# Patient Record
Sex: MALE | Race: WHITE | NOT HISPANIC OR LATINO | Employment: OTHER | URBAN - METROPOLITAN AREA
[De-identification: names, ages, dates, MRNs, and addresses within clinical notes are randomized per-mention and may not be internally consistent; named-entity substitution may affect disease eponyms.]

---

## 2017-03-23 ENCOUNTER — PATIENT OUTREACH (OUTPATIENT)
Dept: HEALTH INFORMATION MANAGEMENT | Facility: OTHER | Age: 77
End: 2017-03-23

## 2017-04-03 NOTE — PROGRESS NOTES
Attempt #:2    Verify PCP: yes    Communication Preference Obtained: yes     Review Care Team: yes    Annual Wellness Visit Scheduling  1. Scheduling Status:Scheduled       Care Gap Scheduling (Attempt to Schedule EACH Overdue Care Gap!)     Health Maintenance Due   Topic Date Due   • Annual Wellness Visit  1940   • COLONOSCOPY  04/17/1990   • IMM ZOSTER VACCINE  04/17/2000   • IMM PNEUMOCOCCAL 65+ (ADULT) LOW/MEDIUM RISK SERIES (2 of 2 - PCV13) 06/29/2016         [employer] Activation: sent activation code  [employer] Thomas: no  Virtual Visits: no  Opt In to Text Messages: no

## 2017-04-19 ENCOUNTER — TELEPHONE (OUTPATIENT)
Dept: MEDICAL GROUP | Facility: MEDICAL CENTER | Age: 77
End: 2017-04-19

## 2017-04-19 NOTE — TELEPHONE ENCOUNTER
ANNUAL WELLNESS VISIT PRE-VISIT PLANNING     1.  Reviewed last PCP office visit assessment and plan notes: No 12/2015    2.  If any orders were placed last visit do we have Results/Consult Notes?        •  Labs? Yes none completed       •  Imaging? No        •  Referrals? No    3.  Patient Care Coordination Note was updated with diagnosis information:  Yes    4.  Patient is due for these Health Maintenance Topics:   Health Maintenance Due   Topic Date Due   • Annual Wellness Visit  1940   • COLONOSCOPY  04/17/1990 will discuss with provider    • IMM ZOSTER VACCINE  04/17/2000   • IMM PNEUMOCOCCAL 65+ (ADULT) LOW/MEDIUM RISK SERIES (2 of 2 - PCV13) 06/29/2016 pt agrees           •  TATIANA letter was faxed to:   for  records    5.  Immunizations were updated in [place] using WebIZ?: Yes       •  Web Iz Recommendations:         •  Is patient due for Tdap/Shingles? No.  If yes, was patient alerted of copay? No    6.  Patient has:       •   Diabetes: TYPE 2    7.  Updated Care Team with [employer] and all specialists?        •   Gait devices, O2, CPAP, etc: no        •   Eye professional: no       •   Other specialists (GYN, cardiology, endo, etc): no    8.  Is patient in need of any refills prior to office visit? Yes       •    Separate refill encounter created?: no    9.  Patient was informed to arrive 15 min prior to their scheduled appointment and bring in their medication bottles? yes    10.  Patient was advised: “This is a free wellness visit. The provider will screen for medical conditions to help you stay healthy. If you have other concerns to address you may be asked to discuss these at a separate visit or there may be an additional fee.”  Yes

## 2017-04-21 ENCOUNTER — HOSPITAL ENCOUNTER (OUTPATIENT)
Facility: MEDICAL CENTER | Age: 77
End: 2017-04-21
Attending: INTERNAL MEDICINE
Payer: MEDICARE

## 2017-04-21 PROCEDURE — 82274 ASSAY TEST FOR BLOOD FECAL: CPT

## 2017-04-21 NOTE — TELEPHONE ENCOUNTER
PVP COMPLETE-    LAST EYE EXAM    ADVANCE DIRECTIVE-set     SPECIALTY COMMENT-updated      VACCINES-pcv-13    DM RN PROBLEM WITH DEVICE   COLON Q'S

## 2017-04-25 ENCOUNTER — OFFICE VISIT (OUTPATIENT)
Dept: MEDICAL GROUP | Facility: MEDICAL CENTER | Age: 77
End: 2017-04-25
Payer: MEDICARE

## 2017-04-25 VITALS
HEIGHT: 71 IN | DIASTOLIC BLOOD PRESSURE: 60 MMHG | OXYGEN SATURATION: 96 % | SYSTOLIC BLOOD PRESSURE: 122 MMHG | HEART RATE: 78 BPM | BODY MASS INDEX: 25.76 KG/M2 | TEMPERATURE: 97.5 F | WEIGHT: 184 LBS

## 2017-04-25 DIAGNOSIS — Z12.11 COLON CANCER SCREENING: ICD-10-CM

## 2017-04-25 DIAGNOSIS — I10 ESSENTIAL HYPERTENSION: ICD-10-CM

## 2017-04-25 DIAGNOSIS — H91.93 BILATERAL HEARING LOSS: ICD-10-CM

## 2017-04-25 DIAGNOSIS — Z23 NEED FOR PNEUMOCOCCAL VACCINATION: ICD-10-CM

## 2017-04-25 DIAGNOSIS — E55.9 VITAMIN D INSUFFICIENCY: ICD-10-CM

## 2017-04-25 DIAGNOSIS — Z85.46 HISTORY OF PROSTATE CANCER: ICD-10-CM

## 2017-04-25 DIAGNOSIS — Z00.00 MEDICARE ANNUAL WELLNESS VISIT, INITIAL: ICD-10-CM

## 2017-04-25 DIAGNOSIS — D64.9 ANEMIA, UNSPECIFIED TYPE: ICD-10-CM

## 2017-04-25 PROCEDURE — G0438 PPPS, INITIAL VISIT: HCPCS | Mod: 25 | Performed by: FAMILY MEDICINE

## 2017-04-25 PROCEDURE — G0009 ADMIN PNEUMOCOCCAL VACCINE: HCPCS | Performed by: FAMILY MEDICINE

## 2017-04-25 PROCEDURE — 90670 PCV13 VACCINE IM: CPT | Performed by: FAMILY MEDICINE

## 2017-04-25 ASSESSMENT — PATIENT HEALTH QUESTIONNAIRE - PHQ9: CLINICAL INTERPRETATION OF PHQ2 SCORE: 0

## 2017-04-25 NOTE — PROGRESS NOTES
Chief Complaint   Patient presents with   • Annual Wellness Visit         HPI:  Sammy Oseguera is a 77 y.o. male here for Medicare Annual Wellness Visit        Patient Active Problem List    Diagnosis Date Noted   • Anemia 12/15/2016   • Bilateral hearing loss 06/29/2015   • History of prostate cancer 06/29/2015   • Type 2 diabetes mellitus, uncontrolled (CMS-[facility]) 01/25/2015   • Hypertension 01/25/2015   • Vitamin D insufficiency 01/25/2015       Current Outpatient Prescriptions   Medication Sig Dispense Refill   • sitagliptan-metformin (JANUMET)  MG per tablet Take 1 Tab by mouth 2 times a day, with meals. 180 Tab 4   • cyanocobalamin (VITAMIN B12) 1000 MCG Tab Take 3 Tabs by mouth every day.     • vitamin D (CHOLECALCIFEROL) 1000 UNIT TABS Take 1,000 Units by mouth every day.       No current facility-administered medications for this visit.        Patient is taking medications as noted in medication list.  Current supplements as per medication list.   Chronic narcotic pain medicines: no    Allergies: Review of patient's allergies indicates no known allergies.    Current social contact/activities: Pt keeps himself busy with daily activities.      Is patient current with immunizations?  No, due for PREVNAR (PCV13) . Patient is interested in receiving PREVNAR (PCV13)  today.     He  reports that he has never smoked. He has never used smokeless tobacco. He reports that he drinks alcohol.  Counseling given: Not Answered        DPA/Advanced Directive:  Patient has Advanced Directive on file.     ROS:    Gait: Uses no assistive device   Ostomy: no   Other tubes: no   Amputations: no   Chronic oxygen use: no   Last eye exam: 4/24/17   Wears hearing aids: yes   : small amount of stress incontinence.       Screening:    DIABETES  1. Records requested for overdue HM topics specifically for diabetes? yes      a. If yes, specifically requested: Retinal eye exam     2. Has patient ever had diabetes education?  Yes      a. If so, when? Over the years       b. If not, is patient interested? no          Depression Screening    Little interest or pleasure in doing things?  0 - not at all  Feeling down, depressed, or hopeless?  0 - not at all  Patient Health Questionnaire Score: 0  If depressive symptoms identified deferred to follow up visit unless specifically addressed in assessment and plan.    Screening for Cognitive Impairment    Three Minute Recall (banana, sunrise, fence)  3/3    Draw clock face with all 12 numbers set to the hand to show 10 minutes past 11 o'clock  1 5/5  If cognitive concerns identified deferred to follow up visit unless specifically addressed in assessment and plan.    Fall Risk Assessment    Has the patient had two or more falls in the last year or any fall with injury in the last year?  Yes  If Fall Risk identified deferred to follow up visit unless specifically addressed in assessment and plan.    Safety Assessment    Throw rugs on floor.  Yes  Handrails on all stairs.  Yes  Good lighting in all hallways.  Yes  Difficulty hearing.  No  Patient counseled about all safety risks that were identified.    Functional Assessment ADLs    Are there any barriers preventing you from cooking for yourself or meeting nutritional needs?  No.    Are there any barriers preventing you from driving safely or obtaining transportation?  No.    Are there any barriers preventing you from using a telephone or calling for help?  No.    Are there any barriers preventing you from shopping?  No.    Are there any barriers preventing you from taking care of your own finances?  No.    Are there any barriers preventing you from managing your medications?  No.    Are currently engaging any exercise or physical activity?  Yes.  Pt walks, lifts weights and yoga.     Health Maintenance Summary                Annual Wellness Visit Overdue 1940     COLONOSCOPY Overdue 4/17/1990     IMM ZOSTER VACCINE Overdue 4/17/2000     IMM  "PNEUMOCOCCAL 65+ (ADULT) LOW/MEDIUM RISK SERIES Overdue 6/29/2016      Done 6/29/2015 Imm Admin: Pneumococcal polysaccharide vaccine (PPSV-23)    RETINAL SCREENING Overdue 4/22/2017      Done 4/22/2016 AMB REFERRAL FOR RETINAL SCREENING EXAM    A1C SCREENING Next Due 5/28/2017      Done 11/28/2016 HEMOGLOBIN A1C (A)     Patient has more history with this topic...    DIABETES MONOFILAMENT / LE EXAM Next Due 7/20/2017      Done 7/20/2016 AMB DIABETIC MONOFILAMENT LOWER EXTREMITY EXAM     Patient has more history with this topic...    FASTING LIPID PROFILE Next Due 11/28/2017      Done 11/28/2016 LIPID PROFILE     Patient has more history with this topic...    URINE ACR / MICROALBUMIN Next Due 11/28/2017      Done 11/28/2016 MICROALBUMIN CREAT RATIO URINE     Patient has more history with this topic...    SERUM CREATININE Next Due 11/28/2017      Done 11/28/2016 COMP METABOLIC PANEL (A)     Patient has more history with this topic...    IMM DTaP/Tdap/Td Vaccine Next Due 2/28/2026      Done 2/28/2016 Imm Admin: Tdap Vaccine          Patient Care Team:  Crow Olivas M.D. as PCP - General (Family Medicine)  Nitesh Young M.D. as Consulting Physician (Ophthalmology)    Social History   Substance Use Topics   • Smoking status: Never Smoker    • Smokeless tobacco: Never Used   • Alcohol Use: 0.0 oz/week     0 Standard drinks or equivalent per week      Comment: 4-5 weekly      Family History   Problem Relation Age of Onset   • Stroke Mother    • Stroke Father      He  has a past medical history of HTN and Type II or unspecified type diabetes mellitus without mention of complication, not stated as uncontrolled.   Past Surgical History   Procedure Laterality Date   • Laparoscopic gastric ulcer over sew     • Prostatectomy, radical retro             Exam:     Blood pressure 122/60, pulse 78, temperature 36.4 °C (97.5 °F), height 1.803 m (5' 11\"), weight 83.462 kg (184 lb), SpO2 96 %. Body mass index is 25.67 " kg/(m^2).    Hearing poor.    Dentition fair  Alert, oriented in no acute distress.  Eye contact is good, speech goal directed, affect calm      Assessment and Plan. The following treatment and monitoring plan is recommended:    1. Medicare annual wellness visit, initial      2. Need for pneumococcal vaccination  - PNEUMOCOCCAL CONJUGATE VACCINE 13-VALENT    3. Uncontrolled type 2 diabetes mellitus without complication, without long-term current use of insulin (CMS-[facility])  Stable. Continue current medication. Follow up in 2 months with labs.    4. Essential hypertension  Controlled off medication. Continue to monitor.    5. Vitamin D insufficiency  Continue supplementation.    6. Bilateral hearing loss  Referral to audiologist for reevaluation.    7. Anemia, unspecified type  Follow up with labs in 2 months.    8. History of prostate cancer  Stable. Continue to monitor.    9. Colon cancer screening  - REFERRAL TO GASTROENTEROLOGY        Services suggested: No services needed at this time  Health Care Screening recommendations as per orders if indicated.  Referrals offered: PT/OT/Nutrition counseling/Behavioral Health/Smoking cessation as per orders if indicated.    Discussion today about general wellness and lifestyle habits:    · Prevent falls and reduce trip hazards; Cautioned about securing or removing rugs.  · Have a working fire alarm and carbon monoxide detector;   · Engage in regular physical activity and social activities       Follow-up: Return in about 2 months (around 6/25/2017) for Diabetes, Short.

## 2017-04-25 NOTE — MR AVS SNAPSHOT
"        Sammy Oseguera   2017 10:00 AM   Office Visit   MRN: [id number]    Department:  [facility]   Dept Phone:  [phone]    Description:  Male : 1940   Provider:  Crow Olivas M.D.; [facility]            Reason for Visit     Annual Wellness Visit           Allergies as of 2017     No Known Allergies      You were diagnosed with     Medicare annual wellness visit, initial   [674534]       Need for pneumococcal vaccination   [333961]       Uncontrolled type 2 diabetes mellitus without complication, without long-term current use of insulin (CMS-[facility])   [1818351]       Essential hypertension   [2540928]       Vitamin D insufficiency   [932786]       Bilateral hearing loss   [930967]       Anemia, unspecified type   [1490281]       History of prostate cancer   [846988]       Colon cancer screening   [655281]         Vital Signs     Blood Pressure Pulse Temperature Height Weight Body Mass Index    122/60 mmHg 78 36.4 °C (97.5 °F) 1.803 m (5' 11\") 83.462 kg (184 lb) 25.67 kg/m2    Oxygen Saturation Smoking Status                96% Never Smoker           Basic Information     Date Of Birth Sex Race Ethnicity Preferred Language    1940 Male White Non- English      Your appointments     Marco 15, 2017 10:00 AM   Established Patient with Crow Olivas M.D.   [facility] ([facility])    [address]  [facility] [address] [phone]           You will be receiving a confirmation call a few days before your appointment from our automated call confirmation system.              Problem List              ICD-10-CM Priority Class Noted - Resolved    Type 2 diabetes mellitus, uncontrolled (CMS-[facility]) E11.65   2015 - Present    Hypertension I10   2015 - Present    Vitamin D insufficiency E55.9   2015 - Present    Bilateral hearing loss H91.93   2015 - Present    History of prostate cancer Z85.46   2015 - " Present    Anemia D64.9   12/15/2016 - Present      Health Maintenance        Date Due Completion Dates    COLONOSCOPY 4/17/1990 ---    IMM ZOSTER VACCINE 4/17/2000 ---    IMM PNEUMOCOCCAL 65+ (ADULT) LOW/MEDIUM RISK SERIES (2 of 2 - PCV13) 6/29/2016 6/29/2015    RETINAL SCREENING 4/22/2017 4/22/2016    A1C SCREENING 5/28/2017 11/28/2016, 7/14/2016, 1/20/2016, 7/16/2015, 4/13/2015, 11/21/2014, 7/29/2014, 4/21/2014, 11/26/2013, 8/29/2013, 4/19/2013, 12/6/2012, 8/30/2012, 5/8/2012    DIABETES MONOFILAMENT / LE EXAM 7/20/2017 7/20/2016, 4/22/2015 (Done)    Override on 4/22/2015: Done    FASTING LIPID PROFILE 11/28/2017 11/28/2016, 2/24/2016, 4/13/2015, 8/30/2012    URINE ACR / MICROALBUMIN 11/28/2017 11/28/2016, 2/24/2016, 4/21/2014, 8/29/2013, 12/6/2012, 8/30/2012    SERUM CREATININE 11/28/2017 11/28/2016, 7/14/2016, 2/24/2016, 10/23/2015, 7/16/2015, 4/13/2015, 11/21/2014, 7/29/2014, 4/21/2014, 11/26/2013, 8/29/2013, 4/19/2013, 12/6/2012, 8/30/2012, 5/8/2012, 11/22/2010    IMM DTaP/Tdap/Td Vaccine (2 - Td) 2/28/2026 2/28/2016            Current Immunizations     13-VALENT PCV PREVNAR 4/25/2017    Influenza Vaccine Adult HD 12/15/2016    Pneumococcal polysaccharide vaccine (PPSV-23) 6/29/2015    Tdap Vaccine 2/28/2016 11:29 AM    Typhoid Vaccine 9/2/2009      Below and/or attached are the medications your provider expects you to take. Review all of your home medications and newly ordered medications with your provider and/or pharmacist. Follow medication instructions as directed by your provider and/or pharmacist. Please keep your medication list with you and share with your provider. Update the information when medications are discontinued, doses are changed, or new medications (including over-the-counter products) are added; and carry medication information at all times in the event of emergency situations     Allergies:  No Known Allergies          Medications  Valid as of: April 25, 2017 - 11:06 AM    Generic Name  Brand Name Tablet Size Instructions for use    Cholecalciferol (Tab) cholecalciferol 1000 UNIT Take 1,000 Units by mouth every day.        Cyanocobalamin (Tab) VITAMIN B12 1000 MCG Take 3 Tabs by mouth every day.        SITagliptin-MetFORMIN HCl (Tab) JANUMET  MG Take 1 Tab by mouth 2 times a day, with meals.        .                 Medicines prescribed today were sent to:     [id number] - [place], [place] - [address]    [address] [place] [address]    Phone: [phone] Fax: [phone]    Open 24 Hours?: No      Medication refill instructions:       If your prescription bottle indicates you have medication refills left, it is not necessary to call your provider’s office. Please contact your pharmacy and they will refill your medication.    If your prescription bottle indicates you do not have any refills left, you may request refills at any time through one of the following ways: The online [email or web address] system (except Urgent Care), by calling your provider’s office, or by asking your pharmacy to contact your provider’s office with a refill request. Medication refills are processed only during regular business hours and may not be available until the next business day. Your provider may request additional information or to have a follow-up visit with you prior to refilling your medication.   *Please Note: Medication refills are assigned a new Rx number when refilled electronically. Your pharmacy may indicate that no refills were authorized even though a new prescription for the same medication is available at the pharmacy. Please request the medicine by name with the pharmacy before contacting your provider for a refill.        Referral     A referral request has been sent to our patient care coordination department. Please allow 3-5 business days for us to process this request and contact you either by phone or mail. If you do not hear from us by the 5th business day, please call us at [phone].           OggiFinogi Access Code: [email or web address]  Expires: 5/25/2017 11:06 AM    OggiFinogi  A secure, online tool to manage your health information     [employer]’s OggiFinogi® is a secure, online tool that connects you to your personalized health information from the privacy of your home -- day or night - making it very easy for you to manage your healthcare. Once the activation process is completed, you can even access your medical information using the OggiFinogi thomas, which is available for free in the Apple Thomas store or Google Play store.     OggiFinogi provides the following levels of access (as shown below):   My Chart Features   [facility] Primary Care Doctor [facility]  Specialists [facility]  Urgent  Care Non-[facility]  Primary Care  Doctor   Email your healthcare team securely and privately 24/7 X X X    Manage appointments: schedule your next appointment; view details of past/upcoming appointments X      Request prescription refills. X      View recent personal medical records, including lab and immunizations X X X X   View health record, including health history, allergies, medications X X X X   Read reports about your outpatient visits, procedures, consult and ER notes X X X X   See your discharge summary, which is a recap of your hospital and/or ER visit that includes your diagnosis, lab results, and care plan. X X       How to register for OggiFinogi:  1. Go to  [email or web address].  2. Click on the Sign Up Now box, which takes you to the New Member Sign Up page. You will need to provide the following information:  a. Enter your OggiFinogi Access Code exactly as it appears at the top of this page. (You will not need to use this code after you’ve completed the sign-up process. If you do not sign up before the expiration date, you must request a new code.)   b. Enter your date of birth.   c. Enter your home email address.   d. Click Submit, and follow the next screen’s instructions.  3. Create a OggiFinogi ID. This  will be your compropago login ID and cannot be changed, so think of one that is secure and easy to remember.  4. Create a compropago password. You can change your password at any time.  5. Enter your Password Reset Question and Answer. This can be used at a later time if you forget your password.   6. Enter your e-mail address. This allows you to receive e-mail notifications when new information is available in compropago.  7. Click Sign Up. You can now view your health information.    For assistance activating your compropago account, call [phone]

## 2017-04-26 DIAGNOSIS — Z12.11 COLON CANCER SCREENING: ICD-10-CM

## 2017-04-27 ENCOUNTER — TELEPHONE (OUTPATIENT)
Dept: MEDICAL GROUP | Facility: MEDICAL CENTER | Age: 77
End: 2017-04-27

## 2017-04-27 LAB — HEMOCCULT STL QL IA: NEGATIVE

## 2017-04-27 NOTE — TELEPHONE ENCOUNTER
----- Message from Crow Olivas M.D. sent at 4/27/2017  9:43 AM PDT -----  Please notify patient that stool test is negative for blood. This test should be repeated annually for colon cancer screening.  Crow Olivas MD

## 2017-05-09 ENCOUNTER — HOSPITAL ENCOUNTER (OUTPATIENT)
Dept: LAB | Facility: MEDICAL CENTER | Age: 77
End: 2017-05-09
Attending: FAMILY MEDICINE
Payer: MEDICARE

## 2017-05-09 DIAGNOSIS — D64.9 ANEMIA, UNSPECIFIED TYPE: ICD-10-CM

## 2017-05-09 DIAGNOSIS — Z85.46 HISTORY OF PROSTATE CANCER: ICD-10-CM

## 2017-05-09 DIAGNOSIS — I10 ESSENTIAL HYPERTENSION: ICD-10-CM

## 2017-05-09 LAB
ALBUMIN SERPL BCP-MCNC: 4.2 G/DL (ref 3.2–4.9)
ALBUMIN/GLOB SERPL: 1.6 G/DL
ALP SERPL-CCNC: 55 U/L (ref 30–99)
ALT SERPL-CCNC: 12 U/L (ref 2–50)
ANION GAP SERPL CALC-SCNC: 8 MMOL/L (ref 0–11.9)
AST SERPL-CCNC: 13 U/L (ref 12–45)
BASOPHILS # BLD AUTO: 0.9 % (ref 0–1.8)
BASOPHILS # BLD: 0.07 K/UL (ref 0–0.12)
BILIRUB SERPL-MCNC: 1 MG/DL (ref 0.1–1.5)
BUN SERPL-MCNC: 19 MG/DL (ref 8–22)
CALCIUM SERPL-MCNC: 9 MG/DL (ref 8.5–10.5)
CHLORIDE SERPL-SCNC: 107 MMOL/L (ref 96–112)
CHOLEST SERPL-MCNC: 114 MG/DL (ref 100–199)
CO2 SERPL-SCNC: 25 MMOL/L (ref 20–33)
CREAT SERPL-MCNC: 1.17 MG/DL (ref 0.5–1.4)
CREAT UR-MCNC: 107.3 MG/DL
EOSINOPHIL # BLD AUTO: 0.2 K/UL (ref 0–0.51)
EOSINOPHIL NFR BLD: 2.5 % (ref 0–6.9)
ERYTHROCYTE [DISTWIDTH] IN BLOOD BY AUTOMATED COUNT: 47.3 FL (ref 35.9–50)
EST. AVERAGE GLUCOSE BLD GHB EST-MCNC: 171 MG/DL
FERRITIN SERPL-MCNC: 27.1 NG/ML (ref 22–322)
GFR SERPL CREATININE-BSD FRML MDRD: >60 ML/MIN/1.73 M 2
GLOBULIN SER CALC-MCNC: 2.6 G/DL (ref 1.9–3.5)
GLUCOSE SERPL-MCNC: 163 MG/DL (ref 65–99)
HBA1C MFR BLD: 7.6 % (ref 0–5.6)
HCT VFR BLD AUTO: 40.2 % (ref 42–52)
HDLC SERPL-MCNC: 41 MG/DL
HGB BLD-MCNC: 13.3 G/DL (ref 14–18)
IMM GRANULOCYTES # BLD AUTO: 0.02 K/UL (ref 0–0.11)
IMM GRANULOCYTES NFR BLD AUTO: 0.2 % (ref 0–0.9)
IRON SATN MFR SERPL: 41 % (ref 15–55)
IRON SERPL-MCNC: 126 UG/DL (ref 50–180)
LDLC SERPL CALC-MCNC: 54 MG/DL
LYMPHOCYTES # BLD AUTO: 1.81 K/UL (ref 1–4.8)
LYMPHOCYTES NFR BLD: 22.4 % (ref 22–41)
MCH RBC QN AUTO: 33.1 PG (ref 27–33)
MCHC RBC AUTO-ENTMCNC: 33.1 G/DL (ref 33.7–35.3)
MCV RBC AUTO: 100 FL (ref 81.4–97.8)
MICROALBUMIN UR-MCNC: 6.1 MG/DL
MICROALBUMIN/CREAT UR: 57 MG/G (ref 0–30)
MONOCYTES # BLD AUTO: 0.61 K/UL (ref 0–0.85)
MONOCYTES NFR BLD AUTO: 7.6 % (ref 0–13.4)
NEUTROPHILS # BLD AUTO: 5.36 K/UL (ref 1.82–7.42)
NEUTROPHILS NFR BLD: 66.4 % (ref 44–72)
NRBC # BLD AUTO: 0 K/UL
NRBC BLD AUTO-RTO: 0 /100 WBC
PLATELET # BLD AUTO: 181 K/UL (ref 164–446)
PMV BLD AUTO: 10.3 FL (ref 9–12.9)
POTASSIUM SERPL-SCNC: 3.8 MMOL/L (ref 3.6–5.5)
PROT SERPL-MCNC: 6.8 G/DL (ref 6–8.2)
PSA SERPL-MCNC: 0.03 NG/ML (ref 0–4)
RBC # BLD AUTO: 4.02 M/UL (ref 4.7–6.1)
SODIUM SERPL-SCNC: 140 MMOL/L (ref 135–145)
TIBC SERPL-MCNC: 311 UG/DL (ref 250–450)
TRIGL SERPL-MCNC: 97 MG/DL (ref 0–149)
WBC # BLD AUTO: 8.1 K/UL (ref 4.8–10.8)

## 2017-05-09 PROCEDURE — 82043 UR ALBUMIN QUANTITATIVE: CPT

## 2017-05-09 PROCEDURE — 84153 ASSAY OF PSA TOTAL: CPT

## 2017-05-09 PROCEDURE — 82728 ASSAY OF FERRITIN: CPT

## 2017-05-09 PROCEDURE — 82570 ASSAY OF URINE CREATININE: CPT

## 2017-05-09 PROCEDURE — 85025 COMPLETE CBC W/AUTO DIFF WBC: CPT

## 2017-05-09 PROCEDURE — 83540 ASSAY OF IRON: CPT

## 2017-05-09 PROCEDURE — 80053 COMPREHEN METABOLIC PANEL: CPT

## 2017-05-09 PROCEDURE — 80061 LIPID PANEL: CPT

## 2017-05-09 PROCEDURE — 36415 COLL VENOUS BLD VENIPUNCTURE: CPT

## 2017-05-09 PROCEDURE — 83550 IRON BINDING TEST: CPT

## 2017-05-09 PROCEDURE — 83036 HEMOGLOBIN GLYCOSYLATED A1C: CPT

## 2017-05-30 ENCOUNTER — OFFICE VISIT (OUTPATIENT)
Dept: MEDICAL GROUP | Facility: MEDICAL CENTER | Age: 77
End: 2017-05-30
Payer: MEDICARE

## 2017-05-30 VITALS
HEART RATE: 77 BPM | SYSTOLIC BLOOD PRESSURE: 154 MMHG | WEIGHT: 185 LBS | TEMPERATURE: 97.8 F | DIASTOLIC BLOOD PRESSURE: 82 MMHG | OXYGEN SATURATION: 100 % | BODY MASS INDEX: 25.9 KG/M2 | HEIGHT: 71 IN

## 2017-05-30 DIAGNOSIS — R60.0 LEG EDEMA, LEFT: ICD-10-CM

## 2017-05-30 DIAGNOSIS — I10 ESSENTIAL HYPERTENSION: ICD-10-CM

## 2017-05-30 DIAGNOSIS — D64.9 ANEMIA, UNSPECIFIED TYPE: ICD-10-CM

## 2017-05-30 DIAGNOSIS — E55.9 VITAMIN D INSUFFICIENCY: ICD-10-CM

## 2017-05-30 PROCEDURE — 1100F PTFALLS ASSESS-DOCD GE2>/YR: CPT | Performed by: FAMILY MEDICINE

## 2017-05-30 PROCEDURE — 4040F PNEUMOC VAC/ADMIN/RCVD: CPT | Performed by: FAMILY MEDICINE

## 2017-05-30 PROCEDURE — G8432 DEP SCR NOT DOC, RNG: HCPCS | Performed by: FAMILY MEDICINE

## 2017-05-30 PROCEDURE — 99214 OFFICE O/P EST MOD 30 MIN: CPT | Performed by: FAMILY MEDICINE

## 2017-05-30 PROCEDURE — 0518F FALL PLAN OF CARE DOCD: CPT | Mod: 8P | Performed by: FAMILY MEDICINE

## 2017-05-30 PROCEDURE — 3288F FALL RISK ASSESSMENT DOCD: CPT | Performed by: FAMILY MEDICINE

## 2017-05-30 PROCEDURE — 1036F TOBACCO NON-USER: CPT | Performed by: FAMILY MEDICINE

## 2017-05-30 PROCEDURE — G8419 CALC BMI OUT NRM PARAM NOF/U: HCPCS | Performed by: FAMILY MEDICINE

## 2017-05-30 NOTE — PROGRESS NOTES
"Subjective:   Sammy Oseguera is a 77 y.o. male here today for diabetes    Anemia  Patient has a macrocytic anemia. He is taking vitamin B-12 supplementation. Anemia has gotten slightly worse over the last 6 months.    Hypertension  Patient is not currently on blood pressure medication.    Leg edema, left  For last 1.5 years patient has noticed leg swelling in the left leg more than the right leg. Right leg is very minimally swollen at times. Left leg is more significantly swollen at the end of the day. When he wakes up in the morning leg swelling is much improved.  He states he has had trauma to his left leg in the past, but denies any surgery of the left lower extremity.    Type 2 diabetes mellitus, uncontrolled  Patient has not been exercising and watching his diet closely. He is on Janumet  grams twice daily, which he is tolerating well. His A1c did increase from 7.1 up to now 7.6.    Vitamin D insufficiency  Patient is taking vitamin D supplementation daily.         Current medicines (including changes today)  Current Outpatient Prescriptions   Medication Sig Dispense Refill   • sitagliptan-metformin (JANUMET)  MG per tablet Take 1 Tab by mouth 2 times a day, with meals. 180 Tab 4   • cyanocobalamin (VITAMIN B12) 1000 MCG Tab Take 3 Tabs by mouth every day.     • vitamin D (CHOLECALCIFEROL) 1000 UNIT TABS Take 1,000 Units by mouth every day.       No current facility-administered medications for this visit.     He  has a past medical history of HTN and Type II or unspecified type diabetes mellitus without mention of complication, not stated as uncontrolled.    ROS   No chest pain, no shortness of breath       Objective:     Blood pressure 154/82, pulse 77, temperature 36.6 °C (97.8 °F), height 1.803 m (5' 11\"), weight 83.915 kg (185 lb), SpO2 100 %. Body mass index is 25.81 kg/(m^2).   Physical Exam:  Constitutional: Alert, no distress.  Skin: Warm, dry, good turgor, no rashes in visible " areas.  Eye: Equal, round and reactive, conjunctiva clear, lids normal.  Psych: Alert and oriented x3, normal affect and mood.  Left leg with +2 pitting pedal edema  Right leg with +1 pitting pedal edema      Assessment and Plan:   The following treatment plan was discussed    1. Uncontrolled type 2 diabetes mellitus without complication, without long-term current use of insulin (CMS-[facility])  Advised diet and exercise. Continue current medication. Follow up with labs in 6 months.  - COMP METABOLIC PANEL; Future  - HEMOGLOBIN A1C; Future  - MICROALBUMIN CREAT RATIO URINE; Future  - LIPID PROFILE; Future    2. Essential hypertension  Slightly elevated today. Follow-up in 6 months to review again, consider ACE inhibitor if still elevated.  - CBC WITH DIFFERENTIAL; Future    3. Vitamin D insufficiency  Controlled. Continue current supplementation.    4. Anemia, unspecified type  Check labs and call with results. If still not clear etiology, consider hematology referral.  - VITAMIN B12; Future  - FOLATE; Future  - IRON/TOTAL IRON BIND; Future  - RETICULOCYTES COUNT; Future    5. Leg edema, left  Most likely venous insufficiency. Ultrasound ordered to rule out DVT, call with results.  - US-EXTREMITY VENOUS UNILATERAL-LOWER LEFT; Future      Followup: Return in about 6 months (around 11/30/2017) for Diabetes, Short.

## 2017-05-30 NOTE — ASSESSMENT & PLAN NOTE
Patient has not been exercising and watching his diet closely. He is on Janumet  grams twice daily, which he is tolerating well. His A1c did increase from 7.1 up to now 7.6.

## 2017-05-30 NOTE — MR AVS SNAPSHOT
"        Sammy Oesguera   2017 9:40 AM   Office Visit   MRN: [id number]    Department:  [facility]   Dept Phone:  [phone]    Description:  Male : 1940   Provider:  Crow Olivas M.D.           Reason for Visit     Knee Injury fell x3days. swollen legs      Allergies as of 2017     No Known Allergies      You were diagnosed with     Uncontrolled type 2 diabetes mellitus without complication, without long-term current use of insulin (CMS-HCC)   [6386093]       Essential hypertension   [8769426]       Vitamin D insufficiency   [906181]       Anemia, unspecified type   [3527363]       Leg edema, left   [397864]         Vital Signs     Blood Pressure Pulse Temperature Height Weight Body Mass Index    154/82 mmHg 77 36.6 °C (97.8 °F) 1.803 m (5' 11\") 83.915 kg (185 lb) 25.81 kg/m2    Oxygen Saturation Smoking Status                100% Never Smoker           Basic Information     Date Of Birth Sex Race Ethnicity Preferred Language    1940 Male White Non- English      Your appointments     Nov 15, 2017 10:00 AM   Established Patient with Crow Olivas M.D.   [facility] ([facility])    [address]  [facility] [address] [phone]           You will be receiving a confirmation call a few days before your appointment from our automated call confirmation system.              Problem List              ICD-10-CM Priority Class Noted - Resolved    Type 2 diabetes mellitus, uncontrolled (CMS-HCC) E11.65   2015 - Present    Hypertension I10   2015 - Present    Vitamin D insufficiency E55.9   2015 - Present    Bilateral hearing loss H91.93   2015 - Present    History of prostate cancer Z85.46   2015 - Present    Anemia D64.9   12/15/2016 - Present    Leg edema, left R60.0   2017 - Present      Health Maintenance        Date Due Completion Dates    IMM ZOSTER VACCINE 2000 ---    RETINAL SCREENING 2017 " 4/22/2016    DIABETES MONOFILAMENT / LE EXAM 7/20/2017 7/20/2016, 4/22/2015 (Done)    Override on 4/22/2015: Done    A1C SCREENING 11/9/2017 5/9/2017, 11/28/2016, 7/14/2016, 1/20/2016, 7/16/2015, 4/13/2015, 11/21/2014, 7/29/2014, 4/21/2014, 11/26/2013, 8/29/2013, 4/19/2013, 12/6/2012, 8/30/2012, 5/8/2012    COLON CANCER SCREENING ANNUAL FIT 4/21/2018 4/21/2017    FASTING LIPID PROFILE 5/9/2018 5/9/2017, 11/28/2016, 2/24/2016, 4/13/2015, 8/30/2012    URINE ACR / MICROALBUMIN 5/9/2018 5/9/2017, 11/28/2016, 2/24/2016, 4/21/2014, 8/29/2013, 12/6/2012, 8/30/2012    SERUM CREATININE 5/9/2018 5/9/2017, 11/28/2016, 7/14/2016, 2/24/2016, 10/23/2015, 7/16/2015, 4/13/2015, 11/21/2014, 7/29/2014, 4/21/2014, 11/26/2013, 8/29/2013, 4/19/2013, 12/6/2012, 8/30/2012, 5/8/2012, 11/22/2010    IMM DTaP/Tdap/Td Vaccine (2 - Td) 2/28/2026 2/28/2016            Current Immunizations     13-VALENT PCV PREVNAR 4/25/2017    Influenza Vaccine Adult HD 12/15/2016    Pneumococcal polysaccharide vaccine (PPSV-23) 6/29/2015    Tdap Vaccine 2/28/2016 11:29 AM    Typhoid Vaccine 9/2/2009      Below and/or attached are the medications your provider expects you to take. Review all of your home medications and newly ordered medications with your provider and/or pharmacist. Follow medication instructions as directed by your provider and/or pharmacist. Please keep your medication list with you and share with your provider. Update the information when medications are discontinued, doses are changed, or new medications (including over-the-counter products) are added; and carry medication information at all times in the event of emergency situations     Allergies:  No Known Allergies          Medications  Valid as of: May 30, 2017 - 10:14 AM    Generic Name Brand Name Tablet Size Instructions for use    Cholecalciferol (Tab) cholecalciferol 1000 UNIT Take 1,000 Units by mouth every day.        Cyanocobalamin (Tab) VITAMIN B12 1000 MCG Take 3 Tabs by mouth  every day.        SITagliptin-MetFORMIN HCl (Tab) JANUMET  MG Take 1 Tab by mouth 2 times a day, with meals.        .                 Medicines prescribed today were sent to:     [id number] - [place], [place] - [address]    [address] [place] [address]    Phone: [phone] Fax: [phone]    Open 24 Hours?: No      Medication refill instructions:       If your prescription bottle indicates you have medication refills left, it is not necessary to call your provider’s office. Please contact your pharmacy and they will refill your medication.    If your prescription bottle indicates you do not have any refills left, you may request refills at any time through one of the following ways: The online [employer] system (except Urgent Care), by calling your provider’s office, or by asking your pharmacy to contact your provider’s office with a refill request. Medication refills are processed only during regular business hours and may not be available until the next business day. Your provider may request additional information or to have a follow-up visit with you prior to refilling your medication.   *Please Note: Medication refills are assigned a new Rx number when refilled electronically. Your pharmacy may indicate that no refills were authorized even though a new prescription for the same medication is available at the pharmacy. Please request the medicine by name with the pharmacy before contacting your provider for a refill.        Your To Do List     Future Labs/Procedures Complete By Expires    CBC WITH DIFFERENTIAL  As directed 5/31/2018    COMP METABOLIC PANEL  As directed 5/31/2018    FOLATE  As directed 5/31/2018    HEMOGLOBIN A1C  As directed 5/31/2018    IRON/TOTAL IRON BIND  As directed 5/31/2018    LIPID PROFILE  As directed 5/31/2018    MICROALBUMIN CREAT RATIO URINE  As directed 5/31/2018    RETICULOCYTES COUNT  As directed 5/30/2018    US-EXTREMITY VENOUS UNILATERAL-LOWER LEFT  As  directed 11/30/2017    VITAMIN B12  As directed 5/31/2018         Medical [employer] Access Code: [email or web address]  Expires: 6/29/2017  9:32 AM    Medical Breakthroughs Fund  A secure, online tool to manage your health information     [employer]’s Medical Breakthrough[employer] Fund® is a secure, online tool that connects you to your personalized health information from the privacy of your home -- day or night - making it very easy for you to manage your healthcare. Once the activation process is completed, you can even access your medical information using the Medical [employer] Fund thomas, which is available for free in the Apple Thomas store or Google Play store.     Medical [employer] Fund provides the following levels of access (as shown below):   My Chart Features   [facility] Doctor [facility]  Specialists [facility]  Urgent  Care Non-[facility]  Primary Care  Doctor   Email your healthcare team securely and privately 24/7 X X X    Manage appointments: schedule your next appointment; view details of past/upcoming appointments X      Request prescription refills. X      View recent personal medical records, including lab and immunizations X X X X   View health record, including health history, allergies, medications X X X X   Read reports about your outpatient visits, procedures, consult and ER notes X X X X   See your discharge summary, which is a recap of your hospital and/or ER visit that includes your diagnosis, lab results, and care plan. X X       How to register for Medical Breakthroughs Fund:  1. Go to  [email or web address].  2. Click on the Sign Up Now box, which takes you to the New Member Sign Up page. You will need to provide the following information:  a. Enter your Medical Breakthroughs Fund Access Code exactly as it appears at the top of this page. (You will not need to use this code after you’ve completed the sign-up process. If you do not sign up before the expiration date, you must request a new code.)   b. Enter your date of birth.   c. Enter your home email address.   d. Click Submit, and follow the next screen’s  instructions.  3. Create a [employer]ist ID. This will be your [employer]ist login ID and cannot be changed, so think of one that is secure and easy to remember.  4. Create a [employer]ist password. You can change your password at any time.  5. Enter your Password Reset Question and Answer. This can be used at a later time if you forget your password.   6. Enter your e-mail address. This allows you to receive e-mail notifications when new information is available in [employer].  7. Click Sign Up. You can now view your health information.    For assistance activating your [employer] account, call [phone]

## 2017-05-30 NOTE — ASSESSMENT & PLAN NOTE
Patient has a macrocytic anemia. He is taking vitamin B-12 supplementation. Anemia has gotten slightly worse over the last 6 months.

## 2017-05-30 NOTE — ASSESSMENT & PLAN NOTE
For last 1.5 years patient has noticed leg swelling in the left leg more than the right leg. Right leg is very minimally swollen at times. Left leg is more significantly swollen at the end of the day. When he wakes up in the morning leg swelling is much improved.  He states he has had trauma to his left leg in the past, but denies any surgery of the left lower extremity.

## 2017-05-31 ENCOUNTER — HOSPITAL ENCOUNTER (OUTPATIENT)
Dept: LAB | Facility: MEDICAL CENTER | Age: 77
End: 2017-05-31
Attending: FAMILY MEDICINE
Payer: MEDICARE

## 2017-05-31 DIAGNOSIS — D64.9 ANEMIA, UNSPECIFIED TYPE: ICD-10-CM

## 2017-05-31 LAB
FOLATE SERPL-MCNC: >23.7 NG/ML
HGB RETIC QN AUTO: 34.5 PG/CELL (ref 29–35)
IMM RETICS NFR: 11.4 % (ref 9.3–17.4)
IRON SATN MFR SERPL: 18 % (ref 15–55)
IRON SERPL-MCNC: 59 UG/DL (ref 50–180)
RETICS # AUTO: 0.05 M/UL (ref 0.04–0.06)
RETICS/RBC NFR: 1.3 % (ref 0.8–2.1)
TIBC SERPL-MCNC: 323 UG/DL (ref 250–450)
VIT B12 SERPL-MCNC: >1500 PG/ML (ref 211–911)

## 2017-05-31 PROCEDURE — 82607 VITAMIN B-12: CPT

## 2017-05-31 PROCEDURE — 83540 ASSAY OF IRON: CPT

## 2017-05-31 PROCEDURE — 82746 ASSAY OF FOLIC ACID SERUM: CPT

## 2017-05-31 PROCEDURE — 83550 IRON BINDING TEST: CPT

## 2017-05-31 PROCEDURE — 85046 RETICYTE/HGB CONCENTRATE: CPT

## 2017-05-31 PROCEDURE — 36415 COLL VENOUS BLD VENIPUNCTURE: CPT

## 2017-06-01 ENCOUNTER — TELEPHONE (OUTPATIENT)
Dept: MEDICAL GROUP | Facility: MEDICAL CENTER | Age: 77
End: 2017-06-01

## 2017-06-01 NOTE — TELEPHONE ENCOUNTER
----- Message from Crow Olivas M.D. sent at 6/1/2017  7:56 AM PDT -----  Please notify the patient that vitamin B-12, folate, iron counts are normal. He should continue with vitamin B-12 supplementation. At this point we will do a recheck on his blood counts in 6 months.  Repeat blood count tests were given recent appointment to be done in 6 months.  Crow Olivas M.D.

## 2017-06-02 ENCOUNTER — TELEPHONE (OUTPATIENT)
Dept: MEDICAL GROUP | Facility: MEDICAL CENTER | Age: 77
End: 2017-06-02

## 2017-06-02 ENCOUNTER — HOSPITAL ENCOUNTER (OUTPATIENT)
Dept: RADIOLOGY | Facility: MEDICAL CENTER | Age: 77
End: 2017-06-02
Attending: FAMILY MEDICINE
Payer: MEDICARE

## 2017-06-02 DIAGNOSIS — M25.562 LEFT KNEE PAIN, UNSPECIFIED CHRONICITY: ICD-10-CM

## 2017-06-02 DIAGNOSIS — H91.93 BILATERAL HEARING LOSS, UNSPECIFIED HEARING LOSS TYPE: ICD-10-CM

## 2017-06-02 DIAGNOSIS — R60.0 LEG EDEMA, LEFT: ICD-10-CM

## 2017-06-02 PROCEDURE — 93971 EXTREMITY STUDY: CPT

## 2017-06-02 NOTE — TELEPHONE ENCOUNTER
----- Message from Rachel Mccormick sent at 6/2/2017  8:21 AM PDT -----  Regarding: REFERRAL TO ORTHOPEDIST  Contact: [phone]  Patient came in hard of hearing says wants dr eng to refer to orthopedic surgeon. Did not say for what. Can you please ask Dr Eng and call the patient?    Thanks,  Rachel

## 2017-06-05 NOTE — TELEPHONE ENCOUNTER
I'm not sure why he wanted a referral to the orthopedic surgeon, we will need to ask him again.  I did place a referral to the audiologist for hearing aids because he was complaining of difficulty hearing.  Crow Olivas M.D.

## 2017-06-06 ENCOUNTER — TELEPHONE (OUTPATIENT)
Dept: MEDICAL GROUP | Facility: MEDICAL CENTER | Age: 77
End: 2017-06-06

## 2017-06-06 NOTE — TELEPHONE ENCOUNTER
----- Message from Crow Olivas M.D. sent at 6/6/2017  7:38 AM PDT -----  Please notify patient that there is no blood clot in legs.  Crow Olivas M.D.

## 2017-09-14 ENCOUNTER — HOSPITAL ENCOUNTER (OUTPATIENT)
Dept: RADIOLOGY | Facility: MEDICAL CENTER | Age: 77
End: 2017-09-14
Attending: FAMILY MEDICINE
Payer: MEDICARE

## 2017-09-14 ENCOUNTER — TELEPHONE (OUTPATIENT)
Dept: MEDICAL GROUP | Facility: MEDICAL CENTER | Age: 77
End: 2017-09-14

## 2017-09-14 ENCOUNTER — OFFICE VISIT (OUTPATIENT)
Dept: MEDICAL GROUP | Facility: MEDICAL CENTER | Age: 77
End: 2017-09-14
Payer: MEDICARE

## 2017-09-14 VITALS
WEIGHT: 176 LBS | OXYGEN SATURATION: 99 % | RESPIRATION RATE: 16 BRPM | HEART RATE: 60 BPM | DIASTOLIC BLOOD PRESSURE: 82 MMHG | BODY MASS INDEX: 24.64 KG/M2 | HEIGHT: 71 IN | TEMPERATURE: 97.6 F | SYSTOLIC BLOOD PRESSURE: 122 MMHG

## 2017-09-14 DIAGNOSIS — R07.81 RIB PAIN: ICD-10-CM

## 2017-09-14 DIAGNOSIS — R09.82 POST-NASAL DRAINAGE: ICD-10-CM

## 2017-09-14 PROCEDURE — 71100 X-RAY EXAM RIBS UNI 2 VIEWS: CPT

## 2017-09-14 PROCEDURE — 99214 OFFICE O/P EST MOD 30 MIN: CPT | Performed by: FAMILY MEDICINE

## 2017-09-14 RX ORDER — FLUTICASONE PROPIONATE 50 MCG
2 SPRAY, SUSPENSION (ML) NASAL DAILY
Qty: 16 G | Refills: 3 | Status: SHIPPED | OUTPATIENT
Start: 2017-09-14 | End: 2018-07-19

## 2017-09-14 NOTE — TELEPHONE ENCOUNTER
----- Message from Crow Olivas M.D. sent at 9/14/2017  4:49 PM PDT -----  Please notify patient and he does have a rib fracture on the right anterior seventh rib. It is nondisplaced and there is no involvement with the lung.  This will take an additional 5-6 weeks to heal.  Crow Olivas M.D.

## 2017-09-14 NOTE — ASSESSMENT & PLAN NOTE
Patient's last A1c was 7.6. He is on Janumet. He denies any neuropathy. He has an appointment with follow-up labs in 2 months.

## 2017-09-14 NOTE — PROGRESS NOTES
"Subjective:   Sammy Oseguera is a 77 y.o. male here today for Rib pain and postnasal drainage    Rib pain  Fell at home about 10 days ago and hit his anterior left side and posterior right side.  Worse with sleeping on his side and going over bumps in car.    Post-nasal drainage  Clearing throat frequently, worse with laying flat, coughing through the night. He has not tried any medication.    Type 2 diabetes mellitus, uncontrolled  Patient's last A1c was 7.6. He is on Janumet. He denies any neuropathy. He has an appointment with follow-up labs in 2 months.         Current medicines (including changes today)  Current Outpatient Prescriptions   Medication Sig Dispense Refill   • fluticasone (FLONASE) 50 MCG/ACT nasal spray Spray 2 Sprays in nose every day. 16 g 3   • JANUMET  MG per tablet TAKE 1 TAB BY MOUTH 2 TIMES A DAY, WITH MEALS. 180 Tab 3   • cyanocobalamin (VITAMIN B12) 1000 MCG Tab Take 3 Tabs by mouth every day.     • vitamin D (CHOLECALCIFEROL) 1000 UNIT TABS Take 1,000 Units by mouth every day.       No current facility-administered medications for this visit.      He  has a past medical history of HTN and Type II or unspecified type diabetes mellitus without mention of complication, not stated as uncontrolled.    ROS   No chest pain, no shortness of breath     Objective:     Blood pressure 122/82, pulse 60, temperature 36.4 °C (97.6 °F), resp. rate 16, height 1.803 m (5' 11\"), weight 79.8 kg (176 lb), SpO2 99 %. Body mass index is 24.55 kg/m².   Physical Exam:  Constitutional: Alert, no distress.  Skin: Warm, dry, good turgor, no rashes in visible areas.  Eye: Equal, round and reactive, conjunctiva clear, lids normal.  ENMT: Lips without lesions, good dentition, oropharynx clear.Thin mucus bilateral naris  Respiratory: Unlabored respiratory effort, lungs clear to auscultation, no wheezes, no ronchi.  Cardiovascular: Normal S1, S2, no murmur, no edema.  Psych: Alert and oriented x3, normal " affect and mood.    Monofilament testing with a 10 gram force: sensation intact: intact bilaterally  Visual Inspection: Feet without maceration, ulcers, fissures.  Pedal pulses: intact bilaterally      Assessment and Plan:   The following treatment plan was discussed    1. Rib pain  X-ray of ribs ordered to evaluate for possible contusion versus fracture.  - [id number] (W/O CXR); Future    2. Post-nasal drainage  New problem. Trial of Flonase. If no improvement consider adding Astelin nasal spray.  - fluticasone (FLONASE) 50 MCG/ACT nasal spray; Spray 2 Sprays in nose every day.  Dispense: 16 g; Refill: 3    3. Uncontrolled type 2 diabetes mellitus without complication, without long-term current use of insulin (CMS-[facility])  Follow-up in 2 months with labs.  - Diabetic Monofilament Lower Extremity Exam      Followup: Return if symptoms worsen or fail to improve.

## 2017-09-14 NOTE — ASSESSMENT & PLAN NOTE
Clearing throat frequently, worse with laying flat, coughing through the night. He has not tried any medication.

## 2017-09-14 NOTE — ASSESSMENT & PLAN NOTE
Fell at home about 10 days ago and hit his anterior left side and posterior right side.  Worse with sleeping on his side and going over bumps in car.

## 2017-11-13 ENCOUNTER — HOSPITAL ENCOUNTER (EMERGENCY)
Facility: MEDICAL CENTER | Age: 77
End: 2017-11-13
Attending: EMERGENCY MEDICINE
Payer: MEDICARE

## 2017-11-13 ENCOUNTER — APPOINTMENT (OUTPATIENT)
Dept: RADIOLOGY | Facility: MEDICAL CENTER | Age: 77
End: 2017-11-13
Attending: EMERGENCY MEDICINE
Payer: MEDICARE

## 2017-11-13 VITALS
HEIGHT: 71 IN | TEMPERATURE: 97.7 F | RESPIRATION RATE: 18 BRPM | BODY MASS INDEX: 30.15 KG/M2 | OXYGEN SATURATION: 94 % | WEIGHT: 215.39 LBS | SYSTOLIC BLOOD PRESSURE: 199 MMHG | HEART RATE: 73 BPM | DIASTOLIC BLOOD PRESSURE: 87 MMHG

## 2017-11-13 DIAGNOSIS — G44.209 TENSION HEADACHE: ICD-10-CM

## 2017-11-13 LAB
ANION GAP SERPL CALC-SCNC: 11 MMOL/L (ref 0–11.9)
BASOPHILS # BLD AUTO: 0.9 % (ref 0–1.8)
BASOPHILS # BLD: 0.08 K/UL (ref 0–0.12)
BUN SERPL-MCNC: 19 MG/DL (ref 8–22)
CALCIUM SERPL-MCNC: 9.4 MG/DL (ref 8.4–10.2)
CHLORIDE SERPL-SCNC: 100 MMOL/L (ref 96–112)
CO2 SERPL-SCNC: 25 MMOL/L (ref 20–33)
CREAT SERPL-MCNC: 1.21 MG/DL (ref 0.5–1.4)
EKG IMPRESSION: NORMAL
EOSINOPHIL # BLD AUTO: 0.13 K/UL (ref 0–0.51)
EOSINOPHIL NFR BLD: 1.5 % (ref 0–6.9)
ERYTHROCYTE [DISTWIDTH] IN BLOOD BY AUTOMATED COUNT: 51.6 FL (ref 35.9–50)
GFR SERPL CREATININE-BSD FRML MDRD: 58 ML/MIN/1.73 M 2
GLUCOSE SERPL-MCNC: 169 MG/DL (ref 65–99)
HCT VFR BLD AUTO: 38.7 % (ref 42–52)
HGB BLD-MCNC: 12.9 G/DL (ref 14–18)
IMM GRANULOCYTES # BLD AUTO: 0.02 K/UL (ref 0–0.11)
IMM GRANULOCYTES NFR BLD AUTO: 0.2 % (ref 0–0.9)
LYMPHOCYTES # BLD AUTO: 1.22 K/UL (ref 1–4.8)
LYMPHOCYTES NFR BLD: 13.7 % (ref 22–41)
MCH RBC QN AUTO: 33.5 PG (ref 27–33)
MCHC RBC AUTO-ENTMCNC: 33.3 G/DL (ref 33.7–35.3)
MCV RBC AUTO: 100.5 FL (ref 81.4–97.8)
MONOCYTES # BLD AUTO: 0.65 K/UL (ref 0–0.85)
MONOCYTES NFR BLD AUTO: 7.3 % (ref 0–13.4)
NEUTROPHILS # BLD AUTO: 6.81 K/UL (ref 1.82–7.42)
NEUTROPHILS NFR BLD: 76.4 % (ref 44–72)
NRBC # BLD AUTO: 0 K/UL
NRBC BLD AUTO-RTO: 0 /100 WBC
PLATELET # BLD AUTO: 178 K/UL (ref 164–446)
PMV BLD AUTO: 10.4 FL (ref 9–12.9)
POTASSIUM SERPL-SCNC: 3.7 MMOL/L (ref 3.6–5.5)
RBC # BLD AUTO: 3.85 M/UL (ref 4.7–6.1)
SODIUM SERPL-SCNC: 136 MMOL/L (ref 135–145)
WBC # BLD AUTO: 8.9 K/UL (ref 4.8–10.8)

## 2017-11-13 PROCEDURE — 96375 TX/PRO/DX INJ NEW DRUG ADDON: CPT | Mod: XU

## 2017-11-13 PROCEDURE — 36415 COLL VENOUS BLD VENIPUNCTURE: CPT

## 2017-11-13 PROCEDURE — 70496 CT ANGIOGRAPHY HEAD: CPT

## 2017-11-13 PROCEDURE — 700102 HCHG RX REV CODE 250 W/ 637 OVERRIDE(OP): Performed by: EMERGENCY MEDICINE

## 2017-11-13 PROCEDURE — 70450 CT HEAD/BRAIN W/O DYE: CPT

## 2017-11-13 PROCEDURE — 99284 EMERGENCY DEPT VISIT MOD MDM: CPT

## 2017-11-13 PROCEDURE — 96374 THER/PROPH/DIAG INJ IV PUSH: CPT | Mod: XU

## 2017-11-13 PROCEDURE — 93005 ELECTROCARDIOGRAM TRACING: CPT | Performed by: EMERGENCY MEDICINE

## 2017-11-13 PROCEDURE — 85025 COMPLETE CBC W/AUTO DIFF WBC: CPT

## 2017-11-13 PROCEDURE — 700111 HCHG RX REV CODE 636 W/ 250 OVERRIDE (IP): Performed by: EMERGENCY MEDICINE

## 2017-11-13 PROCEDURE — A9270 NON-COVERED ITEM OR SERVICE: HCPCS | Performed by: EMERGENCY MEDICINE

## 2017-11-13 PROCEDURE — 80048 BASIC METABOLIC PNL TOTAL CA: CPT

## 2017-11-13 PROCEDURE — 93005 ELECTROCARDIOGRAM TRACING: CPT

## 2017-11-13 RX ORDER — KETOROLAC TROMETHAMINE 30 MG/ML
15 INJECTION, SOLUTION INTRAMUSCULAR; INTRAVENOUS ONCE
Status: COMPLETED | OUTPATIENT
Start: 2017-11-13 | End: 2017-11-13

## 2017-11-13 RX ORDER — BUTALBITAL, ACETAMINOPHEN AND CAFFEINE 50; 325; 40 MG/1; MG/1; MG/1
1 TABLET ORAL ONCE
Status: COMPLETED | OUTPATIENT
Start: 2017-11-13 | End: 2017-11-13

## 2017-11-13 RX ORDER — HYDROCODONE BITARTRATE AND ACETAMINOPHEN 5; 325 MG/1; MG/1
1 TABLET ORAL ONCE
Status: COMPLETED | OUTPATIENT
Start: 2017-11-13 | End: 2017-11-13

## 2017-11-13 RX ORDER — METOCLOPRAMIDE HYDROCHLORIDE 5 MG/ML
5 INJECTION INTRAMUSCULAR; INTRAVENOUS ONCE
Status: COMPLETED | OUTPATIENT
Start: 2017-11-13 | End: 2017-11-13

## 2017-11-13 RX ORDER — DIPHENHYDRAMINE HYDROCHLORIDE 50 MG/ML
25 INJECTION INTRAMUSCULAR; INTRAVENOUS ONCE
Status: COMPLETED | OUTPATIENT
Start: 2017-11-13 | End: 2017-11-13

## 2017-11-13 RX ADMIN — HYDROCODONE BITARTRATE AND ACETAMINOPHEN 1 TABLET: 5; 325 TABLET ORAL at 19:31

## 2017-11-13 RX ADMIN — KETOROLAC TROMETHAMINE 15 MG: 30 INJECTION, SOLUTION INTRAMUSCULAR at 20:28

## 2017-11-13 RX ADMIN — BUTALBITAL, ACETAMINOPHEN, AND CAFFEINE 1 TABLET: 50; 325; 40 TABLET ORAL at 21:23

## 2017-11-13 RX ADMIN — METOCLOPRAMIDE 5 MG: 5 INJECTION, SOLUTION INTRAMUSCULAR; INTRAVENOUS at 20:30

## 2017-11-13 RX ADMIN — DIPHENHYDRAMINE HYDROCHLORIDE 25 MG: 50 INJECTION, SOLUTION INTRAMUSCULAR; INTRAVENOUS at 20:25

## 2017-11-13 ASSESSMENT — PAIN SCALES - GENERAL: PAINLEVEL_OUTOF10: 4

## 2017-11-14 NOTE — ED PROVIDER NOTES
ED Provider Note    CHIEF COMPLAINT  Chief Complaint   Patient presents with   • Headache       HPI  Sammy Oseguera is a 77 y.o. male With history of first-degree heart block and diabetes here with headache for 1 day. Patient reports headache began last night. Patient reports that headache was gradual in onset and reports headache has paroxysms of worsening pain in the last for around a few seconds and then finn without any intervention. Headache is frontal with radiations to the occiput, bilateral. Patient denies any associated lacrimation or changes in vision. Patient denies any associated weakness or numbness or difficulty inability. Patient denies any recent head trauma. Patient denies any associated neck pain or neck stiffness. Patient denies any history of polycystic kidney disease. Patient reports mild nausea without any vomiting. Patient presents to the emergency department as he was unable to resolve headache with aspirin. Patient reports a long-standing history of headaches similar to this however this headache is not resolved as soon as others    REVIEW OF SYSTEMS  Review of Systems   All other systems reviewed and are negative.    See HPI for further details. All other systems are negative.     PAST MEDICAL HISTORY   has a past medical history of HTN and Type II or unspecified type diabetes mellitus without mention of complication, not stated as uncontrolled.    SOCIAL HISTORY  Social History     Social History Main Topics   • Smoking status: Never Smoker   • Smokeless tobacco: Never Used   • Alcohol use 0.0 oz/week      Comment: 4-5 weekly    • Drug use: Unknown   • Sexual activity: Not on file       SURGICAL HISTORY   has a past surgical history that includes laparoscopic gastric ulcer over sew and prostatectomy, radical retro.    CURRENT MEDICATIONS  Home Medications     Reviewed by Henry Terrell R.N. (Registered Nurse) on 11/13/17 at 1823  Med List Status: Complete   Medication Last Dose Status    cyanocobalamin (VITAMIN B12) 1000 MCG Tab  Active   fluticasone (FLONASE) 50 MCG/ACT nasal spray  Active   JANUMET  MG per tablet  Active   vitamin D (CHOLECALCIFEROL) 1000 UNIT TABS  Active                ALLERGIES  No Known Allergies    PHYSICAL EXAM  Physical Exam   Constitutional: He is oriented to person, place, and time. He appears well-developed and well-nourished.   HENT:   Head: Normocephalic and atraumatic.   Eyes: Conjunctivae are normal. Pupils are equal, round, and reactive to light.   Neck: Normal range of motion. Neck supple.   Cardiovascular: Normal rate and regular rhythm.    Pulmonary/Chest: Effort normal and breath sounds normal. No respiratory distress. He has no wheezes. He has no rales. He exhibits no tenderness.   Abdominal: Soft. Bowel sounds are normal. He exhibits no distension and no mass. There is no tenderness. There is no rebound and no guarding.   Neurological: He is alert and oriented to person, place, and time. Coordination normal.   Proximal distal strength is 5 out of 5 bilaterally in upper and lower extremities. No dysmetria, no dysdiadochokinesia, Romberg normal. Normal gait. Equal reflexes. Cranial nerves are intact   Skin: Skin is warm and dry. No rash noted.   Psychiatric: He has a normal mood and affect. His behavior is normal.     EKG reveals first-degree heart block with normal axis normal intervals otherwise and no ST changes consistent with acute regional ischemia    COURSE & MEDICAL DECISION MAKING  Pertinent Labs & Imaging studies reviewed. (See chart for details)  Patient here with gradual onset similar headache to prior with no neck pain or neurologic deficit. I believe subarachnoid hemorrhage is highly unlikely in this very well-appearing patient. Will CT for possible intracranial bleed over also believe this is very unlikely. I believe further CTA and LP is of little utility in this well-appearing patient. Will treat symptomatically and continue to  reassess.  CT head is unremarkable.  Patient reports mild improvement with [place] reports headache persists. We'll give by her cocktail. Adding Toradol that CT results have confirmed no ICH  As patient with ongoing pain will give Fioricet with caffeine for further management. As patient's headache has been difficult to control will check CTA for possible alternative pathology. CT is returned with out any aneurysms, patient with atypical anatomy but no indication for ICH, SAH or acute pathology otherwise  The patient will not drink alcohol nor drive with prescribed medications. The patient will return for worsening symptoms and is stable at the time of discharge. The patient verbalizes understanding and will comply.    FINAL IMPRESSION  1. Tension headache         Electronically signed by: Santana Newton, 11/13/2017 7:25 PM

## 2017-11-14 NOTE — DISCHARGE INSTRUCTIONS
Tension Headache  A tension headache is pain, pressure, or aching felt over the front and sides of the head. Tension headaches often come after stress, feeling worried (anxiety), or feeling sad or down for a while (depressed).  HOME CARE  · Only take medicine as told by your doctor.  · Lie down in a dark, quiet room when you have a headache.  · Keep a journal to find out if certain things bring on headaches. For example, write down:  ¨ What you eat and drink.  ¨ How much sleep you get.  ¨ Any change to your diet or medicines.  · Relax by getting a massage or doing other relaxing activities.  · Put ice or heat packs on the head and neck area as told by your doctor.  · Lessen stress.  · Sit up straight. Do not tighten (tense) your muscles.  · Quit smoking if you smoke.  · Lessen how much alcohol you drink.  · Lessen how much caffeine you drink, or stop drinking caffeine.  · Eat and exercise regularly.  · Get enough sleep.  · Avoid using too much pain medicine.  GET HELP RIGHT AWAY IF:   · Your headache becomes really bad.  · You have a fever.  · You have a stiff neck.  · You have trouble seeing.  · Your muscles are weak, or you lose muscle control.  · You lose your balance or have trouble walking.  · You feel like you will pass out (faint), or you pass out.  · You have really bad symptoms that are different than your first symptoms.  · You have problems with the medicines given to you by your doctor.  · Your medicines do not work.  · Your headache feels different than the other headaches.  · You feel sick to your stomach (nauseous) or throw up (vomit).     This information is not intended to replace advice given to you by your health care provider. Make sure you discuss any questions you have with your health care provider.     Document Released: 03/14/2011 Document Revised: 05/03/2016 Document Reviewed: 04/11/2016  Elsevier Interactive Patient Education ©2016 [employer]

## 2017-11-14 NOTE — ED NOTES
Discharge information provided. Pt verbalized understanding of discharge instructions to follow up with PCP and to return to ER if condition worsens. Pt expressed the awareness of not driving or operating heavy machinery, has ride home with Wife. Pt ambulated out of ER in a steady gait, no additional questions or concerns.

## 2017-11-14 NOTE — ED NOTES
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

## 2017-11-16 ENCOUNTER — HOSPITAL ENCOUNTER (OUTPATIENT)
Dept: LAB | Facility: MEDICAL CENTER | Age: 77
End: 2017-11-16
Attending: FAMILY MEDICINE
Payer: MEDICARE

## 2017-11-16 DIAGNOSIS — I10 ESSENTIAL HYPERTENSION: ICD-10-CM

## 2017-11-16 LAB
ALBUMIN SERPL BCP-MCNC: 4.2 G/DL (ref 3.2–4.9)
ALBUMIN/GLOB SERPL: 1.4 G/DL
ALP SERPL-CCNC: 71 U/L (ref 30–99)
ALT SERPL-CCNC: 16 U/L (ref 2–50)
ANION GAP SERPL CALC-SCNC: 10 MMOL/L (ref 0–11.9)
AST SERPL-CCNC: 15 U/L (ref 12–45)
BASOPHILS # BLD AUTO: 1.1 % (ref 0–1.8)
BASOPHILS # BLD: 0.09 K/UL (ref 0–0.12)
BILIRUB SERPL-MCNC: 1.3 MG/DL (ref 0.1–1.5)
BUN SERPL-MCNC: 15 MG/DL (ref 8–22)
CALCIUM SERPL-MCNC: 9.2 MG/DL (ref 8.5–10.5)
CHLORIDE SERPL-SCNC: 105 MMOL/L (ref 96–112)
CHOLEST SERPL-MCNC: 112 MG/DL (ref 100–199)
CO2 SERPL-SCNC: 25 MMOL/L (ref 20–33)
CREAT SERPL-MCNC: 1.26 MG/DL (ref 0.5–1.4)
CREAT UR-MCNC: 151.4 MG/DL
EOSINOPHIL # BLD AUTO: 0.17 K/UL (ref 0–0.51)
EOSINOPHIL NFR BLD: 2.2 % (ref 0–6.9)
ERYTHROCYTE [DISTWIDTH] IN BLOOD BY AUTOMATED COUNT: 52.3 FL (ref 35.9–50)
EST. AVERAGE GLUCOSE BLD GHB EST-MCNC: 169 MG/DL
GFR SERPL CREATININE-BSD FRML MDRD: 55 ML/MIN/1.73 M 2
GLOBULIN SER CALC-MCNC: 3 G/DL (ref 1.9–3.5)
GLUCOSE SERPL-MCNC: 159 MG/DL (ref 65–99)
HBA1C MFR BLD: 7.5 % (ref 0–5.6)
HCT VFR BLD AUTO: 38.8 % (ref 42–52)
HDLC SERPL-MCNC: 45 MG/DL
HGB BLD-MCNC: 12.4 G/DL (ref 14–18)
IMM GRANULOCYTES # BLD AUTO: 0.03 K/UL (ref 0–0.11)
IMM GRANULOCYTES NFR BLD AUTO: 0.4 % (ref 0–0.9)
LDLC SERPL CALC-MCNC: 53 MG/DL
LYMPHOCYTES # BLD AUTO: 1.22 K/UL (ref 1–4.8)
LYMPHOCYTES NFR BLD: 15.5 % (ref 22–41)
MCH RBC QN AUTO: 32.5 PG (ref 27–33)
MCHC RBC AUTO-ENTMCNC: 32 G/DL (ref 33.7–35.3)
MCV RBC AUTO: 101.6 FL (ref 81.4–97.8)
MICROALBUMIN UR-MCNC: 33.1 MG/DL
MICROALBUMIN/CREAT UR: 219 MG/G (ref 0–30)
MONOCYTES # BLD AUTO: 0.56 K/UL (ref 0–0.85)
MONOCYTES NFR BLD AUTO: 7.1 % (ref 0–13.4)
NEUTROPHILS # BLD AUTO: 5.8 K/UL (ref 1.82–7.42)
NEUTROPHILS NFR BLD: 73.7 % (ref 44–72)
NRBC # BLD AUTO: 0 K/UL
NRBC BLD AUTO-RTO: 0 /100 WBC
PLATELET # BLD AUTO: 169 K/UL (ref 164–446)
PMV BLD AUTO: 10.8 FL (ref 9–12.9)
POTASSIUM SERPL-SCNC: 3.8 MMOL/L (ref 3.6–5.5)
PROT SERPL-MCNC: 7.2 G/DL (ref 6–8.2)
RBC # BLD AUTO: 3.82 M/UL (ref 4.7–6.1)
SODIUM SERPL-SCNC: 140 MMOL/L (ref 135–145)
TRIGL SERPL-MCNC: 71 MG/DL (ref 0–149)
WBC # BLD AUTO: 7.9 K/UL (ref 4.8–10.8)

## 2017-11-16 PROCEDURE — 85025 COMPLETE CBC W/AUTO DIFF WBC: CPT

## 2017-11-16 PROCEDURE — 83036 HEMOGLOBIN GLYCOSYLATED A1C: CPT

## 2017-11-16 PROCEDURE — 80053 COMPREHEN METABOLIC PANEL: CPT

## 2017-11-16 PROCEDURE — 82043 UR ALBUMIN QUANTITATIVE: CPT

## 2017-11-16 PROCEDURE — 36415 COLL VENOUS BLD VENIPUNCTURE: CPT

## 2017-11-16 PROCEDURE — 82570 ASSAY OF URINE CREATININE: CPT

## 2017-11-16 PROCEDURE — 80061 LIPID PANEL: CPT

## 2017-11-20 ENCOUNTER — OFFICE VISIT (OUTPATIENT)
Dept: MEDICAL GROUP | Facility: MEDICAL CENTER | Age: 77
End: 2017-11-20
Payer: MEDICARE

## 2017-11-20 VITALS
HEART RATE: 82 BPM | TEMPERATURE: 97 F | WEIGHT: 180 LBS | DIASTOLIC BLOOD PRESSURE: 86 MMHG | OXYGEN SATURATION: 99 % | HEIGHT: 71 IN | BODY MASS INDEX: 25.2 KG/M2 | RESPIRATION RATE: 16 BRPM | SYSTOLIC BLOOD PRESSURE: 140 MMHG

## 2017-11-20 DIAGNOSIS — N18.30 CKD STAGE 3 SECONDARY TO DIABETES (HCC): ICD-10-CM

## 2017-11-20 DIAGNOSIS — I10 ESSENTIAL HYPERTENSION: ICD-10-CM

## 2017-11-20 DIAGNOSIS — E11.22 CKD STAGE 3 SECONDARY TO DIABETES (HCC): ICD-10-CM

## 2017-11-20 PROCEDURE — 99214 OFFICE O/P EST MOD 30 MIN: CPT | Performed by: FAMILY MEDICINE

## 2017-11-20 RX ORDER — LANCETS 30 GAUGE
EACH MISCELLANEOUS
Qty: 100 EACH | Refills: 3 | Status: SHIPPED | OUTPATIENT
Start: 2017-11-20 | End: 2018-05-09 | Stop reason: SDUPTHER

## 2017-11-20 NOTE — ASSESSMENT & PLAN NOTE
Patient is tolerating Janumet  milligrams twice a day. His A1c has improved slightly from 7.6 down to 7.5. He does have increasing microalbuminuria. Patient is not on an ACE inhibitor or ARB, he would like to try medication to control his blood pressure readings.

## 2017-11-20 NOTE — PROGRESS NOTES
Subjective:   Samym Oseguera is a 77 y.o. male here today for diabetes    Type 2 diabetes mellitus, uncontrolled  Patient is tolerating Janumet  milligrams twice a day. His A1c has improved slightly from 7.6 down to 7.5. He does have increasing microalbuminuria. Patient is not on an ACE inhibitor or ARB, he would like to try medication to control his blood pressure readings.    CKD stage 3 secondary to diabetes (CMS-[facility])  Patient has stage III chronic kidney disease which has been stable over the last couple years.    Hypertension  The patient has controlled his blood pressure through medication in the past. His wife has noticed that his stress and anxiety level have been increased recently.         Patient was seen at the emergency department for frontal headache that resolved with caffeine. He had a CTA that ruled out stroke. Patient has not had recurrence of headache. It was thought that his headache may been triggered from caffeine withdrawal, because the day prior to the headache he did not have any coffee.      Current medicines (including changes today)  Current Outpatient Prescriptions   Medication Sig Dispense Refill   • glucose monitoring kit (FREESTYLE) monitoring kit 1 Each by Does not apply route Once for 1 dose. 1 Kit 0   • Blood Glucose Monitoring Suppl Supplies Misc Test strips order: Test strips for Abbott Freestyle meter. Sig: use once daily. Dx: E11.65 100 Each 3   • Lancets Misc Lancets order: Lancets for Abbott Freestyle meter. Sig: use once daily. Dx: E11.65 100 Each 3   • Acetaminophen-Caffeine 500-65 MG Tab Take 1 Tab by mouth every four hours as needed (for headache). 60 Tab 0   • fluticasone (FLONASE) 50 MCG/ACT nasal spray Spray 2 Sprays in nose every day. 16 g 3   • JANUMET  MG per tablet TAKE 1 TAB BY MOUTH 2 TIMES A DAY, WITH MEALS. 180 Tab 3   • cyanocobalamin (VITAMIN B12) 1000 MCG Tab Take 3 Tabs by mouth every day.     • vitamin D (CHOLECALCIFEROL) 1000 UNIT TABS  "Take 1,000 Units by mouth every day.       No current facility-administered medications for this visit.      He  has a past medical history of HTN and Type II or unspecified type diabetes mellitus without mention of complication, not stated as uncontrolled.    ROS   No chest pain, no shortness of breath       Objective:     Blood pressure 140/86, pulse 82, temperature 36.1 °C (97 °F), resp. rate 16, height 1.803 m (5' 11\"), weight 81.6 kg (180 lb), SpO2 99 %. Body mass index is 25.1 kg/m².   Physical Exam:  Constitutional: Alert, no distress.  Skin: Warm, dry, good turgor, no rashes in visible areas.  Eye: Equal, round and reactive, conjunctiva clear, lids normal.  Psych: Alert and oriented x3, normal affect and mood.        Assessment and Plan:   The following treatment plan was discussed    1. Uncontrolled type 2 diabetes mellitus without complication, without long-term current use of insulin (CMS-HCC)  Controlled for age. Continue current medication. Follow up with labs in 6 months.  - glucose monitoring kit (FREESTYLE) monitoring kit; 1 Each by Does not apply route Once for 1 dose.  Dispense: 1 Kit; Refill: 0  - Blood Glucose Monitoring Suppl Supplies Misc; Test strips order: Test strips for Abbott Freestyle meter. Sig: use once daily. Dx: E11.65  Dispense: 100 Each; Refill: 3  - Lancets Misc; Lancets order: Lancets for Abbott Freestyle meter. Sig: use once daily. Dx: E11.65  Dispense: 100 Each; Refill: 3  - HEMOGLOBIN A1C; Future  - COMP METABOLIC PANEL; Future  - MICROALBUMIN CREAT RATIO URINE; Future  - LIPID PROFILE; Future  - UA/M W/RFLX CULTURE, ROUTINE    2. Essential hypertension  Patient will do a trial of medication, per his request. Follow-up in 6 months.    3. CKD stage 3 secondary to diabetes (CMS-[facility])  Stable. Suggested patient be on lisinopril, but he declines. Follow up in 6 months with labs.      Followup: Return in about 6 months (around 5/20/2018) for Annual, Diabetes.         "

## 2017-12-05 ENCOUNTER — APPOINTMENT (RX ONLY)
Dept: URBAN - METROPOLITAN AREA CLINIC 4 | Facility: CLINIC | Age: 77
Setting detail: DERMATOLOGY
End: 2017-12-05

## 2017-12-05 DIAGNOSIS — L81.4 OTHER MELANIN HYPERPIGMENTATION: ICD-10-CM

## 2017-12-05 DIAGNOSIS — L82.1 OTHER SEBORRHEIC KERATOSIS: ICD-10-CM

## 2017-12-05 PROBLEM — L57.0 ACTINIC KERATOSIS: Status: ACTIVE | Noted: 2017-12-05

## 2017-12-05 PROBLEM — D48.5 NEOPLASM OF UNCERTAIN BEHAVIOR OF SKIN: Status: ACTIVE | Noted: 2017-12-05

## 2017-12-05 PROCEDURE — 11100: CPT

## 2017-12-05 PROCEDURE — 11101: CPT

## 2017-12-05 PROCEDURE — ? BIOPSY BY SHAVE METHOD

## 2017-12-05 PROCEDURE — ? COUNSELING

## 2017-12-05 PROCEDURE — 69100 BIOPSY OF EXTERNAL EAR: CPT

## 2017-12-05 PROCEDURE — 99202 OFFICE O/P NEW SF 15 MIN: CPT | Mod: 25

## 2017-12-05 ASSESSMENT — LOCATION SIMPLE DESCRIPTION DERM
LOCATION SIMPLE: LEFT FOREHEAD
LOCATION SIMPLE: NECK
LOCATION SIMPLE: RIGHT FOREHEAD
LOCATION SIMPLE: FRONTAL SCALP

## 2017-12-05 ASSESSMENT — LOCATION DETAILED DESCRIPTION DERM
LOCATION DETAILED: LEFT SUPERIOR LATERAL NECK
LOCATION DETAILED: MEDIAL FRONTAL SCALP
LOCATION DETAILED: LEFT SUPERIOR FOREHEAD
LOCATION DETAILED: RIGHT SUPERIOR FOREHEAD

## 2017-12-05 ASSESSMENT — LOCATION ZONE DERM
LOCATION ZONE: NECK
LOCATION ZONE: SCALP
LOCATION ZONE: FACE

## 2017-12-05 NOTE — PROCEDURE: BIOPSY BY SHAVE METHOD
Bill [id number] For Specimen Handling/Conveyance To Laboratory?: no
Type Of Destruction Used: Curettage
Wound Care: Petrolatum
Post-Care Instructions: I reviewed with the patient in detail post-care instructions. Patient is to keep the biopsy site dry overnight, and then apply bacitracin twice daily until healed. Patient may apply hydrogen peroxide soaks to remove any crusting.
Consent: Written consent was obtained and risks were reviewed including but not limited to scarring, infection, bleeding, scabbing, incomplete removal, nerve damage and allergy to anesthesia.
Anesthesia Volume In Cc: 0.5
Dressing: bandage
Silver Nitrate Text: The wound bed was treated with silver nitrate after the biopsy was performed.
Detail Level: Detailed
Hemostasis: Drysol
Lab: [id number]
Biopsy Type: H and E
Notification Instructions: Patient will be notified of biopsy results. However, patient instructed to call the office if not contacted within 2 weeks.
Curettage Text: The wound bed was treated with curettage after the biopsy was performed.
Billing Type: Third-Party Bill
Cryotherapy Text: The wound bed was treated with cryotherapy after the biopsy was performed.
Anesthesia Type: 1% lidocaine with 1:100,000 epinephrine and 408mcg clindamycin/ml and a 1:10 solution of 8.4% sodium bicarbonate
X Size Of Lesion In Cm: 0
Electrodesiccation And Curettage Text: The wound bed was treated with electrodesiccation and curettage after the biopsy was performed.
Biopsy Method: Dermablade
Lab Facility: 
Electrodesiccation Text: The wound bed was treated with electrodesiccation after the biopsy was performed.
Size Of Lesion In Cm: 1.5
Size Of Lesion In Cm: 0.7
Size Of Lesion In Cm: 0.9

## 2017-12-13 ENCOUNTER — APPOINTMENT (RX ONLY)
Dept: URBAN - METROPOLITAN AREA CLINIC 4 | Facility: CLINIC | Age: 77
Setting detail: DERMATOLOGY
End: 2017-12-13

## 2017-12-13 PROBLEM — C44.319 BASAL CELL CARCINOMA OF SKIN OF OTHER PARTS OF FACE: Status: ACTIVE | Noted: 2017-12-13

## 2017-12-13 PROCEDURE — ? MOHS SURGERY PHONE CONSULTATION

## 2017-12-13 NOTE — PROCEDURE: MOHS SURGERY PHONE CONSULTATION
Does The Patient Have A Living Will (Optional)?: No
Detail Level: Simple
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
Has The Pathology Report Been Received?: Yes
Date Of Mohs Surgery: 01/08/2018
Patient's Insurance: [employer]
Time Of Mohs Surgery: 11 am
If Yes- What Blood Thinners Do They Take?: Aspirin and aleve as needed
Date Of Surgical Consultation If Needed: 12/12/2017
Office Location Of Mohs Surgery: [facility] [address]
Patient Reported Location: L. central mandibular cheek
Referring Provider: SHIRLEY Ramsey

## 2017-12-14 ENCOUNTER — APPOINTMENT (RX ONLY)
Dept: URBAN - METROPOLITAN AREA CLINIC 4 | Facility: CLINIC | Age: 77
Setting detail: DERMATOLOGY
End: 2017-12-14

## 2017-12-14 DIAGNOSIS — Z48.817 ENCOUNTER FOR SURGICAL AFTERCARE FOLLOWING SURGERY ON THE SKIN AND SUBCUTANEOUS TISSUE: ICD-10-CM

## 2017-12-14 PROCEDURE — 99212 OFFICE O/P EST SF 10 MIN: CPT

## 2017-12-14 PROCEDURE — ? COUNSELING

## 2017-12-14 ASSESSMENT — LOCATION SIMPLE DESCRIPTION DERM: LOCATION SIMPLE: NECK

## 2017-12-14 ASSESSMENT — LOCATION DETAILED DESCRIPTION DERM: LOCATION DETAILED: LEFT SUPERIOR LATERAL NECK

## 2017-12-14 ASSESSMENT — LOCATION ZONE DERM: LOCATION ZONE: NECK

## 2017-12-14 NOTE — HPI: WOUND CHECK
How Is Your Wound Healing?: healing well
Additional History: Biopsy site slightly tender, patient would like site rechecked as he is leaving town.

## 2018-01-08 ENCOUNTER — APPOINTMENT (RX ONLY)
Dept: URBAN - METROPOLITAN AREA CLINIC 20 | Facility: CLINIC | Age: 78
Setting detail: DERMATOLOGY
End: 2018-01-08

## 2018-01-08 VITALS — SYSTOLIC BLOOD PRESSURE: 130 MMHG | DIASTOLIC BLOOD PRESSURE: 75 MMHG | HEART RATE: 73 BPM

## 2018-01-08 DIAGNOSIS — L57.8 OTHER SKIN CHANGES DUE TO CHRONIC EXPOSURE TO NONIONIZING RADIATION: ICD-10-CM

## 2018-01-08 PROBLEM — C44.319 BASAL CELL CARCINOMA OF SKIN OF OTHER PARTS OF FACE: Status: ACTIVE | Noted: 2018-01-08

## 2018-01-08 PROCEDURE — 99212 OFFICE O/P EST SF 10 MIN: CPT | Mod: 25

## 2018-01-08 PROCEDURE — ? COUNSELING

## 2018-01-08 PROCEDURE — 17312 MOHS ADDL STAGE: CPT

## 2018-01-08 PROCEDURE — 13132 CMPLX RPR F/C/C/M/N/AX/G/H/F: CPT

## 2018-01-08 PROCEDURE — 17311 MOHS 1 STAGE H/N/HF/G: CPT

## 2018-01-08 PROCEDURE — ? MOHS SURGERY

## 2018-01-08 NOTE — PROCEDURE: MOHS SURGERY
Stage 3: Number Of Blocks?: 0
Consent Type: Consent 1 (Standard)
O-L Flap Text: The defect edges were debeveled with a #15 scalpel blade.  Given the location of the defect, shape of the defect and the proximity to free margins an O-L flap was deemed most appropriate.  Using a sterile surgical marker, an appropriate advancement flap was drawn incorporating the defect and placing the expected incisions within the relaxed skin tension lines where possible.    The area thus outlined was incised deep to adipose tissue with a #15 scalpel blade.  The skin margins were undermined to an appropriate distance in all directions utilizing iris scissors.
Include Size Of Lesion In Location Indication Statement: Yes
Dermal Autograft Text: The defect edges were debeveled with a #15 scalpel blade.  Given the location of the defect, shape of the defect and the proximity to free margins a dermal autograft was deemed most appropriate.  Using a sterile surgical marker, the primary defect shape was transferred to the donor site. The area thus outlined was incised deep to adipose tissue with a #15 scalpel blade.  The harvested graft was then trimmed of adipose and epidermal tissue until only dermis was left.  The skin graft was then placed in the primary defect and oriented appropriately.
Double Island Pedicle Flap Text: The defect edges were debeveled with a #15 scalpel blade.  Given the location of the defect, shape of the defect and the proximity to free margins a double island pedicle advancement flap was deemed most appropriate.  Using a sterile surgical marker, an appropriate advancement flap was drawn incorporating the defect, outlining the appropriate donor tissue and placing the expected incisions within the relaxed skin tension lines where possible.    The area thus outlined was incised deep to adipose tissue with a #15 scalpel blade.  The skin margins were undermined to an appropriate distance in all directions around the primary defect and laterally outward around the island pedicle utilizing iris scissors.  There was minimal undermining beneath the pedicle flap.
W Plasty Text: The lesion was extirpated to the level of the fat with a #15 scalpel blade.  Given the location of the defect, shape of the defect and the proximity to free margins a W-plasty was deemed most appropriate for repair.  Using a sterile surgical marker, the appropriate transposition arms of the W-plasty were drawn incorporating the defect and placing the expected incisions within the relaxed skin tension lines where possible.    The area thus outlined was incised deep to adipose tissue with a #15 scalpel blade.  The skin margins were undermined to an appropriate distance in all directions utilizing iris scissors.  The opposing transposition arms were then transposed into place in opposite direction and anchored with interrupted buried subcutaneous sutures.
Stage 4: Additional Anesthesia Type: 1% lidocaine with epinephrine
Bcc Histology Text: There were numerous aggregates of basaloid cells.
Mohs Method Verbiage: An incision at a 45 degree angle following the standard Mohs approach was done and the specimen was harvested as a microscopic controlled layer.
Display The Frozen Section And Histology As A Separate Paragraph: No
Referred To Asc For Closure Text (Leave Blank If You Do Not Want): After obtaining clear surgical margins the patient was sent to an ASC for surgical repair.  The patient understands they will receive post-surgical care and follow-up from the ASC physician.
Paramedian Forehead Flap Text: A decision was made to reconstruct the defect utilizing an interpolation axial flap and a staged reconstruction.  A telfa template was made of the defect.  This telfa template was then used to outline the paramedian forehead pedicle flap.  The donor area for the pedicle flap was then injected with anesthesia.  The flap was excised through the skin and subcutaneous tissue down to the layer of the underlying musculature.  The pedicle flap was carefully excised within this deep plane to maintain its blood supply.  The edges of the donor site were undermined.   The donor site was closed in a primary fashion.  The pedicle was then rotated into position and sutured.  Once the tube was sutured into place, adequate blood supply was confirmed with blanching and refill.  The pedicle was then wrapped with xeroform gauze and dressed appropriately with a telfa and gauze bandage to ensure continued blood supply and protect the attached pedicle.
Area M Indication Text: Tumors in this location are included in Area M (cheek, forehead, scalp, neck, jawline and pretibial skin).  Mohs surgery is indicated for tumors in these anatomic locations.
Consent (Nose)/Introductory Paragraph: The rationale for Mohs was explained to the patient and consent was obtained. The risks, benefits and alternatives to therapy were discussed in detail. Specifically, the risks of nasal deformity, changes in the flow of air through the nose, infection, scarring, bleeding, prolonged wound healing, incomplete removal, allergy to anesthesia, nerve injury and recurrence were addressed. Prior to the procedure, the treatment site was clearly identified and confirmed by the patient. All components of Universal Protocol/PAUSE Rule completed.
Consent 3/Introductory Paragraph: I gave the patient a chance to ask questions they had about the procedure.  Following this I explained the Mohs procedure and consent was obtained. The risks, benefits and alternatives to therapy were discussed in detail. Specifically, the risks of infection, scarring, bleeding, prolonged wound healing, incomplete removal, allergy to anesthesia, nerve injury and recurrence were addressed. Prior to the procedure, the treatment site was clearly identified and confirmed by the patient. All components of Universal Protocol/PAUSE Rule completed.
Post-Care Instructions: I reviewed with the patient in detail post-care instructions. Patient is not to engage in any heavy lifting, exercise, or swimming for the next 14 days. Should the patient develop any fevers, chills, bleeding, severe pain patient will contact the office immediately.
Tarsorrhaphy Text: A tarsorrhaphy was performed using Frost sutures.
Secondary Intention Text (Leave Blank If You Do Not Want): The defect will heal with secondary intention.
Estimated Blood Loss (Cc): minimal
Partial Purse String (Intermediate) Text: Given the location of the defect and the characteristics of the surrounding skin an intermediate purse string closure was deemed most appropriate.  Undermining was performed circumfirentially around the surgical defect.  A purse string suture was then placed and tightened. Wound tension only allowed a partial closure of the circular defect.
Rhombic Flap Text: The defect edges were debeveled with a #15 scalpel blade.  Given the location of the defect and the proximity to free margins a rhombic flap was deemed most appropriate.  Using a sterile surgical marker, an appropriate rhombic flap was drawn incorporating the defect.    The area thus outlined was incised deep to adipose tissue with a #15 scalpel blade.  The skin margins were undermined to an appropriate distance in all directions utilizing iris scissors.
Advancement Flap (Single) Text: The defect edges were debeveled with a #15 scalpel blade.  Given the location of the defect and the proximity to free margins a single advancement flap was deemed most appropriate.  Using a sterile surgical marker, an appropriate advancement flap was drawn incorporating the defect and placing the expected incisions within the relaxed skin tension lines where possible.    The area thus outlined was incised deep to adipose tissue with a #15 scalpel blade.  The skin margins were undermined to an appropriate distance in all directions utilizing iris scissors.
O-T Plasty Text: The defect edges were debeveled with a #15 scalpel blade.  Given the location of the defect, shape of the defect and the proximity to free margins an O-T plasty was deemed most appropriate.  Using a sterile surgical marker, an appropriate O-T plasty was drawn incorporating the defect and placing the expected incisions within the relaxed skin tension lines where possible.    The area thus outlined was incised deep to adipose tissue with a #15 scalpel blade.  The skin margins were undermined to an appropriate distance in all directions utilizing iris scissors.
Island Pedicle Flap Text: The defect edges were debeveled with a #15 scalpel blade.  Given the location of the defect, shape of the defect and the proximity to free margins an island pedicle advancement flap was deemed most appropriate.  Using a sterile surgical marker, an appropriate advancement flap was drawn incorporating the defect, outlining the appropriate donor tissue and placing the expected incisions within the relaxed skin tension lines where possible.    The area thus outlined was incised deep to adipose tissue with a #15 scalpel blade.  The skin margins were undermined to an appropriate distance in all directions around the primary defect and laterally outward around the island pedicle utilizing iris scissors.  There was minimal undermining beneath the pedicle flap.
Lazy S Complex Repair Preamble Text (Leave Blank If You Do Not Want): Extensive wide undermining was performed.
Surgeon Performing Repair (Optional): Romy Lombardi MD
Helical Rim Advancement Flap Text: The defect edges were debeveled with a #15 blade scalpel.  Given the location of the defect and the proximity to free margins (helical rim) a double helical rim advancement flap was deemed most appropriate.  Using a sterile surgical marker, the appropriate advancement flaps were drawn incorporating the defect and placing the expected incisions between the helical rim and antihelix where possible.  The area thus outlined was incised through and through with a #15 scalpel blade.  With a skin hook and iris scissors, the flaps were gently and sharply undermined and freed up.
Transposition Flap Text: The defect edges were debeveled with a #15 scalpel blade.  Given the location of the defect and the proximity to free margins a transposition flap was deemed most appropriate.  Using a sterile surgical marker, an appropriate transposition flap was drawn incorporating the defect.    The area thus outlined was incised deep to adipose tissue with a #15 scalpel blade.  The skin margins were undermined to an appropriate distance in all directions utilizing iris scissors.
Hemostasis: Electrocautery
Advancement-Rotation Flap Text: The defect edges were debeveled with a #15 scalpel blade.  Given the location of the defect, shape of the defect and the proximity to free margins an advancement-rotation flap was deemed most appropriate.  Using a sterile surgical marker, an appropriate flap was drawn incorporating the defect and placing the expected incisions within the relaxed skin tension lines where possible. The area thus outlined was incised deep to adipose tissue with a #15 scalpel blade.  The skin margins were undermined to an appropriate distance in all directions utilizing iris scissors.
Hatchet Flap Text: The defect edges were debeveled with a #15 scalpel blade.  Given the location of the defect, shape of the defect and the proximity to free margins a hatchet flap was deemed most appropriate.  Using a sterile surgical marker, an appropriate hatchet flap was drawn incorporating the defect and placing the expected incisions within the relaxed skin tension lines where possible.    The area thus outlined was incised deep to adipose tissue with a #15 scalpel blade.  The skin margins were undermined to an appropriate distance in all directions utilizing iris scissors.
Detail Level: Detailed
Crescentic Intermediate Repair Preamble Text (Leave Blank If You Do Not Want): Undermining was performed with blunt dissection.
Consent (Spinal Accessory)/Introductory Paragraph: The rationale for Mohs was explained to the patient and consent was obtained. The risks, benefits and alternatives to therapy were discussed in detail. Specifically, the risks of damage to the spinal accessory nerve, infection, scarring, bleeding, prolonged wound healing, incomplete removal, allergy to anesthesia, and recurrence were addressed. Prior to the procedure, the treatment site was clearly identified and confirmed by the patient. All components of Universal Protocol/PAUSE Rule completed.
Ftsg Text: The defect edges were debeveled with a #15 scalpel blade.  Given the location of the defect, shape of the defect and the proximity to free margins a full thickness skin graft was deemed most appropriate.  Using a sterile surgical marker, the primary defect shape was transferred to the donor site. The area thus outlined was incised deep to adipose tissue with a #15 scalpel blade.  The harvested graft was then trimmed of adipose tissue until only dermis and epidermis was left.  The skin margins of the secondary defect were undermined to an appropriate distance in all directions utilizing iris scissors.  The secondary defect was closed with interrupted buried subcutaneous sutures.  The skin edges were then re-apposed with running  sutures.  The skin graft was then placed in the primary defect and oriented appropriately.
Epidermal Closure Graft Donor Site (Optional): running
Inflammation Suggestive Of Cancer Camouflage Histology Text: There was a dense lymphocytic infiltrate which prevented adequate histologic evaluation of adjacent structures.
O-Z Plasty Text: The defect edges were debeveled with a #15 scalpel blade.  Given the location of the defect, shape of the defect and the proximity to free margins an O-Z plasty (double transposition flap) was deemed most appropriate.  Using a sterile surgical marker, the appropriate transposition flaps were drawn incorporating the defect and placing the expected incisions within the relaxed skin tension lines where possible.    The area thus outlined was incised deep to adipose tissue with a #15 scalpel blade.  The skin margins were undermined to an appropriate distance in all directions utilizing iris scissors.  Hemostasis was achieved with electrocautery.  The flaps were then transposed into place, one clockwise and the other counterclockwise, and anchored with interrupted buried subcutaneous sutures.
Number Of Stages: 2
A-T Advancement Flap Text: The defect edges were debeveled with a #15 scalpel blade.  Given the location of the defect, shape of the defect and the proximity to free margins an A-T advancement flap was deemed most appropriate.  Using a sterile surgical marker, an appropriate advancement flap was drawn incorporating the defect and placing the expected incisions within the relaxed skin tension lines where possible.    The area thus outlined was incised deep to adipose tissue with a #15 scalpel blade.  The skin margins were undermined to an appropriate distance in all directions utilizing iris scissors.
Closure 3 Information: This tab is for additional flaps and grafts above and beyond our usual structured repairs.  Please note if you enter information here it will not currently bill and you will need to add the billing information manually.
Cartilage Graft Text: The defect edges were debeveled with a #15 scalpel blade.  Given the location of the defect, shape of the defect, the fact the defect involved a full thickness cartilage defect a cartilage graft was deemed most appropriate.  An appropriate donor site was identified, cleansed, and anesthetized. The cartilage graft was then harvested and transferred to the recipient site, oriented appropriately and then sutured into place.  The secondary defect was then repaired using a primary closure.
Consent (Ear)/Introductory Paragraph: The rationale for Mohs was explained to the patient and consent was obtained. The risks, benefits and alternatives to therapy were discussed in detail. Specifically, the risks of ear deformity, infection, scarring, bleeding, prolonged wound healing, incomplete removal, allergy to anesthesia, nerve injury and recurrence were addressed. Prior to the procedure, the treatment site was clearly identified and confirmed by the patient. All components of Universal Protocol/PAUSE Rule completed.
Dressing (No Sutures): dry sterile dressing
Postop Diagnosis: same
No Repair - Repaired With Adjacent Surgical Defect Text (Leave Blank If You Do Not Want): After obtaining clear surgical margins the defect was repaired concurrently with another surgical defect which was in close approximation.
Complex Repair And Flap Additional Text (Will Appearing After The Standard Complex Repair Text): The complex repair was not sufficient to completely close the primary defect. The remaining additional defect was repaired with the flap mentioned below.
Stage 1: Number Of Blocks?: 1
Graft Donor Site Epidermal Sutures (Optional): 5-0 Surgipro
Melolabial Interpolation Flap Text: A decision was made to reconstruct the defect utilizing an interpolation axial flap and a staged reconstruction.  A telfa template was made of the defect.  This telfa template was then used to outline the melolabial interpolation flap.  The donor area for the pedicle flap was then injected with anesthesia.  The flap was excised through the skin and subcutaneous tissue down to the layer of the underlying musculature.  The pedicle flap was carefully excised within this deep plane to maintain its blood supply.  The edges of the donor site were undermined.   The donor site was closed in a primary fashion.  The pedicle was then rotated into position and sutured.  Once the tube was sutured into place, adequate blood supply was confirmed with blanching and refill.  The pedicle was then wrapped with xeroform gauze and dressed appropriately with a telfa and gauze bandage to ensure continued blood supply and protect the attached pedicle.
Z Plasty Text: The lesion was extirpated to the level of the fat with a #15 scalpel blade.  Given the location of the defect, shape of the defect and the proximity to free margins a Z-plasty was deemed most appropriate for repair.  Using a sterile surgical marker, the appropriate transposition arms of the Z-plasty were drawn incorporating the defect and placing the expected incisions within the relaxed skin tension lines where possible.    The area thus outlined was incised deep to adipose tissue with a #15 scalpel blade.  The skin margins were undermined to an appropriate distance in all directions utilizing iris scissors.  The opposing transposition arms were then transposed into place in opposite direction and anchored with interrupted buried subcutaneous sutures.
Referred To Otolaryngology For Closure Text (Leave Blank If You Do Not Want): After obtaining clear surgical margins the patient was sent to otolaryngology for surgical repair.  The patient understands they will receive post-surgical care and follow-up from the referring physician's office.
Closure 2 Information: This tab is for additional flaps and grafts, including complex repair and grafts and complex repair and flaps. You can also specify a different location for the additional defect, if the location is the same you do not need to select a new one. We will insert the automated text for the repair you select below just as we do for solitary flaps and grafts. Please note that at this time if you select a location with a different insurance zone you will need to override the ICD10 and CPT if appropriate.
Mohs Histo Method Verbiage: Each section was then chromacoded and processed in the Mohs lab using the Mohs protocol and submitted for frozen section.
Cheiloplasty (Complex) Text: A decision was made to reconstruct the defect with a  cheiloplasty.  The defect was undermined extensively.  Additional obicularis oris muscle was excised with a 15 blade scalpel.  The defect was converted into a full thickness wedge to facilite a better cosmetic result.  Small vessels were then tied off with 5-0 monocyrl. The obicularis oris, superficial fascia, adipose and dermis were then reapproximated.  After the deeper layers were approximated the epidermis was reapproximated with particular care given to realign the vermilion border.
Trilobed Flap Text: The defect edges were debeveled with a #15 scalpel blade.  Given the location of the defect and the proximity to free margins a trilobed flap was deemed most appropriate.  Using a sterile surgical marker, an appropriate trilobed flap drawn around the defect.    The area thus outlined was incised deep to adipose tissue with a #15 scalpel blade.  The skin margins were undermined to an appropriate distance in all directions utilizing iris scissors.
V-Y Plasty Text: The defect edges were debeveled with a #15 scalpel blade.  Given the location of the defect, shape of the defect and the proximity to free margins an V-Y advancement flap was deemed most appropriate.  Using a sterile surgical marker, an appropriate advancement flap was drawn incorporating the defect and placing the expected incisions within the relaxed skin tension lines where possible.    The area thus outlined was incised deep to adipose tissue with a #15 scalpel blade.  The skin margins were undermined to an appropriate distance in all directions utilizing iris scissors.
Mohs Rapid Report Verbiage: The area of clinically evident tumor was marked with skin marking ink and appropriately hatched.  The initial incision was made following the Mohs approach through the skin.  The specimen was taken to the lab, divided into the necessary number of pieces, chromacoded and processed according to the Mohs protocol.  This was repeated in successive stages until a tumor free defect was achieved.
Ear Star Wedge Flap Text: The defect edges were debeveled with a #15 blade scalpel.  Given the location of the defect and the proximity to free margins (helical rim) an ear star wedge flap was deemed most appropriate.  Using a sterile surgical marker, the appropriate flap was drawn incorporating the defect and placing the expected incisions between the helical rim and antihelix where possible.  The area thus outlined was incised through and through with a #15 scalpel blade.
Full Thickness Lip Wedge Repair (Flap) Text: Given the location of the defect and the proximity to free margins a full thickness wedge repair was deemed most appropriate.  Using a sterile surgical marker, the appropriate repair was drawn incorporating the defect and placing the expected incisions perpendicular to the vermilion border.  The vermilion border was also meticulously outlined to ensure appropriate reapproximation during the repair.  The area thus outlined was incised through and through with a #15 scalpel blade.  The muscularis and dermis were reaproximated with deep sutures following hemostasis. Care was taken to realign the vermilion border before proceeding with the superficial closure.  Once the vermilion was realigned the superfical and mucosal closure was finished.
Wound Care (No Sutures): Petrolatum
Referred To Mid-Level For Closure Text (Leave Blank If You Do Not Want): After obtaining clear surgical margins the patient was sent to a mid-level provider for surgical repair.  The patient understands they will receive post-surgical care and follow-up from the mid-level provider.
Location Indication Override (Is Already Calculated Based On Selected Body Location): Area M
Purse String (Intermediate) Text: Given the location of the defect and the characteristics of the surrounding skin a purse string intermediate closure was deemed most appropriate.  Undermining was performed circumfirentially around the surgical defect.  A purse string suture was then placed and tightened.
Burow's Advancement Flap Text: The defect edges were debeveled with a #15 scalpel blade.  Given the location of the defect and the proximity to free margins a Burow's advancement flap was deemed most appropriate.  Using a sterile surgical marker, the appropriate advancement flap was drawn incorporating the defect and placing the expected incisions within the relaxed skin tension lines where possible.    The area thus outlined was incised deep to adipose tissue with a #15 scalpel blade.  The skin margins were undermined to an appropriate distance in all directions utilizing iris scissors.
Unna Boot Text: An Unna boot was placed to help immobilize the limb and facilitate more rapid healing.
Anesthesia Volume In Cc: 6
Tissue Cultured Epidermal Autograft Text: The defect edges were debeveled with a #15 scalpel blade.  Given the location of the defect, shape of the defect and the proximity to free margins a tissue cultured epidermal autograft was deemed most appropriate.  The graft was then trimmed to fit the size of the defect.  The graft was then placed in the primary defect and oriented appropriately.
Keystone Flap Text: The defect edges were debeveled with a #15 scalpel blade.  Given the location of the defect, shape of the defect a keystone flap was deemed most appropriate.  Using a sterile surgical marker, an appropriate keystone flap was drawn incorporating the defect, outlining the appropriate donor tissue and placing the expected incisions within the relaxed skin tension lines where possible. The area thus outlined was incised deep to adipose tissue with a #15 scalpel blade.  The skin margins were undermined to an appropriate distance in all directions around the primary defect and laterally outward around the flap utilizing iris scissors.
Graft Donor Site Dermal Sutures (Optional): 5-0 Polysorb
Melolabial Transposition Flap Text: The defect edges were debeveled with a #15 scalpel blade.  Given the location of the defect and the proximity to free margins a melolabial flap was deemed most appropriate.  Using a sterile surgical marker, an appropriate melolabial transposition flap was drawn incorporating the defect.    The area thus outlined was incised deep to adipose tissue with a #15 scalpel blade.  The skin margins were undermined to an appropriate distance in all directions utilizing iris scissors.
Cheek-To-Nose Interpolation Flap Text: A decision was made to reconstruct the defect utilizing an interpolation axial flap and a staged reconstruction.  A telfa template was made of the defect.  This telfa template was then used to outline the Cheek-To-Nose Interpolation flap.  The donor area for the pedicle flap was then injected with anesthesia.  The flap was excised through the skin and subcutaneous tissue down to the layer of the underlying musculature.  The interpolation flap was carefully excised within this deep plane to maintain its blood supply.  The edges of the donor site were undermined.   The donor site was closed in a primary fashion.  The pedicle was then rotated into position and sutured.  Once the tube was sutured into place, adequate blood supply was confirmed with blanching and refill.  The pedicle was then wrapped with xeroform gauze and dressed appropriately with a telfa and gauze bandage to ensure continued blood supply and protect the attached pedicle.
Consent 1/Introductory Paragraph: The rationale for Mohs was explained to the patient and consent was obtained. The risks, benefits and alternatives to therapy were discussed in detail. Specifically, the risks of infection, scarring, bleeding, prolonged wound healing, incomplete removal, allergy to anesthesia, nerve injury and recurrence were addressed. Prior to the procedure, the treatment site was clearly identified and confirmed by the patient. All components of Universal Protocol/PAUSE Rule completed.
Consent (Lip)/Introductory Paragraph: The rationale for Mohs was explained to the patient and consent was obtained. The risks, benefits and alternatives to therapy were discussed in detail. Specifically, the risks of lip deformity, changes in the oral aperture, infection, scarring, bleeding, prolonged wound healing, incomplete removal, allergy to anesthesia, nerve injury and recurrence were addressed. Prior to the procedure, the treatment site was clearly identified and confirmed by the patient. All components of Universal Protocol/PAUSE Rule completed.
Referring Physician (Optional): Destiny Ramsey
Bilobed Flap Text: The defect edges were debeveled with a #15 scalpel blade.  Given the location of the defect and the proximity to free margins a bilobe flap was deemed most appropriate.  Using a sterile surgical marker, an appropriate bilobe flap drawn around the defect.    The area thus outlined was incised deep to adipose tissue with a #15 scalpel blade.  The skin margins were undermined to an appropriate distance in all directions utilizing iris scissors.
Consent 2/Introductory Paragraph: Mohs surgery was explained to the patient and consent was obtained. The risks, benefits and alternatives to therapy were discussed in detail. Specifically, the risks of infection, scarring, bleeding, prolonged wound healing, incomplete removal, allergy to anesthesia, nerve injury and recurrence were addressed. Prior to the procedure, the treatment site was clearly identified and confirmed by the patient. All components of Universal Protocol/PAUSE Rule completed.
Bi-Rhombic Flap Text: The defect edges were debeveled with a #15 scalpel blade.  Given the location of the defect and the proximity to free margins a bi-rhombic flap was deemed most appropriate.  Using a sterile surgical marker, an appropriate rhombic flap was drawn incorporating the defect. The area thus outlined was incised deep to adipose tissue with a #15 scalpel blade.  The skin margins were undermined to an appropriate distance in all directions utilizing iris scissors.
Consent (Temporal Branch)/Introductory Paragraph: The rationale for Mohs was explained to the patient and consent was obtained. The risks, benefits and alternatives to therapy were discussed in detail. Specifically, the risks of damage to the temporal branch of the facial nerve, infection, scarring, bleeding, prolonged wound healing, incomplete removal, allergy to anesthesia, and recurrence were addressed. Prior to the procedure, the treatment site was clearly identified and confirmed by the patient. All components of Universal Protocol/PAUSE Rule completed.
Consent (Marginal Mandibular)/Introductory Paragraph: The rationale for Mohs was explained to the patient and consent was obtained. The risks, benefits and alternatives to therapy were discussed in detail. Specifically, the risks of damage to the marginal mandibular branch of the facial nerve, infection, scarring, bleeding, prolonged wound healing, incomplete removal, allergy to anesthesia, and recurrence were addressed. Prior to the procedure, the treatment site was clearly identified and confirmed by the patient. All components of Universal Protocol/PAUSE Rule completed.
Home Suture Removal Text: Patient was provided instructions on removing sutures and will remove their sutures at home.  If they have any questions or difficulties they will call the office.
Suture Removal: 7 days
Localized Dermabrasion With Wire Brush Text: The patient was draped in routine manner.  Localized dermabrasion using 3 x 17 mm wire brush was performed in routine manner to papillary dermis. This spot dermabrasion is being performed to complete skin cancer reconstruction. It also will eliminate the other sun damaged precancerous cells that are known to be part of the regional effect of a lifetime's worth of sun exposure. This localized dermabrasion is therapeutic and should not be considered cosmetic in any regard.
Rotation Flap Text: The defect edges were debeveled with a #15 scalpel blade.  Given the location of the defect, shape of the defect and the proximity to free margins a rotation flap was deemed most appropriate.  Using a sterile surgical marker, an appropriate rotation flap was drawn incorporating the defect and placing the expected incisions within the relaxed skin tension lines where possible.    The area thus outlined was incised deep to adipose tissue with a #15 scalpel blade.  The skin margins were undermined to an appropriate distance in all directions utilizing iris scissors.
Purse String (Simple) Text: Given the location of the defect and the characteristics of the surrounding skin a purse string closure was deemed most appropriate.  Undermining was performed circumfirentially around the surgical defect.  A purse string suture was then placed and tightened.
Repair Performed By Another Provider Text (Leave Blank If You Do Not Want): After obtaining clear surgical margins the defect was repaired by another provider.
Epidermal Autograft Text: The defect edges were debeveled with a #15 scalpel blade.  Given the location of the defect, shape of the defect and the proximity to free margins an epidermal autograft was deemed most appropriate.  Using a sterile surgical marker, the primary defect shape was transferred to the donor site. The epidermal graft was then harvested.  The skin graft was then placed in the primary defect and oriented appropriately.
Mauc Instructions: By selecting yes to the question below the MAUC number will be added into the note.  This will be calculated automatically based on the diagnosis chosen, the size entered, the body zone selected (H,M,L) and the specific indications you chose. You will also have the option to override the Mohs AUC if you disagree with the automatically calculated number and this option is found in the Case Summary tab.
Simple / Intermediate / Complex Repair - Final Wound Length In Cm: 4.1
Island Pedicle Flap-Requiring Vessel Identification Text: The defect edges were debeveled with a #15 scalpel blade.  Given the location of the defect, shape of the defect and the proximity to free margins an island pedicle advancement flap was deemed most appropriate.  Using a sterile surgical marker, an appropriate advancement flap was drawn, based on the axial vessel mentioned above, incorporating the defect, outlining the appropriate donor tissue and placing the expected incisions within the relaxed skin tension lines where possible.    The area thus outlined was incised deep to adipose tissue with a #15 scalpel blade.  The skin margins were undermined to an appropriate distance in all directions around the primary defect and laterally outward around the island pedicle utilizing iris scissors.  There was minimal undermining beneath the pedicle flap.
Skin Substitute Text: The defect edges were debeveled with a #15 scalpel blade.  Given the location of the defect, shape of the defect and the proximity to free margins a skin substitute graft was deemed most appropriate.  The graft material was trimmed to fit the size of the defect. The graft was then placed in the primary defect and oriented appropriately.
Previous Accession (Optional): [id number].
V-Y Flap Text: The defect edges were debeveled with a #15 scalpel blade.  Given the location of the defect, shape of the defect and the proximity to free margins a V-Y flap was deemed most appropriate.  Using a sterile surgical marker, an appropriate advancement flap was drawn incorporating the defect and placing the expected incisions within the relaxed skin tension lines where possible.    The area thus outlined was incised deep to adipose tissue with a #15 scalpel blade.  The skin margins were undermined to an appropriate distance in all directions utilizing iris scissors.
Wound Care: Aquaphor
Consent (Near Eyelid Margin)/Introductory Paragraph: The rationale for Mohs was explained to the patient and consent was obtained. The risks, benefits and alternatives to therapy were discussed in detail. Specifically, the risks of ectropion or eyelid deformity, infection, scarring, bleeding, prolonged wound healing, incomplete removal, allergy to anesthesia, nerve injury and recurrence were addressed. Prior to the procedure, the treatment site was clearly identified and confirmed by the patient. All components of Universal Protocol/PAUSE Rule completed.
Dressing: pressure dressing with telfa
Star Wedge Flap Text: The defect edges were debeveled with a #15 scalpel blade.  Given the location of the defect, shape of the defect and the proximity to free margins a star wedge flap was deemed most appropriate.  Using a sterile surgical marker, an appropriate rotation flap was drawn incorporating the defect and placing the expected incisions within the relaxed skin tension lines where possible. The area thus outlined was incised deep to adipose tissue with a #15 scalpel blade.  The skin margins were undermined to an appropriate distance in all directions utilizing iris scissors.
Posterior Auricular Interpolation Flap Text: A decision was made to reconstruct the defect utilizing an interpolation axial flap and a staged reconstruction.  A telfa template was made of the defect.  This telfa template was then used to outline the posterior auricular interpolation flap.  The donor area for the pedicle flap was then injected with anesthesia.  The flap was excised through the skin and subcutaneous tissue down to the layer of the underlying musculature.  The pedicle flap was carefully excised within this deep plane to maintain its blood supply.  The edges of the donor site were undermined.   The donor site was closed in a primary fashion.  The pedicle was then rotated into position and sutured.  Once the tube was sutured into place, adequate blood supply was confirmed with blanching and refill.  The pedicle was then wrapped with xeroform gauze and dressed appropriately with a telfa and gauze bandage to ensure continued blood supply and protect the attached pedicle.
Deep Sutures: 4-0 Polysorb
Surgeon/Pathologist Verbiage (Will Incorporate Name Of Surgeon From Intro If Not Blank): operated in two distinct and integrated capacities as the surgeon and pathologist.
Alar Island Pedicle Flap Text: The defect edges were debeveled with a #15 scalpel blade.  Given the location of the defect, shape of the defect and the proximity to the alar rim an island pedicle advancement flap was deemed most appropriate.  Using a sterile surgical marker, an appropriate advancement flap was drawn incorporating the defect, outlining the appropriate donor tissue and placing the expected incisions within the nasal ala running parallel to the alar rim. The area thus outlined was incised with a #15 scalpel blade.  The skin margins were undermined minimally to an appropriate distance in all directions around the primary defect and laterally outward around the island pedicle utilizing iris scissors.  There was minimal undermining beneath the pedicle flap.
Bcc Infiltrative Histology Text: There were numerous aggregates of basaloid cells demonstrating an infiltrative pattern.
Area H Indication Text: Tumors in this location are included in Area H (eyelids, eyebrows, nose, lips, chin, ear, pre-auricular, post-auricular, temple, genitalia, hands, feet, ankles and areola).  Tissue conservation is critical in these anatomic locations.
Dorsal Nasal Flap Text: The defect edges were debeveled with a #15 scalpel blade.  Given the location of the defect and the proximity to free margins a dorsal nasal flap was deemed most appropriate.  Using a sterile surgical marker, an appropriate dorsal nasal flap was drawn around the defect.    The area thus outlined was incised deep to adipose tissue with a #15 scalpel blade.  The skin margins were undermined to an appropriate distance in all directions utilizing iris scissors.
Referred To Plastics For Closure Text (Leave Blank If You Do Not Want): After obtaining clear surgical margins the patient was sent to plastics for surgical repair.  The patient understands they will receive post-surgical care and follow-up from the referring physician's office.
Repair Type: Complex Repair
Island Pedicle Flap With Canthal Suspension Text: The defect edges were debeveled with a #15 scalpel blade.  Given the location of the defect, shape of the defect and the proximity to free margins an island pedicle advancement flap was deemed most appropriate.  Using a sterile surgical marker, an appropriate advancement flap was drawn incorporating the defect, outlining the appropriate donor tissue and placing the expected incisions within the relaxed skin tension lines where possible. The area thus outlined was incised deep to adipose tissue with a #15 scalpel blade.  The skin margins were undermined to an appropriate distance in all directions around the primary defect and laterally outward around the island pedicle utilizing iris scissors.  There was minimal undermining beneath the pedicle flap. A suspension suture was placed in the canthal tendon to prevent tension and prevent ectropion.
Modified Advancement Flap Text: The defect edges were debeveled with a #15 scalpel blade.  Given the location of the defect, shape of the defect and the proximity to free margins a modified advancement flap was deemed most appropriate.  Using a sterile surgical marker, an appropriate advancement flap was drawn incorporating the defect and placing the expected incisions within the relaxed skin tension lines where possible.    The area thus outlined was incised deep to adipose tissue with a #15 scalpel blade.  The skin margins were undermined to an appropriate distance in all directions utilizing iris scissors.
Mohs Case Number: [id number]
H Plasty Text: Given the location of the defect, shape of the defect and the proximity to free margins a H-plasty was deemed most appropriate for repair.  Using a sterile surgical marker, the appropriate advancement arms of the H-plasty were drawn incorporating the defect and placing the expected incisions within the relaxed skin tension lines where possible. The area thus outlined was incised deep to adipose tissue with a #15 scalpel blade. The skin margins were undermined to an appropriate distance in all directions utilizing iris scissors.  The opposing advancement arms were then advanced into place in opposite direction and anchored with interrupted buried subcutaneous sutures.
No Residual Tumor Seen Histology Text: There were no malignant cells seen in the sections examined.
Medical Necessity Statement: Based on my medical judgement, Mohs surgery is the most appropriate treatment for this cancer compared to other treatments.
Bilateral Helical Rim Advancement Flap Text: The defect edges were debeveled with a #15 blade scalpel.  Given the location of the defect and the proximity to free margins (helical rim) a bilateral helical rim advancement flap was deemed most appropriate.  Using a sterile surgical marker, the appropriate advancement flaps were drawn incorporating the defect and placing the expected incisions between the helical rim and antihelix where possible.  The area thus outlined was incised through and through with a #15 scalpel blade.  With a skin hook and iris scissors, the flaps were gently and sharply undermined and freed up.
Alternatives Discussed Intro (Do Not Add Period): I discussed alternative treatments to Mohs surgery and specifically discussed the risks and benefits of
Mucosal Advancement Flap Text: Given the location of the defect, shape of the defect and the proximity to free margins a mucosal advancement flap was deemed most appropriate. Incisions were made with a 15 blade scalpel in the appropriate fashion along the cutaneous vermilion border and the mucosal lip. The remaining actinically damaged mucosal tissue was excised.  The mucosal advancement flap was then elevated to the gingival sulcus with care taken to preserve the neurovascular structures and advanced into the primary defect. Care was taken to ensure that precise realignment of the vermilion border was achieved.
Referred To Oculoplastics For Closure Text (Leave Blank If You Do Not Want): After obtaining clear surgical margins the patient was sent to oculoplastics for surgical repair.  The patient understands they will receive post-surgical care and follow-up from the referring physician's office.
Partial Purse String (Simple) Text: Given the location of the defect and the characteristics of the surrounding skin a simple purse string closure was deemed most appropriate.  Undermining was performed circumfirentially around the surgical defect.  A purse string suture was then placed and tightened. Wound tension only allowed a partial closure of the circular defect.
Consent (Scalp)/Introductory Paragraph: The rationale for Mohs was explained to the patient and consent was obtained. The risks, benefits and alternatives to therapy were discussed in detail. Specifically, the risks of changes in hair growth pattern secondary to repair, infection, scarring, bleeding, prolonged wound healing, incomplete removal, allergy to anesthesia, nerve injury and recurrence were addressed. Prior to the procedure, the treatment site was clearly identified and confirmed by the patient. All components of Universal Protocol/PAUSE Rule completed.
Cheiloplasty (Less Than 50%) Text: A decision was made to reconstruct the defect with a  cheiloplasty.  The defect was undermined extensively.  Additional obicularis oris muscle was excised with a 15 blade scalpel.  The defect was converted into a full thickness wedge, of less than 50% of the vertical height of the lip, to facilite a better cosmetic result.  Small vessels were then tied off with 5-0 monocyrl. The obicularis oris, superficial fascia, adipose and dermis were then reapproximated.  After the deeper layers were approximated the epidermis was reapproximated with particular care given to realign the vermilion border.
Epidermal Closure: running cuticular
Muscle Hinge Flap Text: The defect edges were debeveled with a #15 scalpel blade.  Given the size, depth and location of the defect and the proximity to free margins a muscle hinge flap was deemed most appropriate.  Using a sterile surgical marker, an appropriate hinge flap was drawn incorporating the defect. The area thus outlined was incised with a #15 scalpel blade.  The skin margins were undermined to an appropriate distance in all directions utilizing iris scissors.
Xenograft Text: The defect edges were debeveled with a #15 scalpel blade.  Given the location of the defect, shape of the defect and the proximity to free margins a xenograft was deemed most appropriate.  The graft was then trimmed to fit the size of the defect.  The graft was then placed in the primary defect and oriented appropriately.
Mastoid Interpolation Flap Text: A decision was made to reconstruct the defect utilizing an interpolation axial flap and a staged reconstruction.  A telfa template was made of the defect.  This telfa template was then used to outline the mastoid interpolation flap.  The donor area for the pedicle flap was then injected with anesthesia.  The flap was excised through the skin and subcutaneous tissue down to the layer of the underlying musculature.  The pedicle flap was carefully excised within this deep plane to maintain its blood supply.  The edges of the donor site were undermined.   The donor site was closed in a primary fashion.  The pedicle was then rotated into position and sutured.  Once the tube was sutured into place, adequate blood supply was confirmed with blanching and refill.  The pedicle was then wrapped with xeroform gauze and dressed appropriately with a telfa and gauze bandage to ensure continued blood supply and protect the attached pedicle.
Subsequent Stages Histo Method Verbiage: Using a similar technique to that described above, a thin layer of tissue was removed from all areas where tumor was visible on the previous stage.  The tissue was again oriented, mapped, dyed, and processed as above.
Cheek Interpolation Flap Text: A decision was made to reconstruct the defect utilizing an interpolation axial flap and a staged reconstruction.  A telfa template was made of the defect.  This telfa template was then used to outline the Cheek Interpolation flap.  The donor area for the pedicle flap was then injected with anesthesia.  The flap was excised through the skin and subcutaneous tissue down to the layer of the underlying musculature.  The interpolation flap was carefully excised within this deep plane to maintain its blood supply.  The edges of the donor site were undermined.   The donor site was closed in a primary fashion.  The pedicle was then rotated into position and sutured.  Once the tube was sutured into place, adequate blood supply was confirmed with blanching and refill.  The pedicle was then wrapped with xeroform gauze and dressed appropriately with a telfa and gauze bandage to ensure continued blood supply and protect the attached pedicle.
Advancement Flap (Double) Text: The defect edges were debeveled with a #15 scalpel blade.  Given the location of the defect and the proximity to free margins a double advancement flap was deemed most appropriate.  Using a sterile surgical marker, the appropriate advancement flaps were drawn incorporating the defect and placing the expected incisions within the relaxed skin tension lines where possible.    The area thus outlined was incised deep to adipose tissue with a #15 scalpel blade.  The skin margins were undermined to an appropriate distance in all directions utilizing iris scissors.
Split-Thickness Skin Graft Text: The defect edges were debeveled with a #15 scalpel blade.  Given the location of the defect, shape of the defect and the proximity to free margins a split thickness skin graft was deemed most appropriate.  Using a sterile surgical marker, the primary defect shape was transferred to the donor site. The split thickness graft was then harvested.  The skin graft was then placed in the primary defect and oriented appropriately.
Wound Check: 6 weeks
Graft Donor Site Bandage (Optional-Leave Blank If You Don't Want In Note): Aquaplast was fitted to the graft site and sewn into place. A pressure bandage were applied to the donor site and over the aquaplast bolster.
Complex Repair And Graft Additional Text (Will Appearing After The Standard Complex Repair Text): The complex repair was not sufficient to completely close the primary defect. The remaining additional defect was repaired with the graft mentioned below.
Manual Repair Warning Statement: We plan on removing the manually selected variable below in favor of our much easier automatic structured text blocks found in the previous tab. We decided to do this to help make the flow better and give you the full power of structured data. Manual selection is never going to be ideal in our platform and I would encourage you to avoid using manual selection from this point on, especially since I will be sunsetting this feature. It is important that you do one of two things with the customized text below. First, you can save all of the text in a word file so you can have it for future reference. Second, transfer the text to the appropriate area in the Library tab. Lastly, if there is a flap or graft type which we do not have you need to let us know right away so I can add it in before the variable is hidden. No need to panic, we plan to give you roughly 6 months to make the change.
Same Histology In Subsequent Stages Text: The pattern and morphology of the tumor is as described in the first stage.
Ear Wedge Repair Text: A wedge excision was completed by carrying down an excision through the full thickness of the ear and cartilage with an inward facing Burow's triangle. The wound was then closed in a layered fashion.
Eye Protection Verbiage: Before proceeding with the stage, a plastic scleral shield was inserted. The globe was anesthetized with a few drops of 1% lidocaine with 1:100,000 epinephrine. Then, an appropriate sized scleral shield was chosen and coated with lacrilube ointment. The shield was gently inserted and left in place for the duration of each stage. After the stage was completed, the shield was gently removed.
S Plasty Text: Given the location and shape of the defect, and the orientation of relaxed skin tension lines, an S-plasty was deemed most appropriate for repair.  Using a sterile surgical marker, the appropriate outline of the S-plasty was drawn, incorporating the defect and placing the expected incisions within the relaxed skin tension lines where possible.  The area thus outlined was incised deep to adipose tissue with a #15 scalpel blade.  The skin margins were undermined to an appropriate distance in all directions utilizing iris scissors. The skin flaps were advanced over the defect.  The opposing margins were then approximated with interrupted buried subcutaneous sutures.
Interpolation Flap Text: A decision was made to reconstruct the defect utilizing an interpolation axial flap and a staged reconstruction.  A telfa template was made of the defect.  This telfa template was then used to outline the interpolation flap.  The donor area for the pedicle flap was then injected with anesthesia.  The flap was excised through the skin and subcutaneous tissue down to the layer of the underlying musculature.  The interpolation flap was carefully excised within this deep plane to maintain its blood supply.  The edges of the donor site were undermined.   The donor site was closed in a primary fashion.  The pedicle was then rotated into position and sutured.  Once the tube was sutured into place, adequate blood supply was confirmed with blanching and refill.  The pedicle was then wrapped with xeroform gauze and dressed appropriately with a telfa and gauze bandage to ensure continued blood supply and protect the attached pedicle.
Composite Graft Text: The defect edges were debeveled with a #15 scalpel blade.  Given the location of the defect, shape of the defect, the proximity to free margins and the fact the defect was full thickness a composite graft was deemed most appropriate.  The defect was outline and then transferred to the donor site.  A full thickness graft was then excised from the donor site. The graft was then placed in the primary defect, oriented appropriately and then sutured into place.  The secondary defect was then repaired using a primary closure.
Bilobed Transposition Flap Text: The defect edges were debeveled with a #15 scalpel blade.  Given the location of the defect and the proximity to free margins a bilobed transposition flap was deemed most appropriate.  Using a sterile surgical marker, an appropriate bilobe flap drawn around the defect.    The area thus outlined was incised deep to adipose tissue with a #15 scalpel blade.  The skin margins were undermined to an appropriate distance in all directions utilizing iris scissors.
Area L Indication Text: Tumors in this location are included in Area L (trunk and extremities).  Mohs surgery is indicated for larger tumors, or tumors with aggressive histologic features, in these anatomic locations.
Crescentic Advancement Flap Text: The defect edges were debeveled with a #15 scalpel blade.  Given the location of the defect and the proximity to free margins a crescentic advancement flap was deemed most appropriate.  Using a sterile surgical marker, the appropriate advancement flap was drawn incorporating the defect and placing the expected incisions within the relaxed skin tension lines where possible.    The area thus outlined was incised deep to adipose tissue with a #15 scalpel blade.  The skin margins were undermined to an appropriate distance in all directions utilizing iris scissors.
Spiral Flap Text: The defect edges were debeveled with a #15 scalpel blade.  Given the location of the defect, shape of the defect and the proximity to free margins a spiral flap was deemed most appropriate.  Using a sterile surgical marker, an appropriate rotation flap was drawn incorporating the defect and placing the expected incisions within the relaxed skin tension lines where possible. The area thus outlined was incised deep to adipose tissue with a #15 scalpel blade.  The skin margins were undermined to an appropriate distance in all directions utilizing iris scissors.
O-T Advancement Flap Text: The defect edges were debeveled with a #15 scalpel blade.  Given the location of the defect, shape of the defect and the proximity to free margins an O-T advancement flap was deemed most appropriate.  Using a sterile surgical marker, an appropriate advancement flap was drawn incorporating the defect and placing the expected incisions within the relaxed skin tension lines where possible.    The area thus outlined was incised deep to adipose tissue with a #15 scalpel blade.  The skin margins were undermined to an appropriate distance in all directions utilizing iris scissors.

## 2018-01-16 ENCOUNTER — APPOINTMENT (RX ONLY)
Dept: URBAN - METROPOLITAN AREA CLINIC 20 | Facility: CLINIC | Age: 78
Setting detail: DERMATOLOGY
End: 2018-01-16

## 2018-01-16 DIAGNOSIS — Z48.02 ENCOUNTER FOR REMOVAL OF SUTURES: ICD-10-CM

## 2018-01-16 PROCEDURE — 99024 POSTOP FOLLOW-UP VISIT: CPT

## 2018-01-16 PROCEDURE — ? SUTURE REMOVAL (GLOBAL PERIOD)

## 2018-01-16 ASSESSMENT — LOCATION ZONE DERM: LOCATION ZONE: FACE

## 2018-01-16 ASSESSMENT — LOCATION SIMPLE DESCRIPTION DERM: LOCATION SIMPLE: LEFT CHEEK

## 2018-01-16 ASSESSMENT — LOCATION DETAILED DESCRIPTION DERM: LOCATION DETAILED: LEFT CENTRAL MANDIBULAR CHEEK

## 2018-01-16 NOTE — PROCEDURE: SUTURE REMOVAL (GLOBAL PERIOD)
Add 13204 Cpt? (Important Note: In 2017 The Use Of 71120 Is Being Tracked By Cms To Determine Future Global Period Reimbursement For Global Periods): yes
Detail Level: Detailed

## 2018-01-30 RX ORDER — SITAGLIPTIN AND METFORMIN HYDROCHLORIDE 1000; 50 MG/1; MG/1
1 TABLET, FILM COATED ORAL 2 TIMES DAILY WITH MEALS
Qty: 180 TAB | Refills: 3 | Status: SHIPPED | OUTPATIENT
Start: 2018-01-30 | End: 2018-12-06 | Stop reason: SDUPTHER

## 2018-02-09 ENCOUNTER — APPOINTMENT (RX ONLY)
Dept: URBAN - METROPOLITAN AREA CLINIC 36 | Facility: CLINIC | Age: 78
Setting detail: DERMATOLOGY
End: 2018-02-09

## 2018-02-09 DIAGNOSIS — Z48.817 ENCOUNTER FOR SURGICAL AFTERCARE FOLLOWING SURGERY ON THE SKIN AND SUBCUTANEOUS TISSUE: ICD-10-CM

## 2018-02-09 PROCEDURE — ? POST-OP WOUND CHECK

## 2018-02-09 PROCEDURE — 99024 POSTOP FOLLOW-UP VISIT: CPT

## 2018-02-09 ASSESSMENT — LOCATION ZONE DERM: LOCATION ZONE: NECK

## 2018-02-09 ASSESSMENT — LOCATION DETAILED DESCRIPTION DERM: LOCATION DETAILED: LEFT CENTRAL LATERAL NECK

## 2018-02-09 ASSESSMENT — LOCATION SIMPLE DESCRIPTION DERM: LOCATION SIMPLE: NECK

## 2018-02-09 NOTE — PROCEDURE: POST-OP WOUND CHECK
Detail Level: Detailed
Wound Evaluated By: Vannessa Andrew RN
Additional Comments: Pt. Worried about scab on incision site. Removed scab and reassured pt. He will call if he has any further q/c re: site.
Add 16707 Cpt? (Important Note: In 2017 The Use Of 36339 Is Being Tracked By Cms To Determine Future Global Period Reimbursement For Global Periods): yes

## 2018-03-05 ENCOUNTER — APPOINTMENT (RX ONLY)
Dept: URBAN - METROPOLITAN AREA CLINIC 4 | Facility: CLINIC | Age: 78
Setting detail: DERMATOLOGY
End: 2018-03-05

## 2018-03-05 ENCOUNTER — APPOINTMENT (RX ONLY)
Dept: URBAN - METROPOLITAN AREA CLINIC 36 | Facility: CLINIC | Age: 78
Setting detail: DERMATOLOGY
End: 2018-03-05

## 2018-03-05 DIAGNOSIS — L57.0 ACTINIC KERATOSIS: ICD-10-CM

## 2018-03-05 DIAGNOSIS — D22 MELANOCYTIC NEVI: ICD-10-CM

## 2018-03-05 DIAGNOSIS — D18.0 HEMANGIOMA: ICD-10-CM

## 2018-03-05 DIAGNOSIS — L82.1 OTHER SEBORRHEIC KERATOSIS: ICD-10-CM

## 2018-03-05 DIAGNOSIS — Z71.89 OTHER SPECIFIED COUNSELING: ICD-10-CM

## 2018-03-05 DIAGNOSIS — Z48.817 ENCOUNTER FOR SURGICAL AFTERCARE FOLLOWING SURGERY ON THE SKIN AND SUBCUTANEOUS TISSUE: ICD-10-CM

## 2018-03-05 DIAGNOSIS — Z85.828 PERSONAL HISTORY OF OTHER MALIGNANT NEOPLASM OF SKIN: ICD-10-CM

## 2018-03-05 DIAGNOSIS — L81.4 OTHER MELANIN HYPERPIGMENTATION: ICD-10-CM

## 2018-03-05 PROBLEM — D22.71 MELANOCYTIC NEVI OF RIGHT LOWER LIMB, INCLUDING HIP: Status: ACTIVE | Noted: 2018-03-05

## 2018-03-05 PROBLEM — D23.72 OTHER BENIGN NEOPLASM OF SKIN OF LEFT LOWER LIMB, INCLUDING HIP: Status: ACTIVE | Noted: 2018-03-05

## 2018-03-05 PROBLEM — D22.62 MELANOCYTIC NEVI OF LEFT UPPER LIMB, INCLUDING SHOULDER: Status: ACTIVE | Noted: 2018-03-05

## 2018-03-05 PROBLEM — D22.61 MELANOCYTIC NEVI OF RIGHT UPPER LIMB, INCLUDING SHOULDER: Status: ACTIVE | Noted: 2018-03-05

## 2018-03-05 PROBLEM — D23.71 OTHER BENIGN NEOPLASM OF SKIN OF RIGHT LOWER LIMB, INCLUDING HIP: Status: ACTIVE | Noted: 2018-03-05

## 2018-03-05 PROBLEM — D18.01 HEMANGIOMA OF SKIN AND SUBCUTANEOUS TISSUE: Status: ACTIVE | Noted: 2018-03-05

## 2018-03-05 PROBLEM — D22.72 MELANOCYTIC NEVI OF LEFT LOWER LIMB, INCLUDING HIP: Status: ACTIVE | Noted: 2018-03-05

## 2018-03-05 PROBLEM — D22.5 MELANOCYTIC NEVI OF TRUNK: Status: ACTIVE | Noted: 2018-03-05

## 2018-03-05 PROCEDURE — ? INTRALESIONAL KENALOG

## 2018-03-05 PROCEDURE — 99024 POSTOP FOLLOW-UP VISIT: CPT

## 2018-03-05 PROCEDURE — 17000 DESTRUCT PREMALG LESION: CPT

## 2018-03-05 PROCEDURE — ? LIQUID NITROGEN

## 2018-03-05 PROCEDURE — ? POST-OP WOUND CHECK

## 2018-03-05 PROCEDURE — ? COUNSELING

## 2018-03-05 PROCEDURE — 11900 INJECT SKIN LESIONS </W 7: CPT

## 2018-03-05 PROCEDURE — 99214 OFFICE O/P EST MOD 30 MIN: CPT | Mod: 25

## 2018-03-05 PROCEDURE — 17003 DESTRUCT PREMALG LES 2-14: CPT

## 2018-03-05 PROCEDURE — ? ADDITIONAL NOTES

## 2018-03-05 ASSESSMENT — LOCATION DETAILED DESCRIPTION DERM
LOCATION DETAILED: LEFT ANTERIOR DISTAL UPPER ARM
LOCATION DETAILED: LEFT SUPERIOR LATERAL NECK
LOCATION DETAILED: LEFT SUPERIOR FOREHEAD
LOCATION DETAILED: LEFT INFERIOR CENTRAL MALAR CHEEK
LOCATION DETAILED: RIGHT ANTERIOR DISTAL THIGH
LOCATION DETAILED: LEFT SUPERIOR PARIETAL SCALP
LOCATION DETAILED: LEFT CENTRAL ZYGOMA
LOCATION DETAILED: EPIGASTRIC SKIN
LOCATION DETAILED: RIGHT INFERIOR FOREHEAD
LOCATION DETAILED: LEFT MEDIAL SUPERIOR CHEST
LOCATION DETAILED: LEFT BUTTOCK
LOCATION DETAILED: LEFT PROXIMAL PRETIBIAL REGION
LOCATION DETAILED: RIGHT ANTERIOR DISTAL UPPER ARM
LOCATION DETAILED: LEFT ANTERIOR PROXIMAL THIGH
LOCATION DETAILED: LEFT INFERIOR ANTERIOR NECK
LOCATION DETAILED: LEFT SUPERIOR MEDIAL UPPER BACK
LOCATION DETAILED: LEFT SUPERIOR OCCIPITAL SCALP
LOCATION DETAILED: MIDDLE STERNUM
LOCATION DETAILED: LOWER STERNUM
LOCATION DETAILED: SUPERIOR THORACIC SPINE
LOCATION DETAILED: LEFT FOREHEAD
LOCATION DETAILED: LEFT ANTERIOR DISTAL THIGH
LOCATION DETAILED: LEFT CENTRAL MANDIBULAR CHEEK
LOCATION DETAILED: RIGHT ANTERIOR PROXIMAL THIGH
LOCATION DETAILED: LEFT INFERIOR MEDIAL FOREHEAD
LOCATION DETAILED: RIGHT SUPERIOR LATERAL FOREHEAD
LOCATION DETAILED: LEFT INFERIOR FOREHEAD
LOCATION DETAILED: LEFT MEDIAL UPPER BACK
LOCATION DETAILED: RIGHT INFERIOR HELIX
LOCATION DETAILED: INFERIOR THORACIC SPINE
LOCATION DETAILED: LEFT CENTRAL LATERAL NECK

## 2018-03-05 ASSESSMENT — LOCATION SIMPLE DESCRIPTION DERM
LOCATION SIMPLE: RIGHT FOREHEAD
LOCATION SIMPLE: RIGHT EAR
LOCATION SIMPLE: LEFT ZYGOMA
LOCATION SIMPLE: LEFT CHEEK
LOCATION SIMPLE: RIGHT THIGH
LOCATION SIMPLE: LEFT UPPER ARM
LOCATION SIMPLE: LEFT BUTTOCK
LOCATION SIMPLE: RIGHT UPPER ARM
LOCATION SIMPLE: LEFT FOREHEAD
LOCATION SIMPLE: UPPER BACK
LOCATION SIMPLE: LEFT OCCIPITAL SCALP
LOCATION SIMPLE: ABDOMEN
LOCATION SIMPLE: LEFT ANTERIOR NECK
LOCATION SIMPLE: CHEST
LOCATION SIMPLE: LEFT UPPER BACK
LOCATION SIMPLE: NECK
LOCATION SIMPLE: LEFT PRETIBIAL REGION
LOCATION SIMPLE: LEFT THIGH
LOCATION SIMPLE: SCALP

## 2018-03-05 ASSESSMENT — LOCATION ZONE DERM
LOCATION ZONE: EAR
LOCATION ZONE: NECK
LOCATION ZONE: ARM
LOCATION ZONE: SCALP
LOCATION ZONE: NECK
LOCATION ZONE: FACE
LOCATION ZONE: TRUNK
LOCATION ZONE: LEG

## 2018-03-05 NOTE — PROCEDURE: POST-OP WOUND CHECK
Detail Level: Detailed
Wound Evaluated By: Romy Lombardi MD
Wound Dressing Override (Optional): spot bandage applied
Add 05815 Cpt? (Important Note: In 2017 The Use Of 66228 Is Being Tracked By Cms To Determine Future Global Period Reimbursement For Global Periods): yes

## 2018-03-05 NOTE — PROCEDURE: INTRALESIONAL KENALOG
Concentration Of Kenalog Solution Injected (Mg/Ml): 10.0
Include Z78.9 (Other Specified Conditions Influencing Health Status) As An Associated Diagnosis?: No
Administered By (Optional): Romy Lombardi M.D.
Expiration Date For Kenalog (Optional): 03/2019
Total Volume (Ccs): 0.4
Treatment Number (Optional): 1
Consent: The risks of atrophy were reviewed with the patient.
Medical Necessity Clause: This procedure was medically necessary because the lesions that were treated were:
Kenalog Preparation: Kenalog
Lot # For Kenalog (Optional): [id number]
X Size Of Lesion In Cm (Optional): 0
Detail Level: Detailed

## 2018-03-14 ENCOUNTER — OFFICE VISIT (OUTPATIENT)
Dept: MEDICAL GROUP | Facility: MEDICAL CENTER | Age: 78
End: 2018-03-14
Payer: MEDICARE

## 2018-03-14 VITALS
HEIGHT: 71 IN | WEIGHT: 186.8 LBS | SYSTOLIC BLOOD PRESSURE: 142 MMHG | DIASTOLIC BLOOD PRESSURE: 90 MMHG | BODY MASS INDEX: 26.15 KG/M2 | TEMPERATURE: 97 F | OXYGEN SATURATION: 98 % | RESPIRATION RATE: 12 BRPM | HEART RATE: 82 BPM

## 2018-03-14 DIAGNOSIS — I87.2 VENOUS INSUFFICIENCY OF BOTH LOWER EXTREMITIES: ICD-10-CM

## 2018-03-14 PROBLEM — R60.0 LEG EDEMA, LEFT: Status: RESOLVED | Noted: 2017-05-30 | Resolved: 2018-03-14

## 2018-03-14 PROCEDURE — 99214 OFFICE O/P EST MOD 30 MIN: CPT | Performed by: FAMILY MEDICINE

## 2018-03-14 NOTE — ASSESSMENT & PLAN NOTE
Complaining of increased gas since colonoscopy, patient is also on metformin 1000 mg twice daily. He is wondering about a probiotic.

## 2018-03-14 NOTE — ASSESSMENT & PLAN NOTE
Has been present for the last 2 years.  Worse on right than left currently. Ultrasound of left lower extremity showed no DVT last year. He states that swelling gets worse throughout the day. Swelling improved first thing in the morning or with elevating his legs. He states it is associated with heaviness of his legs with the swelling.

## 2018-03-14 NOTE — PROGRESS NOTES
"Subjective:   Sammy Oseguera is a 77 y.o. male here today for venous insufficiency    Venous insufficiency of both lower extremities  Has been present for the last 2 years.  Worse on right than left currently. Ultrasound of left lower extremity showed no DVT last year. He states that swelling gets worse throughout the day. Swelling improved first thing in the morning or with elevating his legs. He states it is associated with heaviness of his legs with the swelling.    Type 2 diabetes mellitus, uncontrolled  Complaining of increased gas since colonoscopy, patient is also on metformin 1000 mg twice daily. He is wondering about a probiotic.         Current medicines (including changes today)  Current Outpatient Prescriptions   Medication Sig Dispense Refill   • JANUMET  MG per tablet TAKE 1 TAB BY MOUTH 2 TIMES A DAY, WITH MEALS. 180 Tab 3   • Blood Glucose Monitoring Suppl Supplies Misc Test strips order: Test strips for Abbott Freestyle meter. Sig: use once daily. Dx: E11.65 100 Each 3   • Lancets Misc Lancets order: Lancets for Abbott Freestyle meter. Sig: use once daily. Dx: E11.65 100 Each 3   • Acetaminophen-Caffeine 500-65 MG Tab Take 1 Tab by mouth every four hours as needed (for headache). 60 Tab 0   • fluticasone (FLONASE) 50 MCG/ACT nasal spray Spray 2 Sprays in nose every day. 16 g 3   • cyanocobalamin (VITAMIN B12) 1000 MCG Tab Take 3 Tabs by mouth every day.     • vitamin D (CHOLECALCIFEROL) 1000 UNIT TABS Take 1,000 Units by mouth every day.       No current facility-administered medications for this visit.      He  has a past medical history of HTN and Type II or unspecified type diabetes mellitus without mention of complication, not stated as uncontrolled.    ROS   No shortness of breath, no fever       Objective:     Blood pressure 142/90, pulse 82, temperature 36.1 °C (97 °F), resp. rate 12, height 1.803 m (5' 11\"), weight 84.7 kg (186 lb 12.8 oz), SpO2 98 %. Body mass index is 26.05 " kg/m².   Physical Exam:  Constitutional: Alert, no distress.  Skin: Warm, dry, good turgor, no rashes in visible areas.  Eye: Equal, round and reactive, conjunctiva clear, lids normal.  Psych: Alert and oriented x3, normal affect and mood.  Ext: 2+ pitting edema bilaterally from ankles to knees, no erythema, no drainage.      Assessment and Plan:   The following treatment plan was discussed    1. Uncontrolled type 2 diabetes mellitus with stage 3 chronic kidney disease, without long-term current use of insulin (CMS-[facility])  Doing well for age. Continue to monitor with twice annual blood work. Continue current medication and try to add a probiotic.    2. Venous insufficiency of both lower extremities  Advised compression stockings first thing in the morning.      Followup: Return in about 3 months (around 6/14/2018) for Diabetes.

## 2018-04-25 ENCOUNTER — HOSPITAL ENCOUNTER (OUTPATIENT)
Dept: LAB | Facility: MEDICAL CENTER | Age: 78
End: 2018-04-25
Attending: FAMILY MEDICINE
Payer: MEDICARE

## 2018-04-25 LAB
ALBUMIN SERPL BCP-MCNC: 4.1 G/DL (ref 3.2–4.9)
ALBUMIN/GLOB SERPL: 1.3 G/DL
ALP SERPL-CCNC: 108 U/L (ref 30–99)
ALT SERPL-CCNC: 15 U/L (ref 2–50)
ANION GAP SERPL CALC-SCNC: 9 MMOL/L (ref 0–11.9)
APPEARANCE UR: CLEAR
AST SERPL-CCNC: 18 U/L (ref 12–45)
BACTERIA #/AREA URNS HPF: NEGATIVE /HPF
BILIRUB SERPL-MCNC: 1.2 MG/DL (ref 0.1–1.5)
BILIRUB UR QL STRIP.AUTO: NEGATIVE
BUN SERPL-MCNC: 20 MG/DL (ref 8–22)
CALCIUM SERPL-MCNC: 9.3 MG/DL (ref 8.5–10.5)
CHLORIDE SERPL-SCNC: 108 MMOL/L (ref 96–112)
CHOLEST SERPL-MCNC: 95 MG/DL (ref 100–199)
CO2 SERPL-SCNC: 24 MMOL/L (ref 20–33)
COLOR UR: YELLOW
CREAT SERPL-MCNC: 1.16 MG/DL (ref 0.5–1.4)
CREAT UR-MCNC: 145.5 MG/DL
EPI CELLS #/AREA URNS HPF: NEGATIVE /HPF
EST. AVERAGE GLUCOSE BLD GHB EST-MCNC: 163 MG/DL
GLOBULIN SER CALC-MCNC: 3.1 G/DL (ref 1.9–3.5)
GLUCOSE SERPL-MCNC: 113 MG/DL (ref 65–99)
GLUCOSE UR STRIP.AUTO-MCNC: NEGATIVE MG/DL
HBA1C MFR BLD: 7.3 % (ref 0–5.6)
HDLC SERPL-MCNC: 52 MG/DL
HYALINE CASTS #/AREA URNS LPF: ABNORMAL /LPF
KETONES UR STRIP.AUTO-MCNC: ABNORMAL MG/DL
LDLC SERPL CALC-MCNC: 33 MG/DL
LEUKOCYTE ESTERASE UR QL STRIP.AUTO: NEGATIVE
MICRO URNS: ABNORMAL
MICROALBUMIN UR-MCNC: 41.7 MG/DL
MICROALBUMIN/CREAT UR: 287 MG/G (ref 0–30)
NITRITE UR QL STRIP.AUTO: NEGATIVE
PH UR STRIP.AUTO: 5.5 [PH]
POTASSIUM SERPL-SCNC: 4.3 MMOL/L (ref 3.6–5.5)
PROT SERPL-MCNC: 7.2 G/DL (ref 6–8.2)
PROT UR QL STRIP: 100 MG/DL
RBC # URNS HPF: ABNORMAL /HPF
RBC UR QL AUTO: ABNORMAL
SODIUM SERPL-SCNC: 141 MMOL/L (ref 135–145)
SP GR UR STRIP.AUTO: 1.02
TRIGL SERPL-MCNC: 52 MG/DL (ref 0–149)
UROBILINOGEN UR STRIP.AUTO-MCNC: 0.2 MG/DL
WBC #/AREA URNS HPF: ABNORMAL /HPF

## 2018-04-25 PROCEDURE — 83036 HEMOGLOBIN GLYCOSYLATED A1C: CPT

## 2018-04-25 PROCEDURE — 80053 COMPREHEN METABOLIC PANEL: CPT

## 2018-04-25 PROCEDURE — 80061 LIPID PANEL: CPT

## 2018-04-25 PROCEDURE — 82043 UR ALBUMIN QUANTITATIVE: CPT

## 2018-04-25 PROCEDURE — 81001 URINALYSIS AUTO W/SCOPE: CPT

## 2018-04-25 PROCEDURE — 36415 COLL VENOUS BLD VENIPUNCTURE: CPT

## 2018-04-25 PROCEDURE — 82570 ASSAY OF URINE CREATININE: CPT

## 2018-05-09 ENCOUNTER — OFFICE VISIT (OUTPATIENT)
Dept: MEDICAL GROUP | Facility: MEDICAL CENTER | Age: 78
End: 2018-05-09
Payer: MEDICARE

## 2018-05-09 ENCOUNTER — HOSPITAL ENCOUNTER (OUTPATIENT)
Dept: LAB | Facility: MEDICAL CENTER | Age: 78
End: 2018-05-09
Attending: FAMILY MEDICINE
Payer: MEDICARE

## 2018-05-09 VITALS
WEIGHT: 174 LBS | TEMPERATURE: 97.3 F | SYSTOLIC BLOOD PRESSURE: 130 MMHG | OXYGEN SATURATION: 98 % | BODY MASS INDEX: 24.36 KG/M2 | HEART RATE: 78 BPM | DIASTOLIC BLOOD PRESSURE: 80 MMHG | HEIGHT: 71 IN

## 2018-05-09 DIAGNOSIS — Z85.46 HISTORY OF PROSTATE CANCER: ICD-10-CM

## 2018-05-09 DIAGNOSIS — D64.9 ANEMIA, UNSPECIFIED TYPE: ICD-10-CM

## 2018-05-09 DIAGNOSIS — R63.4 WEIGHT LOSS: ICD-10-CM

## 2018-05-09 DIAGNOSIS — Z00.00 MEDICARE ANNUAL WELLNESS VISIT, SUBSEQUENT: ICD-10-CM

## 2018-05-09 DIAGNOSIS — R09.82 POST-NASAL DRAINAGE: ICD-10-CM

## 2018-05-09 DIAGNOSIS — I10 ESSENTIAL HYPERTENSION: ICD-10-CM

## 2018-05-09 DIAGNOSIS — I87.2 VENOUS INSUFFICIENCY OF BOTH LOWER EXTREMITIES: ICD-10-CM

## 2018-05-09 DIAGNOSIS — E55.9 VITAMIN D INSUFFICIENCY: ICD-10-CM

## 2018-05-09 DIAGNOSIS — H91.13 PRESBYCUSIS OF BOTH EARS: ICD-10-CM

## 2018-05-09 PROBLEM — R07.81 RIB PAIN: Status: RESOLVED | Noted: 2017-09-14 | Resolved: 2018-05-09

## 2018-05-09 LAB
BASOPHILS # BLD AUTO: 0.8 % (ref 0–1.8)
BASOPHILS # BLD: 0.06 K/UL (ref 0–0.12)
EOSINOPHIL # BLD AUTO: 0.16 K/UL (ref 0–0.51)
EOSINOPHIL NFR BLD: 2.2 % (ref 0–6.9)
ERYTHROCYTE [DISTWIDTH] IN BLOOD BY AUTOMATED COUNT: 54.1 FL (ref 35.9–50)
HCT VFR BLD AUTO: 36.3 % (ref 42–52)
HGB BLD-MCNC: 11.8 G/DL (ref 14–18)
IMM GRANULOCYTES # BLD AUTO: 0.04 K/UL (ref 0–0.11)
IMM GRANULOCYTES NFR BLD AUTO: 0.5 % (ref 0–0.9)
LYMPHOCYTES # BLD AUTO: 1.11 K/UL (ref 1–4.8)
LYMPHOCYTES NFR BLD: 15 % (ref 22–41)
MCH RBC QN AUTO: 33.5 PG (ref 27–33)
MCHC RBC AUTO-ENTMCNC: 32.5 G/DL (ref 33.7–35.3)
MCV RBC AUTO: 103.1 FL (ref 81.4–97.8)
MONOCYTES # BLD AUTO: 0.59 K/UL (ref 0–0.85)
MONOCYTES NFR BLD AUTO: 8 % (ref 0–13.4)
NEUTROPHILS # BLD AUTO: 5.45 K/UL (ref 1.82–7.42)
NEUTROPHILS NFR BLD: 73.5 % (ref 44–72)
NRBC # BLD AUTO: 0 K/UL
NRBC BLD-RTO: 0 /100 WBC
PLATELET # BLD AUTO: 146 K/UL (ref 164–446)
PMV BLD AUTO: 10.3 FL (ref 9–12.9)
PSA SERPL-MCNC: 0.03 NG/ML (ref 0–4)
RBC # BLD AUTO: 3.52 M/UL (ref 4.7–6.1)
TSH SERPL DL<=0.005 MIU/L-ACNC: 2.65 UIU/ML (ref 0.38–5.33)
WBC # BLD AUTO: 7.4 K/UL (ref 4.8–10.8)

## 2018-05-09 PROCEDURE — 36415 COLL VENOUS BLD VENIPUNCTURE: CPT

## 2018-05-09 PROCEDURE — G0439 PPPS, SUBSEQ VISIT: HCPCS | Performed by: FAMILY MEDICINE

## 2018-05-09 PROCEDURE — 85025 COMPLETE CBC W/AUTO DIFF WBC: CPT

## 2018-05-09 PROCEDURE — 84153 ASSAY OF PSA TOTAL: CPT

## 2018-05-09 PROCEDURE — 84443 ASSAY THYROID STIM HORMONE: CPT

## 2018-05-09 RX ORDER — LANCETS 30 GAUGE
EACH MISCELLANEOUS
Qty: 100 EACH | Refills: 3 | Status: SHIPPED | OUTPATIENT
Start: 2018-05-09 | End: 2021-07-14

## 2018-05-09 ASSESSMENT — PATIENT HEALTH QUESTIONNAIRE - PHQ9: CLINICAL INTERPRETATION OF PHQ2 SCORE: 0

## 2018-05-09 ASSESSMENT — ACTIVITIES OF DAILY LIVING (ADL): BATHING_REQUIRES_ASSISTANCE: 0

## 2018-05-09 NOTE — PROGRESS NOTES
Chief Complaint   Patient presents with   • Annual Wellness Visit         HPI:  Sammy is a 78 y.o. here for Medicare Annual Wellness Visit        Patient Active Problem List    Diagnosis Date Noted   • Venous insufficiency of both lower extremities 03/14/2018   • Post-nasal drainage 09/14/2017   • Anemia 12/15/2016   • Bilateral hearing loss 06/29/2015   • History of prostate cancer 06/29/2015   • Type 2 diabetes mellitus, uncontrolled (HCC) 01/25/2015   • Hypertension 01/25/2015   • Vitamin D insufficiency 01/25/2015       Current Outpatient Prescriptions   Medication Sig Dispense Refill   • Blood Glucose Monitoring Suppl Device Meter: Dispense Device of Insurance Preference. Sig. Use as directed for blood sugar monitoring. #1. NR. 1 Device 0   • Blood Glucose Monitoring Suppl Supplies Misc Test strips order: Test strips for Device of Insurance Preference. Sig: use once daily. 100 Each 3   • Lancets Misc Lancets order: Lancets for Device of Insurance Preference. Sig: use once daily. 100 Each 3   • JANUMET  MG per tablet TAKE 1 TAB BY MOUTH 2 TIMES A DAY, WITH MEALS. 180 Tab 3   • Acetaminophen-Caffeine 500-65 MG Tab Take 1 Tab by mouth every four hours as needed (for headache). 60 Tab 0   • fluticasone (FLONASE) 50 MCG/ACT nasal spray Spray 2 Sprays in nose every day. (Patient not taking: Reported on 5/9/2018) 16 g 3   • cyanocobalamin (VITAMIN B12) 1000 MCG Tab Take 3 Tabs by mouth every day.     • vitamin D (CHOLECALCIFEROL) 1000 UNIT TABS Take 1,000 Units by mouth every day.       No current facility-administered medications for this visit.         Patient is taking medications as noted in medication list.  Current supplements as per medication list.     Allergies: Patient has no known allergies.    Current social contact/activities: Pt keeps himself busy at home.    Patient's perception of their health: good    Is patient current with immunizations? No, due for ZOSTAVAX (Shingles). Patient is interested  in receiving NONE today.    He  reports that he has never smoked. He has never used smokeless tobacco. He reports that he drinks alcohol. He reports that he does not use drugs.  Counseling given: Not Answered        DPA/Advanced directive: Patient has Advanced Directive on file.     ROS:    Gait: Uses no assistive device   Ostomy: No   Other tubes: No   Amputations: No   Chronic oxygen use: No   Last eye exam: 5/2018   Wears hearing aids: Yes   : Reports urinary leakage during the last 6 months that has somewhat interfered with their daily activities or sleep.      Screening:  DIABETES    Has patient ever had diabetes education? No, patient is NOT interested.          Depression Screening  Little interest or pleasure in doing things?  0 - not at all  Feeling down, depressed, or hopeless? 0 - not at all  Patient Health Questionnaire Score: 0    If depressive symptoms identified deferred to follow up visit unless specifically addressed in assessment and plan.    Interpretation of PHQ-9 Total Score   Score Severity   1-4 No Depression   5-9 Mild Depression   10-14 Moderate Depression   15-19 Moderately Severe Depression   20-27 Severe Depression    Screening for Cognitive Impairment  Three Minute Recall (apple, watch, jany)  2/3 Jany, horse and apple   Draw clock face with all 12 numbers set to the hand to show 10 minutes past 11 o'clock  1 5/5  If cognitive concerns identified, deferred for follow up unless specifically addressed in assessment and plan.    Fall Risk Assessment  Has the patient had two or more falls in the last year or any fall with injury in the last year?  No  If fall risk identified, deferred for follow up unless specifically addressed in assessment and plan.    Safety Assessment  Throw rugs on floor.  Yes  Handrails on all stairs.  Yes  Good lighting in all hallways.  Yes  Difficulty hearing.  No  Patient counseled about all safety risks that were identified.    Functional Assessment ADLs  Are  there any barriers preventing you from cooking for yourself or meeting nutritional needs?  No.    Are there any barriers preventing you from driving safely or obtaining transportation?  No.    Are there any barriers preventing you from using a telephone or calling for help?  No.    Are there any barriers preventing you from shopping?  No.    Are there any barriers preventing you from taking care of your own finances?  No.    Are there any barriers preventing you from managing your medications?  No.    Are there any barriers preventing you from showering, bathing or dressing yourself?  No.    Are you currently engaging any exercise or physical activity?  Yes.  Pt walks on occ.     Health Maintenance Summary                Annual Wellness Visit Overdue 4/26/2018      Done 4/25/2017 Visit Dx: Medicare annual wellness visit, initial    IMM INFLUENZA Next Due 9/1/2018      Done 12/15/2016 Imm Admin: Influenza Vaccine Adult HD    DIABETES MONOFILAMENT / LE EXAM Next Due 9/14/2018      Done 9/14/2017 AMB DIABETIC MONOFILAMENT LOWER EXTREMITY EXAM     Patient has more history with this topic...    A1C SCREENING Next Due 10/25/2018      Done 4/25/2018 HEMOGLOBIN A1C      Patient has more history with this topic...    RETINAL SCREENING Next Due 4/24/2019      Done 4/24/2018 REFERRAL FOR RETINAL SCREENING EXAM     Patient has more history with this topic...    FASTING LIPID PROFILE Next Due 4/25/2019      Done 4/25/2018 LIPID PROFILE      Patient has more history with this topic...    URINE ACR / MICROALBUMIN Next Due 4/25/2019      Done 4/25/2018 MICROALBUMIN CREAT RATIO URINE      Patient has more history with this topic...    SERUM CREATININE Next Due 4/25/2019      Done 4/25/2018 COMP METABOLIC PANEL      Patient has more history with this topic...    COLONOSCOPY Next Due 7/6/2020      Done 7/6/2017 REFERRAL TO GI FOR COLONOSCOPY    IMM DTaP/Tdap/Td Vaccine Next Due 2/28/2026      Done 2/28/2016 Imm Admin: Tdap Vaccine     "      Patient Care Team:  Crow Olivas M.D. as PCP - General (Family Medicine)  Nitesh Young M.D. as Consulting Physician (Ophthalmology)    Social History   Substance Use Topics   • Smoking status: Never Smoker   • Smokeless tobacco: Never Used   • Alcohol use 0.0 oz/week      Comment: 4-5 weekly      Family History   Problem Relation Age of Onset   • Stroke Mother    • Stroke Father      He  has a past medical history of HTN and Type II or unspecified type diabetes mellitus without mention of complication, not stated as uncontrolled.   Past Surgical History:   Procedure Laterality Date   • LAPAROSCOPIC GASTRIC ULCER OVER SEW     • PROSTATECTOMY, RADICAL RETRO           Exam:   Blood pressure 130/80, pulse 78, temperature 36.3 °C (97.3 °F), height 1.803 m (5' 11\"), weight 78.9 kg (174 lb), SpO2 98 %. Body mass index is 24.27 kg/m².    Constitutional: Alert, no distress.  Skin: Warm, dry, good turgor, no rashes in visible areas.  Eye: Equal, round and reactive, conjunctiva clear, lids normal.  Respiratory: Unlabored respiratory effort, lungs clear to auscultation, no wheezes, no ronchi.  Cardiovascular: Normal S1, S2, no murmur, no edema.  Psych: Alert and oriented x3, normal affect and mood.      Assessment and Plan. The following treatment and monitoring plan is recommended:    1. Medicare annual wellness visit, subsequent  Advised healthy lifestyle.  - Annual Wellness Visit - Includes PPPS Subsequent ()    2. Uncontrolled type 2 diabetes mellitus with stage 3 chronic kidney disease, without long-term current use of insulin (HCC)  Controlled. Encouraged patient to continue current medication. Follow up with labs in 6 months.  - Annual Wellness Visit - Includes PPPS Subsequent ()  - Blood Glucose Monitoring Suppl Device; Meter: Dispense Device of Insurance Preference. Sig. Use as directed for blood sugar monitoring. #1. NR.  Dispense: 1 Device; Refill: 0  - Blood Glucose Monitoring Suppl Supplies " Misc; Test strips order: Test strips for Device of Insurance Preference. Sig: use once daily.  Dispense: 100 Each; Refill: 3  - Lancets Misc; Lancets order: Lancets for Device of Insurance Preference. Sig: use once daily.  Dispense: 100 Each; Refill: 3    3. Essential hypertension  Controlled. Continue to monitor off medication.  - Annual Wellness Visit - Includes PPPS Subsequent ()    4. Anemia, unspecified type  Check labs and call with results.  - CBC WITH DIFFERENTIAL; Future  - Annual Wellness Visit - Includes PPPS Subsequent ()    5. Vitamin D insufficiency  Continue vitamin D supplementation.  - Annual Wellness Visit - Includes PPPS Subsequent ()    6. History of prostate cancer  Check labs and call with results.  - PROSTATE SPECIFIC AG DIAGNOSTIC; Future  - Annual Wellness Visit - Includes PPPS Subsequent ()    7. Weight loss  Patient is having decreased appetite. Check labs and call with results.  - TSH WITH REFLEX TO FT4; Future  - Annual Wellness Visit - Includes PPPS Subsequent ()    8. Venous insufficiency of both lower extremities  Improved with compression stockings.  - Annual Wellness Visit - Includes PPPS Subsequent ()    9. Presbycusis of both ears  Stable. Continue with hearing aids.  - Annual Wellness Visit - Includes PPPS Subsequent ()    10. Post-nasal drainage  Mild at this time. Advised Flonase as needed.  - Annual Wellness Visit - Includes PPPS Subsequent ()      Services suggested: No services needed at this time  Health Care Screening: Age-appropriate preventive services recommended by USPTF and ACIP covered by Medicare were discussed today. Services ordered if indicated and agreed upon by the patient.  Referrals offered: PT/OT/Nutrition counseling/Behavioral Health/Smoking cessation as per orders if indicated.    Discussion today about general wellness and lifestyle habits:    · Prevent falls and reduce trip hazards; Cautioned about securing or  removing rugs.  · Have a working fire alarm and carbon monoxide detector;   · Engage in regular physical activity and social activities     Follow-up: Return in about 6 months (around 11/9/2018) for Diabetes.

## 2018-05-10 ENCOUNTER — TELEPHONE (OUTPATIENT)
Dept: MEDICAL GROUP | Facility: MEDICAL CENTER | Age: 78
End: 2018-05-10

## 2018-05-10 DIAGNOSIS — D64.9 ANEMIA, UNSPECIFIED TYPE: Primary | ICD-10-CM

## 2018-05-10 NOTE — TELEPHONE ENCOUNTER
----- Message from Crow Olivas M.D. sent at 5/10/2018  9:11 AM PDT -----  Please notify patient that thyroid is normal. PSA is undetectable.  His blood counts are slightly lower than last time, slightly more anemic. We should have him do a FIT (stool test to check for bleeding).  Crow Olivas M.D.

## 2018-05-22 ENCOUNTER — HOSPITAL ENCOUNTER (OUTPATIENT)
Facility: MEDICAL CENTER | Age: 78
End: 2018-05-22
Attending: FAMILY MEDICINE
Payer: MEDICARE

## 2018-05-22 PROCEDURE — 82274 ASSAY TEST FOR BLOOD FECAL: CPT

## 2018-05-26 DIAGNOSIS — D64.9 ANEMIA, UNSPECIFIED TYPE: ICD-10-CM

## 2018-05-27 LAB — HEMOCCULT STL QL IA: NEGATIVE

## 2018-05-29 ENCOUNTER — TELEPHONE (OUTPATIENT)
Dept: MEDICAL GROUP | Facility: MEDICAL CENTER | Age: 78
End: 2018-05-29

## 2018-05-29 NOTE — TELEPHONE ENCOUNTER
----- Message from Crow Olivas M.D. sent at 5/28/2018  2:39 PM PDT -----  Please notify patient that stool test is negative for blood. This test should be repeated annually for colon cancer screening.  Crow Olivas MD

## 2018-07-10 ENCOUNTER — HOSPITAL ENCOUNTER (OUTPATIENT)
Dept: LAB | Facility: MEDICAL CENTER | Age: 78
End: 2018-07-10
Attending: FAMILY MEDICINE
Payer: MEDICARE

## 2018-07-10 ENCOUNTER — TELEPHONE (OUTPATIENT)
Dept: MEDICAL GROUP | Facility: MEDICAL CENTER | Age: 78
End: 2018-07-10

## 2018-07-10 ENCOUNTER — OFFICE VISIT (OUTPATIENT)
Dept: MEDICAL GROUP | Facility: MEDICAL CENTER | Age: 78
End: 2018-07-10
Payer: MEDICARE

## 2018-07-10 ENCOUNTER — HOSPITAL ENCOUNTER (OUTPATIENT)
Dept: RADIOLOGY | Facility: MEDICAL CENTER | Age: 78
End: 2018-07-10
Attending: FAMILY MEDICINE
Payer: MEDICARE

## 2018-07-10 VITALS
DIASTOLIC BLOOD PRESSURE: 72 MMHG | WEIGHT: 172 LBS | SYSTOLIC BLOOD PRESSURE: 136 MMHG | HEIGHT: 71 IN | OXYGEN SATURATION: 98 % | BODY MASS INDEX: 24.08 KG/M2 | HEART RATE: 76 BPM | TEMPERATURE: 98 F

## 2018-07-10 DIAGNOSIS — R10.32 LEFT LOWER QUADRANT PAIN: ICD-10-CM

## 2018-07-10 DIAGNOSIS — K40.90 INGUINAL HERNIA OF LEFT SIDE WITHOUT OBSTRUCTION OR GANGRENE: ICD-10-CM

## 2018-07-10 LAB
ANION GAP SERPL CALC-SCNC: 5 MMOL/L (ref 0–11.9)
BUN SERPL-MCNC: 20 MG/DL (ref 8–22)
CALCIUM SERPL-MCNC: 9.2 MG/DL (ref 8.4–10.2)
CHLORIDE SERPL-SCNC: 108 MMOL/L (ref 96–112)
CO2 SERPL-SCNC: 26 MMOL/L (ref 20–33)
CREAT SERPL-MCNC: 1.32 MG/DL (ref 0.5–1.4)
GLUCOSE SERPL-MCNC: 220 MG/DL (ref 65–99)
POTASSIUM SERPL-SCNC: 4.5 MMOL/L (ref 3.6–5.5)
SODIUM SERPL-SCNC: 139 MMOL/L (ref 135–145)

## 2018-07-10 PROCEDURE — 74177 CT ABD & PELVIS W/CONTRAST: CPT

## 2018-07-10 PROCEDURE — 80048 BASIC METABOLIC PNL TOTAL CA: CPT

## 2018-07-10 PROCEDURE — 700117 HCHG RX CONTRAST REV CODE 255: Performed by: FAMILY MEDICINE

## 2018-07-10 PROCEDURE — 36415 COLL VENOUS BLD VENIPUNCTURE: CPT

## 2018-07-10 PROCEDURE — 99214 OFFICE O/P EST MOD 30 MIN: CPT | Performed by: FAMILY MEDICINE

## 2018-07-10 RX ADMIN — IOHEXOL 100 ML: 350 INJECTION, SOLUTION INTRAVENOUS at 13:20

## 2018-07-10 RX ADMIN — IOHEXOL 50 ML: 240 INJECTION, SOLUTION INTRATHECAL; INTRAVASCULAR; INTRAVENOUS; ORAL at 13:06

## 2018-07-10 NOTE — ASSESSMENT & PLAN NOTE
After colonoscopy 1 year he has been having LLQ abdominal pain.  Pain is worse with having a bowel movement. Pain waxes and wanes.  Probiotics have been slightly helpful.  There is pain on palpation in LLQ, 9/10 pain.  Pain radiates down to left side of scrotum.  No fever.

## 2018-07-10 NOTE — TELEPHONE ENCOUNTER
----- Message from Crow Olivas M.D. sent at 7/10/2018  2:11 PM PDT -----  Please notify patient that there is no sign of infection in the colon.  Please have patient follow-up to review CT scan results, there are several small things that need to be reviewed with patient.  Crow Olivas M.D.

## 2018-07-10 NOTE — PROGRESS NOTES
"Subjective:   Sammy Oseguera is a 78 y.o. male here today for left lower quadrant pain    Left lower quadrant pain  After colonoscopy 1 year he has been having LLQ abdominal pain.  Pain is worse with having a bowel movement. Pain waxes and wanes.  Probiotics have been slightly helpful.  There is pain on palpation in LLQ, 9/10 pain.  Pain radiates down to left side of scrotum.  No fever.         Current medicines (including changes today)  Current Outpatient Prescriptions   Medication Sig Dispense Refill   • Blood Glucose Monitoring Suppl Device Meter: Dispense Device of Insurance Preference. Sig. Use as directed for blood sugar monitoring. #1. NR. 1 Device 0   • Blood Glucose Monitoring Suppl Supplies Misc Test strips order: Test strips for Device of Insurance Preference. Sig: use once daily. 100 Each 3   • Lancets Misc Lancets order: Lancets for Device of Insurance Preference. Sig: use once daily. 100 Each 3   • JANUMET  MG per tablet TAKE 1 TAB BY MOUTH 2 TIMES A DAY, WITH MEALS. 180 Tab 3   • Acetaminophen-Caffeine 500-65 MG Tab Take 1 Tab by mouth every four hours as needed (for headache). 60 Tab 0   • fluticasone (FLONASE) 50 MCG/ACT nasal spray Spray 2 Sprays in nose every day. (Patient not taking: Reported on 5/9/2018) 16 g 3   • cyanocobalamin (VITAMIN B12) 1000 MCG Tab Take 3 Tabs by mouth every day.     • vitamin D (CHOLECALCIFEROL) 1000 UNIT TABS Take 1,000 Units by mouth every day.       No current facility-administered medications for this visit.      He  has a past medical history of HTN and Type II or unspecified type diabetes mellitus without mention of complication, not stated as uncontrolled.    ROS   No fever, no dysuria, no significant constipation, no diarrhea, no dark stools, no bloody stools       Objective:     Blood pressure 136/72, pulse 76, temperature 36.7 °C (98 °F), height 1.803 m (5' 11\"), weight 78 kg (172 lb), SpO2 98 %. Body mass index is 23.99 kg/m².   Physical " Exam:  Constitutional: Alert, no distress.  Skin: Warm, dry, good turgor, no rashes in visible areas.  Eye: Equal, round and reactive, conjunctiva clear, lids normal.  Abdomen: Soft, mild left lower quadrant tenderness today on exam, left inguinal hernia appreciated with mild tenderness.  Psych: Alert and oriented x3, normal affect and mood.        Assessment and Plan:   The following treatment plan was discussed    1. Left lower quadrant pain  CT scan of abdomen and pelvis order to rule out diverticulitis.  If negative for diverticulitis done this most likely is coming from inguinal hernia.  - CT-ABDOMEN-PELVIS WITH    2. Inguinal hernia of left side without obstruction or gangrene  Present on exam today.  If CT scan is negative for diverticulitis or underlying colon inflammation then we will refer patient to surgeon for inguinal hernia repair.      Followup: Return if symptoms worsen or fail to improve.

## 2018-07-19 ENCOUNTER — HOSPITAL ENCOUNTER (OUTPATIENT)
Dept: LAB | Facility: MEDICAL CENTER | Age: 78
End: 2018-07-19
Attending: FAMILY MEDICINE
Payer: MEDICARE

## 2018-07-19 ENCOUNTER — OFFICE VISIT (OUTPATIENT)
Dept: MEDICAL GROUP | Facility: MEDICAL CENTER | Age: 78
End: 2018-07-19
Payer: MEDICARE

## 2018-07-19 VITALS
TEMPERATURE: 98 F | OXYGEN SATURATION: 97 % | HEIGHT: 71 IN | HEART RATE: 73 BPM | DIASTOLIC BLOOD PRESSURE: 80 MMHG | BODY MASS INDEX: 23.6 KG/M2 | WEIGHT: 168.6 LBS | SYSTOLIC BLOOD PRESSURE: 142 MMHG

## 2018-07-19 DIAGNOSIS — R74.8 ELEVATED LIVER ENZYMES: ICD-10-CM

## 2018-07-19 DIAGNOSIS — D64.9 ANEMIA, UNSPECIFIED TYPE: ICD-10-CM

## 2018-07-19 LAB
ALBUMIN SERPL BCP-MCNC: 4.6 G/DL (ref 3.2–4.9)
ALBUMIN/GLOB SERPL: 1.4 G/DL
ALP SERPL-CCNC: 113 U/L (ref 30–99)
ALT SERPL-CCNC: 14 U/L (ref 2–50)
ANION GAP SERPL CALC-SCNC: 10 MMOL/L (ref 0–11.9)
AST SERPL-CCNC: 17 U/L (ref 12–45)
BILIRUB SERPL-MCNC: 1.3 MG/DL (ref 0.1–1.5)
BUN SERPL-MCNC: 27 MG/DL (ref 8–22)
CALCIUM SERPL-MCNC: 9.6 MG/DL (ref 8.5–10.5)
CHLORIDE SERPL-SCNC: 108 MMOL/L (ref 96–112)
CO2 SERPL-SCNC: 22 MMOL/L (ref 20–33)
CREAT SERPL-MCNC: 1.38 MG/DL (ref 0.5–1.4)
FERRITIN SERPL-MCNC: 30.6 NG/ML (ref 22–322)
GLOBULIN SER CALC-MCNC: 3.2 G/DL (ref 1.9–3.5)
GLUCOSE SERPL-MCNC: 181 MG/DL (ref 65–99)
HBV SURFACE AG SER QL: NEGATIVE
HCV AB SER QL: NEGATIVE
IRON SATN MFR SERPL: 26 % (ref 15–55)
IRON SERPL-MCNC: 91 UG/DL (ref 50–180)
POTASSIUM SERPL-SCNC: 4.2 MMOL/L (ref 3.6–5.5)
PROT SERPL-MCNC: 7.8 G/DL (ref 6–8.2)
SODIUM SERPL-SCNC: 140 MMOL/L (ref 135–145)
TIBC SERPL-MCNC: 354 UG/DL (ref 250–450)

## 2018-07-19 PROCEDURE — 86235 NUCLEAR ANTIGEN ANTIBODY: CPT

## 2018-07-19 PROCEDURE — 86225 DNA ANTIBODY NATIVE: CPT

## 2018-07-19 PROCEDURE — 86803 HEPATITIS C AB TEST: CPT

## 2018-07-19 PROCEDURE — 86038 ANTINUCLEAR ANTIBODIES: CPT

## 2018-07-19 PROCEDURE — 83540 ASSAY OF IRON: CPT

## 2018-07-19 PROCEDURE — 36415 COLL VENOUS BLD VENIPUNCTURE: CPT

## 2018-07-19 PROCEDURE — 83516 IMMUNOASSAY NONANTIBODY: CPT

## 2018-07-19 PROCEDURE — 99214 OFFICE O/P EST MOD 30 MIN: CPT | Performed by: FAMILY MEDICINE

## 2018-07-19 PROCEDURE — 87340 HEPATITIS B SURFACE AG IA: CPT

## 2018-07-19 PROCEDURE — 80053 COMPREHEN METABOLIC PANEL: CPT

## 2018-07-19 PROCEDURE — 82728 ASSAY OF FERRITIN: CPT

## 2018-07-19 PROCEDURE — 83550 IRON BINDING TEST: CPT

## 2018-07-19 NOTE — PROGRESS NOTES
"Subjective:   Sammy Oseguera is a 78 y.o. male here today for elevated liver enzymes    Patient had an elevated alkaline phosphate. Reviewed CT scan which showed hepatitis steatosis, dilated portal vein, splenomegaly and mild/moderate ascites. He does drink 2 shots of alcohol daily for years. Left lower quadrant pain is on and off.      Current medicines (including changes today)  Current Outpatient Prescriptions   Medication Sig Dispense Refill   • Blood Glucose Monitoring Suppl Device Meter: Dispense Device of Insurance Preference. Sig. Use as directed for blood sugar monitoring. #1. NR. 1 Device 0   • Blood Glucose Monitoring Suppl Supplies Misc Test strips order: Test strips for Device of Insurance Preference. Sig: use once daily. 100 Each 3   • Lancets Misc Lancets order: Lancets for Device of Insurance Preference. Sig: use once daily. 100 Each 3   • JANUMET  MG per tablet TAKE 1 TAB BY MOUTH 2 TIMES A DAY, WITH MEALS. 180 Tab 3   • Acetaminophen-Caffeine 500-65 MG Tab Take 1 Tab by mouth every four hours as needed (for headache). 60 Tab 0   • cyanocobalamin (VITAMIN B12) 1000 MCG Tab Take 3 Tabs by mouth every day.     • vitamin D (CHOLECALCIFEROL) 1000 UNIT TABS Take 1,000 Units by mouth every day.       No current facility-administered medications for this visit.      He  has a past medical history of HTN and Type II or unspecified type diabetes mellitus without mention of complication, not stated as uncontrolled.    ROS   No fever, no jaundice       Objective:     Blood pressure 142/80, pulse 73, temperature 36.7 °C (98 °F), height 1.803 m (5' 11\"), weight 76.5 kg (168 lb 9.6 oz), SpO2 97 %. Body mass index is 23.51 kg/m².   Physical Exam:  Constitutional: Alert, no distress.  Skin: Warm, dry, good turgor, no rashes in visible areas.  Eye: Equal, round and reactive, conjunctiva clear, lids normal.  Psych: Alert and oriented x3, normal affect and mood.        Assessment and Plan:   The following " treatment plan was discussed    1. Elevated liver enzymes  New problem. Advised patient to stop alcohol. Check labs and call with results.  - HEP C VIRUS ANTIBODY; Future  - HEPATITIS B SURFACE ANTIGEN; Future  - MARINA ANTIBODY WITH REFLEX; Future  - MITOCHONDRIAL (M2) AB; Future  - COMP METABOLIC PANEL; Future      Followup: Return in about 4 months (around 11/19/2018).

## 2018-07-21 LAB — MITOCHONDRIA M2 IGG SER-ACNC: 5.8 UNITS (ref 0–20)

## 2018-07-23 LAB
DSDNA AB TITR SER CLIF: ABNORMAL {TITER}
ENA SM IGG SER-ACNC: 0 AU/ML (ref 0–40)
ENA SS-B IGG SER IA-ACNC: 11 AU/ML (ref 0–40)
NUCLEAR IGG SER QL IA: DETECTED
NUCLEAR IGG TITR SER IF: ABNORMAL {TITER}
SSA52 R0ENA AB IGG Q0420: 8 AU/ML (ref 0–40)
SSA60 R0ENA AB IGG Q0419: 3 AU/ML (ref 0–40)
U1 SNRNP IGG SER QL: 0 AU/ML (ref 0–40)

## 2018-07-24 ENCOUNTER — TELEPHONE (OUTPATIENT)
Dept: MEDICAL GROUP | Facility: MEDICAL CENTER | Age: 78
End: 2018-07-24

## 2018-07-24 DIAGNOSIS — K76.0 HEPATIC STEATOSIS: ICD-10-CM

## 2018-07-24 DIAGNOSIS — R76.8 POSITIVE ANA (ANTINUCLEAR ANTIBODY): ICD-10-CM

## 2018-07-24 DIAGNOSIS — K70.11 ASCITES DUE TO ALCOHOLIC HEPATITIS: ICD-10-CM

## 2018-07-24 NOTE — TELEPHONE ENCOUNTER
----- Message from Crow Olivas M.D. sent at 7/24/2018  7:35 AM PDT -----  Please notify patient that kidney function is stable.  Iron counts are normal.  Autoimmune testing shows no significant liver problems.  Hepatitis tests are negative.  We should have patient evaluated by the liver specialist to figure out how much damage has occurred to the liver.  Please let me know if he has a preferred liver specialist.  Crow Olivas M.D.

## 2018-07-27 ENCOUNTER — TELEPHONE (OUTPATIENT)
Dept: MEDICAL GROUP | Facility: MEDICAL CENTER | Age: 78
End: 2018-07-27

## 2018-07-27 NOTE — TELEPHONE ENCOUNTER
ESTABLISHED PATIENT PRE-VISIT PLANNING     Note: Patient will not be contacted if there is no indication to call.     1.  Reviewed notes from the last few office visits within the medical group: Yes 7/19/2018, 7/10/2018    2.  If any orders were placed at last visit or intended to be done for this visit (i.e. 6 mos follow-up), do we have Results/Consult Notes?        •  Labs - Labs ordered, completed on 7/10/2018, 7/19/2018 and results are in chart.   Note: If patient appointment is for lab review and patient did not complete labs, check with provider if OK to reschedule patient until labs completed.       •  Imaging - Imaging ordered, completed and results are in chart.       •  Referrals - No referrals were ordered at last office visit.    3. Is this appointment scheduled as a Hospital Follow-Up? No    4.  Immunizations were updated in [facility] using WebIZ?: Yes       •  Web Iz Recommendations: FLU, TD and SHINGRIX (Shingles)    5.  Patient is due for the following Health Maintenance Topics:   Health Maintenance Due   Topic Date Due   • IMM ZOSTER VACCINES (1 of 2) 04/17/1990       6.  MDX printed for Provider? NO    7.  Patient was NOT informed to arrive 15 min prior to their scheduled appointment and bring in their medication bottles.

## 2018-07-30 ENCOUNTER — OFFICE VISIT (OUTPATIENT)
Dept: MEDICAL GROUP | Facility: MEDICAL CENTER | Age: 78
End: 2018-07-30
Payer: MEDICARE

## 2018-07-30 VITALS
HEIGHT: 71 IN | BODY MASS INDEX: 23.94 KG/M2 | DIASTOLIC BLOOD PRESSURE: 64 MMHG | SYSTOLIC BLOOD PRESSURE: 134 MMHG | TEMPERATURE: 98.6 F | HEART RATE: 72 BPM | WEIGHT: 171 LBS | OXYGEN SATURATION: 97 %

## 2018-07-30 DIAGNOSIS — I87.2 VENOUS INSUFFICIENCY OF BOTH LOWER EXTREMITIES: ICD-10-CM

## 2018-07-30 DIAGNOSIS — K40.90 LEFT INGUINAL HERNIA: ICD-10-CM

## 2018-07-30 PROCEDURE — 99214 OFFICE O/P EST MOD 30 MIN: CPT | Performed by: FAMILY MEDICINE

## 2018-07-31 NOTE — ASSESSMENT & PLAN NOTE
Patient has not tried compression stockings.  He does complain of leg heaviness towards the end of the day.

## 2018-07-31 NOTE — PROGRESS NOTES
"Subjective:   Sammy Oseguera is a 78 y.o. male here today for left inguinal hernia    Left inguinal hernia  Patient is having significant left inguinal hernia pain on occasion.  Pain is made worse with constipation and right before he has to have a bowel movement.  Pain is improved with a bowel movement.  He states that the pain is sharp and radiating to his left groin.    Venous insufficiency of both lower extremities  Patient has not tried compression stockings.  He does complain of leg heaviness towards the end of the day.         Current medicines (including changes today)  Current Outpatient Prescriptions   Medication Sig Dispense Refill   • Blood Glucose Monitoring Suppl Device Meter: Dispense Device of Insurance Preference. Sig. Use as directed for blood sugar monitoring. #1. NR. 1 Device 0   • Blood Glucose Monitoring Suppl Supplies Misc Test strips order: Test strips for Device of Insurance Preference. Sig: use once daily. 100 Each 3   • Lancets Misc Lancets order: Lancets for Device of Insurance Preference. Sig: use once daily. 100 Each 3   • JANUMET  MG per tablet TAKE 1 TAB BY MOUTH 2 TIMES A DAY, WITH MEALS. 180 Tab 3   • Acetaminophen-Caffeine 500-65 MG Tab Take 1 Tab by mouth every four hours as needed (for headache). 60 Tab 0   • cyanocobalamin (VITAMIN B12) 1000 MCG Tab Take 3 Tabs by mouth every day.     • vitamin D (CHOLECALCIFEROL) 1000 UNIT TABS Take 1,000 Units by mouth every day.       No current facility-administered medications for this visit.      He  has a past medical history of HTN and Type II or unspecified type diabetes mellitus without mention of complication, not stated as uncontrolled.    ROS   No fever, no severe abdominal pain       Objective:     Blood pressure 134/64, pulse 72, temperature 37 °C (98.6 °F), height 1.803 m (5' 11\"), weight 77.6 kg (171 lb), SpO2 97 %. Body mass index is 23.85 kg/m².   Physical Exam:  Constitutional: Alert, no distress.  Skin: Warm, dry, " good turgor, no rashes in visible areas.  Eye: Equal, round and reactive, conjunctiva clear, lids normal.  Psych: Alert and oriented x3, normal affect and mood.        Assessment and Plan:   The following treatment plan was discussed    1. Left inguinal hernia  Advised patient to use stool softeners to prevent constipation and worsening pain.  Referral to surgery for evaluation and treatment.  ER precautions given.  - REFERRAL TO GENERAL SURGERY    2. Venous insufficiency of both lower extremities  Advise compression stockings to see if this will help with leg heaviness symptoms.      Followup: Return if symptoms worsen or fail to improve.

## 2018-07-31 NOTE — ASSESSMENT & PLAN NOTE
Patient is having significant left inguinal hernia pain on occasion.  Pain is made worse with constipation and right before he has to have a bowel movement.  Pain is improved with a bowel movement.  He states that the pain is sharp and radiating to his left groin.

## 2018-08-30 ENCOUNTER — TELEPHONE (OUTPATIENT)
Dept: MEDICAL GROUP | Facility: MEDICAL CENTER | Age: 78
End: 2018-08-30

## 2018-08-30 NOTE — TELEPHONE ENCOUNTER
ESTABLISHED PATIENT PRE-VISIT PLANNING     Note: Patient will not be contacted if there is no indication to call.     1.  Reviewed notes from the last few office visits within the medical group: Yes 7/30/18, 7/19/18    2.  If any orders were placed at last visit or intended to be done for this visit (i.e. 6 mos follow-up), do we have Results/Consult Notes?        •  Labs - Labs ordered, completed on 7/19/18 and results are in chart.   Note: If patient appointment is for lab review and patient did not complete labs, check with provider if OK to reschedule patient until labs completed.       •  Imaging - Imaging was not ordered at last office visit.       •  Referrals - Referral ordered, patient was seen and consult notes are in chart. Care Teams updated  YES.    3. Is this appointment scheduled as a Hospital Follow-Up? No    4.  Immunizations were updated in Epic using WebIZ?: Epic matches WebIZ       •  Web Iz Recommendations: FLU, TD and ZOSTAVAX (Shingles)    5.  Patient is due for the following Health Maintenance Topics:   Health Maintenance Due   Topic Date Due   • IMM HEP B VACCINE (1 of 3 - Risk 3-dose series) 04/17/1959   • IMM ZOSTER VACCINES (1 of 2) 04/17/1990   • IMM INFLUENZA (1) 09/01/2018     6.  MDX printed for Provider? NO    7.  Patient was NOT informed to arrive 15 min prior to their scheduled appointment and bring in their medication bottles.

## 2018-08-31 ENCOUNTER — OFFICE VISIT (OUTPATIENT)
Dept: MEDICAL GROUP | Facility: MEDICAL CENTER | Age: 78
End: 2018-08-31
Payer: MEDICARE

## 2018-08-31 VITALS
HEART RATE: 67 BPM | BODY MASS INDEX: 23.57 KG/M2 | OXYGEN SATURATION: 99 % | WEIGHT: 168.4 LBS | DIASTOLIC BLOOD PRESSURE: 72 MMHG | HEIGHT: 71 IN | SYSTOLIC BLOOD PRESSURE: 134 MMHG | TEMPERATURE: 97.7 F

## 2018-08-31 DIAGNOSIS — K70.31 ALCOHOLIC CIRRHOSIS OF LIVER WITH ASCITES (HCC): ICD-10-CM

## 2018-08-31 PROCEDURE — 99214 OFFICE O/P EST MOD 30 MIN: CPT | Performed by: FAMILY MEDICINE

## 2018-08-31 RX ORDER — SPIRONOLACTONE 100 MG/1
100 TABLET, FILM COATED ORAL DAILY
Qty: 30 TAB | Refills: 3 | Status: SHIPPED | OUTPATIENT
Start: 2018-08-31 | End: 2018-12-06 | Stop reason: SDUPTHER

## 2018-08-31 NOTE — PROGRESS NOTES
Subjective:   Sammy Oseguera is a 78 y.o. 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  His symptoms were vague and it was unclear if this was related to left inguinal hernia or diverticulitis because he has history of diverticulosis.  A CT scan was done which showed mild to moderate ascites pelvic ascites and small left inguinal hernia.  He does admit that he drinks about 2 shots of hard alcohol per day.  Patient was referred to a surgeon for inguinal hernia repair, however because of the uncontrolled ascites, he was advised to follow-up with GI to have ascites treated, as it could affect the healing of the inguinal hernia repair.  Patient saw GI and has an EGD scheduled for next month.  He is unsure if he should go ahead with the EGD.         Current medicines (including changes today)  Current Outpatient Prescriptions   Medication Sig Dispense Refill   • spironolactone (ALDACTONE) 100 MG Tab Take 1 Tab by mouth every day. 30 Tab 3   • Blood Glucose Monitoring Suppl Device Meter: Dispense Device of Insurance Preference. Sig. Use as directed for blood sugar monitoring. #1. NR. 1 Device 0   • Blood Glucose Monitoring Suppl Supplies Misc Test strips order: Test strips for Device of Insurance Preference. Sig: use once daily. 100 Each 3   • Lancets Misc Lancets order: Lancets for Device of Insurance Preference. Sig: use once daily. 100 Each 3   • JANUMET  MG per tablet TAKE 1 TAB BY MOUTH 2 TIMES A DAY, WITH MEALS. 180 Tab 3   • Acetaminophen-Caffeine 500-65 MG Tab Take 1 Tab by mouth every four hours as needed (for headache). 60 Tab 0   • cyanocobalamin (VITAMIN B12) 1000 MCG Tab Take 3 Tabs by mouth every day.     • vitamin D (CHOLECALCIFEROL) 1000 UNIT TABS Take 1,000 Units by mouth every day.       No current facility-administered medications for this visit.      He  has a  "past medical history of HTN and Type II or unspecified type diabetes mellitus without mention of complication, not stated as uncontrolled.    ROS   Positive abdominal bloating, no fever       Objective:     Blood pressure 134/72, pulse 67, temperature 36.5 °C (97.7 °F), height 1.803 m (5' 11\"), weight 76.4 kg (168 lb 6.4 oz), SpO2 99 %. Body mass index is 23.49 kg/m².   Physical Exam:  Constitutional: Alert, no distress.  Skin: Warm, dry, good turgor, no rashes in visible areas.  Eye: Equal, round and reactive, conjunctiva clear, lids normal.  Psych: Alert and oriented x3, normal affect and mood.        Assessment and Plan:   The following treatment plan was discussed    1. Alcoholic cirrhosis of liver with ascites (HCC)  New problem.  Start patient on spironolactone 100 mg daily and check BMP.  Follow-up in 2 weeks.  Advised patient to monitor blood pressure closely at home and notify us if blood pressure drops below 100/60, or he gets lightheaded.  Strongly encourage patient to have EGD, explained the reasoning for EGD, which he understands and will proceed with.  Discussed possibility that patient may not be able to have left inguinal hernia repair if liver and ascites problem cannot be controlled well.  Discussed eating small meals to avoid bloating.  - spironolactone (ALDACTONE) 100 MG Tab; Take 1 Tab by mouth every day.  Dispense: 30 Tab; Refill: 3  - BASIC METABOLIC PANEL; Future      Followup: Return in about 2 weeks (around 9/14/2018) for HTN.         "

## 2018-09-10 ENCOUNTER — APPOINTMENT (RX ONLY)
Dept: URBAN - METROPOLITAN AREA CLINIC 4 | Facility: CLINIC | Age: 78
Setting detail: DERMATOLOGY
End: 2018-09-10

## 2018-09-10 DIAGNOSIS — D18.0 HEMANGIOMA: ICD-10-CM

## 2018-09-10 DIAGNOSIS — Z85.828 PERSONAL HISTORY OF OTHER MALIGNANT NEOPLASM OF SKIN: ICD-10-CM

## 2018-09-10 DIAGNOSIS — D22 MELANOCYTIC NEVI: ICD-10-CM

## 2018-09-10 DIAGNOSIS — Z71.89 OTHER SPECIFIED COUNSELING: ICD-10-CM

## 2018-09-10 DIAGNOSIS — L82.1 OTHER SEBORRHEIC KERATOSIS: ICD-10-CM

## 2018-09-10 DIAGNOSIS — L81.4 OTHER MELANIN HYPERPIGMENTATION: ICD-10-CM

## 2018-09-10 PROBLEM — D48.5 NEOPLASM OF UNCERTAIN BEHAVIOR OF SKIN: Status: ACTIVE | Noted: 2018-09-10

## 2018-09-10 PROBLEM — D18.01 HEMANGIOMA OF SKIN AND SUBCUTANEOUS TISSUE: Status: ACTIVE | Noted: 2018-09-10

## 2018-09-10 PROBLEM — D22.5 MELANOCYTIC NEVI OF TRUNK: Status: ACTIVE | Noted: 2018-09-10

## 2018-09-10 PROCEDURE — ? BIOPSY BY SHAVE METHOD

## 2018-09-10 PROCEDURE — 11100: CPT

## 2018-09-10 PROCEDURE — ? COUNSELING

## 2018-09-10 PROCEDURE — 99214 OFFICE O/P EST MOD 30 MIN: CPT | Mod: 25

## 2018-09-10 ASSESSMENT — LOCATION DETAILED DESCRIPTION DERM
LOCATION DETAILED: MIDDLE STERNUM
LOCATION DETAILED: LEFT ANTERIOR DISTAL UPPER ARM
LOCATION DETAILED: INFERIOR THORACIC SPINE
LOCATION DETAILED: SUPERIOR THORACIC SPINE
LOCATION DETAILED: LEFT CENTRAL MANDIBULAR CHEEK
LOCATION DETAILED: LEFT SUPERIOR MEDIAL UPPER BACK
LOCATION DETAILED: LEFT INFERIOR MEDIAL FOREHEAD
LOCATION DETAILED: LEFT MEDIAL UPPER BACK
LOCATION DETAILED: RIGHT INFERIOR ANTERIOR NECK
LOCATION DETAILED: LOWER STERNUM
LOCATION DETAILED: EPIGASTRIC SKIN
LOCATION DETAILED: RIGHT ANTERIOR DISTAL UPPER ARM

## 2018-09-10 ASSESSMENT — LOCATION SIMPLE DESCRIPTION DERM
LOCATION SIMPLE: CHEST
LOCATION SIMPLE: LEFT UPPER ARM
LOCATION SIMPLE: ABDOMEN
LOCATION SIMPLE: RIGHT ANTERIOR NECK
LOCATION SIMPLE: LEFT FOREHEAD
LOCATION SIMPLE: LEFT CHEEK
LOCATION SIMPLE: UPPER BACK
LOCATION SIMPLE: RIGHT UPPER ARM
LOCATION SIMPLE: LEFT UPPER BACK

## 2018-09-10 ASSESSMENT — LOCATION ZONE DERM
LOCATION ZONE: FACE
LOCATION ZONE: ARM
LOCATION ZONE: TRUNK
LOCATION ZONE: NECK

## 2018-09-10 NOTE — HPI: EVALUATION OF SKIN LESION(S)
How Severe Are Your Spot(S)?: mild
Have Your Spot(S) Been Treated In The Past?: has not been treated
Hpi Title: Evaluation of Skin Lesions
Additional History: Patient would like [facility].

## 2018-09-10 NOTE — PROCEDURE: BIOPSY BY SHAVE METHOD
Bill For Surgical Tray: no
Curettage Text: The wound bed was treated with curettage after the biopsy was performed.
Electrodesiccation Text: The wound bed was treated with electrodesiccation after the biopsy was performed.
Additional Anesthesia Volume In Cc (Will Not Render If 0): 0
Consent: Written consent was obtained and risks were reviewed including but not limited to scarring, infection, bleeding, scabbing, incomplete removal, nerve damage and allergy to anesthesia.
Type Of Destruction Used: Curettage
Post-Care Instructions: I reviewed with the patient in detail post-care instructions. Patient is to keep the biopsy site dry overnight, and then apply bacitracin twice daily until healed. Patient may apply hydrogen peroxide soaks to remove any crusting.
Billing Type: Third-Party Bill
Biopsy Method: Dermablade
Anesthesia Volume In Cc: 0.5
Cryotherapy Text: The wound bed was treated with cryotherapy after the biopsy was performed.
Lab Facility: [id number]
Anesthesia Type: 1% lidocaine with 1:100,000 epinephrine and 408mcg clindamycin/ml and a 1:10 solution of 8.4% sodium bicarbonate
Hemostasis: Drysol
Dressing: bandage
Wound Care: Petrolatum
Notification Instructions: Patient will be notified of biopsy results. However, patient instructed to call the office if not contacted within 2 weeks.
Depth Of Biopsy: dermis
Biopsy Type: H and E
Detail Level: Detailed
Silver Nitrate Text: The wound bed was treated with silver nitrate after the biopsy was performed.
Size Of Lesion In Cm: 0.6
Lab: [id number]
Electrodesiccation And Curettage Text: The wound bed was treated with electrodesiccation and curettage after the biopsy was performed.
Was A Bandage Applied: Yes

## 2018-09-18 ENCOUNTER — HOSPITAL ENCOUNTER (OUTPATIENT)
Dept: LAB | Facility: MEDICAL CENTER | Age: 78
End: 2018-09-18
Attending: FAMILY MEDICINE
Payer: MEDICARE

## 2018-09-18 DIAGNOSIS — K70.31 ALCOHOLIC CIRRHOSIS OF LIVER WITH ASCITES (HCC): ICD-10-CM

## 2018-09-18 PROCEDURE — 36415 COLL VENOUS BLD VENIPUNCTURE: CPT

## 2018-09-18 PROCEDURE — 80048 BASIC METABOLIC PNL TOTAL CA: CPT

## 2018-09-19 ENCOUNTER — OFFICE VISIT (OUTPATIENT)
Dept: MEDICAL GROUP | Facility: MEDICAL CENTER | Age: 78
End: 2018-09-19
Payer: MEDICARE

## 2018-09-19 VITALS
HEIGHT: 71 IN | SYSTOLIC BLOOD PRESSURE: 124 MMHG | BODY MASS INDEX: 21.42 KG/M2 | DIASTOLIC BLOOD PRESSURE: 68 MMHG | HEART RATE: 83 BPM | TEMPERATURE: 97.6 F | WEIGHT: 153 LBS | OXYGEN SATURATION: 99 %

## 2018-09-19 DIAGNOSIS — Z23 NEED FOR VACCINATION: ICD-10-CM

## 2018-09-19 DIAGNOSIS — N18.30 CKD (CHRONIC KIDNEY DISEASE) STAGE 3, GFR 30-59 ML/MIN (HCC): ICD-10-CM

## 2018-09-19 DIAGNOSIS — K70.31 ALCOHOLIC CIRRHOSIS OF LIVER WITH ASCITES (HCC): ICD-10-CM

## 2018-09-19 DIAGNOSIS — K40.90 LEFT INGUINAL HERNIA: ICD-10-CM

## 2018-09-19 LAB
ANION GAP SERPL CALC-SCNC: 9 MMOL/L (ref 0–11.9)
BUN SERPL-MCNC: 34 MG/DL (ref 8–22)
CALCIUM SERPL-MCNC: 9.5 MG/DL (ref 8.5–10.5)
CHLORIDE SERPL-SCNC: 105 MMOL/L (ref 96–112)
CO2 SERPL-SCNC: 22 MMOL/L (ref 20–33)
CREAT SERPL-MCNC: 1.48 MG/DL (ref 0.5–1.4)
GLUCOSE SERPL-MCNC: 161 MG/DL (ref 65–99)
POTASSIUM SERPL-SCNC: 5.2 MMOL/L (ref 3.6–5.5)
SODIUM SERPL-SCNC: 136 MMOL/L (ref 135–145)

## 2018-09-19 PROCEDURE — G0008 ADMIN INFLUENZA VIRUS VAC: HCPCS | Performed by: FAMILY MEDICINE

## 2018-09-19 PROCEDURE — 99214 OFFICE O/P EST MOD 30 MIN: CPT | Mod: 25 | Performed by: FAMILY MEDICINE

## 2018-09-19 PROCEDURE — 90662 IIV NO PRSV INCREASED AG IM: CPT | Performed by: FAMILY MEDICINE

## 2018-09-19 NOTE — ASSESSMENT & PLAN NOTE
Patient admits that he has not been hydrating very well.  His GFR dropped from 50 down to 46 after 1 month of spironolactone.

## 2018-09-19 NOTE — ASSESSMENT & PLAN NOTE
Patient has alcohol cirrhosis of liver with ascites.  I started him on spironolactone 1 month ago and his ascites has improved.  His appetite is improving, his left inguinal hernia pain is improving and he does not feel bloated as much.  Patient has endoscopy scheduled for early next month to have esophageal varices screening.

## 2018-09-19 NOTE — PROGRESS NOTES
Subjective:   Sammy Oseguera is a 78 y.o. male here today for alcohol cirrhosis, CKD, hernia    Patient has lost about 30 pounds since last year.  His appetite has been decreased since the ascites.    Alcoholic cirrhosis of liver with ascites (HCC)  Patient has alcohol cirrhosis of liver with ascites.  I started him on spironolactone 1 month ago and his ascites has improved.  His appetite is improving, his left inguinal hernia pain is improving and he does not feel bloated as much.  Patient has endoscopy scheduled for early next month to have esophageal varices screening.    CKD (chronic kidney disease) stage 3, GFR 30-59 ml/min  Patient admits that he has not been hydrating very well.  His GFR dropped from 50 down to 46 after 1 month of spironolactone.    Left inguinal hernia  Patient has an appointment with surgeon in November after EGD to review if patient can have left inguinal hernia repair.  His left inguinal hernia pain has improved since his ascites has improved, but it is still present on occasion and he is still interested in having it repaired.         Current medicines (including changes today)  Current Outpatient Prescriptions   Medication Sig Dispense Refill   • spironolactone (ALDACTONE) 100 MG Tab Take 1 Tab by mouth every day. 30 Tab 3   • Blood Glucose Monitoring Suppl Device Meter: Dispense Device of Insurance Preference. Sig. Use as directed for blood sugar monitoring. #1. NR. 1 Device 0   • Blood Glucose Monitoring Suppl Supplies Misc Test strips order: Test strips for Device of Insurance Preference. Sig: use once daily. 100 Each 3   • Lancets Misc Lancets order: Lancets for Device of Insurance Preference. Sig: use once daily. 100 Each 3   • JANUMET  MG per tablet TAKE 1 TAB BY MOUTH 2 TIMES A DAY, WITH MEALS. 180 Tab 3   • Acetaminophen-Caffeine 500-65 MG Tab Take 1 Tab by mouth every four hours as needed (for headache). 60 Tab 0   • cyanocobalamin (VITAMIN B12) 1000 MCG Tab Take 3  "Tabs by mouth every day.     • vitamin D (CHOLECALCIFEROL) 1000 UNIT TABS Take 1,000 Units by mouth every day.       No current facility-administered medications for this visit.      He  has a past medical history of HTN and Type II or unspecified type diabetes mellitus without mention of complication, not stated as uncontrolled.    ROS   No shortness of breath, no nausea       Objective:     Blood pressure 124/68, pulse 83, temperature 36.4 °C (97.6 °F), height 1.803 m (5' 11\"), weight 69.4 kg (153 lb), SpO2 99 %. Body mass index is 21.34 kg/m².   Physical Exam:  Constitutional: Alert, no distress.  Skin: Warm, dry, good turgor, no rashes in visible areas.  Eye: Equal, round and reactive, conjunctiva clear, lids normal.  Abdomen: Soft, non-tender, no masses, no ascites appreciated.  Psych: Alert and oriented x3, normal affect and mood.        Assessment and Plan:   The following treatment plan was discussed    1. 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  Continue spironolactone for now.  Advised patient to increase hydration and caloric intake.  - BASIC METABOLIC PANEL; Future    2. Left inguinal hernia  Continue following with surgeon after EGD.    3. CKD (chronic kidney disease) stage 3, GFR 30-59 ml/min  Follow-up in 1 month with BMP.  Advised patient increase hydration.  Continues spironolactone.    4. Need for vaccination  - INFLUENZA VACCINE, HIGH DOSE (65+ ONLY)      Followup: Return in about 4 weeks (around 10/17/2018) for Labs.         "

## 2018-10-02 ENCOUNTER — APPOINTMENT (RX ONLY)
Dept: URBAN - METROPOLITAN AREA CLINIC 4 | Facility: CLINIC | Age: 78
Setting detail: DERMATOLOGY
End: 2018-10-02

## 2018-10-02 PROBLEM — C44.712 BASAL CELL CARCINOMA OF SKIN OF RIGHT LOWER LIMB, INCLUDING HIP: Status: ACTIVE | Noted: 2018-10-02

## 2018-10-02 PROCEDURE — ? EXCISION

## 2018-10-02 PROCEDURE — 11602 EXC TR-EXT MAL+MARG 1.1-2 CM: CPT

## 2018-10-02 PROCEDURE — 12031 INTMD RPR S/A/T/EXT 2.5 CM/<: CPT

## 2018-10-08 ENCOUNTER — APPOINTMENT (RX ONLY)
Dept: URBAN - METROPOLITAN AREA CLINIC 4 | Facility: CLINIC | Age: 78
Setting detail: DERMATOLOGY
End: 2018-10-08

## 2018-10-08 DIAGNOSIS — Z48.817 ENCOUNTER FOR SURGICAL AFTERCARE FOLLOWING SURGERY ON THE SKIN AND SUBCUTANEOUS TISSUE: ICD-10-CM

## 2018-10-08 PROCEDURE — ? POST-OP WOUND CHECK

## 2018-10-08 PROCEDURE — 99024 POSTOP FOLLOW-UP VISIT: CPT

## 2018-10-08 ASSESSMENT — LOCATION DETAILED DESCRIPTION DERM: LOCATION DETAILED: RIGHT PROXIMAL PRETIBIAL REGION

## 2018-10-08 ASSESSMENT — LOCATION SIMPLE DESCRIPTION DERM: LOCATION SIMPLE: RIGHT PRETIBIAL REGION

## 2018-10-08 ASSESSMENT — LOCATION ZONE DERM: LOCATION ZONE: LEG

## 2018-10-08 NOTE — PROCEDURE: POST-OP WOUND CHECK
Add 33142 Cpt? (Important Note: In 2017 The Use Of 19610 Is Being Tracked By Cms To Determine Future Global Period Reimbursement For Global Periods): yes
Wound Evaluated By: Destiny Ramsey PA-C
Detail Level: Simple

## 2018-10-12 ENCOUNTER — HOSPITAL ENCOUNTER (OUTPATIENT)
Dept: LAB | Facility: MEDICAL CENTER | Age: 78
End: 2018-10-12
Attending: FAMILY MEDICINE
Payer: MEDICARE

## 2018-10-12 DIAGNOSIS — K70.31 ALCOHOLIC CIRRHOSIS OF LIVER WITH ASCITES (HCC): ICD-10-CM

## 2018-10-12 LAB
ANION GAP SERPL CALC-SCNC: 10 MMOL/L (ref 0–11.9)
BUN SERPL-MCNC: 40 MG/DL (ref 8–22)
CALCIUM SERPL-MCNC: 10.1 MG/DL (ref 8.5–10.5)
CHLORIDE SERPL-SCNC: 105 MMOL/L (ref 96–112)
CO2 SERPL-SCNC: 21 MMOL/L (ref 20–33)
CREAT SERPL-MCNC: 1.5 MG/DL (ref 0.5–1.4)
GLUCOSE SERPL-MCNC: 163 MG/DL (ref 65–99)
POTASSIUM SERPL-SCNC: 4.9 MMOL/L (ref 3.6–5.5)
SODIUM SERPL-SCNC: 136 MMOL/L (ref 135–145)

## 2018-10-12 PROCEDURE — 36415 COLL VENOUS BLD VENIPUNCTURE: CPT

## 2018-10-12 PROCEDURE — 80048 BASIC METABOLIC PNL TOTAL CA: CPT

## 2018-10-16 ENCOUNTER — APPOINTMENT (RX ONLY)
Dept: URBAN - METROPOLITAN AREA CLINIC 4 | Facility: CLINIC | Age: 78
Setting detail: DERMATOLOGY
End: 2018-10-16

## 2018-10-16 DIAGNOSIS — Z48.817 ENCOUNTER FOR SURGICAL AFTERCARE FOLLOWING SURGERY ON THE SKIN AND SUBCUTANEOUS TISSUE: ICD-10-CM

## 2018-10-16 PROCEDURE — ? POST-OP WOUND CHECK

## 2018-10-16 PROCEDURE — 99024 POSTOP FOLLOW-UP VISIT: CPT

## 2018-10-16 ASSESSMENT — LOCATION SIMPLE DESCRIPTION DERM: LOCATION SIMPLE: RIGHT PRETIBIAL REGION

## 2018-10-16 ASSESSMENT — LOCATION ZONE DERM: LOCATION ZONE: LEG

## 2018-10-16 ASSESSMENT — LOCATION DETAILED DESCRIPTION DERM: LOCATION DETAILED: RIGHT PROXIMAL PRETIBIAL REGION

## 2018-10-16 NOTE — PROCEDURE: POST-OP WOUND CHECK
Add 59950 Cpt? (Important Note: In 2017 The Use Of 30885 Is Being Tracked By Cms To Determine Future Global Period Reimbursement For Global Periods): yes
Wound Evaluated By: Destiny Ramsey PA-C
Detail Level: Simple
Wound Assessment Override (Optional): healing slowly
Wound Dressing Override (Optional): coban and adherent telefax pad with Vaseline

## 2018-10-17 ENCOUNTER — OFFICE VISIT (OUTPATIENT)
Dept: MEDICAL GROUP | Facility: MEDICAL CENTER | Age: 78
End: 2018-10-17
Payer: MEDICARE

## 2018-10-17 VITALS
TEMPERATURE: 97.2 F | BODY MASS INDEX: 21.14 KG/M2 | HEART RATE: 65 BPM | HEIGHT: 71 IN | SYSTOLIC BLOOD PRESSURE: 134 MMHG | WEIGHT: 151 LBS | DIASTOLIC BLOOD PRESSURE: 60 MMHG | OXYGEN SATURATION: 96 %

## 2018-10-17 DIAGNOSIS — N18.30 CKD (CHRONIC KIDNEY DISEASE) STAGE 3, GFR 30-59 ML/MIN (HCC): ICD-10-CM

## 2018-10-17 DIAGNOSIS — E11.65 UNCONTROLLED TYPE 2 DIABETES MELLITUS WITH HYPERGLYCEMIA (HCC): ICD-10-CM

## 2018-10-17 DIAGNOSIS — K70.31 ALCOHOLIC CIRRHOSIS OF LIVER WITH ASCITES (HCC): ICD-10-CM

## 2018-10-17 PROCEDURE — 99214 OFFICE O/P EST MOD 30 MIN: CPT | Performed by: FAMILY MEDICINE

## 2018-10-17 NOTE — ASSESSMENT & PLAN NOTE
Patient has cirrhosis of the liver with ascites that is being managed with spironolactone 100 mg daily.  He was seen by the GI specialist and had an EGD performed which showed no esophageal varices but mild gastric hypertension.  An MRI of the abdomen has been scheduled through GI.

## 2018-10-17 NOTE — PROGRESS NOTES
Subjective:   Sammy Oseguera is a 78 y.o. male here today for ascites, diabetes, chronic kidney disease    Alcoholic cirrhosis of liver with ascites (HCC)  Patient has cirrhosis of the liver with ascites that is being managed with spironolactone 100 mg daily.  He was seen by the GI specialist and had an EGD performed which showed no esophageal varices but mild gastric hypertension.  An MRI of the abdomen has been scheduled through GI.    CKD (chronic kidney disease) stage 3, GFR 30-59 ml/min  Reviewed GFR with patient which is stable at 45.  He is on spironolactone 100 mg daily because of ascites.    Type 2 diabetes mellitus, uncontrolled  Patient is tolerating Janumet  mg twice daily.  He states that his fasting blood sugars around 135.  He has not been able to be active because he just had right foot surgery and has a healing wound.         Current medicines (including changes today)  Current Outpatient Prescriptions   Medication Sig Dispense Refill   • Blood Glucose Monitoring Suppl Device Meter: Dispense Device of Insurance Preference. Sig. Use as directed for blood sugar monitoring. #1. NR. 1 Device 0   • Blood Glucose Monitoring Suppl Supplies Misc Test strips order: Test strips for Device of Insurance Preference. Sig: use once daily. 100 Each 3   • spironolactone (ALDACTONE) 100 MG Tab Take 1 Tab by mouth every day. 30 Tab 3   • Lancets Misc Lancets order: Lancets for Device of Insurance Preference. Sig: use once daily. 100 Each 3   • JANUMET  MG per tablet TAKE 1 TAB BY MOUTH 2 TIMES A DAY, WITH MEALS. 180 Tab 3   • Acetaminophen-Caffeine 500-65 MG Tab Take 1 Tab by mouth every four hours as needed (for headache). 60 Tab 0   • cyanocobalamin (VITAMIN B12) 1000 MCG Tab Take 3 Tabs by mouth every day.     • vitamin D (CHOLECALCIFEROL) 1000 UNIT TABS Take 1,000 Units by mouth every day.       No current facility-administered medications for this visit.      He  has a past medical history of  "HTN and Type II or unspecified type diabetes mellitus without mention of complication, not stated as uncontrolled.    ROS   No chest pain, no shortness of breath       Objective:     Blood pressure 134/60, pulse 65, temperature 36.2 °C (97.2 °F), height 1.803 m (5' 11\"), weight 68.5 kg (151 lb), SpO2 96 %. Body mass index is 21.06 kg/m².   Physical Exam:  Constitutional: Alert, no distress.  Skin: Warm, dry, good turgor, no rashes in visible areas.  Eye: Equal, round and reactive, conjunctiva clear, lids normal.  Psych: Alert and oriented x3, normal affect and mood.        Assessment and Plan:   The following treatment plan was discussed    1. Alcoholic cirrhosis of liver with ascites (HCC)  Controlled with spironolactone.  Advised patient to proceed with MRI and follow-up with GI.    2. Uncontrolled type 2 diabetes mellitus with stage 3 chronic kidney disease, without long-term current use of insulin (HCC)  New prescription for glucometer and test strips.  Labs ordered to follow-up in 2 months.  Continue current medication.  - Blood Glucose Monitoring Suppl Device; Meter: Dispense Device of Insurance Preference. Sig. Use as directed for blood sugar monitoring. #1. NR.  Dispense: 1 Device; Refill: 0  - Blood Glucose Monitoring Suppl Supplies Misc; Test strips order: Test strips for Device of Insurance Preference. Sig: use once daily.  Dispense: 100 Each; Refill: 3  - COMP METABOLIC PANEL; Future  - HEMOGLOBIN A1C; Future  - MICROALBUMIN CREAT RATIO URINE; Future  - LIPID PROFILE; Future    3. CKD (chronic kidney disease) stage 3, GFR 30-59 ml/min ([facility])  Stable.  Continue to monitor with labs.  Follow-up in 2 months.    4. Uncontrolled type 2 diabetes mellitus with hyperglycemia ([facility])  Follow-up with labs in 2 months.      Followup: Return in about 2 months (around 12/17/2018) for Diabetes.         "

## 2018-10-17 NOTE — ASSESSMENT & PLAN NOTE
Patient is tolerating Janumet  mg twice daily.  He states that his fasting blood sugars around 135.  He has not been able to be active because he just had right foot surgery and has a healing wound.

## 2018-10-17 NOTE — ASSESSMENT & PLAN NOTE
Reviewed GFR with patient which is stable at 45.  He is on spironolactone 100 mg daily because of ascites.

## 2018-10-23 ENCOUNTER — APPOINTMENT (RX ONLY)
Dept: URBAN - METROPOLITAN AREA CLINIC 4 | Facility: CLINIC | Age: 78
Setting detail: DERMATOLOGY
End: 2018-10-23

## 2018-10-23 DIAGNOSIS — Z48.817 ENCOUNTER FOR SURGICAL AFTERCARE FOLLOWING SURGERY ON THE SKIN AND SUBCUTANEOUS TISSUE: ICD-10-CM

## 2018-10-23 PROCEDURE — 99024 POSTOP FOLLOW-UP VISIT: CPT

## 2018-10-23 PROCEDURE — ? POST-OP WOUND CHECK

## 2018-10-23 PROCEDURE — ? PRESCRIPTION

## 2018-10-23 ASSESSMENT — LOCATION DETAILED DESCRIPTION DERM: LOCATION DETAILED: RIGHT PROXIMAL PRETIBIAL REGION

## 2018-10-23 ASSESSMENT — LOCATION SIMPLE DESCRIPTION DERM: LOCATION SIMPLE: RIGHT PRETIBIAL REGION

## 2018-10-23 ASSESSMENT — LOCATION ZONE DERM: LOCATION ZONE: LEG

## 2018-10-23 NOTE — HPI: WOUND CHECK (POST-OP)
Additional History: Patient states he had pain around the excision site several nights ago, he states it has since felt better although still uncomfortable at time. No increase in redness or swelling.

## 2018-10-23 NOTE — PROCEDURE: POST-OP WOUND CHECK
Add 74547 Cpt? (Important Note: In 2017 The Use Of 03735 Is Being Tracked By Cms To Determine Future Global Period Reimbursement For Global Periods): yes
Detail Level: Simple
Wound Evaluated By: Destiny Ramsey PA-C
Wound Assessment Override (Optional): healing slowly
Wound Dressing Override (Optional): vaseline and nonadherent dressing.
Additional Comments: Recommend patient keep extremity elevated. May change dressing daily. May take ibuprofen for discomfort. Rx for Doxy given as there is slightly more surrounding erythema. Take probiotic while on antibiotic. Patient to return to clinic next week as scheduled.

## 2018-10-24 RX ORDER — DOXYCYCLINE HYCLATE 100 MG/1
CAPSULE, GELATIN COATED ORAL TWICE A DAY
Qty: 28 | Refills: 0 | Status: ERX | COMMUNITY
Start: 2018-10-24

## 2018-10-24 RX ADMIN — DOXYCYCLINE HYCLATE: 100 CAPSULE, GELATIN COATED ORAL at 00:00

## 2018-10-29 ENCOUNTER — TELEPHONE (OUTPATIENT)
Dept: MEDICAL GROUP | Facility: MEDICAL CENTER | Age: 78
End: 2018-10-29

## 2018-10-29 DIAGNOSIS — K70.31 ALCOHOLIC CIRRHOSIS OF LIVER WITH ASCITES (HCC): ICD-10-CM

## 2018-10-29 NOTE — TELEPHONE ENCOUNTER
----- Message from Kim Villarreal sent at 10/29/2018  1:47 PM PDT -----  Contact: [phone]  Patient is having an MRI done on 11/07 and states he was told that he will need labs done prior to that appt.  Please call patient back.

## 2018-10-29 NOTE — TELEPHONE ENCOUNTER
Patient notified. Pt. stating he never requested labs and he does not need them prior to his MRI and he does not wish to have them done.

## 2018-10-30 ENCOUNTER — APPOINTMENT (RX ONLY)
Dept: URBAN - METROPOLITAN AREA CLINIC 4 | Facility: CLINIC | Age: 78
Setting detail: DERMATOLOGY
End: 2018-10-30

## 2018-10-30 DIAGNOSIS — Z48.817 ENCOUNTER FOR SURGICAL AFTERCARE FOLLOWING SURGERY ON THE SKIN AND SUBCUTANEOUS TISSUE: ICD-10-CM

## 2018-10-30 PROCEDURE — 99024 POSTOP FOLLOW-UP VISIT: CPT

## 2018-10-30 PROCEDURE — ? POST-OP WOUND CHECK

## 2018-10-30 PROCEDURE — ? COUNSELING

## 2018-10-30 PROCEDURE — ? ADDITIONAL NOTES

## 2018-10-30 ASSESSMENT — LOCATION DETAILED DESCRIPTION DERM: LOCATION DETAILED: RIGHT PROXIMAL PRETIBIAL REGION

## 2018-10-30 ASSESSMENT — LOCATION ZONE DERM: LOCATION ZONE: LEG

## 2018-10-30 ASSESSMENT — LOCATION SIMPLE DESCRIPTION DERM: LOCATION SIMPLE: RIGHT PRETIBIAL REGION

## 2018-10-30 NOTE — HPI: WOUND CHECK (FOLLOW-UP)
How Is Your Wound Healing?: healing slowly
Additional History: Patient states he changes the wound dressing daily. He states the adherent Telfa is a better bandage for him. He states that he lets the wound breathe once a day while at home with his leg elevated. Patient is in for a wound check.
Date Of Surgery: 10/2/18
Name Of Surgery: Excision

## 2018-10-30 NOTE — PROCEDURE: POST-OP WOUND CHECK
Add 14565 Cpt? (Important Note: In 2017 The Use Of 39902 Is Being Tracked By Cms To Determine Future Global Period Reimbursement For Global Periods): yes
Wound Evaluated By: Destiny Ramsey PA-C
Detail Level: Detailed

## 2018-11-06 ENCOUNTER — APPOINTMENT (RX ONLY)
Dept: URBAN - METROPOLITAN AREA CLINIC 4 | Facility: CLINIC | Age: 78
Setting detail: DERMATOLOGY
End: 2018-11-06

## 2018-11-06 DIAGNOSIS — Z48.817 ENCOUNTER FOR SURGICAL AFTERCARE FOLLOWING SURGERY ON THE SKIN AND SUBCUTANEOUS TISSUE: ICD-10-CM

## 2018-11-06 PROCEDURE — ? POST-OP WOUND CHECK

## 2018-11-06 PROCEDURE — 99024 POSTOP FOLLOW-UP VISIT: CPT

## 2018-11-06 ASSESSMENT — LOCATION DETAILED DESCRIPTION DERM: LOCATION DETAILED: RIGHT PROXIMAL PRETIBIAL REGION

## 2018-11-06 ASSESSMENT — LOCATION SIMPLE DESCRIPTION DERM: LOCATION SIMPLE: RIGHT PRETIBIAL REGION

## 2018-11-06 ASSESSMENT — LOCATION ZONE DERM: LOCATION ZONE: LEG

## 2018-11-06 NOTE — PROCEDURE: POST-OP WOUND CHECK
Add 11183 Cpt? (Important Note: In 2017 The Use Of 21817 Is Being Tracked By Cms To Determine Future Global Period Reimbursement For Global Periods): yes
Additional Comments: Duoderm applied, followed by bandaide and tegaderm\\nWound care instructions discussed at length. Patient to change duoderm as discussed every 2-3 days and call with any questions or concerns.
Detail Level: Detailed
Wound Evaluated By: Destiny Ramsey PA-C

## 2018-11-07 ENCOUNTER — APPOINTMENT (RX ONLY)
Dept: URBAN - METROPOLITAN AREA CLINIC 4 | Facility: CLINIC | Age: 78
Setting detail: DERMATOLOGY
End: 2018-11-07

## 2018-11-07 DIAGNOSIS — Z48.817 ENCOUNTER FOR SURGICAL AFTERCARE FOLLOWING SURGERY ON THE SKIN AND SUBCUTANEOUS TISSUE: ICD-10-CM

## 2018-11-07 PROCEDURE — ? COUNSELING

## 2018-11-07 PROCEDURE — ? ADDITIONAL NOTES

## 2018-11-07 ASSESSMENT — LOCATION ZONE DERM: LOCATION ZONE: LEG

## 2018-11-07 ASSESSMENT — LOCATION SIMPLE DESCRIPTION DERM: LOCATION SIMPLE: RIGHT PRETIBIAL REGION

## 2018-11-07 ASSESSMENT — LOCATION DETAILED DESCRIPTION DERM: LOCATION DETAILED: RIGHT DISTAL PRETIBIAL REGION

## 2018-11-07 NOTE — PROCEDURE: ADDITIONAL NOTES
Detail Level: Simple
Additional Notes: Reiterated wound care and duoderm application instructions with patient. All of his questions were answered to his satisfaction. He verbalizes understanding of wound care. Wife present.

## 2018-11-07 NOTE — HPI: WOUND CHECK (FOLLOW-UP)
How Is Your Wound Healing?: healing slowly
Additional History: Patient states he went to change the wound and the duoderm came off. He states he noticed an odor and this concerned him. Patient denies any increase in pain, redness and denies a fever.

## 2018-11-08 ENCOUNTER — HOSPITAL ENCOUNTER (OUTPATIENT)
Dept: RADIOLOGY | Facility: MEDICAL CENTER | Age: 78
End: 2018-11-08
Attending: INTERNAL MEDICINE
Payer: MEDICARE

## 2018-11-08 DIAGNOSIS — R74.8 ACID PHOSPHATASE ELEVATED: ICD-10-CM

## 2018-11-08 DIAGNOSIS — R63.4 LOSS OF WEIGHT: ICD-10-CM

## 2018-11-08 PROCEDURE — 700117 HCHG RX CONTRAST REV CODE 255: Performed by: INTERNAL MEDICINE

## 2018-11-08 PROCEDURE — A9585 GADOBUTROL INJECTION: HCPCS | Performed by: INTERNAL MEDICINE

## 2018-11-08 PROCEDURE — 74183 MRI ABD W/O CNTR FLWD CNTR: CPT

## 2018-11-08 RX ORDER — GADOBUTROL 604.72 MG/ML
7.5 INJECTION INTRAVENOUS ONCE
Status: COMPLETED | OUTPATIENT
Start: 2018-11-08 | End: 2018-11-08

## 2018-11-08 RX ADMIN — GADOBUTROL 7.5 ML: 604.72 INJECTION INTRAVENOUS at 14:27

## 2018-11-15 ENCOUNTER — APPOINTMENT (RX ONLY)
Dept: URBAN - METROPOLITAN AREA CLINIC 4 | Facility: CLINIC | Age: 78
Setting detail: DERMATOLOGY
End: 2018-11-15

## 2018-11-15 DIAGNOSIS — Z48.817 ENCOUNTER FOR SURGICAL AFTERCARE FOLLOWING SURGERY ON THE SKIN AND SUBCUTANEOUS TISSUE: ICD-10-CM

## 2018-11-15 PROCEDURE — ? ADDITIONAL NOTES

## 2018-11-15 PROCEDURE — ? DEBRIDEMENT OF OPEN WOUND

## 2018-11-15 PROCEDURE — 97597 DBRDMT OPN WND 1ST 20 CM/<: CPT

## 2018-11-15 PROCEDURE — ? COUNSELING

## 2018-11-15 ASSESSMENT — LOCATION DETAILED DESCRIPTION DERM: LOCATION DETAILED: RIGHT PROXIMAL PRETIBIAL REGION

## 2018-11-15 ASSESSMENT — LOCATION SIMPLE DESCRIPTION DERM: LOCATION SIMPLE: RIGHT PRETIBIAL REGION

## 2018-11-15 ASSESSMENT — LOCATION ZONE DERM: LOCATION ZONE: LEG

## 2018-11-15 NOTE — PROCEDURE: DEBRIDEMENT OF OPEN WOUND
Size Of Wound In Cm2 (Optional): 1.5
Post-Care Instructions: I reviewed with the patient in detail post-care instructions. Patient is to keep the area dry for 48 hours, and not to engage in any swimming until the area is healed. Should the patient develop any fevers, chills, bleeding, severe pain patient will contact the office immediately.
Anesthesia Volume In Cc: 0
Render Post-Care Instructions In Note?: no
Consent was obtained from the patient. The risks, benefits and alternatives to therapy were discussed in detail. Specifically, the risks of infection, scarring, bleeding, prolonged wound healing, nerve injury, and allergy to anesthesia were addressed. Alternatives to debridement, such as aggressive wound care, were also discussed.  Prior to the procedure, the treatment site was clearly identified and confirmed by the patient. All components of Universal Protocol/PAUSE Rule completed.
Detail Level: Detailed
Procedure: Debridement of wound tissue less than 20sq cm

## 2018-11-15 NOTE — PROCEDURE: ADDITIONAL NOTES
Detail Level: Detailed
Additional Notes: Patient advised to continue with duoderm. \\nWill recheck progress in 1 week.

## 2018-11-20 ENCOUNTER — APPOINTMENT (RX ONLY)
Dept: URBAN - METROPOLITAN AREA CLINIC 4 | Facility: CLINIC | Age: 78
Setting detail: DERMATOLOGY
End: 2018-11-20

## 2018-11-20 DIAGNOSIS — Z48.817 ENCOUNTER FOR SURGICAL AFTERCARE FOLLOWING SURGERY ON THE SKIN AND SUBCUTANEOUS TISSUE: ICD-10-CM

## 2018-11-20 PROCEDURE — ? PRESCRIPTION

## 2018-11-20 PROCEDURE — 97597 DBRDMT OPN WND 1ST 20 CM/<: CPT

## 2018-11-20 PROCEDURE — ? DEBRIDEMENT OF OPEN WOUND

## 2018-11-20 PROCEDURE — ? ADDITIONAL NOTES

## 2018-11-20 RX ORDER — MUPIROCIN 20 MG/G
OINTMENT TOPICAL
Qty: 1 | Refills: 3 | Status: ERX | COMMUNITY
Start: 2018-11-20

## 2018-11-20 RX ADMIN — MUPIROCIN: 20 OINTMENT TOPICAL at 00:00

## 2018-11-20 ASSESSMENT — LOCATION ZONE DERM: LOCATION ZONE: LEG

## 2018-11-20 ASSESSMENT — LOCATION DETAILED DESCRIPTION DERM: LOCATION DETAILED: RIGHT PROXIMAL PRETIBIAL REGION

## 2018-11-20 ASSESSMENT — LOCATION SIMPLE DESCRIPTION DERM: LOCATION SIMPLE: RIGHT PRETIBIAL REGION

## 2018-11-20 NOTE — PROCEDURE: DEBRIDEMENT OF OPEN WOUND
Anesthesia Type: 1% lidocaine with epinephrine
Detail Level: Detailed
Render Post-Care Instructions In Note?: no
Size Of Wound In Cm2 (Optional): 3
Consent was obtained from the patient. The risks, benefits and alternatives to therapy were discussed in detail. Specifically, the risks of infection, scarring, bleeding, prolonged wound healing, nerve injury, and allergy to anesthesia were addressed. Alternatives to debridement, such as aggressive wound care, were also discussed.  Prior to the procedure, the treatment site was clearly identified and confirmed by the patient. All components of Universal Protocol/PAUSE Rule completed.
Additional Anesthesia Volume In Cc (Will Not Render If 0): 0
Additional Anesthesia Type: 1% lidocaine with 1:200,000 epinephrine and a 1:10 solution of 8.4% sodium bicarbonate
Anesthesia Volume In Cc: 5
Procedure: Debridement of wound tissue less than 20sq cm
Post-Care Instructions: I reviewed with the patient in detail post-care instructions. Patient is to keep the area dry for 48 hours, and not to engage in any swimming until the area is healed. Should the patient develop any fevers, chills, bleeding, severe pain patient will contact the office immediately.

## 2018-11-20 NOTE — PROCEDURE: ADDITIONAL NOTES
Detail Level: Detailed
Additional Notes: Recommend applying bactroban BID and apply sterile nonadherent dressing as demonstrated and explained. Will hold duoderm at this point, will consider resuming next visit.

## 2018-11-27 ENCOUNTER — APPOINTMENT (RX ONLY)
Dept: URBAN - METROPOLITAN AREA CLINIC 4 | Facility: CLINIC | Age: 78
Setting detail: DERMATOLOGY
End: 2018-11-27

## 2018-11-27 DIAGNOSIS — Z48.817 ENCOUNTER FOR SURGICAL AFTERCARE FOLLOWING SURGERY ON THE SKIN AND SUBCUTANEOUS TISSUE: ICD-10-CM

## 2018-11-27 DIAGNOSIS — Z85.828 PERSONAL HISTORY OF OTHER MALIGNANT NEOPLASM OF SKIN: ICD-10-CM

## 2018-11-27 PROCEDURE — 99024 POSTOP FOLLOW-UP VISIT: CPT

## 2018-11-27 PROCEDURE — ? COUNSELING

## 2018-11-27 PROCEDURE — ? POST-OP WOUND CHECK

## 2018-11-27 ASSESSMENT — LOCATION ZONE DERM
LOCATION ZONE: FACE
LOCATION ZONE: LEG

## 2018-11-27 ASSESSMENT — LOCATION DETAILED DESCRIPTION DERM
LOCATION DETAILED: RIGHT DISTAL PRETIBIAL REGION
LOCATION DETAILED: LEFT CENTRAL MANDIBULAR CHEEK

## 2018-11-27 ASSESSMENT — LOCATION SIMPLE DESCRIPTION DERM
LOCATION SIMPLE: LEFT CHEEK
LOCATION SIMPLE: RIGHT PRETIBIAL REGION

## 2018-11-27 NOTE — PROCEDURE: POST-OP WOUND CHECK
Detail Level: Detailed
Add 54953 Cpt? (Important Note: In 2017 The Use Of 39329 Is Being Tracked By Cms To Determine Future Global Period Reimbursement For Global Periods): yes
Wound Evaluated By: Destiny Ramsey PA-C
Additional Comments: Wound healing well, slowly. No indication for debridement today. Recommend patient continue applying mupirocin BID and dressing as discussed. Will consider duoderm next visit.

## 2018-12-04 ENCOUNTER — APPOINTMENT (RX ONLY)
Dept: URBAN - METROPOLITAN AREA CLINIC 4 | Facility: CLINIC | Age: 78
Setting detail: DERMATOLOGY
End: 2018-12-04

## 2018-12-04 DIAGNOSIS — Z48.817 ENCOUNTER FOR SURGICAL AFTERCARE FOLLOWING SURGERY ON THE SKIN AND SUBCUTANEOUS TISSUE: ICD-10-CM

## 2018-12-04 PROCEDURE — ? POST-OP WOUND CHECK (NO GLOBAL PERIOD)

## 2018-12-04 PROCEDURE — 99212 OFFICE O/P EST SF 10 MIN: CPT

## 2018-12-04 PROCEDURE — ? DIAGNOSIS COMMENT

## 2018-12-04 ASSESSMENT — LOCATION SIMPLE DESCRIPTION DERM: LOCATION SIMPLE: RIGHT PRETIBIAL REGION

## 2018-12-04 ASSESSMENT — LOCATION ZONE DERM: LOCATION ZONE: LEG

## 2018-12-04 ASSESSMENT — LOCATION DETAILED DESCRIPTION DERM: LOCATION DETAILED: RIGHT PROXIMAL PRETIBIAL REGION

## 2018-12-04 NOTE — PROCEDURE: POST-OP WOUND CHECK (NO GLOBAL PERIOD)
Detail Level: Detailed
Additional Comments: Patient advised to leave duoderm on 3 days at a time while changing the bandage daily. \\nWill recheck progress in 1 week.

## 2018-12-04 NOTE — HPI: WOUND CHECK (POST-OP)
Additional History: Patient is currently applying Mupirocin on the wound with a bandage, he will need a refill if appropriate.
Date Of Procedure: 9/2018

## 2018-12-05 ENCOUNTER — TELEPHONE (OUTPATIENT)
Dept: MEDICAL GROUP | Facility: MEDICAL CENTER | Age: 78
End: 2018-12-05

## 2018-12-06 ENCOUNTER — OFFICE VISIT (OUTPATIENT)
Dept: MEDICAL GROUP | Facility: MEDICAL CENTER | Age: 78
End: 2018-12-06
Payer: MEDICARE

## 2018-12-06 VITALS
WEIGHT: 159 LBS | RESPIRATION RATE: 16 BRPM | OXYGEN SATURATION: 100 % | TEMPERATURE: 97.1 F | DIASTOLIC BLOOD PRESSURE: 76 MMHG | BODY MASS INDEX: 22.26 KG/M2 | HEIGHT: 71 IN | HEART RATE: 66 BPM | SYSTOLIC BLOOD PRESSURE: 118 MMHG

## 2018-12-06 DIAGNOSIS — K40.90 LEFT INGUINAL HERNIA: ICD-10-CM

## 2018-12-06 DIAGNOSIS — K70.31 ALCOHOLIC CIRRHOSIS OF LIVER WITH ASCITES (HCC): ICD-10-CM

## 2018-12-06 DIAGNOSIS — E11.65 UNCONTROLLED TYPE 2 DIABETES MELLITUS WITH HYPERGLYCEMIA (HCC): ICD-10-CM

## 2018-12-06 LAB
HBA1C MFR BLD: 7 % (ref ?–5.8)
INT CON NEG: NEGATIVE
INT CON POS: POSITIVE

## 2018-12-06 PROCEDURE — 99214 OFFICE O/P EST MOD 30 MIN: CPT | Performed by: FAMILY MEDICINE

## 2018-12-06 PROCEDURE — 83036 HEMOGLOBIN GLYCOSYLATED A1C: CPT | Performed by: FAMILY MEDICINE

## 2018-12-06 RX ORDER — SPIRONOLACTONE 100 MG/1
100 TABLET, FILM COATED ORAL DAILY
Qty: 90 TAB | Refills: 3 | Status: SHIPPED | OUTPATIENT
Start: 2018-12-06 | End: 2020-01-20

## 2018-12-06 NOTE — PROGRESS NOTES
Subjective:   Sammy Oseguera is a 78 y.o. male here today for diabetes    Type 2 diabetes mellitus, uncontrolled  Patient is tolerating Janumet  mg twice daily.    Alcoholic cirrhosis of liver with ascites (HCC)  Saw general surgery yesterday and was advised to avoid Tylenol, pain fumes, and alcohol. MRI of liver dilated portal vein, possibly representing portal hypertension in the appropriate clinical setting. Borderline splenic enlargement. Trace ascites.  On spironolactone 100 mg, which has helped ascites significantly.    Left inguinal hernia  Saw surgeon yesterday and was offered surgery and he will monitor.         Current medicines (including changes today)  Current Outpatient Prescriptions   Medication Sig Dispense Refill   • spironolactone (ALDACTONE) 100 MG Tab Take 1 Tab by mouth every day. 90 Tab 3   • sitagliptan-metformin (JANUMET)  MG per tablet Take 1 Tab by mouth 2 times a day, with meals. 180 Tab 3   • Blood Glucose Monitoring Suppl Device Meter: Dispense Device of Insurance Preference. Sig. Use as directed for blood sugar monitoring. #1. NR. 1 Device 0   • Blood Glucose Monitoring Suppl Supplies Misc Test strips order: Test strips for Device of Insurance Preference. Sig: use once daily. 100 Each 3   • Lancets Misc Lancets order: Lancets for Device of Insurance Preference. Sig: use once daily. 100 Each 3   • Acetaminophen-Caffeine 500-65 MG Tab Take 1 Tab by mouth every four hours as needed (for headache). 60 Tab 0   • cyanocobalamin (VITAMIN B12) 1000 MCG Tab Take 3 Tabs by mouth every day.     • vitamin D (CHOLECALCIFEROL) 1000 UNIT TABS Take 1,000 Units by mouth every day.       No current facility-administered medications for this visit.      He  has a past medical history of HTN and Type II or unspecified type diabetes mellitus without mention of complication, not stated as uncontrolled.    ROS   No ascites, no weight loss       Objective:     Blood pressure 118/76, pulse 66,  "temperature 36.2 °C (97.1 °F), temperature source Temporal, resp. rate 16, height 1.803 m (5' 10.98\"), weight 72.1 kg (159 lb), SpO2 100 %. Body mass index is 22.19 kg/m².   Physical Exam:  Constitutional: Alert, no distress.  Skin: Warm, dry, good turgor, no rashes in visible areas.  Eye: Equal, round and reactive, conjunctiva clear, lids normal.  Psych: Alert and oriented x3, normal affect and mood.        Assessment and Plan:   The following treatment plan was discussed    1. Uncontrolled type 2 diabetes mellitus with hyperglycemia (HCC)  A1c in clinic is 7.0 today.  Continue Janumet, follow up with labs in 6 months.  - sitagliptan-metformin (JANUMET)  MG per tablet; Take 1 Tab by mouth 2 times a day, with meals.  Dispense: 180 Tab; Refill: 3  - COMP METABOLIC PANEL; Future  - MICROALBUMIN CREAT RATIO URINE; Future  - HEMOGLOBIN A1C; Future  - Lipid Profile; Future  - TSH WITH REFLEX TO FT4; Future  - POCT Hemoglobin A1C    2. Alcoholic cirrhosis of liver with ascites (HCC)  Controlled with spironolactone.  - spironolactone (ALDACTONE) 100 MG Tab; Take 1 Tab by mouth every day.  Dispense: 90 Tab; Refill: 3    3. Left inguinal hernia  No symptoms. Continue to monitor per patients request.      Followup: Return in about 6 months (around 6/6/2019) for Diabetes.         "

## 2018-12-06 NOTE — ASSESSMENT & PLAN NOTE
Saw general surgery yesterday and was advised to avoid Tylenol, pain fumes, and alcohol. MRI of liver dilated portal vein, possibly representing portal hypertension in the appropriate clinical setting. Borderline splenic enlargement. Trace ascites.  On spironolactone 100 mg, which has helped ascites significantly.

## 2018-12-06 NOTE — TELEPHONE ENCOUNTER
ESTABLISHED PATIENT PRE-VISIT PLANNING     Patient was NOT contacted to complete PVP.     Note: Patient will not be contacted if there is no indication to call.     1.  Reviewed notes from the last few office visits within the medical group: Yes 9/19/18, 10/17/18    2.  If any orders were placed at last visit or intended to be done for this visit (i.e. 6 mos follow-up), do we have Results/Consult Notes?        •  Labs - Unable to contact pt to inquire about labs ordered on 10/17/18. Labs ordered on 10/12/18, completed on 10/12/18.   Note: If patient appointment is for lab review and patient did not complete labs, check with provider if OK to reschedule patient until labs completed.       •  Imaging - Imaging was not ordered at last office visit.       •  Referrals - No referrals were ordered at last office visit.    3. Is this appointment scheduled as a Hospital Follow-Up? No    4.  Immunizations were updated in Epic using WebIZ?: Epic matches WebIZ       •  Web Iz Recommendations: TD and ZOSTAVAX (Shingles)    5.  Patient is due for the following Health Maintenance Topics:   Health Maintenance Due   Topic Date Due   • IMM HEP B VACCINE (1 of 3 - Risk 3-dose series) [birth year]1959   • IMM ZOSTER VACCINES (1 of 2) 04/17/1990   • DIABETES MONOFILAMENT / LE EXAM  09/14/2018   • A1C SCREENING  10/25/2018     6.  MDX printed for Provider? NO    7.  Patient was NOT informed to arrive 15 min prior to their scheduled appointment and bring in their medication bottles.

## 2018-12-11 ENCOUNTER — APPOINTMENT (RX ONLY)
Dept: URBAN - METROPOLITAN AREA CLINIC 4 | Facility: CLINIC | Age: 78
Setting detail: DERMATOLOGY
End: 2018-12-11

## 2018-12-11 DIAGNOSIS — Z48.817 ENCOUNTER FOR SURGICAL AFTERCARE FOLLOWING SURGERY ON THE SKIN AND SUBCUTANEOUS TISSUE: ICD-10-CM

## 2018-12-11 DIAGNOSIS — Z71.89 OTHER SPECIFIED COUNSELING: ICD-10-CM

## 2018-12-11 PROCEDURE — ? ADDITIONAL NOTES

## 2018-12-11 PROCEDURE — ? COUNSELING

## 2018-12-11 PROCEDURE — 99024 POSTOP FOLLOW-UP VISIT: CPT

## 2018-12-11 PROCEDURE — ? POST-OP WOUND CHECK

## 2018-12-11 ASSESSMENT — LOCATION DETAILED DESCRIPTION DERM
LOCATION DETAILED: RIGHT PROXIMAL PRETIBIAL REGION
LOCATION DETAILED: STERNAL NOTCH

## 2018-12-11 ASSESSMENT — LOCATION ZONE DERM
LOCATION ZONE: LEG
LOCATION ZONE: TRUNK

## 2018-12-11 ASSESSMENT — LOCATION SIMPLE DESCRIPTION DERM
LOCATION SIMPLE: RIGHT PRETIBIAL REGION
LOCATION SIMPLE: CHEST

## 2018-12-11 NOTE — PROCEDURE: ADDITIONAL NOTES
Additional Notes: Patient advised to continue with Duoderm and adherent telfa pad. \\nPatient advised to change duoderm every 2-3 days. \\nPatient advised to change adherent telfa dressing daily. \\nPatient advised to contact our office if lesion worsens despite any tx.
Detail Level: Simple

## 2018-12-11 NOTE — PROCEDURE: POST-OP WOUND CHECK
Additional Comments: No indication for debridement. Duoderm and dressing applied
Add 76440 Cpt? (Important Note: In 2017 The Use Of 70815 Is Being Tracked By Cms To Determine Future Global Period Reimbursement For Global Periods): yes
Detail Level: Detailed
Wound Evaluated By: Destiny Ramsey PA-C

## 2018-12-11 NOTE — HPI: WOUND CHECK (FOLLOW-UP)
How Is Your Wound Healing?: healing slowly
Additional History: Patient states that he is still using the duoderm every 2-3 days. He feels this has improved versus last visit.
Date Of Surgery: 9/2018
Name Of Surgery: Excision

## 2018-12-26 ENCOUNTER — APPOINTMENT (RX ONLY)
Dept: URBAN - METROPOLITAN AREA CLINIC 4 | Facility: CLINIC | Age: 78
Setting detail: DERMATOLOGY
End: 2018-12-26

## 2018-12-26 DIAGNOSIS — Z48.817 ENCOUNTER FOR SURGICAL AFTERCARE FOLLOWING SURGERY ON THE SKIN AND SUBCUTANEOUS TISSUE: ICD-10-CM

## 2018-12-26 PROCEDURE — ? POST-OP WOUND CHECK

## 2018-12-26 PROCEDURE — 99024 POSTOP FOLLOW-UP VISIT: CPT

## 2018-12-26 ASSESSMENT — LOCATION ZONE DERM: LOCATION ZONE: LEG

## 2018-12-26 ASSESSMENT — LOCATION DETAILED DESCRIPTION DERM: LOCATION DETAILED: RIGHT PROXIMAL PRETIBIAL REGION

## 2018-12-26 ASSESSMENT — LOCATION SIMPLE DESCRIPTION DERM: LOCATION SIMPLE: RIGHT PRETIBIAL REGION

## 2018-12-26 NOTE — PROCEDURE: POST-OP WOUND CHECK
Add 88338 Cpt? (Important Note: In 2017 The Use Of 17021 Is Being Tracked By Cms To Determine Future Global Period Reimbursement For Global Periods): yes
Detail Level: Detailed
Wound Evaluated By: Sterling Can MD
Additional Comments: No indication for debridement. Duoderm and dressing applied

## 2019-03-04 ENCOUNTER — APPOINTMENT (RX ONLY)
Dept: URBAN - METROPOLITAN AREA CLINIC 4 | Facility: CLINIC | Age: 79
Setting detail: DERMATOLOGY
End: 2019-03-04

## 2019-03-04 DIAGNOSIS — Z48.817 ENCOUNTER FOR SURGICAL AFTERCARE FOLLOWING SURGERY ON THE SKIN AND SUBCUTANEOUS TISSUE: ICD-10-CM

## 2019-03-04 DIAGNOSIS — L81.4 OTHER MELANIN HYPERPIGMENTATION: ICD-10-CM

## 2019-03-04 DIAGNOSIS — L82.1 OTHER SEBORRHEIC KERATOSIS: ICD-10-CM

## 2019-03-04 PROCEDURE — 99212 OFFICE O/P EST SF 10 MIN: CPT

## 2019-03-04 PROCEDURE — ? POST-OP WOUND EVALUATION

## 2019-03-04 PROCEDURE — ? DIAGNOSIS COMMENT

## 2019-03-04 ASSESSMENT — LOCATION SIMPLE DESCRIPTION DERM
LOCATION SIMPLE: RIGHT PRETIBIAL REGION
LOCATION SIMPLE: LEFT CHEEK

## 2019-03-04 ASSESSMENT — LOCATION ZONE DERM
LOCATION ZONE: FACE
LOCATION ZONE: LEG

## 2019-03-04 ASSESSMENT — LOCATION DETAILED DESCRIPTION DERM
LOCATION DETAILED: LEFT INFERIOR CENTRAL MALAR CHEEK
LOCATION DETAILED: LEFT CENTRAL MALAR CHEEK
LOCATION DETAILED: RIGHT PROXIMAL PRETIBIAL REGION

## 2019-03-04 NOTE — PROCEDURE: POST-OP WOUND EVALUATION
Any New Or Residual Neoplasm?: No
Wound Evaluated By (Optional): physician
Wound Color?: pink
Detail Level: Detailed
Wound Diameter In Cm(Optional): 0
Wound Discharge?: minimal discharge
Follow Up Units (Optional): 6
Wound Crusting?: clean
Follow Up Time Frame (Optional): months

## 2019-03-05 ENCOUNTER — TELEPHONE (OUTPATIENT)
Dept: MEDICAL GROUP | Facility: MEDICAL CENTER | Age: 79
End: 2019-03-05

## 2019-03-05 NOTE — TELEPHONE ENCOUNTER
ESTABLISHED PATIENT PRE-VISIT PLANNING     Patient was NOT contacted to complete PVP.     Note: Patient will not be contacted if there is no indication to call.     1.  Reviewed notes from the last few office visits within the medical group: Yes    2.  If any orders were placed at last visit or intended to be done for this visit (i.e. 6 mos follow-up), do we have Results/Consult Notes?        •  Labs - Labs ordered, but not to be completed until June 2019.   Note: If patient appointment is for lab review and patient did not complete labs, check with provider if OK to reschedule patient until labs completed.       •  Imaging - Imaging was not ordered at last office visit.       •  Referrals - No referrals were ordered at last office visit.    3. Is this appointment scheduled as a Hospital Follow-Up? No    4.  Immunizations were updated in [employer] using WebIZ?: Yes       •  Web Iz Recommendations: HEPATITIS B and SHINGRIX (Shingles)    5.  Patient is due for the following Health Maintenance Topics:   Health Maintenance Due   Topic Date Due   • IMM HEP B VACCINE (1 of 3 - Risk 3-dose series) [birth year]17/1959   • IMM ZOSTER VACCINES (1 of 2) 04/17/1990   • DIABETES MONOFILAMENT / LE EXAM  09/14/2018       6. Orders for overdue Health Maintenance topics pended in Pre-Charting? NO    7.  AHA (MDX) form printed for Provider? YES    8.  Patient was NOT informed to arrive 15 min prior to their scheduled appointment and bring in their medication bottles.

## 2019-03-06 ENCOUNTER — OFFICE VISIT (OUTPATIENT)
Dept: MEDICAL GROUP | Facility: MEDICAL CENTER | Age: 79
End: 2019-03-06
Payer: MEDICARE

## 2019-03-06 VITALS
SYSTOLIC BLOOD PRESSURE: 132 MMHG | HEIGHT: 71 IN | BODY MASS INDEX: 23.66 KG/M2 | DIASTOLIC BLOOD PRESSURE: 72 MMHG | WEIGHT: 169 LBS | TEMPERATURE: 98.4 F | OXYGEN SATURATION: 100 % | HEART RATE: 62 BPM

## 2019-03-06 DIAGNOSIS — K70.31 ALCOHOLIC CIRRHOSIS OF LIVER WITH ASCITES (HCC): ICD-10-CM

## 2019-03-06 DIAGNOSIS — E11.65 UNCONTROLLED TYPE 2 DIABETES MELLITUS WITH HYPERGLYCEMIA (HCC): ICD-10-CM

## 2019-03-06 DIAGNOSIS — I10 ESSENTIAL HYPERTENSION: ICD-10-CM

## 2019-03-06 LAB
HBA1C MFR BLD: 6.9 % (ref ?–5.8)
INT CON NEG: NEGATIVE
INT CON POS: POSITIVE

## 2019-03-06 PROCEDURE — 83036 HEMOGLOBIN GLYCOSYLATED A1C: CPT | Performed by: FAMILY MEDICINE

## 2019-03-06 PROCEDURE — 99214 OFFICE O/P EST MOD 30 MIN: CPT | Performed by: FAMILY MEDICINE

## 2019-03-06 ASSESSMENT — PATIENT HEALTH QUESTIONNAIRE - PHQ9: CLINICAL INTERPRETATION OF PHQ2 SCORE: 0

## 2019-03-06 NOTE — ASSESSMENT & PLAN NOTE
Patient is currently being followed by GI and had a negative EGD in October.  He is currently on spironolactone 100 mg daily.  He is not drinking alcohol.

## 2019-04-08 ENCOUNTER — APPOINTMENT (RX ONLY)
Dept: URBAN - METROPOLITAN AREA CLINIC 4 | Facility: CLINIC | Age: 79
Setting detail: DERMATOLOGY
End: 2019-04-08

## 2019-04-08 DIAGNOSIS — Z71.89 OTHER SPECIFIED COUNSELING: ICD-10-CM

## 2019-04-08 DIAGNOSIS — Z85.828 PERSONAL HISTORY OF OTHER MALIGNANT NEOPLASM OF SKIN: ICD-10-CM

## 2019-04-08 DIAGNOSIS — L57.0 ACTINIC KERATOSIS: ICD-10-CM

## 2019-04-08 DIAGNOSIS — D18.0 HEMANGIOMA: ICD-10-CM

## 2019-04-08 DIAGNOSIS — D22 MELANOCYTIC NEVI: ICD-10-CM

## 2019-04-08 DIAGNOSIS — L81.4 OTHER MELANIN HYPERPIGMENTATION: ICD-10-CM

## 2019-04-08 DIAGNOSIS — L82.1 OTHER SEBORRHEIC KERATOSIS: ICD-10-CM

## 2019-04-08 PROBLEM — D18.01 HEMANGIOMA OF SKIN AND SUBCUTANEOUS TISSUE: Status: ACTIVE | Noted: 2019-04-08

## 2019-04-08 PROBLEM — D22.72 MELANOCYTIC NEVI OF LEFT LOWER LIMB, INCLUDING HIP: Status: ACTIVE | Noted: 2019-04-08

## 2019-04-08 PROBLEM — D22.62 MELANOCYTIC NEVI OF LEFT UPPER LIMB, INCLUDING SHOULDER: Status: ACTIVE | Noted: 2019-04-08

## 2019-04-08 PROBLEM — D22.61 MELANOCYTIC NEVI OF RIGHT UPPER LIMB, INCLUDING SHOULDER: Status: ACTIVE | Noted: 2019-04-08

## 2019-04-08 PROBLEM — D22.5 MELANOCYTIC NEVI OF TRUNK: Status: ACTIVE | Noted: 2019-04-08

## 2019-04-08 PROBLEM — D22.71 MELANOCYTIC NEVI OF RIGHT LOWER LIMB, INCLUDING HIP: Status: ACTIVE | Noted: 2019-04-08

## 2019-04-08 PROCEDURE — ? COUNSELING

## 2019-04-08 PROCEDURE — 17003 DESTRUCT PREMALG LES 2-14: CPT

## 2019-04-08 PROCEDURE — ? LIQUID NITROGEN

## 2019-04-08 PROCEDURE — 17000 DESTRUCT PREMALG LESION: CPT

## 2019-04-08 PROCEDURE — 99214 OFFICE O/P EST MOD 30 MIN: CPT | Mod: 25

## 2019-04-08 PROCEDURE — ? OBSERVATION

## 2019-04-08 ASSESSMENT — LOCATION SIMPLE DESCRIPTION DERM
LOCATION SIMPLE: LEFT THIGH
LOCATION SIMPLE: RIGHT THIGH
LOCATION SIMPLE: CHEST
LOCATION SIMPLE: RIGHT PRETIBIAL REGION
LOCATION SIMPLE: ABDOMEN
LOCATION SIMPLE: LEFT UPPER ARM
LOCATION SIMPLE: RIGHT FOREHEAD
LOCATION SIMPLE: UPPER BACK
LOCATION SIMPLE: RIGHT UPPER ARM
LOCATION SIMPLE: LEFT CHEEK
LOCATION SIMPLE: SCALP
LOCATION SIMPLE: LEFT PRETIBIAL REGION
LOCATION SIMPLE: LEFT FOREHEAD
LOCATION SIMPLE: LEFT SCALP
LOCATION SIMPLE: RIGHT UPPER BACK
LOCATION SIMPLE: LEFT UPPER BACK

## 2019-04-08 ASSESSMENT — LOCATION ZONE DERM
LOCATION ZONE: SCALP
LOCATION ZONE: TRUNK
LOCATION ZONE: LEG
LOCATION ZONE: ARM
LOCATION ZONE: FACE

## 2019-04-08 ASSESSMENT — LOCATION DETAILED DESCRIPTION DERM
LOCATION DETAILED: LEFT ANTERIOR DISTAL THIGH
LOCATION DETAILED: SUPERIOR THORACIC SPINE
LOCATION DETAILED: LEFT DISTAL PRETIBIAL REGION
LOCATION DETAILED: LEFT ANTERIOR PROXIMAL UPPER ARM
LOCATION DETAILED: LEFT INFERIOR MEDIAL FOREHEAD
LOCATION DETAILED: RIGHT ANTERIOR DISTAL UPPER ARM
LOCATION DETAILED: RIGHT MEDIAL FOREHEAD
LOCATION DETAILED: STERNAL NOTCH
LOCATION DETAILED: INFERIOR THORACIC SPINE
LOCATION DETAILED: RIGHT SUPERIOR FOREHEAD
LOCATION DETAILED: LEFT SUPERIOR FOREHEAD
LOCATION DETAILED: LEFT ANTERIOR PROXIMAL THIGH
LOCATION DETAILED: MIDDLE STERNUM
LOCATION DETAILED: RIGHT LATERAL FOREHEAD
LOCATION DETAILED: LEFT CENTRAL MANDIBULAR CHEEK
LOCATION DETAILED: RIGHT MEDIAL UPPER BACK
LOCATION DETAILED: LOWER STERNUM
LOCATION DETAILED: EPIGASTRIC SKIN
LOCATION DETAILED: LEFT MEDIAL FOREHEAD
LOCATION DETAILED: RIGHT ANTERIOR DISTAL THIGH
LOCATION DETAILED: LEFT ANTERIOR DISTAL UPPER ARM
LOCATION DETAILED: LEFT SUPERIOR PARIETAL SCALP
LOCATION DETAILED: LEFT SUPERIOR MEDIAL UPPER BACK
LOCATION DETAILED: LEFT CENTRAL MALAR CHEEK
LOCATION DETAILED: LEFT MEDIAL UPPER BACK
LOCATION DETAILED: LEFT MEDIAL FRONTAL SCALP
LOCATION DETAILED: RIGHT ANTERIOR PROXIMAL THIGH
LOCATION DETAILED: RIGHT PROXIMAL PRETIBIAL REGION
LOCATION DETAILED: RIGHT ANTERIOR PROXIMAL UPPER ARM

## 2019-04-08 NOTE — PROCEDURE: COUNSELING
Detail Level: Simple
Detail Level: Zone
Detail Level: Generalized
Skin normal color for race, warm, dry and intact.

## 2019-04-22 ENCOUNTER — APPOINTMENT (RX ONLY)
Dept: URBAN - METROPOLITAN AREA CLINIC 4 | Facility: CLINIC | Age: 79
Setting detail: DERMATOLOGY
End: 2019-04-22

## 2019-04-22 DIAGNOSIS — G90.09 OTHER IDIOPATHIC PERIPHERAL AUTONOMIC NEUROPATHY: ICD-10-CM

## 2019-04-22 DIAGNOSIS — Z85.828 PERSONAL HISTORY OF OTHER MALIGNANT NEOPLASM OF SKIN: ICD-10-CM

## 2019-04-22 DIAGNOSIS — L82.1 OTHER SEBORRHEIC KERATOSIS: ICD-10-CM

## 2019-04-22 PROCEDURE — ? COUNSELING

## 2019-04-22 PROCEDURE — 99213 OFFICE O/P EST LOW 20 MIN: CPT

## 2019-04-22 PROCEDURE — ? ADDITIONAL NOTES

## 2019-04-22 PROCEDURE — ? DIAGNOSIS COMMENT

## 2019-04-22 ASSESSMENT — LOCATION DETAILED DESCRIPTION DERM
LOCATION DETAILED: RIGHT DISTAL PRETIBIAL REGION
LOCATION DETAILED: RIGHT PROXIMAL PRETIBIAL REGION
LOCATION DETAILED: LEFT SUPERIOR PARIETAL SCALP

## 2019-04-22 ASSESSMENT — LOCATION ZONE DERM
LOCATION ZONE: LEG
LOCATION ZONE: SCALP

## 2019-04-22 ASSESSMENT — LOCATION SIMPLE DESCRIPTION DERM
LOCATION SIMPLE: RIGHT PRETIBIAL REGION
LOCATION SIMPLE: SCALP

## 2019-04-22 NOTE — PROCEDURE: COUNSELING
Detail Level: Simple
Detail Level: Zone
Patient Specific Counseling (Will Not Stick From Patient To Patient): Lesion of concern on the scalp, clinically consistent with a seborrheic keratosis.

## 2019-04-22 NOTE — PROCEDURE: DIAGNOSIS COMMENT
Comment: Neuropathy vs musculoskeletal etiology considered. Tender to palpation anterior tibialis. No evidence of DVT or superficial thrombophlebitis. Patient states pain is gradually improving. Will evaluate in 4 weeks. Patient is to do gentle stretches and short walks. If no improvement may refer to ortho or PT. Gabapentin may also be considered.
Detail Level: Zone

## 2019-04-22 NOTE — HPI: BODY LOCATION - LEG
How Severe Is Your Condition?: mild
Additional History: Patient states that he has had pain intermittently on his right lower extremity. He states that the pain tends to be worse with movement. His wife states patient is not as active during the day but does elevate his leg. Denies leg swelling or redness. No paresthesias.

## 2019-04-22 NOTE — HPI: EVALUATION OF SKIN LESION(S)
What Type Of Note Output Would You Prefer (Optional)?: Standard Output
How Severe Are Your Spot(S)?: mild
Have Your Spot(S) Been Treated In The Past?: has not been treated
Hpi Title: Evaluation of a Skin Lesion
Additional History: Patient is unsure of what this lesion is.

## 2019-05-20 ENCOUNTER — APPOINTMENT (RX ONLY)
Dept: URBAN - METROPOLITAN AREA CLINIC 4 | Facility: CLINIC | Age: 79
Setting detail: DERMATOLOGY
End: 2019-05-20

## 2019-06-12 ENCOUNTER — HOSPITAL ENCOUNTER (OUTPATIENT)
Dept: LAB | Facility: MEDICAL CENTER | Age: 79
End: 2019-06-12
Attending: FAMILY MEDICINE
Payer: MEDICARE

## 2019-06-12 DIAGNOSIS — E11.65 UNCONTROLLED TYPE 2 DIABETES MELLITUS WITH HYPERGLYCEMIA (HCC): ICD-10-CM

## 2019-06-12 DIAGNOSIS — I10 ESSENTIAL HYPERTENSION: ICD-10-CM

## 2019-06-12 LAB
ALBUMIN SERPL BCP-MCNC: 4.1 G/DL (ref 3.2–4.9)
ALBUMIN/GLOB SERPL: 1.5 G/DL
ALP SERPL-CCNC: 55 U/L (ref 30–99)
ALT SERPL-CCNC: 14 U/L (ref 2–50)
ANION GAP SERPL CALC-SCNC: 10 MMOL/L (ref 0–11.9)
AST SERPL-CCNC: 14 U/L (ref 12–45)
BASOPHILS # BLD AUTO: 1.1 % (ref 0–1.8)
BASOPHILS # BLD: 0.08 K/UL (ref 0–0.12)
BILIRUB SERPL-MCNC: 0.7 MG/DL (ref 0.1–1.5)
BUN SERPL-MCNC: 27 MG/DL (ref 8–22)
CALCIUM SERPL-MCNC: 9.1 MG/DL (ref 8.5–10.5)
CHLORIDE SERPL-SCNC: 110 MMOL/L (ref 96–112)
CHOLEST SERPL-MCNC: 116 MG/DL (ref 100–199)
CO2 SERPL-SCNC: 22 MMOL/L (ref 20–33)
CREAT SERPL-MCNC: 1.55 MG/DL (ref 0.5–1.4)
CREAT UR-MCNC: 81.3 MG/DL
EOSINOPHIL # BLD AUTO: 0.2 K/UL (ref 0–0.51)
EOSINOPHIL NFR BLD: 2.7 % (ref 0–6.9)
ERYTHROCYTE [DISTWIDTH] IN BLOOD BY AUTOMATED COUNT: 49.5 FL (ref 35.9–50)
EST. AVERAGE GLUCOSE BLD GHB EST-MCNC: 154 MG/DL
GLOBULIN SER CALC-MCNC: 2.8 G/DL (ref 1.9–3.5)
GLUCOSE SERPL-MCNC: 142 MG/DL (ref 65–99)
HBA1C MFR BLD: 7 % (ref 0–5.6)
HCT VFR BLD AUTO: 37.2 % (ref 42–52)
HDLC SERPL-MCNC: 43 MG/DL
HGB BLD-MCNC: 11.9 G/DL (ref 14–18)
IMM GRANULOCYTES # BLD AUTO: 0.01 K/UL (ref 0–0.11)
IMM GRANULOCYTES NFR BLD AUTO: 0.1 % (ref 0–0.9)
LDLC SERPL CALC-MCNC: 55 MG/DL
LYMPHOCYTES # BLD AUTO: 1.65 K/UL (ref 1–4.8)
LYMPHOCYTES NFR BLD: 22.2 % (ref 22–41)
MCH RBC QN AUTO: 33.2 PG (ref 27–33)
MCHC RBC AUTO-ENTMCNC: 32 G/DL (ref 33.7–35.3)
MCV RBC AUTO: 103.9 FL (ref 81.4–97.8)
MICROALBUMIN UR-MCNC: 2.5 MG/DL
MICROALBUMIN/CREAT UR: 31 MG/G (ref 0–30)
MONOCYTES # BLD AUTO: 0.58 K/UL (ref 0–0.85)
MONOCYTES NFR BLD AUTO: 7.8 % (ref 0–13.4)
NEUTROPHILS # BLD AUTO: 4.91 K/UL (ref 1.82–7.42)
NEUTROPHILS NFR BLD: 66.1 % (ref 44–72)
NRBC # BLD AUTO: 0 K/UL
NRBC BLD-RTO: 0 /100 WBC
PLATELET # BLD AUTO: 171 K/UL (ref 164–446)
PMV BLD AUTO: 9.8 FL (ref 9–12.9)
POTASSIUM SERPL-SCNC: 4.9 MMOL/L (ref 3.6–5.5)
PROT SERPL-MCNC: 6.9 G/DL (ref 6–8.2)
RBC # BLD AUTO: 3.58 M/UL (ref 4.7–6.1)
SODIUM SERPL-SCNC: 142 MMOL/L (ref 135–145)
TRIGL SERPL-MCNC: 88 MG/DL (ref 0–149)
WBC # BLD AUTO: 7.4 K/UL (ref 4.8–10.8)

## 2019-06-12 PROCEDURE — 82570 ASSAY OF URINE CREATININE: CPT

## 2019-06-12 PROCEDURE — 80053 COMPREHEN METABOLIC PANEL: CPT

## 2019-06-12 PROCEDURE — 85025 COMPLETE CBC W/AUTO DIFF WBC: CPT

## 2019-06-12 PROCEDURE — 36415 COLL VENOUS BLD VENIPUNCTURE: CPT

## 2019-06-12 PROCEDURE — 82043 UR ALBUMIN QUANTITATIVE: CPT

## 2019-06-12 PROCEDURE — 83036 HEMOGLOBIN GLYCOSYLATED A1C: CPT

## 2019-06-12 PROCEDURE — 80061 LIPID PANEL: CPT

## 2019-06-14 ENCOUNTER — TELEPHONE (OUTPATIENT)
Dept: MEDICAL GROUP | Facility: MEDICAL CENTER | Age: 79
End: 2019-06-14

## 2019-06-14 NOTE — TELEPHONE ENCOUNTER
ESTABLISHED PATIENT PRE-VISIT PLANNING     Patient was contacted to complete PVP.     Note: Patient will not be contacted if there is no indication to call.     1.  Reviewed notes from the last few office visits within the medical group: Yes    2.  If any orders were placed at last visit or intended to be done for this visit (i.e. 6 mos follow-up), do we have Results/Consult Notes?        •  Labs - Labs ordered, completed on 6/12/19 and results are in chart.   Note: If patient appointment is for lab review and patient did not complete labs, check with provider if OK to reschedule patient until labs completed.       •  Imaging - Imaging was not ordered at last office visit.       •  Referrals - No referrals were ordered at last office visit.    3. Is this appointment scheduled as a Hospital Follow-Up? No    4.  Immunizations were updated in [employer] using WebIZ?: Yes       •  Web Iz Recommendations: HEPATITIS B and SHINGRIX (Shingles)    5.  Patient is due for the following Health Maintenance Topics:   Health Maintenance Due   Topic Date Due   • IMM HEP B VACCINE (1 of 3 - Risk 3-dose series) 04[birth year]17/1959   • IMM ZOSTER VACCINES (1 of 2) 04/17/1990   • DIABETES MONOFILAMENT / LE EXAM  09/14/2018   • RETINAL SCREENING  04/24/2019   • Annual Wellness Visit  05/10/2019       6. Orders for overdue Health Maintenance topics pended in Pre-Charting? NO    7.  AHA (MDX) form printed for Provider? YES    8.  Patient was informed to arrive 15 min prior to their scheduled appointment and bring in their medication bottles.

## 2019-06-17 ENCOUNTER — OFFICE VISIT (OUTPATIENT)
Dept: MEDICAL GROUP | Facility: MEDICAL CENTER | Age: 79
End: 2019-06-17
Payer: MEDICARE

## 2019-06-17 VITALS
TEMPERATURE: 97.3 F | SYSTOLIC BLOOD PRESSURE: 130 MMHG | HEIGHT: 71 IN | OXYGEN SATURATION: 97 % | HEART RATE: 64 BPM | WEIGHT: 165 LBS | DIASTOLIC BLOOD PRESSURE: 74 MMHG | BODY MASS INDEX: 23.1 KG/M2

## 2019-06-17 DIAGNOSIS — E11.22 TYPE 2 DIABETES MELLITUS WITH STAGE 3 CHRONIC KIDNEY DISEASE, WITHOUT LONG-TERM CURRENT USE OF INSULIN (HCC): ICD-10-CM

## 2019-06-17 DIAGNOSIS — K70.31 ALCOHOLIC CIRRHOSIS OF LIVER WITH ASCITES (HCC): ICD-10-CM

## 2019-06-17 DIAGNOSIS — E11.65 UNCONTROLLED TYPE 2 DIABETES MELLITUS WITH HYPERGLYCEMIA (HCC): ICD-10-CM

## 2019-06-17 DIAGNOSIS — N18.30 TYPE 2 DIABETES MELLITUS WITH STAGE 3 CHRONIC KIDNEY DISEASE, WITHOUT LONG-TERM CURRENT USE OF INSULIN (HCC): ICD-10-CM

## 2019-06-17 DIAGNOSIS — K76.6 PORTAL HYPERTENSION (HCC): ICD-10-CM

## 2019-06-17 PROCEDURE — 8041 PR SCP AHA: Performed by: FAMILY MEDICINE

## 2019-06-17 PROCEDURE — 99214 OFFICE O/P EST MOD 30 MIN: CPT | Performed by: FAMILY MEDICINE

## 2019-06-17 NOTE — PROGRESS NOTES
"Subjective:   Sammy Oseguera is a 79 y.o. male here today for diabetes, chronic kidney disease, cirrhosis    Patient has been avoiding alcohol, Tylenol, Aleve and ibuprofen.  He is tolerating spironolactone 100 mg daily and Janumet  mg twice daily.  Labs reviewed with patient, A1c is stable at 7.0.  GFR is stable in the 40s.  Liver enzymes are normal.    Current medicines (including changes today)  Current Outpatient Prescriptions   Medication Sig Dispense Refill   • spironolactone (ALDACTONE) 100 MG Tab Take 1 Tab by mouth every day. 90 Tab 3   • sitagliptan-metformin (JANUMET)  MG per tablet Take 1 Tab by mouth 2 times a day, with meals. 180 Tab 3   • Blood Glucose Monitoring Suppl Device Meter: Dispense Device of Insurance Preference. Sig. Use as directed for blood sugar monitoring. #1. NR. 1 Device 0   • Blood Glucose Monitoring Suppl Supplies Misc Test strips order: Test strips for Device of Insurance Preference. Sig: use once daily. 100 Each 3   • Lancets Misc Lancets order: Lancets for Device of Insurance Preference. Sig: use once daily. 100 Each 3   • Acetaminophen-Caffeine 500-65 MG Tab Take 1 Tab by mouth every four hours as needed (for headache). 60 Tab 0   • cyanocobalamin (VITAMIN B12) 1000 MCG Tab Take 3 Tabs by mouth every day.     • vitamin D (CHOLECALCIFEROL) 1000 UNIT TABS Take 1,000 Units by mouth every day.       No current facility-administered medications for this visit.      He  has a past medical history of HTN and Type II or unspecified type diabetes mellitus without mention of complication, not stated as uncontrolled.    ROS   No chest pain, no shortness of breath       Objective:     /74 (BP Location: Right arm, Patient Position: Sitting)   Pulse 64   Temp 36.3 °C (97.3 °F)   Ht 1.803 m (5' 11\")   Wt 74.8 kg (165 lb)   SpO2 97%  Body mass index is 23.01 kg/m².   Physical Exam:  Constitutional: Alert, no distress.  Skin: Warm, dry, good turgor, no rashes in " visible areas.  Eye: Equal, round and reactive, conjunctiva clear, lids normal.  Extremities: No lower extremity edema.  Psych: Alert and oriented x3, normal affect and mood.        Assessment and Plan:   The following treatment plan was discussed    1. Uncontrolled type 2 diabetes mellitus with hyperglycemia (HCC)  Controlled.  Continue Janumet.  Follow-up with labs in 6 months.  - CBC WITH DIFFERENTIAL; Future  - Comp Metabolic Panel; Future  - Lipid Profile; Future  - HEMOGLOBIN A1C; Future  - MICROALBUMIN CREAT RATIO URINE; Future    2. Type 2 diabetes mellitus with stage 3 chronic kidney disease, without long-term current use of insulin (HCC)  Controlled and stable.  Continue Janumet.  Follow-up with labs in 6 months.  - CBC WITH DIFFERENTIAL; Future  - Comp Metabolic Panel; Future  - Lipid Profile; Future  - HEMOGLOBIN A1C; Future  - MICROALBUMIN CREAT RATIO URINE; Future    3. Portal hypertension (HCC)  Controlled.  Continue avoiding hepatotoxic substances.  Continue spironolactone.  Last EGD showed no esophageal varices in October 2018.  Follow-up with me in 6 months.  Continue following with GI.    4. Alcoholic cirrhosis of liver with ascites (HCC)  Controlled.  Continue avoiding hepatotoxic substances.  Continue spironolactone.  Follow-up in 6 months.       Followup: Return in about 6 months (around 12/17/2019) for Diabetes, Labs.

## 2019-10-07 ENCOUNTER — APPOINTMENT (RX ONLY)
Dept: URBAN - METROPOLITAN AREA CLINIC 4 | Facility: CLINIC | Age: 79
Setting detail: DERMATOLOGY
End: 2019-10-07

## 2019-10-07 DIAGNOSIS — L57.0 ACTINIC KERATOSIS: ICD-10-CM

## 2019-10-07 DIAGNOSIS — Z85.828 PERSONAL HISTORY OF OTHER MALIGNANT NEOPLASM OF SKIN: ICD-10-CM

## 2019-10-07 DIAGNOSIS — L81.4 OTHER MELANIN HYPERPIGMENTATION: ICD-10-CM

## 2019-10-07 PROBLEM — D48.5 NEOPLASM OF UNCERTAIN BEHAVIOR OF SKIN: Status: ACTIVE | Noted: 2019-10-07

## 2019-10-07 PROCEDURE — 69100 BIOPSY OF EXTERNAL EAR: CPT | Mod: 59

## 2019-10-07 PROCEDURE — ? COUNSELING

## 2019-10-07 PROCEDURE — 17003 DESTRUCT PREMALG LES 2-14: CPT

## 2019-10-07 PROCEDURE — 17000 DESTRUCT PREMALG LESION: CPT

## 2019-10-07 PROCEDURE — 99213 OFFICE O/P EST LOW 20 MIN: CPT | Mod: 25

## 2019-10-07 PROCEDURE — ? BIOPSY BY SHAVE METHOD

## 2019-10-07 PROCEDURE — ? LIQUID NITROGEN

## 2019-10-07 ASSESSMENT — LOCATION DETAILED DESCRIPTION DERM
LOCATION DETAILED: RIGHT PROXIMAL PRETIBIAL REGION
LOCATION DETAILED: RIGHT INFERIOR HELIX
LOCATION DETAILED: LEFT SUPERIOR PARIETAL SCALP
LOCATION DETAILED: LEFT CENTRAL ZYGOMA
LOCATION DETAILED: RIGHT SUPERIOR FRONTAL SCALP
LOCATION DETAILED: LEFT CENTRAL MANDIBULAR CHEEK

## 2019-10-07 ASSESSMENT — LOCATION SIMPLE DESCRIPTION DERM
LOCATION SIMPLE: SCALP
LOCATION SIMPLE: RIGHT EAR
LOCATION SIMPLE: LEFT CHEEK
LOCATION SIMPLE: RIGHT PRETIBIAL REGION
LOCATION SIMPLE: LEFT ZYGOMA

## 2019-10-07 ASSESSMENT — LOCATION ZONE DERM
LOCATION ZONE: EAR
LOCATION ZONE: LEG
LOCATION ZONE: SCALP
LOCATION ZONE: FACE

## 2019-10-07 NOTE — PROCEDURE: BIOPSY BY SHAVE METHOD
Hemostasis: Pressure
Render In Bullet Format When Appropriate: No
Curettage Text: The wound bed was treated with curettage after the biopsy was performed.
Detail Level: Detailed
Lab: [id number]
Billing Type: Third-Party Bill
Biopsy Method: Dermablade
Lab Facility: 
Cryotherapy Text: The wound bed was treated with cryotherapy after the biopsy was performed.
Post-Care Instructions: I reviewed with the patient in detail post-care instructions. Patient is to keep the biopsy site dry overnight, and then apply bacitracin twice daily until healed. Patient may apply hydrogen peroxide soaks to remove any crusting.
Additional Anesthesia Volume In Cc (Will Not Render If 0): 0
Anesthesia Type: 1% lidocaine with 1:100,000 epinephrine and 408mcg clindamycin/ml and a 1:10 solution of 8.4% sodium bicarbonate
Size Of Lesion In Cm: 1
Wound Care: Petrolatum
Electrodesiccation Text: The wound bed was treated with electrodesiccation after the biopsy was performed.
Notification Instructions: Patient will be notified of biopsy results. However, patient instructed to call the office if not contacted within 2 weeks.
Consent: Written consent was obtained and risks were reviewed including but not limited to scarring, infection, bleeding, scabbing, incomplete removal, nerve damage and allergy to anesthesia.
Depth Of Biopsy: dermis
Biopsy Type: H and E
Electrodesiccation And Curettage Text: The wound bed was treated with electrodesiccation and curettage after the biopsy was performed.
Type Of Destruction Used: Curettage
Dressing: bandage
Was A Bandage Applied: Yes
Silver Nitrate Text: The wound bed was treated with silver nitrate after the biopsy was performed.

## 2019-10-07 NOTE — HPI: SKIN LESION
Is This A New Presentation, Or A Follow-Up?: Follow Up Skin Lesion
What Type Of Note Output Would You Prefer (Optional)?: Standard Output
How Severe Is Your Skin Lesion?: mild
Has Your Skin Lesion Been Treated?: been treated
Additional History: Patient had biopsy done 9/10/2018 resulted in Nod. BCC. Tx with excision 10/2018. Neuropathy was discussed and diagnosed last visit. Patient is here for further evaluation. Patient states that it only feels tender sometimes but besides that no issues.
When Was It Treated?: 10/2/2018

## 2019-12-19 ENCOUNTER — APPOINTMENT (OUTPATIENT)
Dept: MEDICAL GROUP | Facility: MEDICAL CENTER | Age: 79
End: 2019-12-19
Payer: MEDICARE

## 2020-01-02 ENCOUNTER — HOSPITAL ENCOUNTER (OUTPATIENT)
Dept: LAB | Facility: MEDICAL CENTER | Age: 80
End: 2020-01-02
Attending: FAMILY MEDICINE
Payer: MEDICARE

## 2020-01-02 DIAGNOSIS — E11.22 TYPE 2 DIABETES MELLITUS WITH STAGE 3 CHRONIC KIDNEY DISEASE, WITHOUT LONG-TERM CURRENT USE OF INSULIN (HCC): ICD-10-CM

## 2020-01-02 DIAGNOSIS — E11.65 UNCONTROLLED TYPE 2 DIABETES MELLITUS WITH HYPERGLYCEMIA (HCC): ICD-10-CM

## 2020-01-02 DIAGNOSIS — N18.30 TYPE 2 DIABETES MELLITUS WITH STAGE 3 CHRONIC KIDNEY DISEASE, WITHOUT LONG-TERM CURRENT USE OF INSULIN (HCC): ICD-10-CM

## 2020-01-02 LAB
ALBUMIN SERPL BCP-MCNC: 4.3 G/DL (ref 3.2–4.9)
ALBUMIN/GLOB SERPL: 1.3 G/DL
ALP SERPL-CCNC: 64 U/L (ref 30–99)
ALT SERPL-CCNC: 11 U/L (ref 2–50)
ANION GAP SERPL CALC-SCNC: 8 MMOL/L (ref 0–11.9)
AST SERPL-CCNC: 11 U/L (ref 12–45)
BASOPHILS # BLD AUTO: 1.2 % (ref 0–1.8)
BASOPHILS # BLD: 0.1 K/UL (ref 0–0.12)
BILIRUB SERPL-MCNC: 0.7 MG/DL (ref 0.1–1.5)
BUN SERPL-MCNC: 31 MG/DL (ref 8–22)
CALCIUM SERPL-MCNC: 9 MG/DL (ref 8.5–10.5)
CHLORIDE SERPL-SCNC: 108 MMOL/L (ref 96–112)
CHOLEST SERPL-MCNC: 120 MG/DL (ref 100–199)
CO2 SERPL-SCNC: 23 MMOL/L (ref 20–33)
CREAT SERPL-MCNC: 1.66 MG/DL (ref 0.5–1.4)
CREAT UR-MCNC: 116.4 MG/DL
EOSINOPHIL # BLD AUTO: 0.18 K/UL (ref 0–0.51)
EOSINOPHIL NFR BLD: 2.2 % (ref 0–6.9)
ERYTHROCYTE [DISTWIDTH] IN BLOOD BY AUTOMATED COUNT: 47.8 FL (ref 35.9–50)
EST. AVERAGE GLUCOSE BLD GHB EST-MCNC: 163 MG/DL
FASTING STATUS PATIENT QL REPORTED: NORMAL
GLOBULIN SER CALC-MCNC: 3.2 G/DL (ref 1.9–3.5)
GLUCOSE SERPL-MCNC: 126 MG/DL (ref 65–99)
HBA1C MFR BLD: 7.3 % (ref 0–5.6)
HCT VFR BLD AUTO: 40.5 % (ref 42–52)
HDLC SERPL-MCNC: 44 MG/DL
HGB BLD-MCNC: 13.2 G/DL (ref 14–18)
IMM GRANULOCYTES # BLD AUTO: 0.02 K/UL (ref 0–0.11)
IMM GRANULOCYTES NFR BLD AUTO: 0.2 % (ref 0–0.9)
LDLC SERPL CALC-MCNC: 59 MG/DL
LYMPHOCYTES # BLD AUTO: 1.85 K/UL (ref 1–4.8)
LYMPHOCYTES NFR BLD: 22.8 % (ref 22–41)
MCH RBC QN AUTO: 33.6 PG (ref 27–33)
MCHC RBC AUTO-ENTMCNC: 32.6 G/DL (ref 33.7–35.3)
MCV RBC AUTO: 103.1 FL (ref 81.4–97.8)
MICROALBUMIN UR-MCNC: 6.5 MG/DL
MICROALBUMIN/CREAT UR: 56 MG/G (ref 0–30)
MONOCYTES # BLD AUTO: 0.56 K/UL (ref 0–0.85)
MONOCYTES NFR BLD AUTO: 6.9 % (ref 0–13.4)
NEUTROPHILS # BLD AUTO: 5.42 K/UL (ref 1.82–7.42)
NEUTROPHILS NFR BLD: 66.7 % (ref 44–72)
NRBC # BLD AUTO: 0 K/UL
NRBC BLD-RTO: 0 /100 WBC
PLATELET # BLD AUTO: 179 K/UL (ref 164–446)
PMV BLD AUTO: 9.9 FL (ref 9–12.9)
POTASSIUM SERPL-SCNC: 4.1 MMOL/L (ref 3.6–5.5)
PROT SERPL-MCNC: 7.5 G/DL (ref 6–8.2)
RBC # BLD AUTO: 3.93 M/UL (ref 4.7–6.1)
SODIUM SERPL-SCNC: 139 MMOL/L (ref 135–145)
TRIGL SERPL-MCNC: 84 MG/DL (ref 0–149)
WBC # BLD AUTO: 8.1 K/UL (ref 4.8–10.8)

## 2020-01-02 PROCEDURE — 82043 UR ALBUMIN QUANTITATIVE: CPT

## 2020-01-02 PROCEDURE — 80053 COMPREHEN METABOLIC PANEL: CPT

## 2020-01-02 PROCEDURE — 36415 COLL VENOUS BLD VENIPUNCTURE: CPT

## 2020-01-02 PROCEDURE — 80061 LIPID PANEL: CPT

## 2020-01-02 PROCEDURE — 85025 COMPLETE CBC W/AUTO DIFF WBC: CPT

## 2020-01-02 PROCEDURE — 83036 HEMOGLOBIN GLYCOSYLATED A1C: CPT

## 2020-01-02 PROCEDURE — 82570 ASSAY OF URINE CREATININE: CPT

## 2020-01-03 ENCOUNTER — OFFICE VISIT (OUTPATIENT)
Dept: MEDICAL GROUP | Facility: MEDICAL CENTER | Age: 80
End: 2020-01-03
Payer: MEDICARE

## 2020-01-03 VITALS
SYSTOLIC BLOOD PRESSURE: 118 MMHG | DIASTOLIC BLOOD PRESSURE: 68 MMHG | BODY MASS INDEX: 23.24 KG/M2 | HEART RATE: 64 BPM | HEIGHT: 71 IN | WEIGHT: 166 LBS | OXYGEN SATURATION: 96 % | TEMPERATURE: 98.2 F

## 2020-01-03 DIAGNOSIS — I10 ESSENTIAL HYPERTENSION: ICD-10-CM

## 2020-01-03 DIAGNOSIS — N18.30 TYPE 2 DIABETES MELLITUS WITH STAGE 3 CHRONIC KIDNEY DISEASE, WITHOUT LONG-TERM CURRENT USE OF INSULIN (HCC): ICD-10-CM

## 2020-01-03 DIAGNOSIS — K70.31 ALCOHOLIC CIRRHOSIS OF LIVER WITH ASCITES (HCC): ICD-10-CM

## 2020-01-03 DIAGNOSIS — K76.6 PORTAL HYPERTENSION (HCC): ICD-10-CM

## 2020-01-03 DIAGNOSIS — E11.22 TYPE 2 DIABETES MELLITUS WITH STAGE 3 CHRONIC KIDNEY DISEASE, WITHOUT LONG-TERM CURRENT USE OF INSULIN (HCC): ICD-10-CM

## 2020-01-03 PROCEDURE — 8041 PR SCP AHA: Performed by: FAMILY MEDICINE

## 2020-01-03 PROCEDURE — 99214 OFFICE O/P EST MOD 30 MIN: CPT | Performed by: FAMILY MEDICINE

## 2020-01-03 ASSESSMENT — PATIENT HEALTH QUESTIONNAIRE - PHQ9: CLINICAL INTERPRETATION OF PHQ2 SCORE: 0

## 2020-01-03 NOTE — PROGRESS NOTES
Subjective:   Sammy Oseguera is a 79 y.o. male here today for diabetes    Patient is Janumet  mg twice daily, but causing diarrhea. His A1c is 7.3, up from 7.0.  Kidney disease continues to be progressive.  He admits to not hydrating well.  He is on spironolactone 100 mg daily.    He had a normal EGD in October 2018, which showed no significant concerns, however he has not had follow-up with GI since.  He is tolerating spironolactone 100 mg daily.  He has known alcoholic cirrhosis of the liver and portal hypertension.      Current medicines (including changes today)  Current Outpatient Medications   Medication Sig Dispense Refill   • sitagliptan-metformin (JANUMET)  MG per tablet Take 0.5 Tabs by mouth 2 times a day, with meals. 90 Tab 3   • spironolactone (ALDACTONE) 100 MG Tab Take 1 Tab by mouth every day. 90 Tab 3   • Blood Glucose Monitoring Suppl Device Meter: Dispense Device of Insurance Preference. Sig. Use as directed for blood sugar monitoring. #1. NR. 1 Device 0   • Blood Glucose Monitoring Suppl Supplies Misc Test strips order: Test strips for Device of Insurance Preference. Sig: use once daily. 100 Each 3   • Lancets Misc Lancets order: Lancets for Device of Insurance Preference. Sig: use once daily. 100 Each 3   • Acetaminophen-Caffeine 500-65 MG Tab Take 1 Tab by mouth every four hours as needed (for headache). 60 Tab 0   • cyanocobalamin (VITAMIN B12) 1000 MCG Tab Take 3 Tabs by mouth every day.     • vitamin D (CHOLECALCIFEROL) 1000 UNIT TABS Take 1,000 Units by mouth every day.       No current facility-administered medications for this visit.      He  has a past medical history of HTN and Type II or unspecified type diabetes mellitus without mention of complication, not stated as uncontrolled.    ROS   No chest pain, no shortness of breath       Objective:     /68 (BP Location: Right arm, Patient Position: Sitting)   Pulse 64   Temp 36.8 °C (98.2 °F)   Ht 1.803 m (5'  "11\")   Wt 75.3 kg (166 lb)   SpO2 96%  Body mass index is 23.15 kg/m².   Physical Exam:  Constitutional: Alert, no distress.  Skin: Warm, dry, good turgor, no rashes in visible areas.  Eye: Equal, round and reactive, conjunctiva clear, lids normal.  Psych: Alert and oriented x3, normal affect and mood.        Assessment and Plan:   The following treatment plan was discussed    1. Type 2 diabetes mellitus with stage 3 chronic kidney disease, without long-term current use of insulin (HCC)  Controlled but having significant side effects with Janumet.  We will reduce Janumet from  mg 1 tablet twice daily to  mg half a tablet twice a day.    Encouraged patient to increase hydration.  Follow-up in 3 months with labs.  - Comp Metabolic Panel; Future  - CBC WITH DIFFERENTIAL; Future  - Lipid Profile; Future  - HEMOGLOBIN A1C; Future  - MICROALBUMIN CREAT RATIO URINE; Future  - sitagliptan-metformin (JANUMET)  MG per tablet; Take 0.5 Tabs by mouth 2 times a day, with meals.  Dispense: 90 Tab; Refill: 3    2. Alcoholic cirrhosis of liver with ascites (HCC)  Patient is tolerating spironolactone.  Referral back to GI for evaluation.  - REFERRAL TO GASTROENTEROLOGY    3. Essential hypertension  Controlled.  Continue spironolactone.    4. Portal hypertension (HCC)  Patient is tolerating spironolactone.  Referral back to GI for evaluation.      Followup: Return in about 3 months (around 4/3/2020) for Diabetes.         "

## 2020-01-09 ENCOUNTER — PATIENT OUTREACH (OUTPATIENT)
Dept: HEALTH INFORMATION MANAGEMENT | Facility: OTHER | Age: 80
End: 2020-01-09

## 2020-01-09 SDOH — ECONOMIC STABILITY: TRANSPORTATION INSECURITY
IN THE PAST 12 MONTHS, HAS THE LACK OF TRANSPORTATION KEPT YOU FROM MEDICAL APPOINTMENTS OR FROM GETTING MEDICATIONS?: NO

## 2020-01-09 SDOH — ECONOMIC STABILITY: FOOD INSECURITY: WITHIN THE PAST 12 MONTHS, YOU WORRIED THAT YOUR FOOD WOULD RUN OUT BEFORE YOU GOT MONEY TO BUY MORE.: NEVER TRUE

## 2020-01-09 SDOH — ECONOMIC STABILITY: TRANSPORTATION INSECURITY
IN THE PAST 12 MONTHS, HAS LACK OF TRANSPORTATION KEPT YOU FROM MEETINGS, WORK, OR FROM GETTING THINGS NEEDED FOR DAILY LIVING?: NO

## 2020-01-09 SDOH — ECONOMIC STABILITY: FOOD INSECURITY: WITHIN THE PAST 12 MONTHS, THE FOOD YOU BOUGHT JUST DIDN'T LAST AND YOU DIDN'T HAVE MONEY TO GET MORE.: NEVER TRUE

## 2020-01-09 NOTE — PROGRESS NOTES
1. [facility] Verified: yes    2. Verify PCP: yes    3. Review and add  to Care Team: yes    5. Reviewed/Updated the following with patient:       •   Communication Preference Obtained? YES  • MyChart Activation: declined       •   E-Mail Address Obtained? YES       •   Appointment Day and Time Preferences? YES       •   Preferred Pharmacy? YES       •   Preferred Lab? YES    6. Care Gap Scheduling (Attempt to Schedule EACH Overdue Care Gap!)    Scheduled patient for Annual Wellness Visit

## 2020-01-18 DIAGNOSIS — K70.31 ALCOHOLIC CIRRHOSIS OF LIVER WITH ASCITES (HCC): ICD-10-CM

## 2020-01-20 RX ORDER — SPIRONOLACTONE 100 MG/1
100 TABLET, FILM COATED ORAL DAILY
Qty: 90 TAB | Refills: 3 | Status: SHIPPED | OUTPATIENT
Start: 2020-01-20 | End: 2021-03-01 | Stop reason: SDUPTHER

## 2020-01-28 ENCOUNTER — OFFICE VISIT (OUTPATIENT)
Dept: MEDICAL GROUP | Facility: MEDICAL CENTER | Age: 80
End: 2020-01-28
Payer: MEDICARE

## 2020-01-28 VITALS
BODY MASS INDEX: 23.1 KG/M2 | DIASTOLIC BLOOD PRESSURE: 70 MMHG | WEIGHT: 165 LBS | TEMPERATURE: 97 F | OXYGEN SATURATION: 90 % | SYSTOLIC BLOOD PRESSURE: 124 MMHG | HEART RATE: 60 BPM | HEIGHT: 71 IN

## 2020-01-28 DIAGNOSIS — Z00.00 MEDICARE ANNUAL WELLNESS VISIT, SUBSEQUENT: ICD-10-CM

## 2020-01-28 DIAGNOSIS — K76.6 PORTAL HYPERTENSION (HCC): ICD-10-CM

## 2020-01-28 DIAGNOSIS — N18.30 TYPE 2 DIABETES MELLITUS WITH STAGE 3 CHRONIC KIDNEY DISEASE, WITHOUT LONG-TERM CURRENT USE OF INSULIN (HCC): ICD-10-CM

## 2020-01-28 DIAGNOSIS — E11.65 UNCONTROLLED TYPE 2 DIABETES MELLITUS WITH HYPERGLYCEMIA (HCC): ICD-10-CM

## 2020-01-28 DIAGNOSIS — Z23 NEED FOR VACCINATION: ICD-10-CM

## 2020-01-28 DIAGNOSIS — E11.22 TYPE 2 DIABETES MELLITUS WITH STAGE 3 CHRONIC KIDNEY DISEASE, WITHOUT LONG-TERM CURRENT USE OF INSULIN (HCC): ICD-10-CM

## 2020-01-28 DIAGNOSIS — K70.31 ALCOHOLIC CIRRHOSIS OF LIVER WITH ASCITES (HCC): ICD-10-CM

## 2020-01-28 PROCEDURE — 90472 IMMUNIZATION ADMIN EACH ADD: CPT | Performed by: FAMILY MEDICINE

## 2020-01-28 PROCEDURE — 90662 IIV NO PRSV INCREASED AG IM: CPT | Performed by: FAMILY MEDICINE

## 2020-01-28 PROCEDURE — G0010 ADMIN HEPATITIS B VACCINE: HCPCS | Performed by: FAMILY MEDICINE

## 2020-01-28 PROCEDURE — G0008 ADMIN INFLUENZA VIRUS VAC: HCPCS | Performed by: FAMILY MEDICINE

## 2020-01-28 PROCEDURE — G0439 PPPS, SUBSEQ VISIT: HCPCS | Performed by: FAMILY MEDICINE

## 2020-01-28 PROCEDURE — 90750 HZV VACC RECOMBINANT IM: CPT | Performed by: FAMILY MEDICINE

## 2020-01-28 PROCEDURE — 90746 HEPB VACCINE 3 DOSE ADULT IM: CPT | Performed by: FAMILY MEDICINE

## 2020-01-28 ASSESSMENT — ENCOUNTER SYMPTOMS: GENERAL WELL-BEING: FAIR

## 2020-01-28 ASSESSMENT — PATIENT HEALTH QUESTIONNAIRE - PHQ9: CLINICAL INTERPRETATION OF PHQ2 SCORE: 0

## 2020-01-28 ASSESSMENT — ACTIVITIES OF DAILY LIVING (ADL): BATHING_REQUIRES_ASSISTANCE: 0

## 2020-02-27 ENCOUNTER — HOSPITAL ENCOUNTER (OUTPATIENT)
Dept: LAB | Facility: MEDICAL CENTER | Age: 80
End: 2020-02-27
Attending: INTERNAL MEDICINE
Payer: MEDICARE

## 2020-02-27 LAB
IRON SATN MFR SERPL: 28 % (ref 15–55)
IRON SERPL-MCNC: 84 UG/DL (ref 50–180)
TIBC SERPL-MCNC: 297 UG/DL (ref 250–450)

## 2020-02-27 PROCEDURE — 82105 ALPHA-FETOPROTEIN SERUM: CPT

## 2020-02-27 PROCEDURE — 83540 ASSAY OF IRON: CPT

## 2020-02-27 PROCEDURE — 36415 COLL VENOUS BLD VENIPUNCTURE: CPT

## 2020-02-27 PROCEDURE — 83550 IRON BINDING TEST: CPT

## 2020-02-28 DIAGNOSIS — E11.22 TYPE 2 DIABETES MELLITUS WITH STAGE 3 CHRONIC KIDNEY DISEASE, WITHOUT LONG-TERM CURRENT USE OF INSULIN (HCC): ICD-10-CM

## 2020-02-28 DIAGNOSIS — N18.30 TYPE 2 DIABETES MELLITUS WITH STAGE 3 CHRONIC KIDNEY DISEASE, WITHOUT LONG-TERM CURRENT USE OF INSULIN (HCC): ICD-10-CM

## 2020-02-28 RX ORDER — SITAGLIPTIN AND METFORMIN HYDROCHLORIDE 1000; 50 MG/1; MG/1
TABLET, FILM COATED ORAL
Qty: 200 TAB | Refills: 3 | Status: SHIPPED | OUTPATIENT
Start: 2020-02-28 | End: 2020-07-31 | Stop reason: SDUPTHER

## 2020-02-29 LAB — AFP-TM SERPL-MCNC: 2 NG/ML (ref 0–9)

## 2020-03-17 ENCOUNTER — PATIENT OUTREACH (OUTPATIENT)
Dept: HEALTH INFORMATION MANAGEMENT | Facility: OTHER | Age: 80
End: 2020-03-17

## 2020-03-17 ENCOUNTER — OFFICE VISIT (OUTPATIENT)
Dept: MEDICAL GROUP | Facility: MEDICAL CENTER | Age: 80
End: 2020-03-17
Payer: MEDICARE

## 2020-03-17 VITALS
DIASTOLIC BLOOD PRESSURE: 62 MMHG | WEIGHT: 165 LBS | TEMPERATURE: 97.7 F | SYSTOLIC BLOOD PRESSURE: 114 MMHG | HEART RATE: 68 BPM | HEIGHT: 71 IN | BODY MASS INDEX: 23.1 KG/M2 | OXYGEN SATURATION: 95 %

## 2020-03-17 DIAGNOSIS — H92.02 LEFT EAR PAIN: ICD-10-CM

## 2020-03-17 PROCEDURE — 99214 OFFICE O/P EST MOD 30 MIN: CPT | Performed by: FAMILY MEDICINE

## 2020-03-17 RX ORDER — AMOXICILLIN 875 MG/1
875 TABLET, COATED ORAL 2 TIMES DAILY
Qty: 20 TAB | Refills: 0 | Status: SHIPPED | OUTPATIENT
Start: 2020-03-17 | End: 2020-03-27

## 2020-03-17 ASSESSMENT — FIBROSIS 4 INDEX: FIB4 SCORE: 1.46

## 2020-03-17 NOTE — PROGRESS NOTES
"Subjective:   Sammy Oseguera is a 79 y.o. male here today for left ear pain    Has been having left ear pain and has had his hearing aid out for the last few days, symptoms have been going on for 2 weeks. Pain started on the outside of his ear, comes and goes, sometimes a stabbing for no reason. Now pain is more consistently deep.    Current medicines (including changes today)  Current Outpatient Medications   Medication Sig Dispense Refill   • JANUMET  MG per tablet TAKE 1 TABLET BY MOUTH TWICE A DAY WITH MEALS 200 Tab 3   • spironolactone (ALDACTONE) 100 MG Tab Take 1 Tab by mouth every day. 90 Tab 3   • Blood Glucose Monitoring Suppl Device Meter: Dispense Device of Insurance Preference. Sig. Use as directed for blood sugar monitoring. #1. NR. 1 Device 0   • Blood Glucose Monitoring Suppl Supplies Misc Test strips order: Test strips for Device of Insurance Preference. Sig: use once daily. 100 Each 3   • Lancets Misc Lancets order: Lancets for Device of Insurance Preference. Sig: use once daily. 100 Each 3   • Acetaminophen-Caffeine 500-65 MG Tab Take 1 Tab by mouth every four hours as needed (for headache). 60 Tab 0   • cyanocobalamin (VITAMIN B12) 1000 MCG Tab Take 3 Tabs by mouth every day.     • vitamin D (CHOLECALCIFEROL) 1000 UNIT TABS Take 1,000 Units by mouth every day.       No current facility-administered medications for this visit.      He  has a past medical history of HTN and Type II or unspecified type diabetes mellitus without mention of complication, not stated as uncontrolled.    ROS   No fever, + chewing causes left sided pain       Objective:     /62 (BP Location: Right arm, Patient Position: Sitting)   Pulse 68   Temp 36.5 °C (97.7 °F)   Ht 1.803 m (5' 11\")   Wt 74.8 kg (165 lb)   SpO2 95%  Body mass index is 23.01 kg/m².   Physical Exam:  Constitutional: Alert, no distress.  Skin: Warm, dry, good turgor, no rashes in visible areas.  Eye: Equal, round and reactive, " conjunctiva clear, lids normal.  ENMT: Lips without lesions, good dentition, oropharynx clear. TMs pearly gray right. Mild serous bulging on left TM.  Psych: Alert and oriented x3, normal affect and mood.        Assessment and Plan:   The following treatment plan was discussed    1. Left ear pain  Possible otitis media. Treat with amoxicillin. If no improvement consider dentist appointment if no improvement for TMJ vs dental issues.      Followup: Return if symptoms worsen or fail to improve.

## 2020-03-17 NOTE — PROGRESS NOTES
Member called in to schedule an appointment with his PCP for a possible ear infection. He had no symptoms of respiratory illness. I scheduled the appointment and he had no other questions at this time. If the member calls back, please transfer to [phone]

## 2020-03-30 ENCOUNTER — HOSPITAL ENCOUNTER (OUTPATIENT)
Dept: LAB | Facility: MEDICAL CENTER | Age: 80
End: 2020-03-30
Attending: FAMILY MEDICINE
Payer: MEDICARE

## 2020-03-30 DIAGNOSIS — N18.30 TYPE 2 DIABETES MELLITUS WITH STAGE 3 CHRONIC KIDNEY DISEASE, WITHOUT LONG-TERM CURRENT USE OF INSULIN (HCC): ICD-10-CM

## 2020-03-30 DIAGNOSIS — E11.22 TYPE 2 DIABETES MELLITUS WITH STAGE 3 CHRONIC KIDNEY DISEASE, WITHOUT LONG-TERM CURRENT USE OF INSULIN (HCC): ICD-10-CM

## 2020-03-30 LAB
ALBUMIN SERPL BCP-MCNC: 4.1 G/DL (ref 3.2–4.9)
ALBUMIN/GLOB SERPL: 1.4 G/DL
ALP SERPL-CCNC: 76 U/L (ref 30–99)
ALT SERPL-CCNC: 20 U/L (ref 2–50)
ANION GAP SERPL CALC-SCNC: 10 MMOL/L (ref 7–16)
AST SERPL-CCNC: 18 U/L (ref 12–45)
BASOPHILS # BLD AUTO: 1 % (ref 0–1.8)
BASOPHILS # BLD: 0.08 K/UL (ref 0–0.12)
BILIRUB SERPL-MCNC: 0.7 MG/DL (ref 0.1–1.5)
BUN SERPL-MCNC: 24 MG/DL (ref 8–22)
CALCIUM SERPL-MCNC: 8.9 MG/DL (ref 8.5–10.5)
CHLORIDE SERPL-SCNC: 106 MMOL/L (ref 96–112)
CHOLEST SERPL-MCNC: 122 MG/DL (ref 100–199)
CO2 SERPL-SCNC: 22 MMOL/L (ref 20–33)
CREAT SERPL-MCNC: 1.74 MG/DL (ref 0.5–1.4)
CREAT UR-MCNC: 110.2 MG/DL
EOSINOPHIL # BLD AUTO: 0.15 K/UL (ref 0–0.51)
EOSINOPHIL NFR BLD: 1.8 % (ref 0–6.9)
ERYTHROCYTE [DISTWIDTH] IN BLOOD BY AUTOMATED COUNT: 47.8 FL (ref 35.9–50)
EST. AVERAGE GLUCOSE BLD GHB EST-MCNC: 171 MG/DL
FASTING STATUS PATIENT QL REPORTED: NORMAL
GLOBULIN SER CALC-MCNC: 3 G/DL (ref 1.9–3.5)
GLUCOSE SERPL-MCNC: 137 MG/DL (ref 65–99)
HBA1C MFR BLD: 7.6 % (ref 0–5.6)
HCT VFR BLD AUTO: 39.9 % (ref 42–52)
HDLC SERPL-MCNC: 51 MG/DL
HGB BLD-MCNC: 13.1 G/DL (ref 14–18)
IMM GRANULOCYTES # BLD AUTO: 0.03 K/UL (ref 0–0.11)
IMM GRANULOCYTES NFR BLD AUTO: 0.4 % (ref 0–0.9)
LDLC SERPL CALC-MCNC: 58 MG/DL
LYMPHOCYTES # BLD AUTO: 1.49 K/UL (ref 1–4.8)
LYMPHOCYTES NFR BLD: 18.2 % (ref 22–41)
MCH RBC QN AUTO: 33.5 PG (ref 27–33)
MCHC RBC AUTO-ENTMCNC: 32.8 G/DL (ref 33.7–35.3)
MCV RBC AUTO: 102 FL (ref 81.4–97.8)
MICROALBUMIN UR-MCNC: 4.3 MG/DL
MICROALBUMIN/CREAT UR: 39 MG/G (ref 0–30)
MONOCYTES # BLD AUTO: 0.59 K/UL (ref 0–0.85)
MONOCYTES NFR BLD AUTO: 7.2 % (ref 0–13.4)
NEUTROPHILS # BLD AUTO: 5.85 K/UL (ref 1.82–7.42)
NEUTROPHILS NFR BLD: 71.4 % (ref 44–72)
NRBC # BLD AUTO: 0 K/UL
NRBC BLD-RTO: 0 /100 WBC
PLATELET # BLD AUTO: 171 K/UL (ref 164–446)
PMV BLD AUTO: 9.7 FL (ref 9–12.9)
POTASSIUM SERPL-SCNC: 4.1 MMOL/L (ref 3.6–5.5)
PROT SERPL-MCNC: 7.1 G/DL (ref 6–8.2)
RBC # BLD AUTO: 3.91 M/UL (ref 4.7–6.1)
SODIUM SERPL-SCNC: 138 MMOL/L (ref 135–145)
TRIGL SERPL-MCNC: 67 MG/DL (ref 0–149)
WBC # BLD AUTO: 8.2 K/UL (ref 4.8–10.8)

## 2020-03-30 PROCEDURE — 82570 ASSAY OF URINE CREATININE: CPT

## 2020-03-30 PROCEDURE — 80061 LIPID PANEL: CPT

## 2020-03-30 PROCEDURE — 85025 COMPLETE CBC W/AUTO DIFF WBC: CPT

## 2020-03-30 PROCEDURE — 80053 COMPREHEN METABOLIC PANEL: CPT

## 2020-03-30 PROCEDURE — 83036 HEMOGLOBIN GLYCOSYLATED A1C: CPT

## 2020-03-30 PROCEDURE — 82043 UR ALBUMIN QUANTITATIVE: CPT

## 2020-03-30 PROCEDURE — 36415 COLL VENOUS BLD VENIPUNCTURE: CPT

## 2020-04-03 ENCOUNTER — OFFICE VISIT (OUTPATIENT)
Dept: MEDICAL GROUP | Facility: MEDICAL CENTER | Age: 80
End: 2020-04-03
Payer: MEDICARE

## 2020-04-03 VITALS
SYSTOLIC BLOOD PRESSURE: 120 MMHG | DIASTOLIC BLOOD PRESSURE: 66 MMHG | WEIGHT: 165 LBS | TEMPERATURE: 97.7 F | BODY MASS INDEX: 23.1 KG/M2 | HEART RATE: 47 BPM | OXYGEN SATURATION: 100 % | HEIGHT: 71 IN

## 2020-04-03 DIAGNOSIS — E11.22 TYPE 2 DIABETES MELLITUS WITH STAGE 3 CHRONIC KIDNEY DISEASE, WITHOUT LONG-TERM CURRENT USE OF INSULIN (HCC): ICD-10-CM

## 2020-04-03 DIAGNOSIS — Z23 NEED FOR VACCINATION: ICD-10-CM

## 2020-04-03 DIAGNOSIS — I10 ESSENTIAL HYPERTENSION: ICD-10-CM

## 2020-04-03 DIAGNOSIS — N18.30 TYPE 2 DIABETES MELLITUS WITH STAGE 3 CHRONIC KIDNEY DISEASE, WITHOUT LONG-TERM CURRENT USE OF INSULIN (HCC): ICD-10-CM

## 2020-04-03 PROCEDURE — G0010 ADMIN HEPATITIS B VACCINE: HCPCS | Performed by: FAMILY MEDICINE

## 2020-04-03 PROCEDURE — 99214 OFFICE O/P EST MOD 30 MIN: CPT | Mod: 25 | Performed by: FAMILY MEDICINE

## 2020-04-03 PROCEDURE — 90746 HEPB VACCINE 3 DOSE ADULT IM: CPT | Performed by: FAMILY MEDICINE

## 2020-04-03 ASSESSMENT — FIBROSIS 4 INDEX: FIB4 SCORE: 1.86

## 2020-04-03 NOTE — ASSESSMENT & PLAN NOTE
Janumet  mg 1/2 tablet instead of full tablet, which has improved diarrhea. A1c has gone up from 7.0 to 7.3 to now 7.6. he admits to eating more sugar.

## 2020-04-03 NOTE — PROGRESS NOTES
"Subjective:   Sammy Oseguera is a 79 y.o. male here today for diabetes    Hypertension  Tolerating spironolactone 100 mg daily.    Type 2 diabetes mellitus with stage 3 chronic kidney disease, without long-term current use of insulin (HCC)  Janumet  mg 1/2 tablet instead of full tablet, which has improved diarrhea. A1c has gone up from 7.0 to 7.3 to now 7.6. he admits to eating more sugar.    Portal hypertension (HCC)  Has been seeing GI, ultrasound and alpha-fetoprotein were negative. His EGD was negative.         Current medicines (including changes today)  Current Outpatient Medications   Medication Sig Dispense Refill   • JANUMET  MG per tablet TAKE 1 TABLET BY MOUTH TWICE A DAY WITH MEALS 200 Tab 3   • spironolactone (ALDACTONE) 100 MG Tab Take 1 Tab by mouth every day. 90 Tab 3   • Blood Glucose Monitoring Suppl Device Meter: Dispense Device of Insurance Preference. Sig. Use as directed for blood sugar monitoring. #1. NR. 1 Device 0   • Blood Glucose Monitoring Suppl Supplies Misc Test strips order: Test strips for Device of Insurance Preference. Sig: use once daily. 100 Each 3   • Lancets Misc Lancets order: Lancets for Device of Insurance Preference. Sig: use once daily. 100 Each 3   • Acetaminophen-Caffeine 500-65 MG Tab Take 1 Tab by mouth every four hours as needed (for headache). 60 Tab 0   • cyanocobalamin (VITAMIN B12) 1000 MCG Tab Take 3 Tabs by mouth every day.     • vitamin D (CHOLECALCIFEROL) 1000 UNIT TABS Take 1,000 Units by mouth every day.       No current facility-administered medications for this visit.      He  has a past medical history of HTN and Type II or unspecified type diabetes mellitus without mention of complication, not stated as uncontrolled.    ROS   No chest pain, no shortness of breath       Objective:     /66 (BP Location: Right arm, Patient Position: Sitting)   Pulse (!) 47   Temp 36.5 °C (97.7 °F)   Ht 1.803 m (5' 11\")   Wt 74.8 kg (165 lb)   " SpO2 100%  Body mass index is 23.01 kg/m².   Physical Exam:  Constitutional: Alert, no distress.  Skin: Warm, dry, good turgor, no rashes in visible areas.  Eye: Equal, round and reactive, conjunctiva clear, lids normal.  ENMT: Lips without lesions, good dentition, oropharynx clear. TM pearly gray in left ear.  Psych: Alert and oriented x3, normal affect and mood.        Assessment and Plan:   The following treatment plan was discussed    1. Type 2 diabetes mellitus with stage 3 chronic kidney disease, without long-term current use of insulin (HCC)  Stable. Continue Janumet 1/2 tablet daily. Follow up with labs in 3 months.  - CBC WITH DIFFERENTIAL; Future  - Comp Metabolic Panel; Future  - Lipid Profile; Future  - HEMOGLOBIN A1C; Future  - MICROALBUMIN CREAT RATIO URINE; Future    2. Essential hypertension  Controlled. Continue spironolactone.    3. Need for vaccination  - Hepatitis B Vaccine Adult IM #2 out of 3 given today.      Followup: Return in about 3 months (around 7/3/2020) for Labs.

## 2020-04-17 ENCOUNTER — PATIENT OUTREACH (OUTPATIENT)
Dept: HEALTH INFORMATION MANAGEMENT | Facility: OTHER | Age: 80
End: 2020-04-17

## 2020-04-17 NOTE — PROGRESS NOTES
Called and wish the member a happy birthday. There was nothing the member needed at this time. If the member calls back, please transfer to [phone].

## 2020-07-01 NOTE — PROGRESS NOTES
Chief Complaint   Patient presents with   • Annual Wellness Visit         HPI:  Sammy Oseguera is a 79 y.o. here for Medicare Annual Wellness Visit     Patient has experienced occasional right hip and right ankle pain that is not severe.  He would like to monitor pains for now.      Patient Active Problem List    Diagnosis Date Noted   • Type 2 diabetes mellitus with stage 3 chronic kidney disease, without long-term current use of insulin (HCC) 06/17/2019   • Portal hypertension (HCC) 06/17/2019   • CKD (chronic kidney disease) stage 3, GFR 30-59 ml/min (HCC) 09/19/2018   • Alcoholic cirrhosis of liver with ascites (HCC) 08/31/2018   • Left inguinal hernia 07/10/2018   • Venous insufficiency of both lower extremities 03/14/2018   • Post-nasal drainage 09/14/2017   • Anemia 12/15/2016   • Bilateral hearing loss 06/29/2015   • History of prostate cancer 06/29/2015   • Type 2 diabetes mellitus, uncontrolled ([facility]) 01/25/2015   • Hypertension 01/25/2015   • Vitamin D insufficiency 01/25/2015       Current Outpatient Medications   Medication Sig Dispense Refill   • spironolactone (ALDACTONE) 100 MG Tab Take 1 Tab by mouth every day. 90 Tab 3   • sitagliptan-metformin (JANUMET)  MG per tablet Take 0.5 Tabs by mouth 2 times a day, with meals. 90 Tab 3   • Blood Glucose Monitoring Suppl Device Meter: Dispense Device of Insurance Preference. Sig. Use as directed for blood sugar monitoring. #1. NR. 1 Device 0   • Blood Glucose Monitoring Suppl Supplies Misc Test strips order: Test strips for Device of Insurance Preference. Sig: use once daily. 100 Each 3   • Lancets Misc Lancets order: Lancets for Device of Insurance Preference. Sig: use once daily. 100 Each 3   • Acetaminophen-Caffeine 500-65 MG Tab Take 1 Tab by mouth every four hours as needed (for headache). 60 Tab 0   • cyanocobalamin (VITAMIN B12) 1000 MCG Tab Take 3 Tabs by mouth every day.     • vitamin D (CHOLECALCIFEROL) 1000 UNIT TABS Take 1,000 Units by  SUBJECTIVE:    Edd Cohen is 61 y.o.male who comes in complaining of Medication Refill   . HPI: He is still using Ativan for anxiety. He is working at [employer]. He is also on sertraline. He really only takes the Ativan about once a day. He knows it is addicting. He is not increasing the dose. It does not make him feel hung over. He says he is not drinking too much. He does not smoke. No Known Allergies    Social History     Socioeconomic History    Marital status:      Spouse name: Keshav Hines Number of children: 2    Years of education: 15    Highest education level: Not on file   Occupational History    Occupation: utility supervisor     Employer: [employer]: works swing shift   Social Needs    Financial resource strain: Not on file    Food insecurity     Worry: Not on file     Inability: Not on file   [employer] needs     Medical: Not on file     Non-medical: Not on file   Tobacco Use    Smoking status: Never Smoker    Smokeless tobacco: Never Used   Substance and Sexual Activity    Alcohol use:  Yes     Alcohol/week: 4.2 standard drinks     Types: 5 Standard drinks or equivalent per week     Comment: occ    Drug use: No    Sexual activity: Yes     Partners: Female     Comment:    Lifestyle    Physical activity     Days per week: Not on file     Minutes per session: Not on file    Stress: Not on file   Relationships    Social connections     Talks on phone: Not on file     Gets together: Not on file     Attends Yazdanism service: Not on file     Active member of club or organization: Not on file     Attends meetings of clubs or organizations: Not on file     Relationship status: Not on file    Intimate partner violence     Fear of current or ex partner: Not on file     Emotionally abused: Not on file     Physically abused: Not on file     Forced sexual activity: Not on file   Other Topics Concern    Not on file   Social History Narrative mouth every day.       No current facility-administered medications for this visit.             Current supplements as per medication list.       Allergies: Patient has no known allergies.    Current social contact/activities: patient reports he enjoys playing poker and visiting with friends.     He  reports that he has never smoked. He has never used smokeless tobacco. He reports previous alcohol use. He reports that he does not use drugs.  Counseling given: Not Answered      DPA/Advanced Directive:  Patient has Advanced Directive and Durable Power of  on file.     ROS:    Gait: Uses no assistive device  Ostomy: No  Other tubes: No  Amputations: No  Chronic oxygen use: No  Last eye exam: patient reports about 1 year ago  Wears hearing aids: Yes   : Denies any urinary leakage during the last 6 months      Depression Screening    Little interest or pleasure in doing things?  0 - not at all  Feeling down, depressed , or hopeless? 0 - not at all  Patient Health Questionnaire Score: 0     If depressive symptoms identified deferred to follow up visit unless specifically addressed in assessment and plan.    Interpretation of PHQ-9 Total Score   Score Severity   1-4 No Depression   5-9 Mild Depression   10-14 Moderate Depression   15-19 Moderately Severe Depression   20-27 Severe Depression    Screening for Cognitive Impairment    Three Minute Recall (village, kitchen, baby) 2/3    Jatin clock face with all 12 numbers and set the hands to show 10 past 10.  Yes 5/5  Cognitive concerns identified deferred for follow up unless specifically addressed in assessment and plan.    Fall Risk Assessment    Has the patient had two or more falls in the last year or any fall with injury in the last year?  No    Safety Assessment    Throw rugs on floor.  No  Handrails on all stairs.  Yes  Good lighting in all hallways.  Yes  Difficulty hearing.  Yes  Patient counseled about all safety risks that were identified.    Functional   Not on file       Review of Systems   Constitutional: Negative. Respiratory: Negative. Cardiovascular: Negative. Psychiatric/Behavioral: Positive for dysphoric mood. Negative for self-injury, sleep disturbance and suicidal ideas. The patient is nervous/anxious. All other systems reviewed and are negative. OBJECTIVE:    Wt Readings from Last 3 Encounters:   07/01/20 253 lb 12.8 oz (115.1 kg)   10/18/19 254 lb (115.2 kg)   03/27/19 241 lb 12.8 oz (109.7 kg)       /78   Pulse 68   Temp 98.9 °F (37.2 °C)   Resp 18   Ht 5' 11\" (1.803 m)   Wt 253 lb 12.8 oz (115.1 kg)   SpO2 96%   BMI 35.40 kg/m²     Physical Exam  Vitals signs and nursing note reviewed. Constitutional:       General: He is not in acute distress. Appearance: He is well-developed. Cardiovascular:      Rate and Rhythm: Normal rate and regular rhythm. Heart sounds: Normal heart sounds. Pulmonary:      Effort: Pulmonary effort is normal.      Breath sounds: Normal breath sounds. Skin:     General: Skin is warm and dry. Neurological:      Mental Status: He is alert and oriented to person, place, and time. Psychiatric:         Mood and Affect: Mood normal.         Behavior: Behavior normal.         Thought Content: Thought content normal.         Judgment: Judgment normal.         ASSESSMENT:    1. Anxiety    2.  Medication management          PLAN:    MEDICATIONS:  Orders Placed This Encounter   Medications    LORazepam (ATIVAN) 0.5 MG tablet     Sig: TAKE ONE TABLET BY MOUTH TWICE DAILY AS NEEDED FOR ANXIETY     Dispense:  60 tablet     Refill:  2       ORDERS:  Orders Placed This Encounter   Procedures    POCT Rapid Drug Screen     Results for POC orders placed in visit on 07/01/20   POCT Rapid Drug Screen   Result Value Ref Range    Amphetamine Screen, Urine n     Barbiturate Screen, Urine n     Benzodiazepine Screen, Urine n     Buprenorphine Urine n     Cocaine Metabolite Screen, Urine n Assessment ADLs    Are there any barriers preventing you from cooking for yourself or meeting nutritional needs?  No.    Are there any barriers preventing you from driving safely or obtaining transportation?  No.    Are there any barriers preventing you from using a telephone or calling for help?  No.    Are there any barriers preventing you from shopping?  No.    Are there any barriers preventing you from taking care of your own finances?  No.    Are there any barriers preventing you from managing your medications?  No.    Are there any barriers preventing you from showering, bathing or dressing yourself?  No.    Are you currently engaging in any exercise or physical activity?  Yes.  Patient reports walking about 1/2 mile   What is your perception of your health?  Fair.      Health Maintenance Summary                IMM HEP B VACCINE Overdue 4/17/1959     IMM ZOSTER VACCINES Overdue 4/17/1990     DIABETES MONOFILAMENT / LE EXAM Overdue 9/14/2018      Done 9/14/2017 AMB DIABETIC MONOFILAMENT LOWER EXTREMITY EXAM     Patient has more history with this topic...    Annual Wellness Visit Overdue 5/10/2019      Done 5/9/2018 Visit Dx: Medicare annual wellness visit, subsequent     Patient has more history with this topic...    IMM INFLUENZA Overdue 9/1/2019      Done 9/19/2018 Imm Admin: Influenza Vaccine Adult HD     Patient has more history with this topic...    RETINAL SCREENING Next Due 4/25/2020      Done 4/25/2019 REFERRAL FOR RETINAL SCREENING EXAM     Patient has more history with this topic...    A1C SCREENING Next Due 7/2/2020      Done 1/2/2020 HEMOGLOBIN A1C     Patient has more history with this topic...    COLONOSCOPY Next Due 7/6/2020      Done 7/6/2017 REFERRAL TO GI FOR COLONOSCOPY    FASTING LIPID PROFILE Next Due 1/2/2021      Done 1/2/2020 LIPID PROFILE     Patient has more history with this topic...    URINE ACR / MICROALBUMIN Next Due 1/2/2021      Done 1/2/2020 MICROALBUMIN CREAT RATIO URINE      "Patient has more history with this topic...    SERUM CREATININE Next Due 1/2/2021      Done 1/2/2020 COMP METABOLIC PANEL     Patient has more history with this topic...    IMM DTaP/Tdap/Td Vaccine Next Due 2/28/2026      Done 2/28/2016 Imm Admin: Tdap Vaccine     Patient has more history with this topic...          Patient Care Team:  Crow Olivas M.D. as PCP - General (Family Medicine)  Nitesh Young M.D. as Consulting Physician (Ophthalmology)  Clarissa Stauffer as    Bandar Dillard M.D. (Gastroenterology)        Social History     Tobacco Use   • Smoking status: Never Smoker   • Smokeless tobacco: Never Used   Substance Use Topics   • Alcohol use: Not Currently     Alcohol/week: 0.0 oz     Comment: 4-5 weekly    • Drug use: No     Family History   Problem Relation Age of Onset   • Stroke Mother    • Stroke Father      He  has a past medical history of HTN and Type II or unspecified type diabetes mellitus without mention of complication, not stated as uncontrolled.   Past Surgical History:   Procedure Laterality Date   • LAPAROSCOPIC GASTRIC ULCER OVER SEW     • PROSTATECTOMY, RADICAL RETRO         Exam:   /70 (BP Location: Left arm, Patient Position: Sitting, BP Cuff Size: Adult)   Pulse 60   Temp 36.1 °C (97 °F) (Temporal)   Ht 1.803 m (5' 11\")   Wt 74.8 kg (165 lb)   SpO2 90%  Body mass index is 23.01 kg/m².    Constitutional: Alert, no distress.  Skin: Warm, dry, good turgor, no rashes in visible areas.  Eye: Equal, round and reactive, conjunctiva clear, lids normal.  Respiratory: Unlabored respiratory effort, lungs clear to auscultation, no wheezes, no ronchi.  Cardiovascular: Normal S1, S2, no murmur, no edema.  Psych: Alert and oriented x3, normal affect and mood.        Monofilament testing with a 10 gram force: sensation intact: intact bilaterally  Visual Inspection: Feet without maceration, ulcers, fissures.  Pedal pulses: intact bilaterally    Assessment and " Plan. The following treatment and monitoring plan is recommended:    1. Medicare annual wellness visit, subsequent  - Subsequent Annual Wellness Visit - Includes PPPS ()    2. Need for vaccination  - INFLUENZA VACCINE, HIGH DOSE (65+ ONLY)  - Hepatitis B Vaccine Adult IM  - Shingles Vaccine (SHINGRIX)  - Subsequent Annual Wellness Visit - Includes PPPS ()    3. Type 2 diabetes mellitus with stage 3 chronic kidney disease, without long-term current use of insulin (HCC)  Stable.  Continue current medications and follow-up as scheduled in 3 months for labs.  - Subsequent Annual Wellness Visit - Includes PPPS ()  - Diabetic Monofilament Lower Extremity Exam    4. Uncontrolled type 2 diabetes mellitus with hyperglycemia (HCC)  Stable.  Continue current medications and follow-up as scheduled in 3 months for labs.  - Subsequent Annual Wellness Visit - Includes PPPS ()  - Diabetic Monofilament Lower Extremity Exam    5. Alcoholic cirrhosis of liver with ascites (HCC)  Patient is avoiding alcohol and Tylenol.  Follow-up with labs in 3 months.  Continue following with GI.  He had a recent appointment with GI and they will be updating his imaging of the liver, alpha-fetoprotein level and colonoscopy/endoscopy.  - Subsequent Annual Wellness Visit - Includes PPPS ()    6. Portal hypertension (HCC)  Patient is avoiding alcohol and Tylenol.  Follow-up with labs in 3 months.  Continue following with GI.  He had a recent appointment with GI and they will be updating his imaging of the liver, alpha-fetoprotein level and colonoscopy/endoscopy.  - Subsequent Annual Wellness Visit - Includes PPPS ()        Services suggested: No services needed at this time  Health Care Screening: Age-appropriate preventive services recommended by USPTF and ACIP covered by Medicare were discussed today. Services ordered if indicated and agreed upon by the patient.  Referrals offered: Community-based lifestyle interventions  to reduce health risks and promote self-management and wellness, fall prevention, nutrition, physical activity, tobacco-use cessation, weight loss, and mental health services as per orders if indicated.    Discussion today about general wellness and lifestyle habits:    · Prevent falls and reduce trip hazards; Cautioned about securing or removing rugs.  · Have a working fire alarm and carbon monoxide detector;   · Engage in regular physical activity and social activities     Follow-up: Return in about 3 months (around 4/28/2020) for Labs.

## 2020-07-02 ENCOUNTER — APPOINTMENT (RX ONLY)
Dept: URBAN - METROPOLITAN AREA CLINIC 4 | Facility: CLINIC | Age: 80
Setting detail: DERMATOLOGY
End: 2020-07-02

## 2020-07-02 DIAGNOSIS — L57.0 ACTINIC KERATOSIS: ICD-10-CM

## 2020-07-02 DIAGNOSIS — Z71.89 OTHER SPECIFIED COUNSELING: ICD-10-CM

## 2020-07-02 DIAGNOSIS — L82.1 OTHER SEBORRHEIC KERATOSIS: ICD-10-CM

## 2020-07-02 DIAGNOSIS — L81.4 OTHER MELANIN HYPERPIGMENTATION: ICD-10-CM

## 2020-07-02 DIAGNOSIS — D22 MELANOCYTIC NEVI: ICD-10-CM

## 2020-07-02 DIAGNOSIS — D18.0 HEMANGIOMA: ICD-10-CM

## 2020-07-02 DIAGNOSIS — D485 NEOPLASM OF UNCERTAIN BEHAVIOR OF SKIN: ICD-10-CM

## 2020-07-02 DIAGNOSIS — Z85.828 PERSONAL HISTORY OF OTHER MALIGNANT NEOPLASM OF SKIN: ICD-10-CM

## 2020-07-02 PROBLEM — D22.5 MELANOCYTIC NEVI OF TRUNK: Status: ACTIVE | Noted: 2020-07-02

## 2020-07-02 PROBLEM — D18.01 HEMANGIOMA OF SKIN AND SUBCUTANEOUS TISSUE: Status: ACTIVE | Noted: 2020-07-02

## 2020-07-02 PROBLEM — D22.71 MELANOCYTIC NEVI OF RIGHT LOWER LIMB, INCLUDING HIP: Status: ACTIVE | Noted: 2020-07-02

## 2020-07-02 PROBLEM — D22.72 MELANOCYTIC NEVI OF LEFT LOWER LIMB, INCLUDING HIP: Status: ACTIVE | Noted: 2020-07-02

## 2020-07-02 PROBLEM — D22.62 MELANOCYTIC NEVI OF LEFT UPPER LIMB, INCLUDING SHOULDER: Status: ACTIVE | Noted: 2020-07-02

## 2020-07-02 PROBLEM — D22.61 MELANOCYTIC NEVI OF RIGHT UPPER LIMB, INCLUDING SHOULDER: Status: ACTIVE | Noted: 2020-07-02

## 2020-07-02 PROBLEM — D48.5 NEOPLASM OF UNCERTAIN BEHAVIOR OF SKIN: Status: ACTIVE | Noted: 2020-07-02

## 2020-07-02 PROCEDURE — ? COUNSELING

## 2020-07-02 PROCEDURE — 17003 DESTRUCT PREMALG LES 2-14: CPT

## 2020-07-02 PROCEDURE — 99214 OFFICE O/P EST MOD 30 MIN: CPT | Mod: 25

## 2020-07-02 PROCEDURE — ? BIOPSY BY SHAVE METHOD

## 2020-07-02 PROCEDURE — 11102 TANGNTL BX SKIN SINGLE LES: CPT

## 2020-07-02 PROCEDURE — ? LIQUID NITROGEN

## 2020-07-02 PROCEDURE — 17000 DESTRUCT PREMALG LESION: CPT | Mod: 59

## 2020-07-02 ASSESSMENT — LOCATION DETAILED DESCRIPTION DERM
LOCATION DETAILED: STERNAL NOTCH
LOCATION DETAILED: LEFT ANTERIOR PROXIMAL UPPER ARM
LOCATION DETAILED: INFERIOR THORACIC SPINE
LOCATION DETAILED: RIGHT ANTERIOR PROXIMAL THIGH
LOCATION DETAILED: LEFT CENTRAL MALAR CHEEK
LOCATION DETAILED: LEFT ANTERIOR DISTAL UPPER ARM
LOCATION DETAILED: LEFT INFERIOR MEDIAL FOREHEAD
LOCATION DETAILED: RIGHT MEDIAL UPPER BACK
LOCATION DETAILED: LEFT INFERIOR ANTERIOR NECK
LOCATION DETAILED: LEFT CENTRAL ZYGOMA
LOCATION DETAILED: LOWER STERNUM
LOCATION DETAILED: RIGHT ANTERIOR PROXIMAL UPPER ARM
LOCATION DETAILED: RIGHT ANTERIOR DISTAL THIGH
LOCATION DETAILED: EPIGASTRIC SKIN
LOCATION DETAILED: LEFT CENTRAL MANDIBULAR CHEEK
LOCATION DETAILED: LEFT ANTERIOR DISTAL THIGH
LOCATION DETAILED: RIGHT MEDIAL FOREHEAD
LOCATION DETAILED: MIDDLE STERNUM
LOCATION DETAILED: RIGHT ANTERIOR DISTAL UPPER ARM
LOCATION DETAILED: LEFT MEDIAL FOREHEAD
LOCATION DETAILED: LEFT MEDIAL FRONTAL SCALP
LOCATION DETAILED: LEFT SUPERIOR MEDIAL UPPER BACK
LOCATION DETAILED: RIGHT PROXIMAL PRETIBIAL REGION
LOCATION DETAILED: LEFT ANTERIOR PROXIMAL THIGH
LOCATION DETAILED: LEFT SUPERIOR PARIETAL SCALP
LOCATION DETAILED: LEFT MEDIAL UPPER BACK
LOCATION DETAILED: SUPERIOR THORACIC SPINE

## 2020-07-02 ASSESSMENT — LOCATION SIMPLE DESCRIPTION DERM
LOCATION SIMPLE: LEFT CHEEK
LOCATION SIMPLE: LEFT UPPER BACK
LOCATION SIMPLE: RIGHT THIGH
LOCATION SIMPLE: UPPER BACK
LOCATION SIMPLE: CHEST
LOCATION SIMPLE: LEFT THIGH
LOCATION SIMPLE: RIGHT PRETIBIAL REGION
LOCATION SIMPLE: SCALP
LOCATION SIMPLE: LEFT SCALP
LOCATION SIMPLE: RIGHT UPPER BACK
LOCATION SIMPLE: LEFT FOREHEAD
LOCATION SIMPLE: ABDOMEN
LOCATION SIMPLE: LEFT UPPER ARM
LOCATION SIMPLE: RIGHT UPPER ARM
LOCATION SIMPLE: LEFT ANTERIOR NECK
LOCATION SIMPLE: RIGHT FOREHEAD
LOCATION SIMPLE: LEFT ZYGOMA

## 2020-07-02 ASSESSMENT — LOCATION ZONE DERM
LOCATION ZONE: TRUNK
LOCATION ZONE: LEG
LOCATION ZONE: NECK
LOCATION ZONE: SCALP
LOCATION ZONE: ARM
LOCATION ZONE: FACE

## 2020-07-11 ENCOUNTER — HOSPITAL ENCOUNTER (OUTPATIENT)
Dept: LAB | Facility: MEDICAL CENTER | Age: 80
End: 2020-07-11
Attending: FAMILY MEDICINE
Payer: MEDICARE

## 2020-07-11 DIAGNOSIS — E11.22 TYPE 2 DIABETES MELLITUS WITH STAGE 3 CHRONIC KIDNEY DISEASE, WITHOUT LONG-TERM CURRENT USE OF INSULIN (HCC): ICD-10-CM

## 2020-07-11 DIAGNOSIS — N18.30 TYPE 2 DIABETES MELLITUS WITH STAGE 3 CHRONIC KIDNEY DISEASE, WITHOUT LONG-TERM CURRENT USE OF INSULIN (HCC): ICD-10-CM

## 2020-07-11 LAB
ALBUMIN SERPL BCP-MCNC: 4.4 G/DL (ref 3.2–4.9)
ALBUMIN/GLOB SERPL: 1.6 G/DL
ALP SERPL-CCNC: 81 U/L (ref 30–99)
ALT SERPL-CCNC: 13 U/L (ref 2–50)
ANION GAP SERPL CALC-SCNC: 12 MMOL/L (ref 7–16)
AST SERPL-CCNC: 11 U/L (ref 12–45)
BASOPHILS # BLD AUTO: 0.9 % (ref 0–1.8)
BASOPHILS # BLD: 0.07 K/UL (ref 0–0.12)
BILIRUB SERPL-MCNC: 0.6 MG/DL (ref 0.1–1.5)
BUN SERPL-MCNC: 23 MG/DL (ref 8–22)
CALCIUM SERPL-MCNC: 9.1 MG/DL (ref 8.5–10.5)
CHLORIDE SERPL-SCNC: 102 MMOL/L (ref 96–112)
CHOLEST SERPL-MCNC: 124 MG/DL (ref 100–199)
CO2 SERPL-SCNC: 20 MMOL/L (ref 20–33)
CREAT SERPL-MCNC: 1.52 MG/DL (ref 0.5–1.4)
CREAT UR-MCNC: 134.99 MG/DL
EOSINOPHIL # BLD AUTO: 0.14 K/UL (ref 0–0.51)
EOSINOPHIL NFR BLD: 1.8 % (ref 0–6.9)
ERYTHROCYTE [DISTWIDTH] IN BLOOD BY AUTOMATED COUNT: 49.5 FL (ref 35.9–50)
EST. AVERAGE GLUCOSE BLD GHB EST-MCNC: 192 MG/DL
FASTING STATUS PATIENT QL REPORTED: NORMAL
GLOBULIN SER CALC-MCNC: 2.8 G/DL (ref 1.9–3.5)
GLUCOSE SERPL-MCNC: 156 MG/DL (ref 65–99)
HBA1C MFR BLD: 8.3 % (ref 0–5.6)
HCT VFR BLD AUTO: 39.8 % (ref 42–52)
HDLC SERPL-MCNC: 47 MG/DL
HGB BLD-MCNC: 12.7 G/DL (ref 14–18)
IMM GRANULOCYTES # BLD AUTO: 0.02 K/UL (ref 0–0.11)
IMM GRANULOCYTES NFR BLD AUTO: 0.3 % (ref 0–0.9)
LDLC SERPL CALC-MCNC: 64 MG/DL
LYMPHOCYTES # BLD AUTO: 1.48 K/UL (ref 1–4.8)
LYMPHOCYTES NFR BLD: 19.5 % (ref 22–41)
MCH RBC QN AUTO: 32.8 PG (ref 27–33)
MCHC RBC AUTO-ENTMCNC: 31.9 G/DL (ref 33.7–35.3)
MCV RBC AUTO: 102.8 FL (ref 81.4–97.8)
MICROALBUMIN UR-MCNC: 12.5 MG/DL
MICROALBUMIN/CREAT UR: 93 MG/G (ref 0–30)
MONOCYTES # BLD AUTO: 0.57 K/UL (ref 0–0.85)
MONOCYTES NFR BLD AUTO: 7.5 % (ref 0–13.4)
NEUTROPHILS # BLD AUTO: 5.31 K/UL (ref 1.82–7.42)
NEUTROPHILS NFR BLD: 70 % (ref 44–72)
NRBC # BLD AUTO: 0 K/UL
NRBC BLD-RTO: 0 /100 WBC
PLATELET # BLD AUTO: 177 K/UL (ref 164–446)
PMV BLD AUTO: 9.8 FL (ref 9–12.9)
POTASSIUM SERPL-SCNC: 4.1 MMOL/L (ref 3.6–5.5)
PROT SERPL-MCNC: 7.2 G/DL (ref 6–8.2)
RBC # BLD AUTO: 3.87 M/UL (ref 4.7–6.1)
SODIUM SERPL-SCNC: 134 MMOL/L (ref 135–145)
TRIGL SERPL-MCNC: 65 MG/DL (ref 0–149)
WBC # BLD AUTO: 7.6 K/UL (ref 4.8–10.8)

## 2020-07-11 PROCEDURE — 36415 COLL VENOUS BLD VENIPUNCTURE: CPT

## 2020-07-11 PROCEDURE — 82043 UR ALBUMIN QUANTITATIVE: CPT

## 2020-07-11 PROCEDURE — 80053 COMPREHEN METABOLIC PANEL: CPT

## 2020-07-11 PROCEDURE — 80061 LIPID PANEL: CPT

## 2020-07-11 PROCEDURE — 85025 COMPLETE CBC W/AUTO DIFF WBC: CPT

## 2020-07-11 PROCEDURE — 83036 HEMOGLOBIN GLYCOSYLATED A1C: CPT

## 2020-07-11 PROCEDURE — 82570 ASSAY OF URINE CREATININE: CPT

## 2020-07-28 NOTE — ASSESSMENT & PLAN NOTE
Patient originally came in for left inguinal hernia symptoms about 2 months ago.  His symptoms were vague and it was unclear if this was related to left inguinal hernia or diverticulitis because he has history of diverticulosis.  A CT scan was done which showed mild to moderate ascites pelvic ascites and small left inguinal hernia.  He does admit that he drinks about 2 shots of hard alcohol per day.  Patient was referred to a surgeon for inguinal hernia repair, however because of the uncontrolled ascites, he was advised to follow-up with GI to have ascites treated, as it could affect the healing of the inguinal hernia repair.  Patient saw GI and has an EGD scheduled for next month.  He is unsure if he should go ahead with the EGD.   How Severe Are Your Spot(S)?: moderate What Type Of Note Output Would You Prefer (Optional)?: Standard Output What Is The Reason For Today's Visit?: Full Body Skin Examination What Is The Reason For Today's Visit? (Being Monitored For X): concerning skin lesions on an annual basis

## 2020-07-31 ENCOUNTER — OFFICE VISIT (OUTPATIENT)
Dept: MEDICAL GROUP | Facility: MEDICAL CENTER | Age: 80
End: 2020-07-31
Payer: MEDICARE

## 2020-07-31 VITALS
TEMPERATURE: 97.5 F | BODY MASS INDEX: 23.38 KG/M2 | WEIGHT: 167 LBS | OXYGEN SATURATION: 96 % | HEIGHT: 71 IN | SYSTOLIC BLOOD PRESSURE: 112 MMHG | DIASTOLIC BLOOD PRESSURE: 74 MMHG | HEART RATE: 49 BPM

## 2020-07-31 DIAGNOSIS — E11.22 TYPE 2 DIABETES MELLITUS WITH STAGE 3 CHRONIC KIDNEY DISEASE, WITHOUT LONG-TERM CURRENT USE OF INSULIN (HCC): ICD-10-CM

## 2020-07-31 DIAGNOSIS — I10 ESSENTIAL HYPERTENSION: ICD-10-CM

## 2020-07-31 DIAGNOSIS — N18.30 TYPE 2 DIABETES MELLITUS WITH STAGE 3 CHRONIC KIDNEY DISEASE, WITHOUT LONG-TERM CURRENT USE OF INSULIN (HCC): ICD-10-CM

## 2020-07-31 DIAGNOSIS — D64.9 ANEMIA, UNSPECIFIED TYPE: ICD-10-CM

## 2020-07-31 PROCEDURE — 99214 OFFICE O/P EST MOD 30 MIN: CPT | Performed by: FAMILY MEDICINE

## 2020-07-31 RX ORDER — SITAGLIPTIN AND METFORMIN HYDROCHLORIDE 1000; 50 MG/1; MG/1
0.5 TABLET, FILM COATED ORAL 2 TIMES DAILY WITH MEALS
Qty: 200 TAB | Refills: 3 | COMMUNITY
Start: 2020-07-31 | End: 2021-06-30 | Stop reason: SDUPTHER

## 2020-07-31 ASSESSMENT — FIBROSIS 4 INDEX: FIB4 SCORE: 1.38

## 2020-07-31 NOTE — PROGRESS NOTES
Subjective:   Sammy Oseguera is a 80 y.o. male here today for diabetes    Type 2 diabetes mellitus with stage 3 chronic kidney disease, without long-term current use of insulin ([facility])  Reviewed lab work.  GFR is improved from last time.  A1c is increased from last time.  Patient contributes his A1c increased to eating more sweets.  He is tolerating Janumet  mg half a tablet twice daily without any GI upset.  He was having GI upset when he was on the full tablet twice daily.  We made the switch from full tablet to half tablet his A1c went from 7.3 to 7.6.    Results for MAXWELL OSEGUERA (MRN [id number]) as of 7/31/2020 08:25   Ref. Range 7/11/2020 10:24   Sodium Latest Ref Range: 135 - 145 mmol/L 134 (L)   Potassium Latest Ref Range: 3.6 - 5.5 mmol/L 4.1   Chloride Latest Ref Range: 96 - 112 mmol/L 102   Co2 Latest Ref Range: 20 - 33 mmol/L 20   Anion Gap Latest Ref Range: 7.0 - 16.0  12.0   Glucose Latest Ref Range: 65 - 99 mg/dL 156 (H)   Bun Latest Ref Range: 8 - 22 mg/dL 23 (H)   Creatinine Latest Ref Range: 0.50 - 1.40 mg/dL 1.52 (H)   GFR If  Latest Ref Range: >60 mL/min/1.73 m 2 54 (A)   GFR If Non  Latest Ref Range: >60 mL/min/1.73 m 2 44 (A)   Calcium Latest Ref Range: 8.5 - 10.5 mg/dL 9.1   AST(SGOT) Latest Ref Range: 12 - 45 U/L 11 (L)   ALT(SGPT) Latest Ref Range: 2 - 50 U/L 13   Alkaline Phosphatase Latest Ref Range: 30 - 99 U/L 81   Total Bilirubin Latest Ref Range: 0.1 - 1.5 mg/dL 0.6   Albumin Latest Ref Range: 3.2 - 4.9 g/dL 4.4   Total Protein Latest Ref Range: 6.0 - 8.2 g/dL 7.2   Globulin Latest Ref Range: 1.9 - 3.5 g/dL 2.8   A-G Ratio Latest Units: g/dL 1.6   Glycohemoglobin Latest Ref Range: 0.0 - 5.6 % 8.3 (H)   Estim. Avg Glu Latest Units: mg/dL 192   Fasting Status Unknown Fasting   Cholesterol,Tot Latest Ref Range: 100 - 199 mg/dL 124   Triglycerides Latest Ref Range: 0 - 149 mg/dL 65   HDL Latest Ref Range: >=40 mg/dL 47   LDL Latest Ref Range: <100  "mg/dL 64       Hypertension  Patient is currently tolerating spironolactone 100 mg daily.    Anemia  Reviewed lab work which has been stable for years.  Vitamin B12 and folate have been normal on previous checks.         Current medicines (including changes today)  Current Outpatient Medications   Medication Sig Dispense Refill   • sitagliptan-metformin (JANUMET)  MG per tablet Take 0.5 Tabs by mouth 2 times a day, with meals. 200 Tab 3   • spironolactone (ALDACTONE) 100 MG Tab Take 1 Tab by mouth every day. 90 Tab 3   • Blood Glucose Monitoring Suppl Device Meter: Dispense Device of Insurance Preference. Sig. Use as directed for blood sugar monitoring. #1. NR. 1 Device 0   • Blood Glucose Monitoring Suppl Supplies Misc Test strips order: Test strips for Device of Insurance Preference. Sig: use once daily. 100 Each 3   • Lancets Misc Lancets order: Lancets for Device of Insurance Preference. Sig: use once daily. 100 Each 3   • Acetaminophen-Caffeine 500-65 MG Tab Take 1 Tab by mouth every four hours as needed (for headache). 60 Tab 0   • cyanocobalamin (VITAMIN B12) 1000 MCG Tab Take 3 Tabs by mouth every day.     • vitamin D (CHOLECALCIFEROL) 1000 UNIT TABS Take 1,000 Units by mouth every day.       No current facility-administered medications for this visit.      He  has a past medical history of HTN and Type II or unspecified type diabetes mellitus without mention of complication, not stated as uncontrolled.    ROS   No chest pain, no shortness of breath       Objective:     /74 (BP Location: Right arm, Patient Position: Sitting)   Pulse (!) 49   Temp 36.4 °C (97.5 °F) (Temporal)   Ht 1.803 m (5' 11\")   Wt 75.8 kg (167 lb)   SpO2 96%  Body mass index is 23.29 kg/m².   Physical Exam:  Constitutional: Alert, no distress.  Skin: Warm, dry, good turgor, no rashes in visible areas.  Eye: Equal, round and reactive, conjunctiva clear, lids normal.  Respiratory: Unlabored respiratory effort, lungs clear " to auscultation, no wheezes, no ronchi.  Cardiovascular: Normal S1, S2, no murmur, no edema.  Psych: Alert and oriented x3, normal affect and mood.          Assessment and Plan:   The following treatment plan was discussed    1. Type 2 diabetes mellitus with stage 3 chronic kidney disease, without long-term current use of insulin (HCC)  Elevated A1c, stable GFR.  Patient would like to try dietary improvement for the next 3 to 4 months.  Follow-up with labs in 3 to 4 months.  - sitagliptan-metformin (JANUMET)  MG per tablet; Take 0.5 Tabs by mouth 2 times a day, with meals.  Dispense: 200 Tab; Refill: 3  - Comp Metabolic Panel; Future  - Lipid Profile; Future  - CBC WITH DIFFERENTIAL; Future  - HEMOGLOBIN A1C; Future  - MICROALBUMIN CREAT RATIO URINE; Future    2. Essential hypertension  Controlled.  Continue spironolactone.    3. Anemia, unspecified type  Stable.  Most likely related to anemia of chronic disease, this was discussed with patient.  Continue to monitor.      Followup: Return in about 3 months (around 10/31/2020) for Diabetes.       Reviewed doing erlin chi to help with balance.

## 2020-07-31 NOTE — ASSESSMENT & PLAN NOTE
Reviewed lab work which has been stable for years.  Vitamin B12 and folate have been normal on previous checks.

## 2020-07-31 NOTE — ASSESSMENT & PLAN NOTE
Reviewed lab work.  GFR is improved from last time.  A1c is increased from last time.  Patient contributes his A1c increased to eating more sweets.  He is tolerating Janumet  mg half a tablet twice daily without any GI upset.  He was having GI upset when he was on the full tablet twice daily.  We made the switch from full tablet to half tablet his A1c went from 7.3 to 7.6.    Results for MAXWELL CARMONA (MRN [id number]) as of 7/31/2020 08:25   Ref. Range 7/11/2020 10:24   Sodium Latest Ref Range: 135 - 145 mmol/L 134 (L)   Potassium Latest Ref Range: 3.6 - 5.5 mmol/L 4.1   Chloride Latest Ref Range: 96 - 112 mmol/L 102   Co2 Latest Ref Range: 20 - 33 mmol/L 20   Anion Gap Latest Ref Range: 7.0 - 16.0  12.0   Glucose Latest Ref Range: 65 - 99 mg/dL 156 (H)   Bun Latest Ref Range: 8 - 22 mg/dL 23 (H)   Creatinine Latest Ref Range: 0.50 - 1.40 mg/dL 1.52 (H)   GFR If  Latest Ref Range: >60 mL/min/1.73 m 2 54 (A)   GFR If Non  Latest Ref Range: >60 mL/min/1.73 m 2 44 (A)   Calcium Latest Ref Range: 8.5 - 10.5 mg/dL 9.1   AST(SGOT) Latest Ref Range: 12 - 45 U/L 11 (L)   ALT(SGPT) Latest Ref Range: 2 - 50 U/L 13   Alkaline Phosphatase Latest Ref Range: 30 - 99 U/L 81   Total Bilirubin Latest Ref Range: 0.1 - 1.5 mg/dL 0.6   Albumin Latest Ref Range: 3.2 - 4.9 g/dL 4.4   Total Protein Latest Ref Range: 6.0 - 8.2 g/dL 7.2   Globulin Latest Ref Range: 1.9 - 3.5 g/dL 2.8   A-G Ratio Latest Units: g/dL 1.6   Glycohemoglobin Latest Ref Range: 0.0 - 5.6 % 8.3 (H)   Estim. Avg Glu Latest Units: mg/dL 192   Fasting Status Unknown Fasting   Cholesterol,Tot Latest Ref Range: 100 - 199 mg/dL 124   Triglycerides Latest Ref Range: 0 - 149 mg/dL 65   HDL Latest Ref Range: >=40 mg/dL 47   LDL Latest Ref Range: <100 mg/dL 64

## 2020-09-02 ENCOUNTER — HOSPITAL ENCOUNTER (OUTPATIENT)
Dept: LAB | Facility: MEDICAL CENTER | Age: 80
End: 2020-09-02
Attending: INTERNAL MEDICINE
Payer: MEDICARE

## 2020-09-02 PROCEDURE — 82105 ALPHA-FETOPROTEIN SERUM: CPT

## 2020-09-02 PROCEDURE — 36415 COLL VENOUS BLD VENIPUNCTURE: CPT

## 2020-09-04 LAB — AFP-TM SERPL-MCNC: 2 NG/ML (ref 0–9)

## 2020-09-16 ENCOUNTER — HOSPITAL ENCOUNTER (OUTPATIENT)
Dept: RADIOLOGY | Facility: MEDICAL CENTER | Age: 80
End: 2020-09-16
Attending: INTERNAL MEDICINE
Payer: MEDICARE

## 2020-09-16 DIAGNOSIS — K70.30 ALCOHOLIC CIRRHOSIS, UNSPECIFIED WHETHER ASCITES PRESENT (HCC): ICD-10-CM

## 2020-09-16 PROCEDURE — 76700 US EXAM ABDOM COMPLETE: CPT

## 2020-09-21 NOTE — PROGRESS NOTES
Calling member back per request about hearing aid implants. After verifying HIPPA I advised that he would need to get prior authorization to have this done to prove it is medically necessary and that hearing aids do not work well enough for him. He understood and said he is not too sure if he wants to have this done or not, but he will think about it more and contact me with any questions. If the member calls in, please transfer to [phone]

## 2020-09-24 NOTE — PROGRESS NOTES
Member called in to see how much cochlear implants would be. After verifying HIPPA. I advised that last time we spoke he told me it would be an inpatient hospital stay. I advised that his copayment for that is $275 for days 1-5. I also advised that he will most likely need a prior authorization to have this done and covered. He asked how to do that. I let him know the provider can place the request to us. He understood and had no other questions.

## 2020-10-16 ENCOUNTER — OFFICE VISIT (OUTPATIENT)
Dept: MEDICAL GROUP | Facility: MEDICAL CENTER | Age: 80
End: 2020-10-16
Payer: MEDICARE

## 2020-10-16 VITALS
HEART RATE: 64 BPM | TEMPERATURE: 98.4 F | DIASTOLIC BLOOD PRESSURE: 68 MMHG | WEIGHT: 167 LBS | BODY MASS INDEX: 23.38 KG/M2 | HEIGHT: 71 IN | OXYGEN SATURATION: 98 % | SYSTOLIC BLOOD PRESSURE: 114 MMHG

## 2020-10-16 DIAGNOSIS — K76.6 PORTAL HYPERTENSION (HCC): ICD-10-CM

## 2020-10-16 DIAGNOSIS — I10 ESSENTIAL HYPERTENSION: ICD-10-CM

## 2020-10-16 DIAGNOSIS — R29.898 BILATERAL LEG WEAKNESS: ICD-10-CM

## 2020-10-16 PROCEDURE — 99214 OFFICE O/P EST MOD 30 MIN: CPT | Performed by: FAMILY MEDICINE

## 2020-10-16 ASSESSMENT — FIBROSIS 4 INDEX: FIB4 SCORE: 1.38

## 2020-10-16 NOTE — ASSESSMENT & PLAN NOTE
Has been noticing more weakness in legs and sometimes walks with difficulty down the hallway. He is not using a cane. He is doing deep knee bends and is not interested in physical therapy right now.

## 2020-10-16 NOTE — PROGRESS NOTES
Subjective:   Sammy Oseguera is a 80 y.o. male here today for leg weakness    Bilateral leg weakness  Has been noticing more weakness in legs and sometimes walks with difficulty down the hallway. He is not using a cane. He is doing deep knee bends and is not interested in physical therapy right now.    Portal hypertension (HCC)  Patient had an ultrasound of abdomen that showed prominent IVC and hepatic veins suggesting right heart failure.    Hypertension  Patient is tolerating spironolactone 100 mg daily.         Current medicines (including changes today)  Current Outpatient Medications   Medication Sig Dispense Refill   • sitagliptan-metformin (JANUMET)  MG per tablet Take 0.5 Tabs by mouth 2 times a day, with meals. 200 Tab 3   • spironolactone (ALDACTONE) 100 MG Tab Take 1 Tab by mouth every day. 90 Tab 3   • Blood Glucose Monitoring Suppl Device Meter: Dispense Device of Insurance Preference. Sig. Use as directed for blood sugar monitoring. #1. NR. 1 Device 0   • Blood Glucose Monitoring Suppl Supplies Misc Test strips order: Test strips for Device of Insurance Preference. Sig: use once daily. 100 Each 3   • Lancets Misc Lancets order: Lancets for Device of Insurance Preference. Sig: use once daily. 100 Each 3   • Acetaminophen-Caffeine 500-65 MG Tab Take 1 Tab by mouth every four hours as needed (for headache). 60 Tab 0   • cyanocobalamin (VITAMIN B12) 1000 MCG Tab Take 3 Tabs by mouth every day.     • vitamin D (CHOLECALCIFEROL) 1000 UNIT TABS Take 1,000 Units by mouth every day.       No current facility-administered medications for this visit.      He  has a past medical history of HTN and Type II or unspecified type diabetes mellitus without mention of complication, not stated as uncontrolled.    ROS   No chest pain, no shortness of breath       Objective:     /68 (BP Location: Right arm, Patient Position: Sitting, BP Cuff Size: Adult)   Pulse 64   Temp 36.9 °C (98.4 °F) (Temporal)    "Ht 1.803 m (5' 11\")   Wt 75.8 kg (167 lb)   SpO2 98%  Body mass index is 23.29 kg/m².   Physical Exam:  Constitutional: Alert, no distress.  Skin: Warm, dry, good turgor, no rashes in visible areas.  Eye: Equal, round and reactive, conjunctiva clear, lids normal.  Psych: Alert and oriented x3, normal affect and mood.        Assessment and Plan:   The following treatment plan was discussed    1. Portal hypertension (HCC)  Check ECHO for possible right-sided heart failure causing portal hypertension.  call with ECHO results.  - EC-ECHOCARDIOGRAM COMPLETE W/O CONT; Future    2. Bilateral leg weakness  Patient is agreeable to do physical therapy.  Referral done.    3. Essential hypertension  Controlled. Continue spironolactone.      Followup: Return if symptoms worsen or fail to improve.         "

## 2020-10-16 NOTE — ASSESSMENT & PLAN NOTE
Patient had an ultrasound of abdomen that showed prominent IVC and hepatic veins suggesting right heart failure.

## 2020-10-28 ENCOUNTER — TELEPHONE (OUTPATIENT)
Dept: MEDICAL GROUP | Facility: MEDICAL CENTER | Age: 80
End: 2020-10-28

## 2020-10-28 DIAGNOSIS — Z11.59 ENCOUNTER FOR SCREENING FOR OTHER VIRAL DISEASES: ICD-10-CM

## 2020-10-31 ENCOUNTER — HOSPITAL ENCOUNTER (OUTPATIENT)
Dept: LAB | Facility: MEDICAL CENTER | Age: 80
End: 2020-10-31
Attending: FAMILY MEDICINE
Payer: MEDICARE

## 2020-10-31 DIAGNOSIS — Z11.59 ENCOUNTER FOR SCREENING FOR OTHER VIRAL DISEASES: ICD-10-CM

## 2020-10-31 PROCEDURE — C9803 HOPD COVID-19 SPEC COLLECT: HCPCS

## 2020-10-31 PROCEDURE — U0003 INFECTIOUS AGENT DETECTION BY NUCLEIC ACID (DNA OR RNA); SEVERE ACUTE RESPIRATORY SYNDROME CORONAVIRUS 2 (SARS-COV-2) (CORONAVIRUS DISEASE [COVID-19]), AMPLIFIED PROBE TECHNIQUE, MAKING USE OF HIGH THROUGHPUT TECHNOLOGIES AS DESCRIBED BY CMS-2020-01-R: HCPCS

## 2020-11-01 LAB — COVID ORDER STATUS COVID19: NORMAL

## 2020-11-02 LAB
SARS-COV-2 RNA RESP QL NAA+PROBE: NOTDETECTED
SPECIMEN SOURCE: NORMAL

## 2020-11-09 ENCOUNTER — TELEPHONE (OUTPATIENT)
Dept: MEDICAL GROUP | Facility: MEDICAL CENTER | Age: 80
End: 2020-11-09

## 2020-11-09 NOTE — TELEPHONE ENCOUNTER
----- Message from Crow Olivas M.D. sent at 11/7/2020  5:55 AM PST -----  Please notify patient that his COVID-19 test was negative  Crow Olivas M.D.

## 2020-11-17 ENCOUNTER — HOSPITAL ENCOUNTER (OUTPATIENT)
Dept: CARDIOLOGY | Facility: MEDICAL CENTER | Age: 80
End: 2020-11-17
Attending: FAMILY MEDICINE
Payer: MEDICARE

## 2020-11-17 ENCOUNTER — OFFICE VISIT (OUTPATIENT)
Dept: MEDICAL GROUP | Facility: MEDICAL CENTER | Age: 80
End: 2020-11-17
Payer: MEDICARE

## 2020-11-17 ENCOUNTER — TELEPHONE (OUTPATIENT)
Dept: MEDICAL GROUP | Facility: MEDICAL CENTER | Age: 80
End: 2020-11-17

## 2020-11-17 VITALS
TEMPERATURE: 97.2 F | HEART RATE: 61 BPM | OXYGEN SATURATION: 99 % | SYSTOLIC BLOOD PRESSURE: 126 MMHG | HEIGHT: 71 IN | DIASTOLIC BLOOD PRESSURE: 64 MMHG | BODY MASS INDEX: 23.38 KG/M2 | WEIGHT: 167 LBS

## 2020-11-17 DIAGNOSIS — I10 ESSENTIAL HYPERTENSION: ICD-10-CM

## 2020-11-17 DIAGNOSIS — K76.6 PORTAL HYPERTENSION (HCC): ICD-10-CM

## 2020-11-17 DIAGNOSIS — K70.31 ALCOHOLIC CIRRHOSIS OF LIVER WITH ASCITES (HCC): ICD-10-CM

## 2020-11-17 DIAGNOSIS — I48.91 ATRIAL FIBRILLATION, NEW ONSET (HCC): ICD-10-CM

## 2020-11-17 LAB
LV EJECT FRACT  99904: 60
LV EJECT FRACT MOD 2C 99903: 58.81
LV EJECT FRACT MOD 4C 99902: 58.72
LV EJECT FRACT MOD BP 99901: 57.28

## 2020-11-17 PROCEDURE — 99214 OFFICE O/P EST MOD 30 MIN: CPT | Performed by: FAMILY MEDICINE

## 2020-11-17 PROCEDURE — 93306 TTE W/DOPPLER COMPLETE: CPT | Mod: 26 | Performed by: INTERNAL MEDICINE

## 2020-11-17 PROCEDURE — 93306 TTE W/DOPPLER COMPLETE: CPT

## 2020-11-17 RX ORDER — METOPROLOL SUCCINATE 25 MG/1
25 TABLET, EXTENDED RELEASE ORAL DAILY
Qty: 90 TAB | Refills: 3 | Status: SHIPPED | OUTPATIENT
Start: 2020-11-17 | End: 2021-02-23

## 2020-11-17 ASSESSMENT — FIBROSIS 4 INDEX: FIB4 SCORE: 1.38

## 2020-11-17 NOTE — TELEPHONE ENCOUNTER
----- Message from Crow Olivas M.D. sent at 11/17/2020  9:58 AM PST -----  Please have patient schedule appointment today or tomorrow to go over ECHO results, okay to double book.  Crow Olivas M.D.

## 2020-11-17 NOTE — PROGRESS NOTES
Subjective:   Sammy Oseguera is a 80 y.o. male here today for A. Fib    Alcoholic cirrhosis of liver with ascites (HCC)  Last EGD in 2018 without varices. He is on spironolactone 100 mg daily.    Atrial fibrillation, new onset (HCC)  He has no heart palpitations. ECHO today showed new onset A. Fib.    Hypertension  Patient is tolerating spironolactone 100 mg daily.         Current medicines (including changes today)  Current Outpatient Medications   Medication Sig Dispense Refill   • metoprolol SR (TOPROL XL) 25 MG TABLET SR 24 HR Take 1 Tab by mouth every day. 90 Tab 3   • aspirin EC (ECOTRIN) 81 MG Tablet Delayed Response Take 1 Tab by mouth every day. 90 Tab 3   • sitagliptan-metformin (JANUMET)  MG per tablet Take 0.5 Tabs by mouth 2 times a day, with meals. 200 Tab 3   • spironolactone (ALDACTONE) 100 MG Tab Take 1 Tab by mouth every day. 90 Tab 3   • Blood Glucose Monitoring Suppl Device Meter: Dispense Device of Insurance Preference. Sig. Use as directed for blood sugar monitoring. #1. NR. 1 Device 0   • Blood Glucose Monitoring Suppl Supplies Misc Test strips order: Test strips for Device of Insurance Preference. Sig: use once daily. 100 Each 3   • Lancets Misc Lancets order: Lancets for Device of Insurance Preference. Sig: use once daily. 100 Each 3   • Acetaminophen-Caffeine 500-65 MG Tab Take 1 Tab by mouth every four hours as needed (for headache). 60 Tab 0   • cyanocobalamin (VITAMIN B12) 1000 MCG Tab Take 3 Tabs by mouth every day.     • vitamin D (CHOLECALCIFEROL) 1000 UNIT TABS Take 1,000 Units by mouth every day.       No current facility-administered medications for this visit.      He  has a past medical history of HTN and Type II or unspecified type diabetes mellitus without mention of complication, not stated as uncontrolled.    ROS   No chest pain, no shortness of breath       Objective:     /64 (BP Location: Right arm, Patient Position: Sitting)   Pulse 61   Temp 36.2 °C  "(97.2 °F) (Temporal)   Ht 1.803 m (5' 11\")   Wt 75.8 kg (167 lb)   SpO2 99%  Body mass index is 23.29 kg/m².   Physical Exam:  Constitutional: Alert, no distress.  Skin: Warm, dry, good turgor, no rashes in visible areas.  Eye: Equal, round and reactive, conjunctiva clear, lids normal.  Psych: Alert and oriented x3, normal affect and mood.  Cardiology: rate controlled, irregular rhythm.      Assessment and Plan:   The following treatment plan was discussed    1. Atrial fibrillation, new onset (HCC)  New problem. Continue metoprolol SR 25 mg daily and aspirin 81 mg daily. Referral to cardiology.  - metoprolol SR (TOPROL XL) 25 MG TABLET SR 24 HR; Take 1 Tab by mouth every day.  Dispense: 90 Tab; Refill: 3  - aspirin EC (ECOTRIN) 81 MG Tablet Delayed Response; Take 1 Tab by mouth every day.  Dispense: 90 Tab; Refill: 3  - REFERRAL TO CARDIOLOGY    2. Alcoholic cirrhosis of liver with ascites (HCC)  Start metoprolol SR 25 mg daily. Advised GI follow up with EGD.    3. Essential hypertension  Controlled. Continue spironolactone 100 mg daily.      Followup: Return if symptoms worsen or fail to improve.         "

## 2020-11-25 ENCOUNTER — APPOINTMENT (OUTPATIENT)
Dept: MEDICAL GROUP | Facility: MEDICAL CENTER | Age: 80
End: 2020-11-25
Payer: MEDICARE

## 2020-11-25 ENCOUNTER — OFFICE VISIT (OUTPATIENT)
Dept: CARDIOLOGY | Facility: MEDICAL CENTER | Age: 80
End: 2020-11-25
Payer: MEDICARE

## 2020-11-25 VITALS
WEIGHT: 185 LBS | HEIGHT: 71 IN | SYSTOLIC BLOOD PRESSURE: 122 MMHG | DIASTOLIC BLOOD PRESSURE: 68 MMHG | OXYGEN SATURATION: 98 % | HEART RATE: 46 BPM | BODY MASS INDEX: 25.9 KG/M2 | RESPIRATION RATE: 16 BRPM

## 2020-11-25 DIAGNOSIS — I48.19 ATRIAL FIBRILLATION, PERSISTENT (HCC): ICD-10-CM

## 2020-11-25 DIAGNOSIS — I10 ESSENTIAL HYPERTENSION: ICD-10-CM

## 2020-11-25 DIAGNOSIS — I10 ESSENTIAL HYPERTENSION, BENIGN: ICD-10-CM

## 2020-11-25 DIAGNOSIS — R00.1 BRADYCARDIA: ICD-10-CM

## 2020-11-25 LAB — EKG IMPRESSION: NORMAL

## 2020-11-25 PROCEDURE — 93000 ELECTROCARDIOGRAM COMPLETE: CPT | Performed by: INTERNAL MEDICINE

## 2020-11-25 PROCEDURE — 99204 OFFICE O/P NEW MOD 45 MIN: CPT | Performed by: INTERNAL MEDICINE

## 2020-11-25 ASSESSMENT — FIBROSIS 4 INDEX: FIB4 SCORE: 1.38

## 2020-11-25 NOTE — LETTER
"     [facility]   [address] [place] [address] [address]  [place] [address]  Phone: [phone]  Fax: [phone]              Sammy Oseguera  1940    Encounter Date: 11/25/2020    Conrad Goodman M.D.          PROGRESS NOTE:  Chief Complaint   Patient presents with   • Hypertension   • Atrial Fibrillation       Subjective:   Lucio Oseguera is a 80 y.o. male who presents today by his PCP Benji Olivas MD for cardiology consultation for management of newly diagnosed atrial fibrillation.    The patient has a significant history of hypertension, diabetes mellitus, alcoholic cirrhosis with portal hypertension.    He is followed by [facility] for his cirrhosis and portal hypertension.  An abdominal ultrasound 9/2020 which showed prominent IVC and hepatic veins with suggestion of right heart failure\".  His PCP ordered an echocardiogram which demonstrated atrial fibrillation otherwise unremarkable with normal EF and right heart pressures.  At follow-up appointment the patient was started on metoprolol with a baseline heart rate of 60 and aspirin.    The patient has no prior history of heart disease.  He denies palpitations.  No lightheadedness or syncope.  No angina pectoris or shortness of breath.  Since starting metoprolol his wife states that he is more lethargic and \"sleeps more\".  No dyslipidemia.  Never smoked cigarettes.  Stop drinking alcohol approximately 8 years ago.  No prior history of GI bleeding.  Not aware of undergoing banding for gastric or esophageal varices.    Past Medical History:   Diagnosis Date   • HTN    • Type II or unspecified type diabetes mellitus without mention of complication, not stated as uncontrolled      Past Surgical History:   Procedure Laterality Date   • LAPAROSCOPIC GASTRIC ULCER OVER SEW     • PROSTATECTOMY, RADICAL RETRO       Family History   Problem Relation Age of Onset   • Stroke Mother    • Stroke Father      Social History     "     Socioeconomic History   • Marital status:      Spouse name: Not on file   • Number of children: Not on file   • Years of education: Not on file   • Highest education level: Not on file   Occupational History   • Not on file   Social Needs   • Financial resource strain: Not on file   • Food insecurity     Worry: Never true     Inability: Never true   • Transportation needs     Medical: No     Non-medical: No   Tobacco Use   • Smoking status: Never Smoker   • Smokeless tobacco: Never Used   Substance and Sexual Activity   • Alcohol use: Not Currently     Alcohol/week: 0.0 oz     Comment: 4-5 weekly    • Drug use: No   • Sexual activity: Yes   Lifestyle   • Physical activity     Days per week: Not on file     Minutes per session: Not on file   • Stress: Not on file   Relationships   • Social connections     Talks on phone: Not on file     Gets together: Not on file     Attends [employer]'s witness service: Not on file     Active member of club or organization: Not on file     Attends meetings of clubs or organizations: Not on file     Relationship status: Not on file   • Intimate partner violence     Fear of current or ex partner: Not on file     Emotionally abused: Not on file     Physically abused: Not on file     Forced sexual activity: Not on file   Other Topics Concern   • Not on file   Social History Narrative   • Not on file     No Known Allergies  Outpatient Encounter Medications as of 11/25/2020   Medication Sig Dispense Refill   • metoprolol SR (TOPROL XL) 25 MG TABLET SR 24 HR Take 1 Tab by mouth every day. 90 Tab 3   • aspirin EC (ECOTRIN) 81 MG Tablet Delayed Response Take 1 Tab by mouth every day. 90 Tab 3   • sitagliptan-metformin (JANUMET)  MG per tablet Take 0.5 Tabs by mouth 2 times a day, with meals. 200 Tab 3   • spironolactone (ALDACTONE) 100 MG Tab Take 1 Tab by mouth every day. 90 Tab 3   • Blood Glucose Monitoring Suppl Device Meter: Dispense Device of Insurance Preference. Sig. Use as  "directed for blood sugar monitoring. #1. NR. 1 Device 0   • Blood Glucose Monitoring Suppl Supplies Misc Test strips order: Test strips for Device of Insurance Preference. Sig: use once daily. 100 Each 3   • Lancets Misc Lancets order: Lancets for Device of Insurance Preference. Sig: use once daily. 100 Each 3   • Acetaminophen-Caffeine 500-65 MG Tab Take 1 Tab by mouth every four hours as needed (for headache). (Patient not taking: Reported on 11/25/2020) 60 Tab 0   • cyanocobalamin (VITAMIN B12) 1000 MCG Tab Take 3 Tabs by mouth every day.     • vitamin D (CHOLECALCIFEROL) 1000 UNIT TABS Take 1,000 Units by mouth every day.       No facility-administered encounter medications on file as of 11/25/2020.      ROS     Objective:   /68 (BP Location: Left arm, Patient Position: Sitting, BP Cuff Size: Adult)   Pulse (!) 46   Resp 16   Ht 1.803 m (5' 11\")   Wt 83.9 kg (185 lb)   SpO2 98%   BMI 25.80 kg/m²     Physical Exam   Constitutional: He is oriented to person, place, and time. He appears well-developed and well-nourished. No distress.   Eyes: Pupils are equal, round, and reactive to light. Conjunctivae and EOM are normal.   Neck: Normal range of motion. Neck supple. No JVD present.   Cardiovascular: Normal heart sounds and intact distal pulses. An irregularly irregular rhythm present. Bradycardia present. Exam reveals no gallop and no friction rub.   No murmur heard.  Pulses:       Carotid pulses are 2+ on the right side and 2+ on the left side.  Pulmonary/Chest: Effort normal and breath sounds normal. No accessory muscle usage. No respiratory distress. He has no wheezes. He has no rales.   Abdominal: Soft. He exhibits no distension and no mass. There is no hepatosplenomegaly. There is no abdominal tenderness.   Musculoskeletal:         General: No edema.   Neurological: He is alert and oriented to person, place, and time.   Skin: Skin is warm, dry and intact. No rash noted. No cyanosis. Nails show no " clubbing.   Psychiatric: He has a normal mood and affect. His behavior is normal.   Vitals reviewed.    ABDOMINAL ULTRASOUND 09/16/2020  Prominent IVC and hepatic veins suggesting right heart failure.  No evidence of hepatic lesion.    ECHOCARDIOGRAM 11/17/2020  No prior study is available for comparison.   Moderate to severe concentric left ventricular hypertrophy.  Normal left ventricular systolic function. Left ventricular ejection   fraction is visually estimated to be 60%.  Diastolic function is difficult to assess with atrial fibrillation.  Normal inferior vena cava size and inspiratory collapse.  Estimated right ventricular systolic pressure  is 37 mmHg.    EKG 11/25/2020 atrial fibrillation, rate 40s.  Diffuse ST-T wave abnormalities.  Reviewed and interpreted.    Assessment:     1. Atrial fibrillation, persistent (HCC)  Cardiac Event Monitor   2. Bradycardia     3. Essential hypertension     4. Essential hypertension, benign  EKG     Medical Decision Making:  Today's Assessment / Status / Plan:     Assessment  1.  Atrial fibrillation, new diagnosis.  2.  Bradycardia on metoprolol.  3.  Hypertension.  4.  Diabetes mellitus.  5.  Alcoholic cirrhosis with portal hypertension.  6.  CKD.    Recommendation Discussion  1.  I had a detailed discussion with the patient and his wife concerning his diagnosis of atrial fibrillation and recommended treatment.  2.  He is asymptomatic for his atrial fibrillation and has normal EF.  3.  [id number] Score is 4, recommend Eliquis 2.5 mg, creatinine 1.5 and age, discussed stroke risk versus bleeding risk.  4.  Probably symptomatic due to bradycardia with metoprolol and will have him discontinue this along with aspirin.  5.  24-hour Holter monitor off metoprolol.  6.  [facility] medical records.  7.  Further recommendations based on Holter monitor.  8.  RTC 3 months, sooner if necessary.          Crow Olivas M.D.  [address]  [address]  [facility] [address]  Via In Basket       Bandar Dillard M.D.  [address] [place] [address]  Via Fax: [phone]

## 2020-11-30 ENCOUNTER — TELEPHONE (OUTPATIENT)
Dept: CARDIOLOGY | Facility: MEDICAL CENTER | Age: 80
End: 2020-11-30

## 2020-12-01 NOTE — TELEPHONE ENCOUNTER
SW    TO: 3p/Mon/Office  NM: Lucio Oseguera   PH: [phone]   ALT PH: [phone]   PT NM: Veronica Oseguera   : 40   REG DR: Dr Goodman   RE: Seen , received Rx but not  sure where it was sent.

## 2020-12-01 NOTE — TELEPHONE ENCOUNTER
Returned call. Pt was wondering where Eliquis Rx was sent, but then he found that SW had given him a hand written Eliquis Rx. He has no further concerns at this time.

## 2020-12-03 ENCOUNTER — TELEPHONE (OUTPATIENT)
Dept: CARDIOLOGY | Facility: MEDICAL CENTER | Age: 80
End: 2020-12-03

## 2020-12-03 NOTE — TELEPHONE ENCOUNTER
Per patient, Dr. Goodman request to know cost of Eliquis. Per patient, cost is $551.99 for one month's supply.    Information routed to Dr. Goodman.    To Dr. Goomdan: Please read above.

## 2020-12-03 NOTE — TELEPHONE ENCOUNTER
SW    TO: 1:30p/Wed/Ofc  NM: Jessica Oseguera   PH: [phone]   PT NM: Lucio Oseguera   : 1940   REG DR: Dr Goodman   RE:  wanted to know how much  eliquis would cost patient. Picked up  , cost is $551.99 for 1 month.   DISP HIST: 2020 01:00P AW P/Disp  2020 01:01P DLR checked msg

## 2020-12-04 ENCOUNTER — OFFICE VISIT (OUTPATIENT)
Dept: CARDIOLOGY | Facility: MEDICAL CENTER | Age: 80
End: 2020-12-04
Payer: MEDICARE

## 2020-12-04 VITALS
WEIGHT: 178 LBS | RESPIRATION RATE: 16 BRPM | SYSTOLIC BLOOD PRESSURE: 118 MMHG | BODY MASS INDEX: 24.92 KG/M2 | OXYGEN SATURATION: 98 % | HEIGHT: 71 IN | DIASTOLIC BLOOD PRESSURE: 62 MMHG | HEART RATE: 52 BPM

## 2020-12-04 DIAGNOSIS — Z79.01 ANTICOAGULATED: ICD-10-CM

## 2020-12-04 DIAGNOSIS — I10 ESSENTIAL HYPERTENSION: ICD-10-CM

## 2020-12-04 DIAGNOSIS — R00.1 BRADYCARDIA: ICD-10-CM

## 2020-12-04 DIAGNOSIS — I48.20 ATRIAL FIBRILLATION, CHRONIC (HCC): ICD-10-CM

## 2020-12-04 PROBLEM — I48.91 ATRIAL FIBRILLATION, NEW ONSET (HCC): Status: RESOLVED | Noted: 2020-11-17 | Resolved: 2020-12-04

## 2020-12-04 PROBLEM — I48.19 ATRIAL FIBRILLATION, PERSISTENT (HCC): Status: RESOLVED | Noted: 2020-11-25 | Resolved: 2020-12-04

## 2020-12-04 PROCEDURE — 99214 OFFICE O/P EST MOD 30 MIN: CPT | Performed by: INTERNAL MEDICINE

## 2020-12-04 RX ORDER — APIXABAN 2.5 MG/1
TABLET, FILM COATED ORAL 2 TIMES DAILY
COMMUNITY
Start: 2020-12-02 | End: 2021-12-22

## 2020-12-04 ASSESSMENT — ENCOUNTER SYMPTOMS
SHORTNESS OF BREATH: 0
SHORTNESS OF BREATH: 0
DIZZINESS: 0
PALPITATIONS: 0
LOSS OF CONSCIOUSNESS: 0
INSOMNIA: 0
MYALGIAS: 0
FEVER: 0
BLURRED VISION: 0
COUGH: 0
ORTHOPNEA: 0
LOSS OF CONSCIOUSNESS: 0
DIZZINESS: 0
PND: 0
MYALGIAS: 0
PALPITATIONS: 0
CHILLS: 0
COUGH: 0
ABDOMINAL PAIN: 0

## 2020-12-04 ASSESSMENT — FIBROSIS 4 INDEX: FIB4 SCORE: 1.38

## 2020-12-04 NOTE — PROGRESS NOTES
"Chief Complaint   Patient presents with   • HTN (Controlled)   • Atrial Fibrillation   • Bradycardia       Subjective:   Lucio Oseguera is a 80 y.o. male who presents today for follow-up evaluation of atrial fibrillation, hypertension and anticoagulation.    Last seen 11/25/2020.    Since 11/25/2020 the patient's had no specific cardiac symptoms.  Here today with his wife again.  At last visit he was instructed to start Eliquis 2.5 mg BID, discontinue metoprolol due to bradycardia and discontinue aspirin.  He brings in his medications today.  He had been started on metoprolol by his PCP Dr. Olivas but now tells me he never started it.  He is also not started Eliquis.  No neurological symptoms.  No bleeding problems.  Has a remote ulcer in the 1960s.    The patient has a significant history of hypertension, diabetes mellitus, alcoholic cirrhosis with portal hypertension.    He is followed by [facility] for his cirrhosis and portal hypertension.  An abdominal ultrasound 9/2020 which showed prominent IVC and hepatic veins with suggestion of right heart failure\".  His PCP ordered an echocardiogram which demonstrated atrial fibrillation otherwise unremarkable with normal EF and right heart pressures.  At follow-up appointment the patient was started on metoprolol with a baseline heart rate of 60 and aspirin.    The patient has no prior history of heart disease.  He denies palpitations.  No lightheadedness or syncope.  No angina pectoris or shortness of breath.  Since starting metoprolol his wife states that he is more lethargic and \"sleeps more\".  No dyslipidemia.  Never smoked cigarettes.  Stop drinking alcohol approximately 8 years ago.  No prior history of GI bleeding.  Not aware of undergoing banding for gastric or esophageal varices.    Past Medical History:   Diagnosis Date   • HTN    • Type II or unspecified type diabetes mellitus without mention of complication, not stated as uncontrolled      Past Surgical History: "   Procedure Laterality Date   • LAPAROSCOPIC GASTRIC ULCER OVER SEW     • PROSTATECTOMY, RADICAL RETRO       Family History   Problem Relation Age of Onset   • Stroke Mother    • Stroke Father      Social History     Socioeconomic History   • Marital status:      Spouse name: Not on file   • Number of children: Not on file   • Years of education: Not on file   • Highest education level: Not on file   Occupational History   • Not on file   Social Needs   • Financial resource strain: Not on file   • Food insecurity     Worry: Never true     Inability: Never true   • Transportation needs     Medical: No     Non-medical: No   Tobacco Use   • Smoking status: Never Smoker   • Smokeless tobacco: Never Used   Substance and Sexual Activity   • Alcohol use: Not Currently     Alcohol/week: 0.0 oz     Comment: 4-5 weekly    • Drug use: No   • Sexual activity: Yes   Lifestyle   • Physical activity     Days per week: Not on file     Minutes per session: Not on file   • Stress: Not on file   Relationships   • Social connections     Talks on phone: Not on file     Gets together: Not on file     Attends [employer] service: Not on file     Active member of club or organization: Not on file     Attends meetings of clubs or organizations: Not on file     Relationship status: Not on file   • Intimate partner violence     Fear of current or ex partner: Not on file     Emotionally abused: Not on file     Physically abused: Not on file     Forced sexual activity: Not on file   Other Topics Concern   • Not on file   Social History Narrative   • Not on file     No Known Allergies  Outpatient Encounter Medications as of 12/4/2020   Medication Sig Dispense Refill   • ELIQUIS 2.5 MG Tab      • metoprolol SR (TOPROL XL) 25 MG TABLET SR 24 HR Take 1 Tab by mouth every day. 90 Tab 3   • aspirin EC (ECOTRIN) 81 MG Tablet Delayed Response Take 1 Tab by mouth every day. 90 Tab 3   • sitagliptan-metformin (JANUMET)  MG per tablet Take 0.5  "Tabs by mouth 2 times a day, with meals. 200 Tab 3   • spironolactone (ALDACTONE) 100 MG Tab Take 1 Tab by mouth every day. 90 Tab 3   • Blood Glucose Monitoring Suppl Device Meter: Dispense Device of Insurance Preference. Sig. Use as directed for blood sugar monitoring. #1. NR. 1 Device 0   • Blood Glucose Monitoring Suppl Supplies Misc Test strips order: Test strips for Device of Insurance Preference. Sig: use once daily. 100 Each 3   • Lancets Misc Lancets order: Lancets for Device of Insurance Preference. Sig: use once daily. 100 Each 3   • Acetaminophen-Caffeine 500-65 MG Tab Take 1 Tab by mouth every four hours as needed (for headache). (Patient not taking: Reported on 11/25/2020) 60 Tab 0   • cyanocobalamin (VITAMIN B12) 1000 MCG Tab Take 3 Tabs by mouth every day.     • vitamin D (CHOLECALCIFEROL) 1000 UNIT TABS Take 1,000 Units by mouth every day.       No facility-administered encounter medications on file as of 12/4/2020.      Review of Systems   Respiratory: Negative for cough and shortness of breath.    Cardiovascular: Negative for chest pain and palpitations.   Musculoskeletal: Negative for myalgias.   Neurological: Negative for dizziness and loss of consciousness.        Objective:   /62 (BP Location: Left arm, Patient Position: Sitting, BP Cuff Size: Adult)   Pulse (!) 52   Resp 16   Ht 1.803 m (5' 11\")   Wt 80.7 kg (178 lb)   SpO2 98%   BMI 24.83 kg/m²     Physical Exam   Constitutional: He is oriented to person, place, and time. He appears well-developed and well-nourished. No distress.   Neck: Normal range of motion. Neck supple. No JVD present.   Cardiovascular: Normal heart sounds and intact distal pulses. An irregularly irregular rhythm present. Bradycardia present. Exam reveals no gallop and no friction rub.   No murmur heard.  Pulses:       Carotid pulses are 2+ on the right side and 2+ on the left side.  Pulmonary/Chest: Effort normal and breath sounds normal. No accessory muscle " usage. No respiratory distress. He has no wheezes. He has no rales.   Abdominal: Soft. He exhibits no distension and no mass. There is no hepatosplenomegaly. There is no abdominal tenderness.   Musculoskeletal:         General: No edema.   Neurological: He is alert and oriented to person, place, and time.   Skin: Skin is warm, dry and intact. No rash noted. No cyanosis. Nails show no clubbing.   Psychiatric: He has a normal mood and affect. His behavior is normal.   Vitals reviewed.    ABDOMINAL ULTRASOUND 09/16/2020  Prominent IVC and hepatic veins suggesting right heart failure.  No evidence of hepatic lesion.    ECHOCARDIOGRAM 11/17/2020  No prior study is available for comparison.   Moderate to severe concentric left ventricular hypertrophy.  Normal left ventricular systolic function. Left ventricular ejection   fraction is visually estimated to be 60%.  Diastolic function is difficult to assess with atrial fibrillation.  Normal inferior vena cava size and inspiratory collapse.  Estimated right ventricular systolic pressure  is 37 mmHg.    EKG 11/25/2020 atrial fibrillation, rate 40s.  Diffuse ST-T wave abnormalities.  Reviewed and interpreted.    Assessment:     1. Atrial fibrillation, chronic (HCC)     2. Anticoagulated     3. Essential hypertension     4. Bradycardia       Medical Decision Making:  Today's Assessment / Status / Plan:     Assessment  1.  Atrial fibrillation, new diagnosis.  2.  Bradycardia on metoprolol.  3.  Hypertension.  4.  Diabetes mellitus.  5.  Alcoholic cirrhosis with portal hypertension.  6.  CKD.    Recommendation Discussion  1.  I again reviewed his reviewed the nature of his cardiac arrhythmia both with the patient and his wife.  2.  Discussed treatment objectives including thromboembolic prophylaxis.  3.  Currently bradycardic but does not appear to be symptomatic nonetheless scheduled for 24-hour Holter monitor in early January.  4.  Again reviewed all of his medications and he  is to start Eliquis 2.5 mg twice daily.  5.  Obtaining medical records from [facility].  6.  Has scheduled follow-up with cardiology 3/11/2020.

## 2021-01-04 ENCOUNTER — APPOINTMENT (RX ONLY)
Dept: URBAN - METROPOLITAN AREA CLINIC 4 | Facility: CLINIC | Age: 81
Setting detail: DERMATOLOGY
End: 2021-01-04

## 2021-01-04 DIAGNOSIS — L57.0 ACTINIC KERATOSIS: ICD-10-CM

## 2021-01-04 DIAGNOSIS — L81.4 OTHER MELANIN HYPERPIGMENTATION: ICD-10-CM

## 2021-01-04 DIAGNOSIS — D18.0 HEMANGIOMA: ICD-10-CM

## 2021-01-04 DIAGNOSIS — Z71.89 OTHER SPECIFIED COUNSELING: ICD-10-CM

## 2021-01-04 DIAGNOSIS — D22 MELANOCYTIC NEVI: ICD-10-CM

## 2021-01-04 DIAGNOSIS — L82.1 OTHER SEBORRHEIC KERATOSIS: ICD-10-CM

## 2021-01-04 DIAGNOSIS — Z85.828 PERSONAL HISTORY OF OTHER MALIGNANT NEOPLASM OF SKIN: ICD-10-CM

## 2021-01-04 PROBLEM — D22.62 MELANOCYTIC NEVI OF LEFT UPPER LIMB, INCLUDING SHOULDER: Status: ACTIVE | Noted: 2021-01-04

## 2021-01-04 PROBLEM — D22.39 MELANOCYTIC NEVI OF OTHER PARTS OF FACE: Status: ACTIVE | Noted: 2021-01-04

## 2021-01-04 PROBLEM — D22.5 MELANOCYTIC NEVI OF TRUNK: Status: ACTIVE | Noted: 2021-01-04

## 2021-01-04 PROBLEM — D22.61 MELANOCYTIC NEVI OF RIGHT UPPER LIMB, INCLUDING SHOULDER: Status: ACTIVE | Noted: 2021-01-04

## 2021-01-04 PROBLEM — D18.01 HEMANGIOMA OF SKIN AND SUBCUTANEOUS TISSUE: Status: ACTIVE | Noted: 2021-01-04

## 2021-01-04 PROCEDURE — ? ADDITIONAL NOTES

## 2021-01-04 PROCEDURE — 17003 DESTRUCT PREMALG LES 2-14: CPT

## 2021-01-04 PROCEDURE — 17000 DESTRUCT PREMALG LESION: CPT

## 2021-01-04 PROCEDURE — 99213 OFFICE O/P EST LOW 20 MIN: CPT | Mod: 25

## 2021-01-04 PROCEDURE — ? COUNSELING

## 2021-01-04 PROCEDURE — ? SUNSCREEN TREATMENT REGIMEN

## 2021-01-04 PROCEDURE — ? LIQUID NITROGEN

## 2021-01-04 ASSESSMENT — LOCATION SIMPLE DESCRIPTION DERM
LOCATION SIMPLE: LEFT FOREHEAD
LOCATION SIMPLE: RIGHT UPPER ARM
LOCATION SIMPLE: RIGHT PRETIBIAL REGION
LOCATION SIMPLE: RIGHT UPPER BACK
LOCATION SIMPLE: ANTERIOR SCALP
LOCATION SIMPLE: LEFT UPPER ARM
LOCATION SIMPLE: CHEST
LOCATION SIMPLE: UPPER BACK
LOCATION SIMPLE: LEFT UPPER BACK
LOCATION SIMPLE: ABDOMEN
LOCATION SIMPLE: RIGHT CHEEK
LOCATION SIMPLE: LEFT CHEEK

## 2021-01-04 ASSESSMENT — LOCATION DETAILED DESCRIPTION DERM
LOCATION DETAILED: LEFT LATERAL INFERIOR CHEST
LOCATION DETAILED: RIGHT MEDIAL INFERIOR CHEST
LOCATION DETAILED: RIGHT PROXIMAL PRETIBIAL REGION
LOCATION DETAILED: LEFT CENTRAL MANDIBULAR CHEEK
LOCATION DETAILED: RIGHT ANTERIOR PROXIMAL UPPER ARM
LOCATION DETAILED: EPIGASTRIC SKIN
LOCATION DETAILED: RIGHT INFERIOR UPPER BACK
LOCATION DETAILED: RIGHT INFERIOR CENTRAL MALAR CHEEK
LOCATION DETAILED: LEFT ANTERIOR DISTAL UPPER ARM
LOCATION DETAILED: SUPERIOR THORACIC SPINE
LOCATION DETAILED: RIGHT MEDIAL UPPER BACK
LOCATION DETAILED: RIGHT MID-UPPER BACK
LOCATION DETAILED: MID-FRONTAL SCALP
LOCATION DETAILED: LOWER STERNUM
LOCATION DETAILED: LEFT ANTERIOR PROXIMAL UPPER ARM
LOCATION DETAILED: LEFT MEDIAL UPPER BACK
LOCATION DETAILED: LEFT INFERIOR MEDIAL FOREHEAD
LOCATION DETAILED: RIGHT CENTRAL MALAR CHEEK
LOCATION DETAILED: RIGHT MEDIAL MALAR CHEEK
LOCATION DETAILED: LEFT CENTRAL MALAR CHEEK

## 2021-01-04 ASSESSMENT — LOCATION ZONE DERM
LOCATION ZONE: FACE
LOCATION ZONE: LEG
LOCATION ZONE: SCALP
LOCATION ZONE: ARM
LOCATION ZONE: TRUNK

## 2021-01-07 ENCOUNTER — NON-PROVIDER VISIT (OUTPATIENT)
Dept: CARDIOLOGY | Facility: MEDICAL CENTER | Age: 81
End: 2021-01-07
Payer: MEDICARE

## 2021-01-07 DIAGNOSIS — I48.20 ATRIAL FIBRILLATION, CHRONIC (HCC): ICD-10-CM

## 2021-01-07 DIAGNOSIS — I49.3 MULTIFOCAL PVCS: ICD-10-CM

## 2021-01-07 PROCEDURE — 93224 XTRNL ECG REC UP TO 48 HRS: CPT | Performed by: INTERNAL MEDICINE

## 2021-01-08 DIAGNOSIS — Z23 NEED FOR VACCINATION: ICD-10-CM

## 2021-01-11 DIAGNOSIS — I48.19 ATRIAL FIBRILLATION, PERSISTENT (HCC): ICD-10-CM

## 2021-01-11 LAB — EKG IMPRESSION: NORMAL

## 2021-02-06 ENCOUNTER — IMMUNIZATION (OUTPATIENT)
Dept: FAMILY PLANNING/WOMEN'S HEALTH CLINIC | Facility: IMMUNIZATION CENTER | Age: 81
End: 2021-02-06
Attending: INTERNAL MEDICINE
Payer: MEDICARE

## 2021-02-06 DIAGNOSIS — Z23 ENCOUNTER FOR VACCINATION: Primary | ICD-10-CM

## 2021-02-06 DIAGNOSIS — Z23 NEED FOR VACCINATION: ICD-10-CM

## 2021-02-06 PROCEDURE — 0011A MODERNA SARS-COV-2 VACCINE: CPT

## 2021-02-06 PROCEDURE — 91301 MODERNA SARS-COV-2 VACCINE: CPT

## 2021-02-16 PROBLEM — R00.1 BRADYCARDIA: Status: RESOLVED | Noted: 2020-11-25 | Resolved: 2021-02-16

## 2021-02-16 ASSESSMENT — ENCOUNTER SYMPTOMS
PALPITATIONS: 0
VOMITING: 0
NAUSEA: 0
SHORTNESS OF BREATH: 0
FEVER: 0
CONSTIPATION: 0
WEAKNESS: 0
BLURRED VISION: 0
DIARRHEA: 0
CHILLS: 0
DEPRESSION: 0

## 2021-02-16 NOTE — PROGRESS NOTES
History of Present Illness  80 year old male presents to clinic to establish care.  He has atrial fibrillation and hypertension for which he is using spironolactone.  He does not check his blood pressures regularly at home. Subsequent to his atrial fibrillation he is using Eliquis for anticoagulation. He denies any palpitations or chest pain. No active bleeding    He has a history of diabetes and is using Janumet.  He does not check his blood sugars at home.  Last hemoglobin A1c was 8.3 7/11/2020.  He was previously taking Janumet twice a day, but had to creased to daily as he was having diarrhea from this.    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  He is using spironolactone for this.  Last liver ultrasound was done 9/16/2020.  He did see GI, but is unsure what they told him.  I am unable to access his records for that today.    He does have a history of chronic kidney disease, stage III.  He is may nephrotoxic medications and trying to stay well-hydrated.    He denies any other questions or concerns at this time.    ROS  Review of Systems   Constitutional: Negative for chills and fever.   HENT: Negative for hearing loss.    Eyes: Negative for blurred vision.   Respiratory: Negative for shortness of breath.    Cardiovascular: Negative for chest pain and palpitations.   Gastrointestinal: Negative for constipation, diarrhea, nausea and vomiting.   Genitourinary: Negative for dysuria and hematuria.   Skin: Negative for rash.   Neurological: Negative for weakness.   Psychiatric/Behavioral: Negative for depression.     Medications  Current Outpatient Medications   Medication Sig Dispense Refill   • ELIQUIS 2.5 MG Tab      • aspirin EC (ECOTRIN) 81 MG Tablet Delayed Response Take 1 Tab by mouth every day. 90 Tab 3   • sitagliptan-metformin (JANUMET)  MG per tablet Take 0.5 Tabs by mouth 2 times a day, with meals. 200 Tab 3   • spironolactone (ALDACTONE) 100 MG Tab Take  1 Tab by mouth every day. 90 Tab 3   • Blood Glucose Monitoring Suppl Device Meter: Dispense Device of Insurance Preference. Sig. Use as directed for blood sugar monitoring. #1. NR. 1 Device 0   • Blood Glucose Monitoring Suppl Supplies Misc Test strips order: Test strips for Device of Insurance Preference. Sig: use once daily. 100 Each 3   • Lancets Misc Lancets order: Lancets for Device of Insurance Preference. Sig: use once daily. 100 Each 3     No current facility-administered medications for this visit.     Allergies  No Known Allergies    Problem List  Patient Active Problem List   Diagnosis   • Hypertension   • History of prostate cancer   • Anemia   • Alcoholic cirrhosis of liver with ascites (HCC)   • CKD (chronic kidney disease) stage 3, GFR 30-59 ml/min   • Type 2 diabetes mellitus with stage 3 chronic kidney disease, without long-term current use of insulin (HCC)   • Portal hypertension (HCC)   • Atrial fibrillation, chronic (HCC)   • Anticoagulated     Past Medical History  Past Medical History:   Diagnosis Date   • HTN    • Type II or unspecified type diabetes mellitus without mention of complication, not stated as uncontrolled      Past Surgical History  Past Surgical History:   Procedure Laterality Date   • LAPAROSCOPIC GASTRIC ULCER OVER SEW     • PROSTATECTOMY, RADICAL RETRO       Past Family History  Family History   Problem Relation Age of Onset   • Stroke Mother    • Heart Disease Father    • Depression Sister    • Heart Disease Sister    • Depression Sister    • Depression Sister    • No Known Problems Son      Social History  He reports eating a healthy and balanced diet, but does not get regular exercise. 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 He denies any alcohol, tobacco product, or illicit drug use. He is not currently sexually active.    Physical Exam  /54 (BP Location: Left arm, Patient Position: Sitting, BP Cuff Size: Adult)   Pulse (!) 48   Temp  36.3 °C (97.4 °F) (Temporal)   Resp 16   Ht 1.829 m (6')   Wt 76 kg (167 lb 8.8 oz)   SpO2 99%   BMI 22.72 kg/m²   Physical Exam   Constitutional: He is well-developed, well-nourished, and in no distress. No distress.   HENT:   Head: Normocephalic and atraumatic.   Right Ear: Tympanic membrane, external ear and ear canal normal.   Left Ear: Tympanic membrane, external ear and ear canal normal.   Eyes: Pupils are equal, round, and reactive to light. Right eye exhibits no discharge. Left eye exhibits no discharge. No scleral icterus.   Neck: No thyromegaly present.   Cardiovascular: Normal rate, regular rhythm and normal heart sounds.   Pulmonary/Chest: Effort normal and breath sounds normal. No respiratory distress.   Abdominal: Soft. Bowel sounds are normal. He exhibits no distension. There is no abdominal tenderness.   Musculoskeletal:         General: No edema.   Neurological: He is alert.   Skin: Skin is warm and dry. He is not diaphoretic.   Psychiatric: Affect and judgment normal.   Monofilament testing with a 10 gram force: sensation: intact bilaterally  Visual Inspection: Feet without maceration, ulcers, or fissures.  Pedal pulses: intact bilaterally    Assessment & Plan  1. Atrial fibrillation, chronic (HCC)  2. Essential hypertension  Chronic and stable.  Continue the same medications.  - Comp Metabolic Panel; Future    3. Alcoholic cirrhosis of liver with ascites (HCC)  4. Portal hypertension (HCC)  Chronic and stable.  I am unable to see any notes from his gastroenterologist, the patient will get these records sent to me.    5. Type 2 diabetes mellitus with stage 3b chronic kidney disease, without long-term current use of insulin (HCC)  Chronic and uncontrolled.  We will get an updated hemoglobin A1c.  If this remains high, we will add sitagliptin to his current Janumet.  - Comp Metabolic Panel; Future  - Lipid Profile; Future  - HEMOGLOBIN A1C; Future  - Diabetic Monofilament Lower Extremity  Exam    6. Stage 3b chronic kidney disease  Chronic and stable.  We will get updated labs.  - Comp Metabolic Panel; Future    Return in about 6 months (around 8/23/2021) for Medicare annual wellness.    Radha Dow M.D.

## 2021-02-18 PROBLEM — K40.90 LEFT INGUINAL HERNIA: Status: RESOLVED | Noted: 2018-07-10 | Resolved: 2021-02-18

## 2021-02-18 PROBLEM — R29.898 BILATERAL LEG WEAKNESS: Status: RESOLVED | Noted: 2020-10-16 | Resolved: 2021-02-18

## 2021-02-18 PROBLEM — I87.2 VENOUS INSUFFICIENCY OF BOTH LOWER EXTREMITIES: Status: RESOLVED | Noted: 2018-03-14 | Resolved: 2021-02-18

## 2021-02-18 PROBLEM — R09.82 POST-NASAL DRAINAGE: Status: RESOLVED | Noted: 2017-09-14 | Resolved: 2021-02-18

## 2021-02-23 ENCOUNTER — OFFICE VISIT (OUTPATIENT)
Dept: MEDICAL GROUP | Facility: MEDICAL CENTER | Age: 81
End: 2021-02-23
Payer: MEDICARE

## 2021-02-23 VITALS
OXYGEN SATURATION: 99 % | HEIGHT: 72 IN | RESPIRATION RATE: 16 BRPM | TEMPERATURE: 97.4 F | SYSTOLIC BLOOD PRESSURE: 134 MMHG | WEIGHT: 167.55 LBS | HEART RATE: 48 BPM | BODY MASS INDEX: 22.69 KG/M2 | DIASTOLIC BLOOD PRESSURE: 54 MMHG

## 2021-02-23 DIAGNOSIS — I10 ESSENTIAL HYPERTENSION: ICD-10-CM

## 2021-02-23 DIAGNOSIS — N18.32 TYPE 2 DIABETES MELLITUS WITH STAGE 3B CHRONIC KIDNEY DISEASE, WITHOUT LONG-TERM CURRENT USE OF INSULIN (HCC): ICD-10-CM

## 2021-02-23 DIAGNOSIS — I48.20 ATRIAL FIBRILLATION, CHRONIC (HCC): ICD-10-CM

## 2021-02-23 DIAGNOSIS — E11.22 TYPE 2 DIABETES MELLITUS WITH STAGE 3B CHRONIC KIDNEY DISEASE, WITHOUT LONG-TERM CURRENT USE OF INSULIN (HCC): ICD-10-CM

## 2021-02-23 DIAGNOSIS — K70.31 ALCOHOLIC CIRRHOSIS OF LIVER WITH ASCITES (HCC): ICD-10-CM

## 2021-02-23 DIAGNOSIS — N18.32 STAGE 3B CHRONIC KIDNEY DISEASE: ICD-10-CM

## 2021-02-23 DIAGNOSIS — K76.6 PORTAL HYPERTENSION (HCC): ICD-10-CM

## 2021-02-23 PROCEDURE — 99214 OFFICE O/P EST MOD 30 MIN: CPT | Performed by: FAMILY MEDICINE

## 2021-02-23 ASSESSMENT — PATIENT HEALTH QUESTIONNAIRE - PHQ9
5. POOR APPETITE OR OVEREATING: 0 - NOT AT ALL
CLINICAL INTERPRETATION OF PHQ2 SCORE: 1
SUM OF ALL RESPONSES TO PHQ QUESTIONS 1-9: 3

## 2021-02-23 ASSESSMENT — FIBROSIS 4 INDEX: FIB4 SCORE: 1.38

## 2021-02-25 ENCOUNTER — TELEPHONE (OUTPATIENT)
Dept: CARDIOLOGY | Facility: MEDICAL CENTER | Age: 81
End: 2021-02-25

## 2021-02-25 NOTE — TELEPHONE ENCOUNTER
Please contact the patient and confirm that he is on an anticoagulate for his atrial fibrillation; not clear by chart

## 2021-03-01 DIAGNOSIS — K70.31 ALCOHOLIC CIRRHOSIS OF LIVER WITH ASCITES (HCC): ICD-10-CM

## 2021-03-01 RX ORDER — SPIRONOLACTONE 100 MG/1
100 TABLET, FILM COATED ORAL DAILY
Qty: 90 TABLET | Refills: 0 | Status: SHIPPED | OUTPATIENT
Start: 2021-03-01 | End: 2021-07-14

## 2021-03-02 NOTE — TELEPHONE ENCOUNTER
Pt called back advising they are still taking their Eliquis. Please call them back at [phone].    Thank you,  Sally MARRERO

## 2021-03-02 NOTE — TELEPHONE ENCOUNTER
S/w pt confirms taking 2.5mg BID eliquis. Does not need a refill states he is on his way to  his refill.

## 2021-03-05 ENCOUNTER — IMMUNIZATION (OUTPATIENT)
Dept: FAMILY PLANNING/WOMEN'S HEALTH CLINIC | Facility: IMMUNIZATION CENTER | Age: 81
End: 2021-03-05
Attending: INTERNAL MEDICINE
Payer: MEDICARE

## 2021-03-05 DIAGNOSIS — Z23 ENCOUNTER FOR VACCINATION: Primary | ICD-10-CM

## 2021-03-05 PROCEDURE — 91301 MODERNA SARS-COV-2 VACCINE: CPT

## 2021-03-05 PROCEDURE — 0012A MODERNA SARS-COV-2 VACCINE: CPT

## 2021-03-25 ENCOUNTER — HOSPITAL ENCOUNTER (OUTPATIENT)
Dept: RADIOLOGY | Facility: MEDICAL CENTER | Age: 81
End: 2021-03-25
Attending: INTERNAL MEDICINE
Payer: MEDICARE

## 2021-03-25 ENCOUNTER — HOSPITAL ENCOUNTER (OUTPATIENT)
Dept: LAB | Facility: MEDICAL CENTER | Age: 81
End: 2021-03-25
Attending: INTERNAL MEDICINE
Payer: MEDICARE

## 2021-03-25 DIAGNOSIS — K70.30 ALCOHOLIC CIRRHOSIS, UNSPECIFIED WHETHER ASCITES PRESENT (HCC): ICD-10-CM

## 2021-03-25 DIAGNOSIS — R93.2 ABNORMAL LIVER SCAN: ICD-10-CM

## 2021-03-25 PROCEDURE — 700117 HCHG RX CONTRAST REV CODE 255: Performed by: INTERNAL MEDICINE

## 2021-03-25 PROCEDURE — 82105 ALPHA-FETOPROTEIN SERUM: CPT

## 2021-03-25 PROCEDURE — A9576 INJ PROHANCE MULTIPACK: HCPCS | Performed by: INTERNAL MEDICINE

## 2021-03-25 PROCEDURE — 36415 COLL VENOUS BLD VENIPUNCTURE: CPT

## 2021-03-25 PROCEDURE — 74183 MRI ABD W/O CNTR FLWD CNTR: CPT | Mod: MH

## 2021-03-25 RX ADMIN — GADOTERIDOL 15 ML: 279.3 INJECTION, SOLUTION INTRAVENOUS at 10:18

## 2021-03-27 LAB — AFP-TM SERPL-MCNC: 2 NG/ML (ref 0–9)

## 2021-03-29 ENCOUNTER — TELEPHONE (OUTPATIENT)
Dept: CARDIOLOGY | Facility: MEDICAL CENTER | Age: 81
End: 2021-03-29

## 2021-03-29 NOTE — TELEPHONE ENCOUNTER
Received a surgical clearance from [facility] for patient to have EGD/Colonoscopy scheduled on 05/19/21.   P: [phone]  F:[phone]  Upon chart review patient has upcoming appt on 04/05/21.  Will await clearance from appt.

## 2021-04-05 ENCOUNTER — OFFICE VISIT (OUTPATIENT)
Dept: CARDIOLOGY | Facility: MEDICAL CENTER | Age: 81
End: 2021-04-05
Payer: MEDICARE

## 2021-04-05 ENCOUNTER — APPOINTMENT (RX ONLY)
Dept: URBAN - METROPOLITAN AREA CLINIC 4 | Facility: CLINIC | Age: 81
Setting detail: DERMATOLOGY
End: 2021-04-05

## 2021-04-05 VITALS
OXYGEN SATURATION: 100 % | HEART RATE: 47 BPM | BODY MASS INDEX: 22.96 KG/M2 | DIASTOLIC BLOOD PRESSURE: 60 MMHG | SYSTOLIC BLOOD PRESSURE: 130 MMHG | HEIGHT: 71 IN | WEIGHT: 164 LBS

## 2021-04-05 DIAGNOSIS — I48.20 ATRIAL FIBRILLATION, CHRONIC (HCC): ICD-10-CM

## 2021-04-05 DIAGNOSIS — I10 ESSENTIAL HYPERTENSION: ICD-10-CM

## 2021-04-05 DIAGNOSIS — S31000A OPEN WOUND(S) (MULTIPLE) OF UNSPECIFIED SITE(S), WITHOUT MENTION OF COMPLICATION: ICD-10-CM

## 2021-04-05 DIAGNOSIS — Z79.01 ANTICOAGULATED: ICD-10-CM

## 2021-04-05 PROBLEM — T07.XXXA UNSPECIFIED MULTIPLE INJURIES, INITIAL ENCOUNTER: Status: ACTIVE | Noted: 2021-04-05

## 2021-04-05 PROCEDURE — ? ADDITIONAL NOTES

## 2021-04-05 PROCEDURE — ? PHOTO-DOCUMENTATION

## 2021-04-05 PROCEDURE — 99212 OFFICE O/P EST SF 10 MIN: CPT

## 2021-04-05 PROCEDURE — ? DIAGNOSIS COMMENT

## 2021-04-05 PROCEDURE — 99214 OFFICE O/P EST MOD 30 MIN: CPT | Performed by: INTERNAL MEDICINE

## 2021-04-05 ASSESSMENT — ENCOUNTER SYMPTOMS
PALPITATIONS: 0
COUGH: 0
DIZZINESS: 0
SHORTNESS OF BREATH: 0
LOSS OF CONSCIOUSNESS: 0
MYALGIAS: 0

## 2021-04-05 ASSESSMENT — FIBROSIS 4 INDEX: FIB4 SCORE: 1.38

## 2021-04-05 NOTE — PROGRESS NOTES
"Chief Complaint   Patient presents with   • Atrial Fibrillation     4MO F/V DX: Atrial fibrillation, chronic (HCC)       Subjective:   Lucio Oseguera is a 80 y.o. male who presents today for follow-up evaluation of atrial fibrillation, hypertension and anticoagulation.    Last seen 12/4/2020.    Since 12/4/2020 the patient denies any cardiac problems or symptoms.  He is unclear on that Eliquis dosage.  Has not followed up with his A1c blood draw for PCP, prior A1c was 8.3%.  He has some balance problems, slipped and fell and had a superficial wound of his left wrist.    The patient has no prior history of heart disease.  He denies palpitations.  No lightheadedness or syncope.  No angina pectoris or shortness of breath.  Since starting metoprolol his wife states that he is more lethargic and \"sleeps more\".  No dyslipidemia.  Never smoked cigarettes.  Stop drinking alcohol approximately 8 years ago.  No prior history of GI bleeding.  Not aware of undergoing banding for gastric or esophageal varices.    Past Medical History:   Diagnosis Date   • HTN    • Type II or unspecified type diabetes mellitus without mention of complication, not stated as uncontrolled      Past Surgical History:   Procedure Laterality Date   • LAPAROSCOPIC GASTRIC ULCER OVER SEW     • PROSTATECTOMY, RADICAL RETRO       Family History   Problem Relation Age of Onset   • Stroke Mother    • Heart Disease Father    • Depression Sister    • Heart Disease Sister    • Depression Sister    • Depression Sister    • No Known Problems Son      Social History     Socioeconomic History   • Marital status:      Spouse name: Not on file   • Number of children: Not on file   • Years of education: Not on file   • Highest education level: Not on file   Occupational History   • Not on file   Tobacco Use   • Smoking status: Never Smoker   • Smokeless tobacco: Never Used   Substance and Sexual Activity   • Alcohol use: Not Currently     Alcohol/week: 0.0 oz "     Comment: 4-5 weekly    • Drug use: No   • Sexual activity: Yes   Other Topics Concern   • Not on file   Social History Narrative   • Not on file     Social Determinants of Health     Financial Resource Strain:    • Difficulty of Paying Living Expenses:    Food Insecurity:    • Worried About Running Out of Food in the Last Year:    • Ran Out of Food in the Last Year:    Transportation Needs:    • Lack of Transportation (Medical):    • Lack of Transportation (Non-Medical):    Physical Activity:    • Days of Exercise per Week:    • Minutes of Exercise per Session:    Stress:    • Feeling of Stress :    Social Connections:    • Frequency of Communication with Friends and Family:    • Frequency of Social Gatherings with Friends and Family:    • Attends [employer] Services:    • Active Member of Clubs or Organizations:    • Attends Club or Organization Meetings:    • Marital Status:    Intimate Partner Violence:    • Fear of Current or Ex-Partner:    • Emotionally Abused:    • Physically Abused:    • Sexually Abused:      No Known Allergies  Outpatient Encounter Medications as of 4/5/2021   Medication Sig Dispense Refill   • spironolactone (ALDACTONE) 100 MG Tab Take 1 tablet by mouth every day. 90 tablet 0   • ELIQUIS 2.5 MG Tab      • sitagliptan-metformin (JANUMET)  MG per tablet Take 0.5 Tabs by mouth 2 times a day, with meals. 200 Tab 3   • aspirin EC (ECOTRIN) 81 MG Tablet Delayed Response Take 1 Tab by mouth every day. (Patient not taking: Reported on 4/5/2021) 90 Tab 3   • Blood Glucose Monitoring Suppl Device Meter: Dispense Device of Insurance Preference. Sig. Use as directed for blood sugar monitoring. #1. NR. (Patient not taking: Reported on 4/5/2021) 1 Device 0   • Blood Glucose Monitoring Suppl Supplies Misc Test strips order: Test strips for Device of Insurance Preference. Sig: use once daily. (Patient not taking: Reported on 4/5/2021) 100 Each 3   • Lancets Misc Lancets order: Lancets for Device  "of Insurance Preference. Sig: use once daily. (Patient not taking: Reported on 4/5/2021) 100 Each 3     No facility-administered encounter medications on file as of 4/5/2021.     Review of Systems   Respiratory: Negative for cough and shortness of breath.    Cardiovascular: Negative for chest pain and palpitations.   Musculoskeletal: Negative for myalgias.   Neurological: Negative for dizziness and loss of consciousness.        Objective:   /60 (BP Location: Left arm, Patient Position: Sitting, BP Cuff Size: Adult)   Pulse (!) 47   Ht 1.803 m (5' 11\")   Wt 74.4 kg (164 lb)   SpO2 100%   BMI 22.87 kg/m²     Physical Exam   Constitutional: He is oriented to person, place, and time. He appears well-developed and well-nourished. No distress.   Neck: No JVD present.   Cardiovascular: Normal heart sounds and intact distal pulses. An irregularly irregular rhythm present. Bradycardia present. Exam reveals no gallop and no friction rub.   No murmur heard.  Pulses:       Carotid pulses are 2+ on the right side and 2+ on the left side.  Pulmonary/Chest: Effort normal and breath sounds normal. No accessory muscle usage. No respiratory distress. He has no wheezes. He has no rales.   Abdominal: Soft. He exhibits no distension and no mass. There is no hepatosplenomegaly. There is no abdominal tenderness.   Musculoskeletal:         General: No edema.      Cervical back: Normal range of motion and neck supple.   Neurological: He is alert and oriented to person, place, and time.   Skin: Skin is warm, dry and intact. No rash noted. No cyanosis. Nails show no clubbing.   Psychiatric: He has a normal mood and affect. His behavior is normal.   Vitals reviewed.    ABDOMINAL ULTRASOUND 09/16/2020  Prominent IVC and hepatic veins suggesting right heart failure.  No evidence of hepatic lesion.    ECHOCARDIOGRAM 11/17/2020  No prior study is available for comparison.   Moderate to severe concentric left ventricular " hypertrophy.  Normal left ventricular systolic function. Left ventricular ejection   fraction is visually estimated to be 60%.  Diastolic function is difficult to assess with atrial fibrillation.  Normal inferior vena cava size and inspiratory collapse.  Estimated right ventricular systolic pressure  is 37 mmHg.    EKG 11/25/2020 atrial fibrillation, rate 40s.  Diffuse ST-T wave abnormalities.  Reviewed and interpreted.    Assessment:     1. Atrial fibrillation, chronic (HCC)     2. Anticoagulated     3. Essential hypertension       Medical Decision Making:  Today's Assessment / Status / Plan:     Assessment  1.  Atrial fibrillation, new diagnosis.  2.  Bradycardia on metoprolol.  3.  Hypertension.  4.  Diabetes mellitus.  5.  Alcoholic cirrhosis with portal hypertension.  6.  CKD.    Recommendation Discussion  1.  Patient stable from a cardiac standpoint concerning his atrial fibrillation probably persistent, anticoagulation on Eliquis 2.5 mg BID (age 80 and CKD creatinine 1.5) and hypertension.  2.  Continue current therapy.  3.  RTC 6 months.

## 2021-04-05 NOTE — TELEPHONE ENCOUNTER
Sent a Voalte message to BYRON TORRE.  Patient is low risk and can hold his eliquis for 48-72 hours prior to procedure.   Letter drafted and faxed to [facility].    Patient identifier on , LM with recommendations and that it has been faxed to [facility].

## 2021-04-05 NOTE — PROCEDURE: ADDITIONAL NOTES
Render Risk Assessment In Note?: no
Detail Level: Simple
Additional Notes: Recommend applying vaseline to the wound.\\nWound looks clean.\\nRecommend washing wound gently.\\nPhoto documentation obtained today.\\n\\nApplied Vaseline to affected area, nonadherent gauze, and a coban dressing was applied.

## 2021-04-05 NOTE — PROCEDURE: DIAGNOSIS COMMENT
Detail Level: Detailed
Render Risk Assessment In Note?: no
Comment: Wound healing well without signs of secondary bacterial infection. Mild surrounding edema and ecchymoses. Will recheck in 1 week. Wound care discussed with patient and wife.

## 2021-05-17 ENCOUNTER — PATIENT MESSAGE (OUTPATIENT)
Dept: HEALTH INFORMATION MANAGEMENT | Facility: OTHER | Age: 81
End: 2021-05-17

## 2021-05-25 ENCOUNTER — PATIENT OUTREACH (OUTPATIENT)
Dept: HEALTH INFORMATION MANAGEMENT | Facility: OTHER | Age: 81
End: 2021-05-25

## 2021-05-25 NOTE — PROGRESS NOTES
Outcome: Left Message to call back to schedule DEMETRIUS    Please transfer to Patient Outreach Team at [phone] when patient returns call.    Attempt # 2

## 2021-06-01 NOTE — PROGRESS NOTES
Called pt to schedule DEMETRIUS. Verified HIPAA. Pt declined and was not interested in scheduling at this time.     Attempt # 3

## 2021-06-23 ENCOUNTER — PATIENT OUTREACH (OUTPATIENT)
Dept: MEDICAL GROUP | Facility: MEDICAL CENTER | Age: 81
End: 2021-06-23

## 2021-06-23 NOTE — PROGRESS NOTES
ANNUAL WELLNESS VISIT PRE-VISIT PLANNING    Patient NOT contacted to complete Annual Wellness Visit Pre-Visit Planning. Information was reviewed in chart.     1.  Reviewed notes from the last office visit: Yes    2.  If any orders were ordered or intended to be done prior to visit (i.e. 6 mos follow-up), do we have results/consult notes or has patient scheduled?   · Labs - Labs ordered, NOT completed. Patient advised to complete prior to next appointment. [employer] [employer] message sent to patient.   Note: If patient appointment is for lab review and patient did not complete labs, check with provider if OK to reschedule patient until labs completed.  · Imaging - Imaging was not ordered at last office visit.  · Referrals - No referrals were ordered at last office visit.    3.  Immunizations were updated in Epic using Reconcile Outside Information activity? Yes. Immunizations reconciled through [employer] and outside sources. Immunization records are up to date.  · Is patient due for Tdap? NO  · Is patient due for Shingrix? Yes. An additional charge may apply to this immunization. Please contact [facility] Billing Department to estimate the cost of the vaccine with your insurance.    4.  Patient is due for the following Health Maintenance Topics:   Health Maintenance Due   Topic Date Due   • IMM ZOSTER VACCINES (2 of 2) 03/24/2020   • A1C SCREENING  01/11/2021   • Annual Wellness Visit  01/28/2021   • RETINAL SCREENING  04/28/2021   • FASTING LIPID PROFILE  07/11/2021   • URINE ACR / MICROALBUMIN  07/11/2021   • SERUM CREATININE  07/11/2021     5.  Reviewed/Updated the following:  · Preferred Pharmacy? Yes  · Preferred Lab? Yes  · Preferred Communication? Yes  · Allergies? Yes. No new allergies to reconcile.  · Medications? YES. Was Abstract Encounter opened and chart updated? NO. No medications to reconcile  · Social History? Yes  · Family History (document living status of immediate family members and if + hx of  cancer, diabetes,  hypertension, hyperlipidemia, heart attack, stroke) No    6.  Care Team Updated:  · DME Company (gait device, O2, CPAP, etc.): N\A  · Other Specialists (eye doctor, derm, GYN, cardiology, endo, etc): YES    7.  Patient was not advised: “This is a free wellness visit. The provider will screen for medical conditions to help you stay healthy. If you have other concerns to address you may be asked to discuss these at a separate visit or there may be an additional fee.”         ---------------------------------------------------------------------------------------------  This Pre-Visit Planning note has been created for the Provider and Medical Assistant to review prior to the patient's office appointment.   Patient is NOT REQUIRED to follow-up on this note.

## 2021-06-30 ENCOUNTER — OFFICE VISIT (OUTPATIENT)
Dept: MEDICAL GROUP | Facility: MEDICAL CENTER | Age: 81
End: 2021-06-30
Payer: MEDICARE

## 2021-06-30 ENCOUNTER — HOSPITAL ENCOUNTER (OUTPATIENT)
Dept: LAB | Facility: MEDICAL CENTER | Age: 81
End: 2021-06-30
Attending: FAMILY MEDICINE
Payer: MEDICARE

## 2021-06-30 VITALS
DIASTOLIC BLOOD PRESSURE: 72 MMHG | WEIGHT: 174.16 LBS | OXYGEN SATURATION: 98 % | TEMPERATURE: 98.4 F | HEART RATE: 62 BPM | HEIGHT: 71 IN | BODY MASS INDEX: 24.38 KG/M2 | SYSTOLIC BLOOD PRESSURE: 138 MMHG

## 2021-06-30 DIAGNOSIS — Z23 NEED FOR VACCINATION: ICD-10-CM

## 2021-06-30 DIAGNOSIS — I48.20 ATRIAL FIBRILLATION, CHRONIC (HCC): ICD-10-CM

## 2021-06-30 DIAGNOSIS — N18.32 TYPE 2 DIABETES MELLITUS WITH STAGE 3B CHRONIC KIDNEY DISEASE, WITHOUT LONG-TERM CURRENT USE OF INSULIN (HCC): ICD-10-CM

## 2021-06-30 DIAGNOSIS — K76.6 PORTAL HYPERTENSION (HCC): ICD-10-CM

## 2021-06-30 DIAGNOSIS — E11.22 TYPE 2 DIABETES MELLITUS WITH STAGE 3B CHRONIC KIDNEY DISEASE, WITHOUT LONG-TERM CURRENT USE OF INSULIN (HCC): ICD-10-CM

## 2021-06-30 DIAGNOSIS — N18.32 STAGE 3B CHRONIC KIDNEY DISEASE: ICD-10-CM

## 2021-06-30 DIAGNOSIS — N18.30 TYPE 2 DIABETES MELLITUS WITH STAGE 3 CHRONIC KIDNEY DISEASE, WITHOUT LONG-TERM CURRENT USE OF INSULIN (HCC): ICD-10-CM

## 2021-06-30 DIAGNOSIS — I10 ESSENTIAL HYPERTENSION: ICD-10-CM

## 2021-06-30 DIAGNOSIS — K70.31 ALCOHOLIC CIRRHOSIS OF LIVER WITH ASCITES (HCC): ICD-10-CM

## 2021-06-30 DIAGNOSIS — E11.22 TYPE 2 DIABETES MELLITUS WITH STAGE 3 CHRONIC KIDNEY DISEASE, WITHOUT LONG-TERM CURRENT USE OF INSULIN (HCC): ICD-10-CM

## 2021-06-30 LAB
ALBUMIN SERPL BCP-MCNC: 4.1 G/DL (ref 3.2–4.9)
ALBUMIN/GLOB SERPL: 1.3 G/DL
ALP SERPL-CCNC: 79 U/L (ref 30–99)
ALT SERPL-CCNC: 9 U/L (ref 2–50)
ANION GAP SERPL CALC-SCNC: 11 MMOL/L (ref 7–16)
AST SERPL-CCNC: 11 U/L (ref 12–45)
BILIRUB SERPL-MCNC: 1 MG/DL (ref 0.1–1.5)
BUN SERPL-MCNC: 35 MG/DL (ref 8–22)
CALCIUM SERPL-MCNC: 8.9 MG/DL (ref 8.5–10.5)
CHLORIDE SERPL-SCNC: 114 MMOL/L (ref 96–112)
CHOLEST SERPL-MCNC: 101 MG/DL (ref 100–199)
CO2 SERPL-SCNC: 19 MMOL/L (ref 20–33)
CREAT SERPL-MCNC: 1.99 MG/DL (ref 0.5–1.4)
EST. AVERAGE GLUCOSE BLD GHB EST-MCNC: 186 MG/DL
FASTING STATUS PATIENT QL REPORTED: NORMAL
GLOBULIN SER CALC-MCNC: 3.2 G/DL (ref 1.9–3.5)
GLUCOSE SERPL-MCNC: 124 MG/DL (ref 65–99)
HBA1C MFR BLD: 8.1 % (ref 4–5.6)
HDLC SERPL-MCNC: 44 MG/DL
LDLC SERPL CALC-MCNC: 47 MG/DL
POTASSIUM SERPL-SCNC: 4.5 MMOL/L (ref 3.6–5.5)
PROT SERPL-MCNC: 7.3 G/DL (ref 6–8.2)
SODIUM SERPL-SCNC: 144 MMOL/L (ref 135–145)
TRIGL SERPL-MCNC: 51 MG/DL (ref 0–149)

## 2021-06-30 PROCEDURE — 36415 COLL VENOUS BLD VENIPUNCTURE: CPT

## 2021-06-30 PROCEDURE — 80053 COMPREHEN METABOLIC PANEL: CPT

## 2021-06-30 PROCEDURE — 90471 IMMUNIZATION ADMIN: CPT | Performed by: FAMILY MEDICINE

## 2021-06-30 PROCEDURE — 83036 HEMOGLOBIN GLYCOSYLATED A1C: CPT

## 2021-06-30 PROCEDURE — G0439 PPPS, SUBSEQ VISIT: HCPCS | Performed by: FAMILY MEDICINE

## 2021-06-30 PROCEDURE — 90750 HZV VACC RECOMBINANT IM: CPT | Performed by: FAMILY MEDICINE

## 2021-06-30 PROCEDURE — 80061 LIPID PANEL: CPT

## 2021-06-30 RX ORDER — SITAGLIPTIN AND METFORMIN HYDROCHLORIDE 1000; 50 MG/1; MG/1
1 TABLET, FILM COATED ORAL EVERY MORNING
Qty: 90 TABLET | Refills: 1 | Status: SHIPPED | OUTPATIENT
Start: 2021-06-30 | End: 2021-10-22

## 2021-06-30 ASSESSMENT — PATIENT HEALTH QUESTIONNAIRE - PHQ9: CLINICAL INTERPRETATION OF PHQ2 SCORE: 0

## 2021-06-30 ASSESSMENT — ENCOUNTER SYMPTOMS: GENERAL WELL-BEING: FAIR

## 2021-06-30 ASSESSMENT — ACTIVITIES OF DAILY LIVING (ADL): BATHING_REQUIRES_ASSISTANCE: 0

## 2021-06-30 ASSESSMENT — FIBROSIS 4 INDEX: FIB4 SCORE: 1.4

## 2021-06-30 NOTE — PROGRESS NOTES
Chief Complaint   Patient presents with   • Annual Wellness Visit       HPI:  Sammy Oseguera is a 81 year old here for Medicare Annual Wellness Visit. He has atrial fibrillation and hypertension for which he is using spironolactone.  He does not check his blood pressures regularly at home. Subsequent to his atrial fibrillation he is using Eliquis for anticoagulation.  He denies any chest pain, palpitations, or shortness of breath.  He notes the occasional nosebleed, but states this is rare.  Otherwise no easy bleeding.     He is using Janumet for his diabetes.  He was previously taking this 2 times a day, but had to decrease due to diarrhea.  He does not check his blood sugars at home.  He had his hemoglobin A1c drawn today at the lab, unfortunately the results are not yet back.    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  He is using spironolactone for this.  Last liver ultrasound was done 9/16/2020.    He follows with GI on a regular basis.    He does have a history of chronic kidney disease, stage III.  He is avoiding nephrotoxic medications and trying to stay well-hydrated drinking plenty of water.    Patient Active Problem List    Diagnosis Date Noted   • Atrial fibrillation, chronic (HCC) 12/04/2020   • Anticoagulated 12/04/2020   • Type 2 diabetes mellitus with stage 3 chronic kidney disease, without long-term current use of insulin (HCC) 06/17/2019   • Portal hypertension (HCC) 06/17/2019   • CKD (chronic kidney disease) stage 3, GFR 30-59 ml/min (HCC) 09/19/2018   • Alcoholic cirrhosis of liver with ascites (HCC) 08/31/2018   • Anemia 12/15/2016   • History of prostate cancer 06/29/2015   • Hypertension 01/25/2015     Current Outpatient Medications   Medication Sig Dispense Refill   • spironolactone (ALDACTONE) 100 MG Tab Take 1 tablet by mouth every day. 90 tablet 0   • ELIQUIS 2.5 MG Tab      • sitagliptan-metformin (JANUMET)  MG per tablet Take 0.5  Tabs by mouth 2 times a day, with meals. 200 Tab 3   • Lancets Misc Lancets order: Lancets for Device of Insurance Preference. Sig: use once daily. (Patient not taking: Reported on 4/5/2021) 100 Each 3     No current facility-administered medications for this visit.          Current supplements as per medication list.     Allergies: Patient has no known allergies.    Current social contact/activities: He enjoys shopping, traveling, watching tv.      He  reports that he has never smoked. He has never used smokeless tobacco. He reports previous alcohol use. He reports that he does not use drugs.  Counseling given: Not Answered      DPA/Advanced Directive:  Patient has Advanced Directive on file.     ROS:    Gait: Uses no assistive device  Ostomy: No  Other tubes: No  Amputations: No  Chronic oxygen use: No  Last eye exam: feb 2021  Wears hearing aids: Yes   : Denies any urinary leakage during the last 6 months    Screening:    Depression Screening  Little interest or pleasure in doing things?  0 - not at all  Feeling down, depressed , or hopeless? 0 - not at all  Trouble falling or staying asleep, or sleeping too much?     Feeling tired or having little energy?     Poor appetite or overeating?     Feeling bad about yourself - or that you are a failure or have let yourself or your family down?    Trouble concentrating on things, such as reading the newspaper or watching television?    Moving or speaking so slowly that other people could have noticed.  Or the opposite - being so fidgety or restless that you have been moving around a lot more than usual?     Thoughts that you would be better off dead, or of hurting yourself?     Patient Health Questionnaire Score:      If depressive symptoms identified deferred to follow up visit unless specifically addressed in assessment and plan.    Interpretation of PHQ-9 Total Score   Score Severity   1-4 No Depression   5-9 Mild Depression   10-14 Moderate Depression   15-19  Moderately Severe Depression   20-27 Severe Depression    Screening for Cognitive Impairment  Three Minute Recall (captain, garden, picture) 1/3    Jatin clock face with all 12 numbers and set the hands to show 5 past 8.  Yes    Cognitive concerns identified deferred for follow up unless specifically addressed in assessment and plan.    Fall Risk Assessment  Has the patient had two or more falls in the last year or any fall with injury in the last year?  Yes    Safety Assessment  Throw rugs on floor.  No  Handrails on all stairs.  Yes  Good lighting in all hallways.  Yes  Difficulty hearing.  Yes  Patient counseled about all safety risks that were identified.    Functional Assessment ADLs  Are there any barriers preventing you from cooking for yourself or meeting nutritional needs?  No.    Are there any barriers preventing you from driving safely or obtaining transportation?  No.    Are there any barriers preventing you from using a telephone or calling for help?  No.    Are there any barriers preventing you from shopping?  No.    Are there any barriers preventing you from taking care of your own finances?  No.    Are there any barriers preventing you from managing your medications?  No.    Are there any barriers preventing you from showering, bathing or dressing yourself? No.    Are you currently engaging in any exercise or physical activity?  No.     What is your perception of your health?  Fair.    Health Maintenance Summary                A1C SCREENING Overdue 1/11/2021      Done 7/11/2020 HEMOGLOBIN A1C     Patient has more history with this topic...    URINE ACR / MICROALBUMIN Next Due 7/11/2021      Done 7/11/2020 MICROALBUMIN CREAT RATIO URINE     Patient has more history with this topic...    IMM INFLUENZA Postponed 2/23/2022 Originally 9/1/2021. Patient Refused     Done 1/28/2020 Imm Admin: Influenza Vaccine Adult HD     Patient has more history with this topic...    RETINAL SCREENING Next Due 2/1/2022       Done 2/1/2021      Patient has more history with this topic...    DIABETES MONOFILAMENT / LE EXAM Next Due 2/23/2022      Done 2/23/2021 AMB DIABETIC MONOFILAMENT LOWER EXTREMITY EXAM     Patient has more history with this topic...    FASTING LIPID PROFILE Next Due 6/30/2022      Done 6/30/2021 LIPID PROFILE     Patient has more history with this topic...    SERUM CREATININE Next Due 6/30/2022      Done 6/30/2021 COMP METABOLIC PANEL     Patient has more history with this topic...    Annual Wellness Visit Next Due 7/1/2022      Done 6/30/2021 LOS:RI ANNUAL WELLNESS VISIT-INCLUDES PPPS SUBSEQUE*     Patient has more history with this topic...    IMM DTaP/Tdap/Td Vaccine Next Due 2/28/2026      Done 2/28/2016 Imm Admin: Tdap Vaccine     Patient has more history with this topic...          Patient Care Team:  Radha Dow M.D. as PCP - General (Family Medicine)  Nitesh Young M.D. as Consulting Physician (Ophthalmology)  Clarissa Stauffer as [facility]   Bandar Dillard M.D. (Gastroenterology)      Social History     Tobacco Use   • Smoking status: Never Smoker   • Smokeless tobacco: Never Used   Vaping Use   • Vaping Use: Never used   Substance Use Topics   • Alcohol use: Not Currently     Alcohol/week: 0.0 oz     Comment: 4-5 weekly    • Drug use: No     Family History   Problem Relation Age of Onset   • Stroke Mother    • Heart Disease Father    • Depression Sister    • Heart Disease Sister    • Depression Sister    • Depression Sister    • No Known Problems Son      He  has a past medical history of HTN and Type II or unspecified type diabetes mellitus without mention of complication, not stated as uncontrolled.   Past Surgical History:   Procedure Laterality Date   • LAPAROSCOPIC GASTRIC ULCER OVER SEW     • PROSTATECTOMY, RADICAL RETRO         Exam:   /72 (BP Location: Left arm, Patient Position: Sitting, BP Cuff Size: Adult)   Pulse 62   Temp 36.9 °C (98.4 °F) (Temporal)   " Ht 1.803 m (5' 11\")   Wt 79 kg (174 lb 2.6 oz)   SpO2 98%  Body mass index is 24.29 kg/m².  Cardiovascular: Normal rate, regular rhythm and normal heart sounds.   Pulmonary/Chest: Effort normal and breath sounds normal. No respiratory distress.   Abdominal: Soft. Bowel sounds are normal. He exhibits no distension. There is no abdominal tenderness.   Musculoskeletal:         General: No edema.   Hearing fair with his bilateral hearing aids  Dentition good  Alert, oriented in no acute distress.  Eye contact is good, speech goal directed, affect calm    Assessment and Plan. The following treatment and monitoring plan is recommended:    1. Atrial fibrillation, chronic (HCC)  2. Essential hypertension  Chronic and stable.  Continue the same medications.    3. Type 2 diabetes mellitus with stage 3b chronic kidney disease, without long-term current use of insulin (HCC)  Chronic condition.  Will adjust medications accordingly when hemoglobin A1c returns.  Plan to see the patient in 3 months again with a point-of-care A1c in clinic.    4. Portal hypertension (HCC)  5. Alcoholic cirrhosis of liver with ascites (HCC)  Chronic and stable.  Continue the same medications.  Continue with GI.    6. Stage 3b chronic kidney disease (HCC)  Chronic and stable.  Continue to monitor.    7. Need for vaccination  Final dose of shingles vaccine is due, and has been given today in clinic.  - Shingles Vaccine (SHINGRIX)      Services suggested: No services needed at this time  Health Care Screening: Age-appropriate preventive services recommended by USPTF and ACIP covered by Medicare were discussed today. Services ordered if indicated and agreed upon by the patient.  Referrals offered: Community-based lifestyle interventions to reduce health risks and promote self-management and wellness, fall prevention, nutrition, physical activity, tobacco-use cessation, weight loss, and mental health services as per orders if indicated.    Discussion " today about general wellness and lifestyle habits:    · Prevent falls and reduce trip hazards; Cautioned about securing or removing rugs.  · Have a working fire alarm and carbon monoxide detector;   · Engage in regular physical activity and social activities     Follow-up: Return in about 3 months (around 9/30/2021) for Diabetes management.

## 2021-06-30 NOTE — PROGRESS NOTES
Hemoglobin A1c remains uncontrolled.  Patient was unable to tolerate Janumet  twice daily due to diarrhea.  We will have him continue with the Janumet  mg once daily in the morning, and I have added sitagliptin 50 mg daily in the evening.

## 2021-07-01 ENCOUNTER — TELEPHONE (OUTPATIENT)
Dept: MEDICAL GROUP | Facility: MEDICAL CENTER | Age: 81
End: 2021-07-01

## 2021-07-12 ENCOUNTER — APPOINTMENT (RX ONLY)
Dept: URBAN - METROPOLITAN AREA CLINIC 4 | Facility: CLINIC | Age: 81
Setting detail: DERMATOLOGY
End: 2021-07-12

## 2021-07-14 ENCOUNTER — OFFICE VISIT (OUTPATIENT)
Dept: MEDICAL GROUP | Facility: MEDICAL CENTER | Age: 81
End: 2021-07-14
Payer: MEDICARE

## 2021-07-14 VITALS
OXYGEN SATURATION: 98 % | HEIGHT: 71 IN | BODY MASS INDEX: 24.29 KG/M2 | WEIGHT: 173.5 LBS | SYSTOLIC BLOOD PRESSURE: 138 MMHG | TEMPERATURE: 98.8 F | HEART RATE: 87 BPM | DIASTOLIC BLOOD PRESSURE: 74 MMHG

## 2021-07-14 DIAGNOSIS — N18.32 TYPE 2 DIABETES MELLITUS WITH STAGE 3B CHRONIC KIDNEY DISEASE, WITHOUT LONG-TERM CURRENT USE OF INSULIN (HCC): ICD-10-CM

## 2021-07-14 DIAGNOSIS — E11.22 TYPE 2 DIABETES MELLITUS WITH STAGE 3B CHRONIC KIDNEY DISEASE, WITHOUT LONG-TERM CURRENT USE OF INSULIN (HCC): ICD-10-CM

## 2021-07-14 PROCEDURE — 99213 OFFICE O/P EST LOW 20 MIN: CPT | Performed by: FAMILY MEDICINE

## 2021-07-14 ASSESSMENT — FIBROSIS 4 INDEX: FIB4 SCORE: 1.68

## 2021-07-14 NOTE — PROGRESS NOTES
"History of Present Illness  81 year old male presents to clinic for diabetes management.  Last hemoglobin A1c was 8.1, performed on 6/30/2021.  Prior to this testing he was taking Janumet  mg once daily, as he was unable to tolerate 2 times daily secondary to diarrhea.  I did add sitagliptin 50 mg daily in the evening.  The patient had picked up his sitagliptin, but was confused about when he should be taking it.  He has not started taking it.  We reviewed this together in clinic, and he voiced understanding.  He does not check his blood glucose regularly at home. He states his diet is \"bad\", upon questioning he eats a lot of carbohydrates and diet soda. He denies any signs or symptoms of hypo or hyperglycemia.    He denies any other questions or concerns at this time.    Current problem list, current medication, and past medical/surgical history were reviewed.     ROS  See HPI    Physical Exam  /74 (BP Location: Left arm, Patient Position: Sitting, BP Cuff Size: Adult)   Pulse 87   Temp 37.1 °C (98.8 °F) (Temporal)   Ht 1.803 m (5' 11\")   Wt 78.7 kg (173 lb 8 oz)   SpO2 98%   BMI 24.20 kg/m²   Physical Exam  Constitutional:       General: He is not in acute distress.     Appearance: He is not diaphoretic.   Cardiovascular:      Rate and Rhythm: Normal rate and regular rhythm.      Heart sounds: Normal heart sounds.   Pulmonary:      Effort: Pulmonary effort is normal. No respiratory distress.      Breath sounds: Normal breath sounds.   Abdominal:      General: Bowel sounds are normal.      Palpations: Abdomen is soft.   Skin:     General: Skin is warm and dry.   Neurological:      Mental Status: He is alert.   Psychiatric:         Mood and Affect: Affect normal.         Judgment: Judgment normal.       Assessment & Plan  1. Type 2 diabetes mellitus with stage 3b chronic kidney disease, without long-term current use of insulin (HCC)  We will continue with Janumet  mg in the morning, and " Januvia 50 mg at night.  Patient voices understanding of this new regime.  We also discussed healthy and balanced diet, as well as decreasing her eliminating his diet soda.  We will see him in 3 months, and do a point-of-care A1c at that time.    Return in about 3 months (around 10/14/2021) for Diabetes management, with POC A1C.    Radha Dow M.D.

## 2021-07-14 NOTE — PATIENT INSTRUCTIONS
Please take one full tablet of Janumet in the morning and one full tablet of Januvia in the evening.   Continue Eliquis as you are doing.

## 2021-08-17 PROBLEM — D68.69 HYPERCOAGULABILITY DUE TO ATRIAL FIBRILLATION (HCC): Status: ACTIVE | Noted: 2020-12-04

## 2021-08-17 PROBLEM — I48.91 HYPERCOAGULABILITY DUE TO ATRIAL FIBRILLATION (HCC): Status: ACTIVE | Noted: 2020-12-04

## 2021-08-27 ENCOUNTER — OFFICE VISIT (OUTPATIENT)
Dept: MEDICAL GROUP | Facility: PHYSICIAN GROUP | Age: 81
End: 2021-08-27
Payer: MEDICARE

## 2021-08-27 ENCOUNTER — HOSPITAL ENCOUNTER (OUTPATIENT)
Facility: MEDICAL CENTER | Age: 81
End: 2021-08-27
Attending: FAMILY MEDICINE
Payer: MEDICARE

## 2021-08-27 VITALS
HEIGHT: 71 IN | SYSTOLIC BLOOD PRESSURE: 128 MMHG | TEMPERATURE: 98.4 F | WEIGHT: 178.9 LBS | HEART RATE: 64 BPM | RESPIRATION RATE: 14 BRPM | OXYGEN SATURATION: 97 % | DIASTOLIC BLOOD PRESSURE: 72 MMHG | BODY MASS INDEX: 25.05 KG/M2

## 2021-08-27 DIAGNOSIS — N18.32 TYPE 2 DIABETES MELLITUS WITH STAGE 3B CHRONIC KIDNEY DISEASE, WITHOUT LONG-TERM CURRENT USE OF INSULIN (HCC): ICD-10-CM

## 2021-08-27 DIAGNOSIS — R60.9 EDEMA, UNSPECIFIED TYPE: ICD-10-CM

## 2021-08-27 DIAGNOSIS — R05.9 COUGH: ICD-10-CM

## 2021-08-27 DIAGNOSIS — K70.31 ALCOHOLIC CIRRHOSIS OF LIVER WITH ASCITES (HCC): ICD-10-CM

## 2021-08-27 DIAGNOSIS — N18.32 STAGE 3B CHRONIC KIDNEY DISEASE: ICD-10-CM

## 2021-08-27 DIAGNOSIS — E11.22 TYPE 2 DIABETES MELLITUS WITH STAGE 3B CHRONIC KIDNEY DISEASE, WITHOUT LONG-TERM CURRENT USE OF INSULIN (HCC): ICD-10-CM

## 2021-08-27 DIAGNOSIS — D68.69 SECONDARY HYPERCOAGULABLE STATE (HCC): ICD-10-CM

## 2021-08-27 DIAGNOSIS — R41.3 MEMORY PROBLEM: ICD-10-CM

## 2021-08-27 DIAGNOSIS — I70.0 ATHEROSCLEROSIS OF AORTA (HCC): ICD-10-CM

## 2021-08-27 DIAGNOSIS — F32.A MILD DEPRESSION: ICD-10-CM

## 2021-08-27 DIAGNOSIS — I48.20 ATRIAL FIBRILLATION, CHRONIC (HCC): ICD-10-CM

## 2021-08-27 PROBLEM — N18.30 CKD (CHRONIC KIDNEY DISEASE) STAGE 3, GFR 30-59 ML/MIN: Status: RESOLVED | Noted: 2018-09-19 | Resolved: 2021-08-27

## 2021-08-27 PROCEDURE — 82570 ASSAY OF URINE CREATININE: CPT

## 2021-08-27 PROCEDURE — 99214 OFFICE O/P EST MOD 30 MIN: CPT | Performed by: FAMILY MEDICINE

## 2021-08-27 PROCEDURE — U0005 INFEC AGEN DETEC AMPLI PROBE: HCPCS

## 2021-08-27 PROCEDURE — 82043 UR ALBUMIN QUANTITATIVE: CPT

## 2021-08-27 PROCEDURE — U0003 INFECTIOUS AGENT DETECTION BY NUCLEIC ACID (DNA OR RNA); SEVERE ACUTE RESPIRATORY SYNDROME CORONAVIRUS 2 (SARS-COV-2) (CORONAVIRUS DISEASE [COVID-19]), AMPLIFIED PROBE TECHNIQUE, MAKING USE OF HIGH THROUGHPUT TECHNOLOGIES AS DESCRIBED BY CMS-2020-01-R: HCPCS

## 2021-08-27 PROCEDURE — 99000 SPECIMEN HANDLING OFFICE-LAB: CPT | Performed by: FAMILY MEDICINE

## 2021-08-27 PROCEDURE — 81001 URINALYSIS AUTO W/SCOPE: CPT

## 2021-08-27 RX ORDER — GLUCOSAMINE HCL/CHONDROITIN SU 500-400 MG
CAPSULE ORAL
Qty: 100 EACH | Refills: 0 | Status: SHIPPED | OUTPATIENT
Start: 2021-08-27 | End: 2022-01-01

## 2021-08-27 RX ORDER — LANCETS 30 GAUGE
EACH MISCELLANEOUS
Qty: 100 EACH | Refills: 0 | Status: SHIPPED | OUTPATIENT
Start: 2021-08-27 | End: 2022-01-01

## 2021-08-27 RX ORDER — FLUOXETINE HYDROCHLORIDE 20 MG/1
20 CAPSULE ORAL DAILY
Qty: 100 CAPSULE | Refills: 2 | Status: SHIPPED | OUTPATIENT
Start: 2021-08-27 | End: 2021-09-17

## 2021-08-27 ASSESSMENT — FIBROSIS 4 INDEX: FIB4 SCORE: 1.68

## 2021-08-27 NOTE — PROGRESS NOTES
Subjective:     CC:    Chief Complaint   Patient presents with   • Establish Care     New pt    • Shortness of Breath     x 3 weeks/ cough/    • Diabetes     a1c June was 8.1        HISTORY OF THE PRESENT ILLNESS: Patient is a 81 y.o. male. This pleasant patient is here today to establish care and discuss issues as above. His prior PCP was Dr. Dow, he last saw her July 14.  He has diabetes, his last A1c was 8.1 June 30.  Apparently he had not started taking the new prescription of 50 mg daily sitagliptin as he had been confused whether he should take it.  He did not tolerate the twice daily prior dosing of 50/1000 mg Janumet because of diarrhea so is only taking it once daily.  Apparently he also tends to be eat a lot of carbohydrates with diet soda. He doesn't check his home fs. Typically has coffee for breakfast w/ pb and jelly on 1 toast (oatnut). Lunch - tends to eat out - eg ham and tky sandwich or a hamburger w/ fries. Dinner - same, tends to eat out. Doesn't eat enough veggies.   Has diet pepsi 1-2 bottles/d.  Notes less sense of taste other than salty and sweet.     Here w/ wife. C/o 3wk of SOB and has a lot of phglem in the am, coughs to clear that.  They went to [place] last week for 8d but he continued to cough though was less significant. Attributes some of his sxs to the smoke.  He denies any fevers or chills or wheezing.  No chest pain.  No ear pain.    Notes some issues w/ short term memory. eg struggles w/ daily schedules but wife tends to manage that. Wife has managed their money. 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  He is hard of hearing but seems to already be in the process of getting hearing aids.  Quit drinking a few years ago, h/o heavy drinking.   Admits he is lazy, maybe for the past few yrs, doesn't exercise. Diagnosed w/ dm2 about 1-yr ago. Happily .   Had seeds for radiation for prostate  cancer about 10y ago. Steady decline since then with memory and mood.     Problem   Atherosclerosis of Aorta (Hcc)    Stable, continue current plan of care       Secondary Hypercoagulable State (Hcc)    Stable, continue current plan of care       Mild Depression (Hcc)    Unstable, add prozac, contract for safety, f/u 2wk     Alcoholic Cirrhosis of Liver With Ascites (Hcc)    Quit drinking alcohol heavily about 3-5 years ago. Sees GI.      Ckd (Chronic Kidney Disease) Stage 3, Gfr 30-59 Ml/Min (Hcc) (Resolved)       Allergies: Patient has no known allergies.    Current Outpatient Medications Ordered in Epic   Medication Sig Dispense Refill   • Blood Glucose Meter Kit Test blood sugar as recommended by provider. Brand as covered by insurance - blood glucose monitoring kit. 1 Kit 0   • Blood Glucose Test Strips Use one Brand as covered by insurance  strip to test blood sugar once daily early morning before first meal and on occasion 2 hr post meal. 100 Strip 0   • Lancets Use one Brand as covered by insurance  lancet to test blood sugar once daily early morning before first meal. 100 Each 0   • Alcohol Swabs Wipe site with prep pad prior to injection. 100 Each 0   • FLUoxetine (PROZAC) 20 MG Cap Take 1 Capsule by mouth every day. 100 Capsule 2   • SITagliptin (JANUVIA) 50 MG Tab Take 1 tablet by mouth every day. 30 tablet 3   • sitagliptan-metformin (JANUMET)  MG per tablet Take 1 tablet by mouth every morning. 90 tablet 1   • ELIQUIS 2.5 MG Tab        No current Epic-ordered facility-administered medications on file.       Past Medical History:   Diagnosis Date   • HTN    • Type II or unspecified type diabetes mellitus without mention of complication, not stated as uncontrolled        Past Surgical History:   Procedure Laterality Date   • LAPAROSCOPIC GASTRIC ULCER OVER SEW     • PROSTATECTOMY, RADICAL RETRO         Social History     Tobacco Use   • Smoking status: Never Smoker   • Smokeless tobacco: Never  "Used   Vaping Use   • Vaping Use: Never used   Substance Use Topics   • Alcohol use: Not Currently     Alcohol/week: 0.0 oz     Comment: 4-5 weekly    • Drug use: No       Social History     Social History Narrative   • Not on file       Family History   Problem Relation Age of Onset   • Stroke Mother    • Heart Disease Father    • Depression Sister    • Heart Disease Sister    • Depression Sister    • Depression Sister    • No Known Problems Son        Health Maintenance: Completed    ROS:   See HPI  His PHQ-9 9 was 7 out of 27.  Is consistent with mild depression.  Objective:     Exam:   /72 (BP Location: Left arm, Patient Position: Sitting, BP Cuff Size: Adult)   Pulse 64   Temp 36.9 °C (98.4 °F) (Temporal)   Resp 14   Ht 1.8 m (5' 10.87\")   Wt 81.1 kg (178 lb 14.4 oz)   SpO2 97%   BMI 25.05 kg/m²   Body mass index is 25.05 kg/m².  Wt Readings from Last 4 Encounters:   08/27/21 81.1 kg (178 lb 14.4 oz)   07/14/21 78.7 kg (173 lb 8 oz)   06/30/21 79 kg (174 lb 2.6 oz)   04/05/21 74.4 kg (164 lb)   w  General: Normal appearing. No distress. Appropriately groomed.  HEENT: Normocephalic. Eyes conjunctiva clear lids without ptosis, pupils equal    Neck: Supple without JVD or bruit. Thyroid is not enlarged.   Pulmonary: Clear to ausculation.  Normal effort. No rales, ronchi, or wheezing.  Cardiovascular: Regular rate and irreg irreg rhythm without murmur. Carotid and radial pulses are intact and equal bilaterally.  Abdomen: Soft, nontender, nondistended. Normal bowel sounds. Liver and spleen are not palpable  Neurologic: Grossly nonfocal  Lymph: No cervical or supraclavicular lymph nodes are palpable  Skin: Warm and dry.  No obvious lesions.  Musculoskeletal: Normal gait. No extremity cyanosis, clubbing, 1+ pitting bilat LE edema.  Psych: Normal mood and affect. Alert and oriented x3. Judgment and insight is normal.      Assessment & Plan:   81 y.o. male with the following -  Today I decided to start him " on a medicine for his mild depression in hopes that it would help him with better managing his diabetes and general mood throughout the day.   Think there is a low likelihood for him to have Covid but decided to check the test as he requested it.  I also chest a chest x-ray and an echocardiogram in light of his lower extremity edema.  I will see him back in a couple of weeks.  I can do memory testing at that time.  Also asked him to monitor his fasting sugar and some occasional postprandials in order to better assess whether his diabetes has improved with the addition of Januvia.  1. Stage 3b chronic kidney disease (HCC)    2. Atrial fibrillation, chronic (HCC)    3. Type 2 diabetes mellitus with stage 3b chronic kidney disease, without long-term current use of insulin (HCC)  - MICROALBUMIN CREAT RATIO URINE; Future    4. Alcoholic cirrhosis of liver with ascites (HCC)    5. Atherosclerosis of aorta (HCC)    6. Memory problem  - URINALYSIS,CULTURE IF INDICATED; Future    7. Cough  - DX-CHEST-2 VIEWS; Future  - SARS-CoV-2 PCR (24 hour In-House): Collect NP swab in [place]; Future    8. Edema, unspecified type  - EC-ECHOCARDIOGRAM COMPLETE W/O CONT; Future    9. Secondary hypercoagulable state (HCC)    10. Mild depression (HCC)    Other orders  - Blood Glucose Meter Kit; Test blood sugar as recommended by provider. Brand as covered by insurance - blood glucose monitoring kit.  Dispense: 1 Kit; Refill: 0  - Blood Glucose Test Strips; Use one Brand as covered by insurance  strip to test blood sugar once daily early morning before first meal and on occasion 2 hr post meal.  Dispense: 100 Strip; Refill: 0  - Lancets; Use one Brand as covered by insurance  lancet to test blood sugar once daily early morning before first meal.  Dispense: 100 Each; Refill: 0  - Alcohol Swabs; Wipe site with prep pad prior to injection.  Dispense: 100 Each; Refill: 0  - FLUoxetine (PROZAC) 20 MG Cap; Take 1 Capsule by mouth every day.  Dispense:  100 Capsule; Refill: 2       Return in about 2 weeks (around 9/10/2021) for DM.    Please note that this dictation was created using voice recognition software. I have made every reasonable attempt to correct obvious errors, but I expect that there are errors of grammar and possibly content that I did not discover before finalizing the note.

## 2021-08-27 NOTE — PATIENT INSTRUCTIONS
Daily exercise    Wear compression hose, look for zip up ones on amaz[employer]    Avoid salt  You need a better diet, less sugar, less bread, more vegetables and lean protein.    Start prozac for mood and motivation.     Bring your glucose log when you come back.

## 2021-08-28 LAB
APPEARANCE UR: CLEAR
BACTERIA #/AREA URNS HPF: NEGATIVE /HPF
BILIRUB UR QL STRIP.AUTO: NEGATIVE
COLOR UR: YELLOW
COVID ORDER STATUS COVID19: NORMAL
CREAT UR-MCNC: 179.27 MG/DL
EPI CELLS #/AREA URNS HPF: NEGATIVE /HPF
GLUCOSE UR STRIP.AUTO-MCNC: NEGATIVE MG/DL
HYALINE CASTS #/AREA URNS LPF: ABNORMAL /LPF
KETONES UR STRIP.AUTO-MCNC: ABNORMAL MG/DL
LEUKOCYTE ESTERASE UR QL STRIP.AUTO: NEGATIVE
MICRO URNS: ABNORMAL
MICROALBUMIN UR-MCNC: 59 MG/DL
MICROALBUMIN/CREAT UR: 329 MG/G (ref 0–30)
NITRITE UR QL STRIP.AUTO: NEGATIVE
PH UR STRIP.AUTO: 5 [PH] (ref 5–8)
PROT UR QL STRIP: 100 MG/DL
RBC # URNS HPF: ABNORMAL /HPF
RBC UR QL AUTO: ABNORMAL
SP GR UR STRIP.AUTO: 1.02
UROBILINOGEN UR STRIP.AUTO-MCNC: 0.2 MG/DL
WBC #/AREA URNS HPF: ABNORMAL /HPF

## 2021-08-29 LAB
SARS-COV-2 RNA RESP QL NAA+PROBE: NOTDETECTED
SPECIMEN SOURCE: NORMAL

## 2021-09-01 ENCOUNTER — TELEPHONE (OUTPATIENT)
Dept: MEDICAL GROUP | Facility: PHYSICIAN GROUP | Age: 81
End: 2021-09-01

## 2021-09-01 NOTE — TELEPHONE ENCOUNTER
VOICEMAIL  1. Caller Name: Sammy Oseguera                        Call Back Number: [phone] (home) [phone] (work)      2. Message: Pt LVM stating that he was having trouble with obtaining a Rx from [facility] pharmacy.    3. Patient approves office to leave a detailed voicemail/MyChart message: yes

## 2021-09-02 ENCOUNTER — HOSPITAL ENCOUNTER (OUTPATIENT)
Dept: RADIOLOGY | Facility: MEDICAL CENTER | Age: 81
End: 2021-09-02
Attending: FAMILY MEDICINE
Payer: MEDICARE

## 2021-09-02 DIAGNOSIS — R05.9 COUGH: ICD-10-CM

## 2021-09-02 PROCEDURE — 71046 X-RAY EXAM CHEST 2 VIEWS: CPT

## 2021-09-03 ENCOUNTER — HOSPITAL ENCOUNTER (OUTPATIENT)
Dept: CARDIOLOGY | Facility: MEDICAL CENTER | Age: 81
End: 2021-09-03
Attending: FAMILY MEDICINE
Payer: MEDICARE

## 2021-09-03 DIAGNOSIS — R60.9 EDEMA, UNSPECIFIED TYPE: ICD-10-CM

## 2021-09-03 LAB
LV EJECT FRACT  99904: 40
LV EJECT FRACT MOD 2C 99903: 48.64
LV EJECT FRACT MOD 4C 99902: 41.75
LV EJECT FRACT MOD BP 99901: 46.33

## 2021-09-03 PROCEDURE — 93306 TTE W/DOPPLER COMPLETE: CPT | Mod: 26 | Performed by: INTERNAL MEDICINE

## 2021-09-03 PROCEDURE — 93306 TTE W/DOPPLER COMPLETE: CPT

## 2021-09-16 ENCOUNTER — TELEPHONE (OUTPATIENT)
Dept: MEDICAL GROUP | Facility: PHYSICIAN GROUP | Age: 81
End: 2021-09-16

## 2021-09-16 NOTE — TELEPHONE ENCOUNTER
ESTABLISHED PATIENT PRE-VISIT PLANNING     Patient was NOT contacted to complete PVP.     Note: Patient will not be contacted if there is no indication to call.     1.  Reviewed notes from the last few office visits within the medical group: Yes    2.  If any orders were placed at last visit or intended to be done for this visit (i.e. 6 mos follow-up), do we have Results/Consult Notes?         •  Labs - Labs ordered, completed on 8/27/2021 and results are in chart.  Note: If patient appointment is for lab review and patient did not complete labs, check with provider if OK to reschedule patient until labs completed.       •  Imaging - Imaging ordered, completed and results are in chart.       •  Referrals - No referrals were ordered at last office visit.    3. Is this appointment scheduled as a Hospital Follow-Up? No    4.  Immunizations were updated in Epic using Reconcile Outside Information activity? Yes    5.  Patient is due for the following Health Maintenance Topics:   Health Maintenance Due   Topic Date Due   • IMM INFLUENZA (1) 09/01/2021       6.  AHA (Pulse8) form printed for Provider? No, already completed

## 2021-09-17 ENCOUNTER — OFFICE VISIT (OUTPATIENT)
Dept: MEDICAL GROUP | Facility: PHYSICIAN GROUP | Age: 81
End: 2021-09-17
Payer: MEDICARE

## 2021-09-17 VITALS
SYSTOLIC BLOOD PRESSURE: 122 MMHG | RESPIRATION RATE: 16 BRPM | OXYGEN SATURATION: 99 % | BODY MASS INDEX: 24.92 KG/M2 | HEART RATE: 54 BPM | DIASTOLIC BLOOD PRESSURE: 68 MMHG | WEIGHT: 178 LBS | HEIGHT: 71 IN | TEMPERATURE: 97.7 F

## 2021-09-17 DIAGNOSIS — N18.32 TYPE 2 DIABETES MELLITUS WITH STAGE 3B CHRONIC KIDNEY DISEASE, WITHOUT LONG-TERM CURRENT USE OF INSULIN (HCC): ICD-10-CM

## 2021-09-17 DIAGNOSIS — E11.22 TYPE 2 DIABETES MELLITUS WITH STAGE 3B CHRONIC KIDNEY DISEASE, WITHOUT LONG-TERM CURRENT USE OF INSULIN (HCC): ICD-10-CM

## 2021-09-17 PROBLEM — F32.A MILD DEPRESSION: Status: RESOLVED | Noted: 2021-08-27 | Resolved: 2021-09-17

## 2021-09-17 PROCEDURE — 99215 OFFICE O/P EST HI 40 MIN: CPT | Performed by: FAMILY MEDICINE

## 2021-09-17 ASSESSMENT — FIBROSIS 4 INDEX: FIB4 SCORE: 1.68

## 2021-09-17 NOTE — PROGRESS NOTES
Subjective:     CC:   Chief Complaint   Patient presents with   • Follow-Up     2 weeks   • Lab Results     echocradiogram   • Medication Problem     spironolactone         HPI:   Sammy presents today with his wife to f/u nivia issues  First met me to establish August 27.  At that time he had not yet started taking his sitagliptin because he had been confused whether he should take it.    He did not tolerate the twice daily prior dosing of 50/1000 mg Janumet because of diarrhea so is only taking it once daily.  Apparently he also tends to be eat a lot of carbohydrates with diet soda. He doesn't check his home fs. Typically has coffee for breakfast w/ pb and jelly on 1 toast (oatnut). Lunch - tends to eat out - eg ham and tky sandwich or a hamburger w/ fries. Dinner - same, tends to eat out. Doesn't eat enough veggies.   Has diet pepsi 1-2 bottles/d.  09/17/21 he states he has cut down significantly on the diet Pepsi, only has about 1 bottle on 3 to 4 days/week.    Wasn't able to get the test strips/meter.     9/3 echo, compared to 11/20 echo, LVEF declined, 35-40% and global hypokenises, grade 3 diastolic dysfunction, moderate MR, moderate TR. Consider amyloidosis.  9/2 cxr - atherosclerosis, o/w neg.   Hasn't seen cards since 12/20, hx afib.   He tried compression hose for his lower ext edema - doesn't like them bc hard to use, plans to order zip up ones.    Has a bottle of spironolactone from 12/20 100mg spironolactione, rx'd by Dr. Olivas for swelling in his L groin - it helped, resolved, but notes some swelling recently.   Does have a GI for hx etoh cirrhosis.       Problem   Mild Depression (Hcc) (Resolved)    Unstable, add prozac, contract for safety, f/u 2wk, 09/17/21 states he decided not to take it, doesn't think he needs it.          Current Outpatient Medications Ordered in Epic   Medication Sig Dispense Refill   • Blood Glucose Meter Kit Test blood sugar as recommended by provider. Brand as covered by  "insurance - blood glucose monitoring kit. 1 Kit 0   • Blood Glucose Test Strips Use one Brand as covered by insurance  strip to test blood sugar once daily early morning before first meal and on occasion 2 hr post meal. 100 Strip 0   • Lancets Use one Brand as covered by insurance  lancet to test blood sugar once daily early morning before first meal. 100 Each 0   • Alcohol Swabs Wipe site with prep pad prior to injection. 100 Each 0   • SITagliptin (JANUVIA) 50 MG Tab Take 1 tablet by mouth every day. 30 tablet 3   • sitagliptan-metformin (JANUMET)  MG per tablet Take 1 tablet by mouth every morning. 90 tablet 1   • ELIQUIS 2.5 MG Tab        No current Epic-ordered facility-administered medications on file.       Past Medical History:   Diagnosis Date   • HTN    • Type II or unspecified type diabetes mellitus without mention of complication, not stated as uncontrolled        Social History     Tobacco Use   • Smoking status: Never Smoker   • Smokeless tobacco: Never Used   Vaping Use   • Vaping Use: Never used   Substance Use Topics   • Alcohol use: Not Currently     Alcohol/week: 0.0 oz     Comment: 4-5 weekly    • Drug use: No       Allergies:  Patient has no known allergies.    Health Maintenance: Completed    ROS:  Per HPI      Objective:       Exam:  /68 (BP Location: Left arm, Patient Position: Sitting, BP Cuff Size: Adult)   Pulse (!) 54   Temp 36.5 °C (97.7 °F) (Temporal)   Resp 16   Ht 1.8 m (5' 10.87\")   Wt 80.7 kg (178 lb)   SpO2 99%   BMI 24.92 kg/m²   Body mass index is 24.92 kg/m².  Wt Readings from Last 4 Encounters:   09/17/21 80.7 kg (178 lb)   08/27/21 81.1 kg (178 lb 14.4 oz)   07/14/21 78.7 kg (173 lb 8 oz)   06/30/21 79 kg (174 lb 2.6 oz)       Gen: Alert and oriented, No apparent distress. Appropriately groomed.  Neck: Neck is supple without lymphadenopathy.No thyromegaly.   Lungs: Normal effort, CTA bilaterally, no wheezes, rhonchi, or rales.  CV: Regular rate and rhythm. " No murmurs, rubs, or gallops.  ABD:  Soft, nontender, nondistended, NABSx4, no HSM or RBT or guarding or masses.  Ext: No clubbing, cyanosis, edema.  Skin: No rash noted.      Labs:   Urine microalb 329, 100 protein, trace blood. 5-10 rbc, 6/21 gfr 32,     Assessment & Plan:     81 y.o. male with the following -   Check a1c and cmp 10/1 as I'm concerned he's on metformin w/ stage 3b ckd. Only resumed his dm meds late 8/21 so too soon to see significant change in a1c but bc he defers check home finger sticks.   Doesn't need ssri for mood -denies depn  I   1. Type 2 diabetes mellitus with stage 3b chronic kidney disease, without long-term current use of insulin (HCC)  - Comp Metabolic Panel; Future  - HEMOGLOBIN A1C; Future        I spent a total of 41 minutes with record review, exam, communication with the patient, communication with other providers, and documentation of this encounter.      Return in about 3 weeks (around 10/8/2021) for labs.    Please note that this dictation was created using voice recognition software. I have made every reasonable attempt to correct obvious errors, but I expect that there are errors of grammar and possibly content that I did not discover before finalizing the note.

## 2021-09-17 NOTE — PATIENT INSTRUCTIONS
Please call Dr. Goodman cardiologist to see him first available regarding worsening heart function noted on the echocardiogram     Check non fasting lab 10/1/21    Get zip up compression hose    Stay off spironolactone

## 2021-10-12 ENCOUNTER — OFFICE VISIT (OUTPATIENT)
Dept: MEDICAL GROUP | Facility: PHYSICIAN GROUP | Age: 81
End: 2021-10-12
Payer: MEDICARE

## 2021-10-12 ENCOUNTER — HOSPITAL ENCOUNTER (OUTPATIENT)
Dept: RADIOLOGY | Facility: MEDICAL CENTER | Age: 81
End: 2021-10-12
Attending: FAMILY MEDICINE
Payer: MEDICARE

## 2021-10-12 ENCOUNTER — HOSPITAL ENCOUNTER (OUTPATIENT)
Facility: MEDICAL CENTER | Age: 81
End: 2021-10-12
Attending: FAMILY MEDICINE
Payer: MEDICARE

## 2021-10-12 VITALS
WEIGHT: 181.3 LBS | SYSTOLIC BLOOD PRESSURE: 122 MMHG | HEIGHT: 70 IN | BODY MASS INDEX: 25.96 KG/M2 | TEMPERATURE: 98.4 F | DIASTOLIC BLOOD PRESSURE: 58 MMHG | HEART RATE: 53 BPM | OXYGEN SATURATION: 98 % | RESPIRATION RATE: 12 BRPM

## 2021-10-12 DIAGNOSIS — E11.22 TYPE 2 DIABETES MELLITUS WITH STAGE 3B CHRONIC KIDNEY DISEASE, WITHOUT LONG-TERM CURRENT USE OF INSULIN (HCC): ICD-10-CM

## 2021-10-12 DIAGNOSIS — N18.32 TYPE 2 DIABETES MELLITUS WITH STAGE 3B CHRONIC KIDNEY DISEASE, WITHOUT LONG-TERM CURRENT USE OF INSULIN (HCC): ICD-10-CM

## 2021-10-12 DIAGNOSIS — N18.32 STAGE 3B CHRONIC KIDNEY DISEASE: ICD-10-CM

## 2021-10-12 DIAGNOSIS — I50.40 HEART FAILURE WITH REDUCED EJECTION FRACTION AND DIASTOLIC DYSFUNCTION (HCC): ICD-10-CM

## 2021-10-12 PROBLEM — N18.9 CKD (CHRONIC KIDNEY DISEASE): Status: ACTIVE | Noted: 2018-09-19

## 2021-10-12 PROCEDURE — 83036 HEMOGLOBIN GLYCOSYLATED A1C: CPT

## 2021-10-12 PROCEDURE — 71046 X-RAY EXAM CHEST 2 VIEWS: CPT

## 2021-10-12 PROCEDURE — 80053 COMPREHEN METABOLIC PANEL: CPT

## 2021-10-12 PROCEDURE — 99000 SPECIMEN HANDLING OFFICE-LAB: CPT | Performed by: FAMILY MEDICINE

## 2021-10-12 PROCEDURE — 36415 COLL VENOUS BLD VENIPUNCTURE: CPT | Performed by: FAMILY MEDICINE

## 2021-10-12 PROCEDURE — 85025 COMPLETE CBC W/AUTO DIFF WBC: CPT

## 2021-10-12 PROCEDURE — G2212 PROLONG OUTPT/OFFICE VIS: HCPCS | Performed by: FAMILY MEDICINE

## 2021-10-12 PROCEDURE — 83880 ASSAY OF NATRIURETIC PEPTIDE: CPT

## 2021-10-12 PROCEDURE — 99215 OFFICE O/P EST HI 40 MIN: CPT | Performed by: FAMILY MEDICINE

## 2021-10-12 ASSESSMENT — FIBROSIS 4 INDEX: FIB4 SCORE: 1.68

## 2021-10-12 NOTE — PROGRESS NOTES
Pt was seated, confirmed pt name and , procedure explained to pt, venipuncture performed in LAC using aseptic technique, 1 lav., 1 green, 1 gold tube collected, gauze placed with pressure on venipuncture site until hemostasis observed, site clean and dry, no redness or swelling observed, bandage placed, pt tolerated well and voiced no concerns.

## 2021-10-12 NOTE — PROGRESS NOTES
CC:   Chief Complaint   Patient presents with   • Difficulty Breathing     Occurs when laying down. Started a couple months ago.    • Follow-Up     Wants to know more about an echocardiogram         HPI:   Sammy presents today with his wife for an acute visit and evaluation of difficulty breathing.     He was seen by his PCP last month.  Consultation notes were reviewed.  At that time compression stockings for his lower extremity edema were recommended.  He has not been able to tolerate these.  He presents today complaining that he  Wakes up in the middle of night with difficulty breathing.  This has been going on for the last 3-4 months.  Has had a dry cough x 3-4 months.  Has noticed LE edema.  This is also been going on for the last 3 to 4 months.  He does follow-up with cardiology, Dr. Goodman, for his Afib.  His records were reviewed and was on spironolactone 100 mg in the past.  Patient does not recall the reason for that.  His most recent labs from June did have decreasing GFR down to 32 and a hemoglobin A1c at 8.1%.     His most recent echocardiogram from last month did show some changes in comparison to the echocardiogram from 2020.  He was found to have a left ventricular ejection fraction decreased at 35 to 40%.  This was at 60% last year.  He was also found to have grade 3 diastolic dysfunction.  Also found to have moderate mitral and tricuspid regurgitation.  Amyloidosis was recommended as a differential diagnosis.    No problem-specific Assessment & Plan notes found for this encounter.      Current Outpatient Medications Ordered in Epic   Medication Sig Dispense Refill   • Blood Glucose Meter Kit Test blood sugar as recommended by provider. Brand as covered by insurance - blood glucose monitoring kit. 1 Kit 0   • Blood Glucose Test Strips Use one Brand as covered by insurance  strip to test blood sugar once daily early morning before first meal and on occasion 2 hr post meal. 100 Strip 0   •  "Lancets Use one Brand as covered by insurance  lancet to test blood sugar once daily early morning before first meal. 100 Each 0   • Alcohol Swabs Wipe site with prep pad prior to injection. 100 Each 0   • SITagliptin (JANUVIA) 50 MG Tab Take 1 tablet by mouth every day. 30 tablet 3   • sitagliptan-metformin (JANUMET)  MG per tablet Take 1 tablet by mouth every morning. 90 tablet 1   • ELIQUIS 2.5 MG Tab        No current Epic-ordered facility-administered medications on file.       Past Medical History:   Diagnosis Date   • HTN    • Type II or unspecified type diabetes mellitus without mention of complication, not stated as uncontrolled        Social History     Tobacco Use   • Smoking status: Never Smoker   • Smokeless tobacco: Never Used   Vaping Use   • Vaping Use: Never used   Substance Use Topics   • Alcohol use: Not Currently     Alcohol/week: 0.0 oz     Comment: 4-5 weekly    • Drug use: No       Allergies:  Patient has no known allergies.          Objective:       Exam:  /58 (BP Location: Right arm, Patient Position: Sitting, BP Cuff Size: Adult)   Pulse (!) 53   Temp 36.9 °C (98.4 °F) (Temporal)   Resp 12   Ht 1.778 m (5' 10\")   Wt 82.2 kg (181 lb 4.8 oz)   SpO2 98%   BMI 26.01 kg/m²   Body mass index is 26.01 kg/m².  Wt Readings from Last 4 Encounters:   10/12/21 82.2 kg (181 lb 4.8 oz)   09/17/21 80.7 kg (178 lb)   08/27/21 81.1 kg (178 lb 14.4 oz)   07/14/21 78.7 kg (173 lb 8 oz)       Gen: Alert and oriented, No apparent distress. Appropriately groomed.  Neck: Neck is supple without lymphadenopathy.No thyromegaly. JVD distention noted.   Lungs: Normal effort,   CV: bradycardic. Bilateral 1+ Lower extremity edema  Skin: No rash noted.      Assessment & Plan:     81 y.o. male with the following -     1. Heart failure with reduced ejection fraction and diastolic dysfunction (HCC)  -2. Stage 3b chronic kidney disease (HCC)   New issue.  Cardiology notes from April were reviewed.  " Patient wasn't noted to have CHF at that time.  ECHO does show changes in comparison to last year.  Cardiologist did mention to consider amyloidosis as a differential diagnosis.  Has not had a chance to complete labs which were ordered by his PCP.  Discussed echocardiogram results in detail with patient and spouse.  At this point, recommended holding off on spironolactone or other medications until we have lab results.  Phone call was placed to cardiology and they were able to schedule an appt for him  to see Dr. Cheatham tomorrow. Labs drawn in clinic today.  Encouraged to get cxr completed today.  ED precautions d/w patient and wife.   CBC WITH DIFFERENTIAL; Future  - Comp Metabolic Panel; Future  - proBrain Natriuretic Peptide, NT; Future  - DX-CHEST-2 VIEWS; Future    3. Diabetes  Chronic.  Not well controlled.  Currently on janumet . Last A1c was 8.1% in June.  Hasn't been able to get PCP's labs drawn.  Will check A1c  Today.    I spent a total of 69 minutes with record review, exam, communication with the patient, communication with other providers, and documentation of this encounter.      No follow-ups on file.    Please note that this dictation was created using voice recognition software. I have made every reasonable attempt to correct obvious errors, but I expect that there are errors of grammar and possibly content that I did not discover before finalizing the note.

## 2021-10-13 ENCOUNTER — OFFICE VISIT (OUTPATIENT)
Dept: CARDIOLOGY | Facility: MEDICAL CENTER | Age: 81
End: 2021-10-13
Payer: MEDICARE

## 2021-10-13 VITALS
HEART RATE: 67 BPM | RESPIRATION RATE: 14 BRPM | OXYGEN SATURATION: 99 % | DIASTOLIC BLOOD PRESSURE: 74 MMHG | SYSTOLIC BLOOD PRESSURE: 132 MMHG | WEIGHT: 181 LBS | BODY MASS INDEX: 25.91 KG/M2 | HEIGHT: 70 IN

## 2021-10-13 DIAGNOSIS — I50.31 ACUTE DIASTOLIC HEART FAILURE (HCC): ICD-10-CM

## 2021-10-13 DIAGNOSIS — I48.20 ATRIAL FIBRILLATION, CHRONIC (HCC): ICD-10-CM

## 2021-10-13 DIAGNOSIS — I51.7 LVH (LEFT VENTRICULAR HYPERTROPHY): ICD-10-CM

## 2021-10-13 LAB
ALBUMIN SERPL BCP-MCNC: 4.5 G/DL (ref 3.2–4.9)
ALBUMIN/GLOB SERPL: 1.4 G/DL
ALP SERPL-CCNC: 108 U/L (ref 30–99)
ALT SERPL-CCNC: 12 U/L (ref 2–50)
ANION GAP SERPL CALC-SCNC: 14 MMOL/L (ref 7–16)
AST SERPL-CCNC: 13 U/L (ref 12–45)
BASOPHILS # BLD AUTO: 0.9 % (ref 0–1.8)
BASOPHILS # BLD: 0.07 K/UL (ref 0–0.12)
BILIRUB SERPL-MCNC: 1.3 MG/DL (ref 0.1–1.5)
BUN SERPL-MCNC: 28 MG/DL (ref 8–22)
CALCIUM SERPL-MCNC: 9.2 MG/DL (ref 8.5–10.5)
CHLORIDE SERPL-SCNC: 106 MMOL/L (ref 96–112)
CO2 SERPL-SCNC: 20 MMOL/L (ref 20–33)
CREAT SERPL-MCNC: 1.69 MG/DL (ref 0.5–1.4)
EOSINOPHIL # BLD AUTO: 0.12 K/UL (ref 0–0.51)
EOSINOPHIL NFR BLD: 1.6 % (ref 0–6.9)
ERYTHROCYTE [DISTWIDTH] IN BLOOD BY AUTOMATED COUNT: 58.2 FL (ref 35.9–50)
EST. AVERAGE GLUCOSE BLD GHB EST-MCNC: 157 MG/DL
GLOBULIN SER CALC-MCNC: 3.3 G/DL (ref 1.9–3.5)
GLUCOSE SERPL-MCNC: 108 MG/DL (ref 65–99)
HBA1C MFR BLD: 7.1 % (ref 4–5.6)
HCT VFR BLD AUTO: 38.1 % (ref 42–52)
HGB BLD-MCNC: 12.2 G/DL (ref 14–18)
IMM GRANULOCYTES # BLD AUTO: 0.02 K/UL (ref 0–0.11)
IMM GRANULOCYTES NFR BLD AUTO: 0.3 % (ref 0–0.9)
LYMPHOCYTES # BLD AUTO: 0.88 K/UL (ref 1–4.8)
LYMPHOCYTES NFR BLD: 11.5 % (ref 22–41)
MCH RBC QN AUTO: 32.9 PG (ref 27–33)
MCHC RBC AUTO-ENTMCNC: 32 G/DL (ref 33.7–35.3)
MCV RBC AUTO: 102.7 FL (ref 81.4–97.8)
MONOCYTES # BLD AUTO: 0.71 K/UL (ref 0–0.85)
MONOCYTES NFR BLD AUTO: 9.3 % (ref 0–13.4)
NEUTROPHILS # BLD AUTO: 5.84 K/UL (ref 1.82–7.42)
NEUTROPHILS NFR BLD: 76.4 % (ref 44–72)
NRBC # BLD AUTO: 0 K/UL
NRBC BLD-RTO: 0 /100 WBC
NT-PROBNP SERPL IA-MCNC: 7611 PG/ML (ref 0–125)
PLATELET # BLD AUTO: 158 K/UL (ref 164–446)
PMV BLD AUTO: 11.3 FL (ref 9–12.9)
POTASSIUM SERPL-SCNC: 5 MMOL/L (ref 3.6–5.5)
PROT SERPL-MCNC: 7.8 G/DL (ref 6–8.2)
RBC # BLD AUTO: 3.71 M/UL (ref 4.7–6.1)
SODIUM SERPL-SCNC: 140 MMOL/L (ref 135–145)
WBC # BLD AUTO: 7.6 K/UL (ref 4.8–10.8)

## 2021-10-13 PROCEDURE — 99214 OFFICE O/P EST MOD 30 MIN: CPT | Performed by: INTERNAL MEDICINE

## 2021-10-13 RX ORDER — FUROSEMIDE 80 MG
80 TABLET ORAL DAILY
Qty: 90 TABLET | Refills: 1 | Status: SHIPPED | OUTPATIENT
Start: 2021-10-13 | End: 2022-04-07

## 2021-10-13 ASSESSMENT — FIBROSIS 4 INDEX: FIB4 SCORE: 1.92

## 2021-10-13 NOTE — PROGRESS NOTES
"CARDIOLOGY OUTPATIENT FOLLOWUP    PCP: Annalise Clements M.D.    1. Acute decompensated heart failure with midrange ejection fraction   2. LVH (left ventricular hypertrophy)    3. Atrial fibrillation, chronic (HCC)      Sammy Oseguera has newly identified acute decompensated heart failure with midrange ejection fraction.  I advised furosemide 80 mg twice daily for the next 4 days and then once daily thereafter.  He will measure basic metabolic panel, BNP as well as myeloma labs.     Follow up: in 1 week    Chief Complaint   Patient presents with   • Hypertension   • Atrial Fibrillation     F/V Dx: Atrial fibrillation, chronic (HCC)   • Aortic Atherosclerosis       History: Sammy Oseguera is a 81 y.o. male with history of chronic atrial fibrillation, chronic kidney disease and hypertension presenting for evaluation of edema and shortness of breath.  Symptoms have been present for the past 3 months and are stable.  He notices stable swelling of the bilateral lower extremities which is now improved with compression stockings.  He also finds it difficult to take a deep breath.  His wife reports that he often has to sit upright in bed because he is winded at night.  He does continue on with his daytime activities though according to his wife he has slowed down substantially and actually spends much of the day sleeping or resting.      ROS:   All other systems reviewed and negative except as per the HPI    PE:  /74 (BP Location: Left arm, Patient Position: Sitting, BP Cuff Size: Adult)   Pulse 67   Resp 14   Ht 1.778 m (5' 10\")   Wt 82.1 kg (181 lb)   SpO2 99%   BMI 25.97 kg/m²   Gen: no acute distress  HEENT: Symmetric face. Anicteric sclerae. Moist mucus membranes  NECK: + JVD with kussmaul.  No lymphadenopathy  CARDIAC: Irregular, Normal S1, S2, No murmur  VASCULATURE: carotids are normal bilaterally without bruit  RESP: Clear to auscultation bilaterally  ABD: Soft, non-tender, non-distended  EXT: " No edema, no clubbing or cyanosis  SKIN: Warm and dry  NEURO: No gross deficits  PSYCH: Appropriate affect, participates in conversation    The ASCVD Risk score (Jhonatan DC Jr, et al., 2013) failed to calculate.    Past Medical History:   Diagnosis Date   • HTN    • Type II or unspecified type diabetes mellitus without mention of complication, not stated as uncontrolled      No Known Allergies  Outpatient Encounter Medications as of 10/13/2021   Medication Sig Dispense Refill   • furosemide (LASIX) 80 MG Tab Take 1 Tablet by mouth every day. Take one tablet twice daily for four days then one tablet daily therafter 90 Tablet 1   • SITagliptin (JANUVIA) 50 MG Tab Take 1 tablet by mouth every day. 30 tablet 3   • sitagliptan-metformin (JANUMET)  MG per tablet Take 1 tablet by mouth every morning. 90 tablet 1   • ELIQUIS 2.5 MG Tab      • Blood Glucose Meter Kit Test blood sugar as recommended by provider. Brand as covered by insurance - blood glucose monitoring kit. 1 Kit 0   • Blood Glucose Test Strips Use one Brand as covered by insurance  strip to test blood sugar once daily early morning before first meal and on occasion 2 hr post meal. 100 Strip 0   • Lancets Use one Brand as covered by insurance  lancet to test blood sugar once daily early morning before first meal. 100 Each 0   • Alcohol Swabs Wipe site with prep pad prior to injection. 100 Each 0     No facility-administered encounter medications on file as of 10/13/2021.     Social History     Socioeconomic History   • Marital status:      Spouse name: Not on file   • Number of children: Not on file   • Years of education: Not on file   • Highest education level: Not on file   Occupational History   • Not on file   Tobacco Use   • Smoking status: Never Smoker   • Smokeless tobacco: Never Used   Vaping Use   • Vaping Use: Never used   Substance and Sexual Activity   • Alcohol use: Not Currently     Alcohol/week: 0.0 oz     Comment: 4-5 weekly    •  Drug use: No   • Sexual activity: Yes   Other Topics Concern   • Not on file   Social History Narrative   • Not on file     Social Determinants of Health     Financial Resource Strain:    • Difficulty of Paying Living Expenses:    Food Insecurity:    • Worried About Running Out of Food in the Last Year:    • Ran Out of Food in the Last Year:    Transportation Needs:    • Lack of Transportation (Medical):    • Lack of Transportation (Non-Medical):    Physical Activity:    • Days of Exercise per Week:    • Minutes of Exercise per Session:    Stress:    • Feeling of Stress :    Social Connections:    • Frequency of Communication with Friends and Family:    • Frequency of Social Gatherings with Friends and Family:    • Attends Zoroastrianism Services:    • Active Member of Clubs or Organizations:    • Attends Club or Organization Meetings:    • Marital Status:    Intimate Partner Violence:    • Fear of Current or Ex-Partner:    • Emotionally Abused:    • Physically Abused:    • Sexually Abused:        Studies  Lab Results   Component Value Date/Time    CHOLSTRLTOT 101 06/30/2021 07:28 AM    LDL 47 06/30/2021 07:28 AM    HDL 44 06/30/2021 07:28 AM    TRIGLYCERIDE 51 06/30/2021 07:28 AM       Lab Results   Component Value Date/Time    SODIUM 140 10/12/2021 03:35 PM    POTASSIUM 5.0 10/12/2021 03:35 PM    CHLORIDE 106 10/12/2021 03:35 PM    CO2 20 10/12/2021 03:35 PM    GLUCOSE 108 (H) 10/12/2021 03:35 PM    BUN 28 (H) 10/12/2021 03:35 PM    CREATININE 1.69 (H) 10/12/2021 03:35 PM     Lab Results   Component Value Date/Time    ALKPHOSPHAT 108 (H) 10/12/2021 03:35 PM    ASTSGOT 13 10/12/2021 03:35 PM    ALTSGPT 12 10/12/2021 03:35 PM    TBILIRUBIN 1.3 10/12/2021 03:35 PM        For this encounter I reviewed the following medical records PCP notes, BMP, Lipid profile and CBC        Adjunctive assessment was made after consulting with the patient's spouse

## 2021-10-19 ENCOUNTER — TELEPHONE (OUTPATIENT)
Dept: MEDICAL GROUP | Facility: PHYSICIAN GROUP | Age: 81
End: 2021-10-19

## 2021-10-19 NOTE — TELEPHONE ENCOUNTER
ESTABLISHED PATIENT PRE-VISIT PLANNING     Patient was NOT contacted to complete PVP.     Note: Patient will not be contacted if there is no indication to call.     1.  Reviewed notes from the last few office visits within the medical group: Yes    2.  If any orders were placed at last visit or intended to be done for this visit (i.e. 6 mos follow-up), do we have Results/Consult Notes?         •  Labs - Labs ordered, completed on 10/13/2021 and results are in chart.  Note: If patient appointment is for lab review and patient did not complete labs, check with provider if OK to reschedule patient until labs completed.       •  Imaging - Imaging ordered, completed and results are in chart.       •  Referrals - No referrals were ordered at last office visit.    3. Is this appointment scheduled as a Hospital Follow-Up? No    4.  Immunizations were updated in Epic using Reconcile Outside Information activity? Yes    5.  Patient is due for the following Health Maintenance Topics:   Health Maintenance Due   Topic Date Due   • IMM INFLUENZA (1) 09/01/2021       6.  AHA (Pulse8) form printed for Provider? No, already completed

## 2021-10-20 ENCOUNTER — HOSPITAL ENCOUNTER (OUTPATIENT)
Facility: MEDICAL CENTER | Age: 81
End: 2021-10-20
Attending: INTERNAL MEDICINE
Payer: MEDICARE

## 2021-10-20 ENCOUNTER — OFFICE VISIT (OUTPATIENT)
Dept: MEDICAL GROUP | Facility: PHYSICIAN GROUP | Age: 81
End: 2021-10-20
Payer: MEDICARE

## 2021-10-20 ENCOUNTER — OFFICE VISIT (OUTPATIENT)
Dept: CARDIOLOGY | Facility: MEDICAL CENTER | Age: 81
End: 2021-10-20
Payer: MEDICARE

## 2021-10-20 ENCOUNTER — TELEPHONE (OUTPATIENT)
Dept: MEDICAL GROUP | Facility: PHYSICIAN GROUP | Age: 81
End: 2021-10-20

## 2021-10-20 ENCOUNTER — NON-PROVIDER VISIT (OUTPATIENT)
Dept: MEDICAL GROUP | Facility: PHYSICIAN GROUP | Age: 81
End: 2021-10-20

## 2021-10-20 VITALS
HEIGHT: 70 IN | WEIGHT: 154 LBS | RESPIRATION RATE: 16 BRPM | BODY MASS INDEX: 22.05 KG/M2 | HEART RATE: 54 BPM | DIASTOLIC BLOOD PRESSURE: 70 MMHG | SYSTOLIC BLOOD PRESSURE: 122 MMHG | OXYGEN SATURATION: 98 %

## 2021-10-20 VITALS
HEART RATE: 59 BPM | HEIGHT: 70 IN | WEIGHT: 152.6 LBS | DIASTOLIC BLOOD PRESSURE: 62 MMHG | SYSTOLIC BLOOD PRESSURE: 124 MMHG | RESPIRATION RATE: 16 BRPM | BODY MASS INDEX: 21.85 KG/M2 | TEMPERATURE: 97.2 F | OXYGEN SATURATION: 97 %

## 2021-10-20 DIAGNOSIS — N18.32 TYPE 2 DIABETES MELLITUS WITH STAGE 3B CHRONIC KIDNEY DISEASE, WITHOUT LONG-TERM CURRENT USE OF INSULIN (HCC): ICD-10-CM

## 2021-10-20 DIAGNOSIS — I48.19 ATRIAL FIBRILLATION, PERSISTENT (HCC): ICD-10-CM

## 2021-10-20 DIAGNOSIS — F32.A MILD DEPRESSION: ICD-10-CM

## 2021-10-20 DIAGNOSIS — Z23 NEED FOR VACCINATION: ICD-10-CM

## 2021-10-20 DIAGNOSIS — I50.20 HFREF (HEART FAILURE WITH REDUCED EJECTION FRACTION) (HCC): ICD-10-CM

## 2021-10-20 DIAGNOSIS — R41.3 MEMORY IMPAIRMENT: ICD-10-CM

## 2021-10-20 DIAGNOSIS — I48.20 ATRIAL FIBRILLATION, CHRONIC (HCC): ICD-10-CM

## 2021-10-20 DIAGNOSIS — I50.40 HEART FAILURE WITH REDUCED EJECTION FRACTION AND DIASTOLIC DYSFUNCTION (HCC): ICD-10-CM

## 2021-10-20 DIAGNOSIS — I51.7 LVH (LEFT VENTRICULAR HYPERTROPHY): ICD-10-CM

## 2021-10-20 DIAGNOSIS — E11.22 TYPE 2 DIABETES MELLITUS WITH STAGE 3B CHRONIC KIDNEY DISEASE, WITHOUT LONG-TERM CURRENT USE OF INSULIN (HCC): ICD-10-CM

## 2021-10-20 DIAGNOSIS — I50.31 ACUTE DIASTOLIC HEART FAILURE (HCC): ICD-10-CM

## 2021-10-20 DIAGNOSIS — R41.3 MEMORY PROBLEM: ICD-10-CM

## 2021-10-20 PROCEDURE — 80053 COMPREHEN METABOLIC PANEL: CPT

## 2021-10-20 PROCEDURE — 36415 COLL VENOUS BLD VENIPUNCTURE: CPT | Performed by: FAMILY MEDICINE

## 2021-10-20 PROCEDURE — 90662 IIV NO PRSV INCREASED AG IM: CPT | Performed by: FAMILY MEDICINE

## 2021-10-20 PROCEDURE — 83880 ASSAY OF NATRIURETIC PEPTIDE: CPT

## 2021-10-20 PROCEDURE — 99215 OFFICE O/P EST HI 40 MIN: CPT | Mod: 25 | Performed by: FAMILY MEDICINE

## 2021-10-20 PROCEDURE — G0008 ADMIN INFLUENZA VIRUS VAC: HCPCS | Performed by: FAMILY MEDICINE

## 2021-10-20 PROCEDURE — 99000 SPECIMEN HANDLING OFFICE-LAB: CPT | Performed by: FAMILY MEDICINE

## 2021-10-20 PROCEDURE — 99214 OFFICE O/P EST MOD 30 MIN: CPT | Performed by: INTERNAL MEDICINE

## 2021-10-20 RX ORDER — FLUOXETINE HYDROCHLORIDE 20 MG/1
20 CAPSULE ORAL DAILY
Qty: 100 CAPSULE | Refills: 2 | Status: SHIPPED | OUTPATIENT
Start: 2021-10-20 | End: 2021-10-20

## 2021-10-20 ASSESSMENT — FIBROSIS 4 INDEX
FIB4 SCORE: 1.92
FIB4 SCORE: 1.92

## 2021-10-20 ASSESSMENT — ENCOUNTER SYMPTOMS
MYALGIAS: 0
SHORTNESS OF BREATH: 0
PALPITATIONS: 0
COUGH: 0
DIZZINESS: 0
LOSS OF CONSCIOUSNESS: 0

## 2021-10-20 NOTE — PROGRESS NOTES
Pt was seated, confirmed pt name and , procedure explained to pt, venipuncture performed in LAC using aseptic technique, 2 gold, 1 green tube collected, gauze placed with pressure on venipuncture site until hemostasis observed, site clean and dry, no redness or swelling observed, bandage placed, pt tolerated well and voiced no concerns.

## 2021-10-20 NOTE — Clinical Note
Hi, you saw our patient today. He has new CHF, was started on lasix by Dr. Cheatham 10/20/21 - he lost almost 30lb in past week. I asked him to obtain the f/u labs today as I'm concerned his renal fxn will take a hit. What do you think of his new CHF?

## 2021-10-20 NOTE — PROGRESS NOTES
Chief Complaint   Patient presents with   • Atrial Fibrillation     F/V Dx: Atrial fibrillation, chronic (HCC)   • CHF (Diastolic)     F/V Dx: Acute diastolic heart failure (HCC)   • Aortic Atherosclerosis       Subjective:   Lucio Oseguera is a 81 y.o. male who presents today for follow-up evaluation of atrial fibrillation, systolic CHF, hypertension and anticoagulation.    Since 4/5/2021 appointment the patient developed heart failure symptoms with SOB, lower extremity edema and 20 pound weight gain.  Echocardiogram 9/3/2020 showed worsening EF of 40% previous EF 60%, severe LVH concerning for infiltrative cardiomyopathy plus right heart dysfunction.  Labs showed creatinine 1.69, BNP 7611.  Started on Lasix 80 mg twice daily x4days daily then 80 mg daily with 27 pound weight loss.  Spironolactone apparently discontinued.  KOO and lower extremity edema resolved but feels very fatigued.  No lightheadedness or dizziness.    Past Medical History:   Diagnosis Date   • HTN    • Type II or unspecified type diabetes mellitus without mention of complication, not stated as uncontrolled      Past Surgical History:   Procedure Laterality Date   • LAPAROSCOPIC GASTRIC ULCER OVER SEW     • PROSTATECTOMY, RADICAL RETRO       Family History   Problem Relation Age of Onset   • Stroke Mother    • Heart Disease Father    • Depression Sister    • Heart Disease Sister    • Depression Sister    • Depression Sister    • No Known Problems Son      Social History     Socioeconomic History   • Marital status:      Spouse name: Not on file   • Number of children: Not on file   • Years of education: Not on file   • Highest education level: Not on file   Occupational History   • Not on file   Tobacco Use   • Smoking status: Never Smoker   • Smokeless tobacco: Never Used   Vaping Use   • Vaping Use: Never used   Substance and Sexual Activity   • Alcohol use: Not Currently     Alcohol/week: 0.0 oz   • Drug use: No   • Sexual activity:  Yes   Other Topics Concern   • Not on file   Social History Narrative     in '[birth year]. They have 2 children. 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 He quit drinking alcohol when prior PCP told him about his liver damage around age 76. He was drinking at most 2oz of alcohol daily. Wife doesn't drink etoh, no h/o drug use.      Social Determinants of Health     Financial Resource Strain:    • Difficulty of Paying Living Expenses:    Food Insecurity:    • Worried About Running Out of Food in the Last Year:    • Ran Out of Food in the Last Year:    Transportation Needs:    • Lack of Transportation (Medical):    • Lack of Transportation (Non-Medical):    Physical Activity:    • Days of Exercise per Week:    • Minutes of Exercise per Session:    Stress:    • Feeling of Stress :    Social Connections:    • Frequency of Communication with Friends and Family:    • Frequency of Social Gatherings with Friends and Family:    • Attends Church Services:    • Active Member of Clubs or Organizations:    • Attends Club or Organization Meetings:    • Marital Status:    Intimate Partner Violence:    • Fear of Current or Ex-Partner:    • Emotionally Abused:    • Physically Abused:    • Sexually Abused:      No Known Allergies  Outpatient Encounter Medications as of 10/20/2021   Medication Sig Dispense Refill   • furosemide (LASIX) 80 MG Tab Take 1 Tablet by mouth every day. Take one tablet twice daily for four days then one tablet daily therafter 90 Tablet 1   • Blood Glucose Meter Kit Test blood sugar as recommended by provider. Brand as covered by insurance - blood glucose monitoring kit. 1 Kit 0   • Blood Glucose Test Strips Use one Brand as covered by insurance  strip to test blood sugar once daily early morning before first meal and on occasion 2 hr post meal. 100 Strip 0   • Lancets Use one Brand as covered by [employer]  lancet to  "test blood sugar once daily early morning before first meal. 100 Each 0   • Alcohol Swabs Wipe site with prep pad prior to injection. 100 Each 0   • SITagliptin (JANUVIA) 50 MG Tab Take 1 tablet by mouth every day. 30 tablet 3   • sitagliptan-metformin (JANUMET)  MG per tablet Take 1 tablet by mouth every morning. (Patient taking differently: Take 1 Tablet by mouth 2 times a day with meals.) 90 tablet 1   • ELIQUIS 2.5 MG Tab 2 times a day.     • [DISCONTINUED] FLUoxetine (PROZAC) 20 MG Cap Take 1 Capsule by mouth every day. To help with mood. (Patient not taking: Reported on 10/20/2021) 100 Capsule 2     No facility-administered encounter medications on file as of 10/20/2021.     Review of Systems   Respiratory: Negative for cough and shortness of breath.    Cardiovascular: Negative for chest pain and palpitations.   Musculoskeletal: Negative for myalgias.   Neurological: Negative for dizziness and loss of consciousness.        Objective:   /70 (BP Location: Left arm, Patient Position: Sitting, BP Cuff Size: Adult)   Pulse (!) 54   Resp 16   Ht 1.778 m (5' 10\")   Wt 69.9 kg (154 lb)   SpO2 98%   BMI 22.10 kg/m²     Physical Exam  Vitals reviewed.   Constitutional:       General: He is not in acute distress.     Appearance: He is well-developed.   Neck:      Vascular: No JVD.   Cardiovascular:      Rate and Rhythm: Bradycardia present. Rhythm irregularly irregular.      Pulses:           Carotid pulses are 2+ on the right side and 2+ on the left side.     Heart sounds: Normal heart sounds. No murmur heard.   No friction rub. No gallop.    Pulmonary:      Effort: Pulmonary effort is normal. No accessory muscle usage or respiratory distress.      Breath sounds: Normal breath sounds. No wheezing or rales.   Abdominal:      General: There is no distension.      Palpations: Abdomen is soft. There is no mass.      Tenderness: There is no abdominal tenderness.   Musculoskeletal:      Cervical back: " Normal range of motion and neck supple.   Skin:     General: Skin is warm and dry.      Findings: No rash.      Nails: There is no clubbing.   Neurological:      Mental Status: He is alert and oriented to person, place, and time.   Psychiatric:         Behavior: Behavior normal.       ABDOMINAL ULTRASOUND 09/16/2020  Prominent IVC and hepatic veins suggesting right heart failure.  No evidence of hepatic lesion.    ECHOCARDIOGRAM 11/17/2020  No prior study is available for comparison.   Moderate to severe concentric left ventricular hypertrophy.  Normal left ventricular systolic function. Left ventricular ejection   fraction is visually estimated to be 60%.  Diastolic function is difficult to assess with atrial fibrillation.  Normal inferior vena cava size and inspiratory collapse.  Estimated right ventricular systolic pressure  is 37 mmHg.    EKG 11/25/2020 atrial fibrillation, rate 40s.  Diffuse ST-T wave abnormalities.  Reviewed and interpreted.    Assessment:     1. Atrial fibrillation, chronic (HCC)  Comp Metabolic Panel   2. Atrial fibrillation, persistent (HCC)     3. LVH (left ventricular hypertrophy)  NM-BONE/JOINT SCAN WHOLE BODY   4. HFrEF (heart failure with reduced ejection fraction) (HCC)  NM-BONE/JOINT SCAN WHOLE BODY     Medical Decision Making:  Today's Assessment / Status / Plan:     Assessment  1.  Atrial fibrillation, chronic.  2.  HFrEF/systolic CHF.  3.  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  4.  H/O hypertension.  5.  Diabetes mellitus.  6.  Alcoholic cirrhosis with portal hypertension.  7.  CKD.    Recommendation Discussion  1.  The patient has developed biventricular systolic CHF with reduced EF and concern for infiltrative cardiomyopathy.  Symptomatically CHF has improved and resolved with diuretic therapy on Lasix.  2.  CMP, BNP today to reassess CKD in order to direct GDMT CHF therapy (ARB/ACE, Aldactone, beta-blocker).  Beta-blockers currently precluded  due to resting bradycardia and previous worsening heart rate on metoprolol.  3.  EP referral for consideration of PPM +/-CRT-P +/-D for SSS/bradycardia and need of beta-blocker therapy.  4.  Reviewed reviewed the patient's cardiac condition both with him and his wife with a concern of infiltrative cardiomyopathy and its implications.  4.  [employer] cardiac scan and free light chains.  Iron studies (prior ferritin level 30 and 2018)  5.  Instructed on monitoring daily weight and vital signs and will adjust diuretics accordingly.  6.  RTC 3 weeks.

## 2021-10-20 NOTE — PROGRESS NOTES
Subjective:     CC:   Chief Complaint   Patient presents with   • Follow-Up     Fluid in legs, cardiologist prescribed lasix, helped   • Lab Results     10/13/21   • Medication Management     Furosemide wants to know if he's supposed to take daily since the fluid went down,          HPI:   Sammy presents today with f/u from his last appt 9/17/21,   At his first appointment to establish with me August 27 I had ordered a chest x-ray and echocardiogram in light of his chronic lower extremity edema and added a prescription for 20 mg of Prozac to help with his mood.  He later stated he did not need anything to help with his mood and did not take the Prozac.    His echocardiogram on September 3, compared to 11/20 echo, showed LVEF declined, 35-40% and global hypokenises, grade 3 diastolic dysfunction, moderate MR, moderate TR. Consider amyloidosis.  9/2 cxr - atherosclerosis, o/w neg.   He had not seen cards since 12/20, he has a history of afib.   He tried compression hose for his lower ext edema - doesn't like them bc hard to use, plans to order zip up ones.     He had mentioned that he had a bottle of spironolactone from 12/20 100mg spironolactione, rx'd by Dr. Olivas for swelling in his L groin - it helped, resolved, but notes some swelling recently.   Does have a GI for hx etoh cirrhosis.   At his September 17 appointment I asked him to please call Dr. Goodman cardiologist to see him first available regarding worsening heart function noted on the echocardiogram.  It also instructed him to stay off the spironolactone and get compression hose plus check a nonfasting lab October 1 which he did not do.  He did come to see a covering physician in my office on October 12 complaining of persistent shortness of breath and she scheduled him an appointment with the cardiologist for the following day.  He was seen by Dr. Cheatham October 13 who diagnosed him with acute decompensated heart failure with midrange ejection fraction  and advise 80 mg twice daily Lasix for 4 days then once daily thereafter and also ordered lab tests and suggested a 1 week follow-up.  He has not yet obtained those lab tests.    10/12/21 a1c 7.1, gfr 39 (improved from 32 on 6/30)    10/20/21 he states he took the lasix 80mg bid for 4 days then stopped. Has been off it for 2 days. He says it helped the swelling in his legs, he didn't notice whether it helped his breathing, his wife says he is now breathing much more comfortably since taking lasix. He has f/u with Dr. Cheatham later today. I rechecked his weight several times, he has lost almost 30lb in one week.     Here w/ wife. She first started noticing problems with his memory around summer '21. He has trouble remembering appts, dates, if someone called. He is concerned about this. He had a monthly poker group and couldn't remember the hands he played about 4 months ago. He denies depression or anxiety but wife thinks he could be depressed.     Their son lives with them and the wife is leaving for [place] 10/25 until 11/4. He drives rarely. Son will look after him.     Problem   Memory Impairment    MOCA 21/30. Recommend to obtain driving eval prior to further driving. Lives w/ wife and son. Will address this in more detail at his f/u appt in 2w. I presume his dementia is more likely d/t small vessel dz, we can discuss treatment options in 2wk. Will also discuss advance directives. Wife states her memory is fine.      Mild Depression (Hcc)    Unstable, add prozac, contract for safety, f/u 2wk, 09/17/21 states he decided not to take it, doesn't think he needs it. 10/20/21 he agrees again to try prozac as + mild depression on PHQ9.      Ckd Stage 3 Due to Type 2 Diabetes Mellitus (Hcc)    Unstable, needs repeat bmp 10/20/21 and then likely to have metformin held if worsening ckd. I asked him to track his home sugars over the next few days and return 10/22/21 with our NP to assess plan for diabetes management in the  office as there seem to be compliance issues and with the wife leaving town I want to ensure there is a good plan in place to f/u the labs drawn from Dr Cheatham 10/20/21.          Current Outpatient Medications Ordered in Epic   Medication Sig Dispense Refill   • furosemide (LASIX) 80 MG Tab Take 1 Tablet by mouth every day. Take one tablet twice daily for four days then one tablet daily therafter 90 Tablet 1   • Blood Glucose Meter Kit Test blood sugar as recommended by provider. Brand as covered by insurance - blood glucose monitoring kit. 1 Kit 0   • Blood Glucose Test Strips Use one Brand as covered by insurance  strip to test blood sugar once daily early morning before first meal and on occasion 2 hr post meal. 100 Strip 0   • Lancets Use one Brand as covered by [employer]  lancet to test blood sugar once daily early morning before first meal. 100 Each 0   • Alcohol Swabs Wipe site with prep pad prior to injection. 100 Each 0   • SITagliptin (JANUVIA) 50 MG Tab Take 1 tablet by mouth every day. 30 tablet 3   • sitagliptan-metformin (JANUMET)  MG per tablet Take 1 tablet by mouth every morning. (Patient taking differently: Take 1 Tablet by mouth 2 times a day with meals.) 90 tablet 1   • ELIQUIS 2.5 MG Tab 2 times a day.       No current [facility]-ordered facility-administered medications on file.       Past Medical History:   Diagnosis Date   • HTN    • Type II or unspecified type diabetes mellitus without mention of complication, not stated as uncontrolled        Social History     Tobacco Use   • Smoking status: Never Smoker   • Smokeless tobacco: Never Used   Vaping Use   • Vaping Use: Never used   Substance Use Topics   • Alcohol use: Not Currently     Alcohol/week: 0.0 oz   • Drug use: No       Allergies:  Patient has no known allergies.    Health Maintenance: Completed    ROS:  Per HPI      Objective:       Exam:  /62 (BP Location: Right arm, Patient Position: Sitting, BP Cuff Size: Adult)   Pulse  "(!) 59   Temp 36.2 °C (97.2 °F) (Temporal)   Resp 16   Ht 1.778 m (5' 10\")   Wt 69.2 kg (152 lb 9.6 oz)   SpO2 97%   BMI 21.90 kg/m²   Body mass index is 21.9 kg/m².  Wt Readings from Last 4 Encounters:   10/20/21 69.9 kg (154 lb)   10/20/21 69.2 kg (152 lb 9.6 oz)   10/13/21 82.1 kg (181 lb)   10/12/21 82.2 kg (181 lb 4.8 oz)       Gen: Alert and oriented, No apparent distress. Appropriately groomed.  Neck: Neck is supple without lymphadenopathy.No thyromegaly.   Lungs: Normal effort, CTA bilaterally, no wheezes, rhonchi, or rales.  CV: Regular rate and irreg irreg rhythm. No murmurs, rubs, or gallops.  ABD:  Soft, nontender, nondistended, NABSx4, no HSM or RBT or guarding or masses.  Ext: No clubbing, cyanosis, edema.  Skin: No rash noted.    PHQ9 is 5  MOCA 21/30  Labs: hasn't yet obtained.     Assessment & Plan:     81 y.o. male with the following -   He needs repeat bmp today and then likely to have metformin held if worsening ckd. I asked him to track his home sugars over the next few days and return 10/22/21 with our NP to assess plan for diabetes management in the office as there seem to be compliance issues and with the wife leaving town I want to ensure there is a good plan in place to f/u the labs drawn from Dr Cheatham 10/20/21.   He will d/w cardiology today re: continued use of lasix and f/u of the ? Of amyloidosis. Unclear if his liver disease could be related.  I reviewed his cardiology note prior to closing today's note - will continue current plan of care.  1. Need for vaccination  - INFLUENZA VACCINE, HIGH DOSE (65+ ONLY)    2. Type 2 diabetes mellitus with stage 3b chronic kidney disease, without long-term current use of insulin (HCC)    3. Memory problem  - REFERRAL TO OCCUPATIONAL THERAPY    4. Heart failure with reduced ejection fraction and diastolic dysfunction (HCC)    5. Mild depression (HCC)    6. Memory impairment        I spent a total of 59 minutes with record review, exam, " communication with the patient, communication with other providers, and documentation of this encounter.      No follow-ups on file.    Please note that this dictation was created using voice recognition software. I have made every reasonable attempt to correct obvious errors, but I expect that there are errors of grammar and possibly content that I did not discover before finalizing the note.

## 2021-10-21 ENCOUNTER — TELEPHONE (OUTPATIENT)
Dept: SCHEDULING | Facility: IMAGING CENTER | Age: 81
End: 2021-10-21

## 2021-10-21 ENCOUNTER — TELEPHONE (OUTPATIENT)
Dept: MEDICAL GROUP | Facility: PHYSICIAN GROUP | Age: 81
End: 2021-10-21

## 2021-10-21 LAB
ALBUMIN SERPL BCP-MCNC: 5.3 G/DL (ref 3.2–4.9)
ALBUMIN/GLOB SERPL: 1.5 G/DL
ALP SERPL-CCNC: 125 U/L (ref 30–99)
ALT SERPL-CCNC: 19 U/L (ref 2–50)
ANION GAP SERPL CALC-SCNC: 21 MMOL/L (ref 7–16)
AST SERPL-CCNC: 19 U/L (ref 12–45)
BILIRUB SERPL-MCNC: 2.3 MG/DL (ref 0.1–1.5)
BUN SERPL-MCNC: 55 MG/DL (ref 8–22)
CALCIUM SERPL-MCNC: 10 MG/DL (ref 8.5–10.5)
CHLORIDE SERPL-SCNC: 93 MMOL/L (ref 96–112)
CO2 SERPL-SCNC: 24 MMOL/L (ref 20–33)
CREAT SERPL-MCNC: 2.72 MG/DL (ref 0.5–1.4)
GLOBULIN SER CALC-MCNC: 3.5 G/DL (ref 1.9–3.5)
GLUCOSE SERPL-MCNC: 168 MG/DL (ref 65–99)
NT-PROBNP SERPL IA-MCNC: 6474 PG/ML (ref 0–125)
POTASSIUM SERPL-SCNC: 4.4 MMOL/L (ref 3.6–5.5)
PROT SERPL-MCNC: 8.8 G/DL (ref 6–8.2)
SODIUM SERPL-SCNC: 138 MMOL/L (ref 135–145)

## 2021-10-21 NOTE — TELEPHONE ENCOUNTER
ESTABLISHED PATIENT PRE-VISIT PLANNING     Patient was NOT contacted to complete PVP.     Note: Patient will not be contacted if there is no indication to call.     1.  Reviewed notes from the last few office visits within the medical group: Yes    2.  If any orders were placed at last visit or intended to be done for this visit (i.e. 6 mos follow-up), do we have Results/Consult Notes?         •  Labs - Labs were not ordered at last office visit.  Note: If patient appointment is for lab review and patient did not complete labs, check with provider if OK to reschedule patient until labs completed.       •  Imaging - Imaging was not ordered at last office visit.       •  Referrals - Referral ordered, patient has NOT been seen.    3. Is this appointment scheduled as a Hospital Follow-Up? No    4.  Immunizations were updated in Epic using Reconcile Outside Information activity? Yes    5.  Patient is due for the following Health Maintenance Topics:   There are no preventive care reminders to display for this patient.    - Patient is up-to-date on all Health Maintenance topics. No records have been requested at this time.    6.  AHA (Pulse[employer]) form printed for Provider? No, already completed

## 2021-10-21 NOTE — TELEPHONE ENCOUNTER
Spoke to SW and he asked if patient called back stated, yes told them he had spoken to PCP and has FV tomorrow. He stated have them stop lasix, SW will call.     Called and spoke to the patient wife. Relayed BYRON recommendations. Stopped lasix, lost 30 pounds in the last week. Has not taken since then, continuing diabetes and eliquis medications.   She is going to [place] Monday, son and granddaughter will be helping him. She will not be home from about 4:30-6pm and wants SW to call before or after that. Advised I cannot tell when he will call, but keep an eye on their phone and keep FV scheduled tomorrow. Answered all questions and concerns, appreciative of call.

## 2021-10-21 NOTE — TELEPHONE ENCOUNTER
SW    Pts wife Jessica called for Pts lab results. Please call Jessica back at [phone].    Thank you

## 2021-10-21 NOTE — TELEPHONE ENCOUNTER
Called and spoke to patient wife, advised I notified SW this morning and he already spoke to PCP, and patient has FV scheduled tomorrow with PCP. She asked what the results were and I stated his kidney function did worsen, but that will be discussed at the OV tomorrow with PCP. No other recommendations were discussed at this time. Answered all questions and concerns, appreciative of call.

## 2021-10-22 ENCOUNTER — OFFICE VISIT (OUTPATIENT)
Dept: MEDICAL GROUP | Facility: PHYSICIAN GROUP | Age: 81
End: 2021-10-22
Payer: MEDICARE

## 2021-10-22 ENCOUNTER — TELEPHONE (OUTPATIENT)
Dept: CARDIOLOGY | Facility: MEDICAL CENTER | Age: 81
End: 2021-10-22

## 2021-10-22 VITALS
DIASTOLIC BLOOD PRESSURE: 74 MMHG | WEIGHT: 156 LBS | BODY MASS INDEX: 22.33 KG/M2 | SYSTOLIC BLOOD PRESSURE: 118 MMHG | OXYGEN SATURATION: 98 % | TEMPERATURE: 97.9 F | RESPIRATION RATE: 14 BRPM | HEART RATE: 50 BPM | HEIGHT: 70 IN

## 2021-10-22 DIAGNOSIS — I49.5 SSS (SICK SINUS SYNDROME) (HCC): ICD-10-CM

## 2021-10-22 DIAGNOSIS — I50.20 HFREF (HEART FAILURE WITH REDUCED EJECTION FRACTION) (HCC): ICD-10-CM

## 2021-10-22 DIAGNOSIS — I42.8 INFILTRATIVE CARDIOMYOPATHY (HCC): ICD-10-CM

## 2021-10-22 DIAGNOSIS — N18.30 CKD STAGE 3 DUE TO TYPE 2 DIABETES MELLITUS (HCC): ICD-10-CM

## 2021-10-22 DIAGNOSIS — E11.22 CKD STAGE 3 DUE TO TYPE 2 DIABETES MELLITUS (HCC): ICD-10-CM

## 2021-10-22 DIAGNOSIS — I50.40 HEART FAILURE WITH REDUCED EJECTION FRACTION AND DIASTOLIC DYSFUNCTION (HCC): ICD-10-CM

## 2021-10-22 PROCEDURE — 99215 OFFICE O/P EST HI 40 MIN: CPT | Performed by: FAMILY MEDICINE

## 2021-10-22 RX ORDER — GLIPIZIDE 5 MG/1
TABLET ORAL
Qty: 30 TABLET | Refills: 1 | Status: SHIPPED | OUTPATIENT
Start: 2021-10-22 | End: 2021-12-14

## 2021-10-22 RX ORDER — GLIPIZIDE 5 MG/1
TABLET ORAL
Qty: 30 TABLET | Refills: 1 | Status: SHIPPED | OUTPATIENT
Start: 2021-10-22 | End: 2021-10-22

## 2021-10-22 ASSESSMENT — ENCOUNTER SYMPTOMS
PALPITATIONS: 0
ORTHOPNEA: 0
CHILLS: 0
MUSCULOSKELETAL NEGATIVE: 1
WEIGHT LOSS: 0
HEADACHES: 0
CLAUDICATION: 0
EYES NEGATIVE: 1
DIZZINESS: 0
GASTROINTESTINAL NEGATIVE: 1
FEVER: 0

## 2021-10-22 ASSESSMENT — FIBROSIS 4 INDEX: FIB4 SCORE: 2.23

## 2021-10-22 NOTE — TELEPHONE ENCOUNTER
Pt's wife, Mercedes called and states they did not received a call from  and is requesting a call back.   Ph:[phone]    Thank you

## 2021-10-22 NOTE — PROGRESS NOTES
Called and spoke with the patient and his wife   Reviewed lab work; renal function  Will repeat BMP today with a standing order  Will get Free serum light chains today  Again reviewed significance of he current cardiac condition

## 2021-10-22 NOTE — ASSESSMENT & PLAN NOTE
TWIN Walsh spoke with wife to reiterate blood sugar checks daily. Due to decreased GFR being less than 30, patient will stop Januvia and Janumet and will add Glipizide, starting at 2.5 mg daily and increasing by 2.5 daily if fasting blood sugars are above 160. Do not exceed 20 mg daily. Will reassess at next PCP appointment.

## 2021-10-22 NOTE — PROGRESS NOTES
Relayed information per PCP request. Got read back. Patient's family will call pharmacy if they have questions and will begin monitoring BS.

## 2021-10-22 NOTE — PATIENT INSTRUCTIONS
If he gains more than 2lbs in a day or 5lbs in a week  Or you have to use more pillows at night when lying down, or feel short of breath with laying down or from baseline call Cardiology or take Lasix 40 mg daily until weight goes down to baseline.     Watch salt intake, stay below 2 grams (2,000 mg) of salt a day

## 2021-10-22 NOTE — ASSESSMENT & PLAN NOTE
New, recent diagnosis. He was seen by Dr. Cheatham October 13 who diagnosed him with acute decompensated heart failure with midrange ejection fraction and advise 80 mg twice daily Lasix for 4 days then once daily thereafter and also ordered lab tests and suggested a 1 week follow-up. His most recent lab tests showed decreased kidney function and he also had lost 30 lbs in a week. Patient and wife stated he is currently not taking Lasix at all, discussed weight parameters. Pt is going to do BP, weights, and pulse daily and write it down to trend it moving forward per the wife. Cardiologist states to call his office if he has weight fluctuations or increased edema for instructions.  Cardiology discussed with wife that he will speak with colleague to see if patient may need pacemaker after further testing is done. Discussed salt intake, staying below 2 grams a day and he is currently drinking 1-2 bottles of water daily. Discussed he can have 1.5- 2 L daily.  Denies chest pains, shortness of breath from baseline, worsening orthopnea, dizziness, or falls     GFR:   GFR If African American >60 mL/min/1.73 m 2 27 Abnormal     GFR If Non African American >60 mL/min/1.73 m 2 23 Abnormal      Bun 8 - 22 mg/dL 55 High     Creatinine 0.50 - 1.40 mg/dL 2.72

## 2021-10-22 NOTE — PROGRESS NOTES
Subjective:     CC:   Chief Complaint   Patient presents with   • Results     labs        HPI:   Sammy presents today with follow up on newly diagnosed heart failure. He is here with his wife today, she will be going to [place] next week but his son will be working short days and helping him in her absence.       Heart failure with reduced ejection fraction and diastolic dysfunction (HCC)  New, recent diagnosis. He was seen by Dr. Cheatham October 13 who diagnosed him with acute decompensated heart failure with midrange ejection fraction and advise 80 mg twice daily Lasix for 4 days then once daily thereafter and also ordered lab tests and suggested a 1 week follow-up. His most recent lab tests showed decreased kidney function and he also had lost 30 lbs in a week. Patient and wife stated he is currently not taking Lasix at all, discussed weight parameters. Pt is going to do BP, weights, and pulse daily and write it down to trend it moving forward per the wife. Cardiologist states to call his office if he has weight fluctuations or increased edema for instructions.  Cardiology discussed with wife that he will speak with colleague to see if patient may need pacemaker after further testing is done. Discussed salt intake, staying below 2 grams a day and he is currently drinking 1-2 bottles of water daily. Discussed he can have 1.5- 2 L daily.  Denies chest pains, shortness of breath from baseline, worsening orthopnea, dizziness, or falls     GFR:   GFR If African American >60 mL/min/1.73 m 2 27 Abnormal     GFR If Non African American >60 mL/min/1.73 m 2 23 Abnormal      Bun 8 - 22 mg/dL 55 High     Creatinine 0.50 - 1.40 mg/dL 2.72       CKD stage 3 due to type 2 diabetes mellitus (HCC)  TWIN Walhs spoke with wife to reiterate blood sugar checks daily. Due to decreased GFR being less than 30, patient will stop Januvia and Janumet and will add Glipizide, starting at 2.5 mg daily and increasing by 2.5 daily if fasting  "blood sugars are above 160. Do not exceed 20 mg daily. Will reassess at next PCP appointment.       Current Outpatient Medications Ordered in Epic   Medication Sig Dispense Refill   • glipiZIDE (GLUCOTROL) 5 MG Tab Take 2.5 mg daily daily and increase by 2.5 mg daily for fasting blood sugars above 160. Do not take more than max of 20 mg a day. Take 30 min before meals 30 Tablet 1   • furosemide (LASIX) 80 MG Tab Take 1 Tablet by mouth every day. Take one tablet twice daily for four days then one tablet daily therafter 90 Tablet 1   • Blood Glucose Meter Kit Test blood sugar as recommended by provider. Brand as covered by insurance - blood glucose monitoring kit. 1 Kit 0   • Blood Glucose Test Strips Use one Brand as covered by [employer]  strip to test blood sugar once daily early morning before first meal and on occasion 2 hr post meal. 100 Strip 0   • Lancets Use one Brand as covered by [employer]  lancet to test blood sugar once daily early morning before first meal. 100 Each 0   • Alcohol Swabs Wipe site with prep pad prior to injection. 100 Each 0   • ELIQUIS 2.5 MG Tab 2 times a day.       No current [facility]-ordered facility-administered medications on file.       Health Maintenance: Completed    Review of Systems   Constitutional: Negative for chills, fever, malaise/fatigue and weight loss.        30 lb fluid weight loss from diuretic over 7 days   HENT: Positive for hearing loss. Negative for tinnitus.    Eyes: Negative.    Cardiovascular: Negative for chest pain, palpitations, orthopnea, claudication and leg swelling.   Gastrointestinal: Negative.    Genitourinary: Negative.    Musculoskeletal: Negative.    Skin: Negative.    Neurological: Negative for dizziness and headaches.         Objective:     Exam:  /74 (BP Location: Right arm, Patient Position: Sitting, BP Cuff Size: Adult)   Pulse (!) 50   Temp 36.6 °C (97.9 °F) (Temporal)   Resp 14   Ht 1.778 m (5' 10\")   Wt 70.8 kg (156 lb)   SpO2 98%   " BMI 22.38 kg/m²  Body mass index is 22.38 kg/m².    Physical Exam  Vitals reviewed.   Constitutional:       General: He is not in acute distress.     Appearance: Normal appearance.   HENT:      Mouth/Throat:      Mouth: Mucous membranes are moist.      Pharynx: Oropharynx is clear.   Eyes:      Conjunctiva/sclera: Conjunctivae normal.      Pupils: Pupils are equal, round, and reactive to light.   Cardiovascular:      Rate and Rhythm: Normal rate. Rhythm irregular.      Pulses: Normal pulses.      Heart sounds: No murmur heard.     Skin:     General: Skin is warm and dry.   Neurological:      Mental Status: He is alert and oriented to person, place, and time.          A chaperone was offered to the patient during today's exam. Chaperone name: wife was present.      Assessment & Plan:     81 y.o. male with the following -     1. Heart failure with reduced ejection fraction and diastolic dysfunction (HCC)   Newly diagnosed problem, discussed at great length weights, BP, pulse checks daily, patient stopped Lasix and currently has no Edema. Given instruction on weight gain or fluid overload symptoms and when to restart Lasix, wife states they will call Cardiology as she doesn't feel comfortable starting Lasix without them giving instructions.     2. CKD stage 3 due to type 2 diabetes mellitus (HCC)  Chronic, uncontrolled but due to kidney function being less than 30 will readjust medication regimen. Will stop Janumet and Januvia, start Glipizide at 2.5 mg qd and increase by 2.5 mg if FBS over 160. TWIN Walsh to call wife to go over instructions for new dosing.   I spent a total of 60 minutes with record review, exam, communication with the patient, communication with other providers, and documentation of this encounter.      Return lab appointment on Monday for lab draw.    Please note that this dictation was created using voice recognition software. I have made every reasonable attempt to correct obvious errors, but I  expect that there are errors of grammar and possibly content that I did not discover before finalizing the note.

## 2021-10-25 ENCOUNTER — HOSPITAL ENCOUNTER (OUTPATIENT)
Facility: MEDICAL CENTER | Age: 81
End: 2021-10-25
Attending: INTERNAL MEDICINE
Payer: MEDICARE

## 2021-10-25 ENCOUNTER — TELEPHONE (OUTPATIENT)
Dept: CARDIOLOGY | Facility: MEDICAL CENTER | Age: 81
End: 2021-10-25

## 2021-10-25 ENCOUNTER — NON-PROVIDER VISIT (OUTPATIENT)
Dept: MEDICAL GROUP | Facility: PHYSICIAN GROUP | Age: 81
End: 2021-10-25
Payer: MEDICARE

## 2021-10-25 DIAGNOSIS — I50.40 HEART FAILURE WITH REDUCED EJECTION FRACTION AND DIASTOLIC DYSFUNCTION (HCC): ICD-10-CM

## 2021-10-25 PROCEDURE — 36415 COLL VENOUS BLD VENIPUNCTURE: CPT | Performed by: INTERNAL MEDICINE

## 2021-10-25 PROCEDURE — 99000 SPECIMEN HANDLING OFFICE-LAB: CPT | Performed by: INTERNAL MEDICINE

## 2021-10-25 PROCEDURE — 83520 IMMUNOASSAY QUANT NOS NONAB: CPT

## 2021-10-25 NOTE — PROGRESS NOTES
Pt arrived for lab draw. Had Dr. Mon correct the needed lab order for [employer]-serum. Pt was accompanied by son- Robles. Placed Robles's info on blue sticky note in pt's chart.   Pt had not picked up Glipizide med that was ordered on Friday. Pt has not been taking BS readings or checking his weight/Bp/Pulse daily as instructed. Asked pt if he knew how to use glucometer- pt responded it has been awhile since he has used a glucometer, but he thinks he can use it. Robles said he knows how to use it and can help pt. Reviewed with pt & Robles how to increase Glipizide if fasting BS >160- and continue to increase prn up to 20mg daily. Also discussed what to do if daily or weekly weight increases- take Furosemide 40mg in response. Printed out another AVS from appt wilberto/Jabier for pt and son to reference. They both were given my direct extension if questions arise.     Verified patient's name/date-of-birth and reason for visit before procedure was started. Patient's left antecubital cleansed. Venipuncture attempts X1. Blood draw completed on patient's left AC. Applied pressure afterwards and placed Coban on site of venipuncture with directions for patient to remove within the next hour. Patient tolerated procedure well, no adverse reactions. Patient ambulated safely upon leaving clinic.   Completed labels were placed on blood samples in draw room with patient present. Appropriate blood samples were centrifuged. Blood samples were then placed in locked lab box for  pick-up. '

## 2021-10-25 NOTE — TELEPHONE ENCOUNTER
Received call from Gilda cardiology coordinator with Nataliya from the lab calling.     Spoke to Nataliya from [facility]. She stated the patient needs a recollect for his lab. I advised I do not know what that means and she stated that they received the lab order with a blood specimen without a colored top for the specimen tubing. She asked if we can redraw it. I advised we do not draw labs in the office so I am not sure who irma it or where. She stated that is needs to be redrawn and on her end it looks like our office did. She then stated because it is a recollect they cannot call the patient. I stated I would call the patient and let him know he needs to go to a [facility] and have them redrawn. She stated that is fine.     Called and spoke to the patient, advised he needs to have his labs redone. He stated he does not have a car and does not know his situation right now. He stated he received a call from [place] Papri[facility] to have labs redone with his GP. Gave him the lab phone number and our office number to call if there are any problems. Answered all questions and concerns, appreciative of call.     Spoke to BYRON and advised labs need to be redrawn. He then stated can we call and get him scheduled for his NM bone scan. Advised I can call patient back and give him the schedulers number for the imaging, but ultimately he has to make the appt.     Called patient back and LM for patient to callback regarding testing.

## 2021-10-26 ENCOUNTER — TELEPHONE (OUTPATIENT)
Dept: MEDICAL GROUP | Facility: PHYSICIAN GROUP | Age: 81
End: 2021-10-26

## 2021-10-26 NOTE — TELEPHONE ENCOUNTER
He is supposed to be off the metformin  Very important that he stop the metformin  bc of his worsening kidney function the metformin could cause him to become very sick   Was given a rx for glipizide to use instead  Thank you.

## 2021-10-26 NOTE — TELEPHONE ENCOUNTER
VOICEMAIL  1. Caller Name: Sammy Oseguera                        Call Back Number: [phone] (home) [phone] (work)      2. Message: Patient's wife called stating she will get a blood pressure monitor and blood glucose monitor. However, she has an issue with getting the metformin medication. Patient's wife will be out of town and if there are any side effects she won't be there to help him. Would like some advise. Patient's wife is currently out of town as of Monday.    3. Patient approves office to leave a detailed voicemail/MyChart message: yes

## 2021-10-27 NOTE — TELEPHONE ENCOUNTER
Followed up with son. They did get the glipizide. However, they had some issues getting ahold of us and the pharmacy so they were unsure about the issue. I explained everything Dr. Clements informed me about. He would like a method to get ahold of someone incase of emergency. He also wanted to schedule a blood draw for Patient's BMP. Scheduled for tomorrow @ 11:20

## 2021-10-28 ENCOUNTER — HOSPITAL ENCOUNTER (OUTPATIENT)
Facility: MEDICAL CENTER | Age: 81
End: 2021-10-28
Attending: INTERNAL MEDICINE
Payer: MEDICARE

## 2021-10-28 ENCOUNTER — NON-PROVIDER VISIT (OUTPATIENT)
Dept: MEDICAL GROUP | Facility: PHYSICIAN GROUP | Age: 81
End: 2021-10-28
Payer: MEDICARE

## 2021-10-28 DIAGNOSIS — I50.20 HFREF (HEART FAILURE WITH REDUCED EJECTION FRACTION) (HCC): ICD-10-CM

## 2021-10-28 LAB
KAPPA LC FREE SER-MCNC: 114.49 MG/L (ref 3.3–19.4)
KAPPA LC FREE/LAMBDA FREE SER NEPH: 2.11 {RATIO} (ref 0.26–1.65)
LAMBDA LC FREE SERPL-MCNC: 54.18 MG/L (ref 5.71–26.3)

## 2021-10-28 PROCEDURE — 80048 BASIC METABOLIC PNL TOTAL CA: CPT

## 2021-10-28 PROCEDURE — 36415 COLL VENOUS BLD VENIPUNCTURE: CPT | Performed by: FAMILY MEDICINE

## 2021-10-28 PROCEDURE — 99000 SPECIMEN HANDLING OFFICE-LAB: CPT | Performed by: FAMILY MEDICINE

## 2021-10-29 LAB
ANION GAP SERPL CALC-SCNC: 10 MMOL/L (ref 7–16)
BUN SERPL-MCNC: 36 MG/DL (ref 8–22)
CALCIUM SERPL-MCNC: 9.2 MG/DL (ref 8.5–10.5)
CHLORIDE SERPL-SCNC: 106 MMOL/L (ref 96–112)
CO2 SERPL-SCNC: 24 MMOL/L (ref 20–33)
CREAT SERPL-MCNC: 2.05 MG/DL (ref 0.5–1.4)
GLUCOSE SERPL-MCNC: 164 MG/DL (ref 65–99)
POTASSIUM SERPL-SCNC: 4.4 MMOL/L (ref 3.6–5.5)
SODIUM SERPL-SCNC: 140 MMOL/L (ref 135–145)

## 2021-11-04 ENCOUNTER — TELEPHONE (OUTPATIENT)
Dept: MEDICAL GROUP | Facility: PHYSICIAN GROUP | Age: 81
End: 2021-11-04

## 2021-11-04 NOTE — TELEPHONE ENCOUNTER
Spoke to SW and he wanted me to call patient and follow up on NM bone scan test.    Called and spoke to patients son, asked about scheduling the NM bone scan and he stated they have an appt tomorrow with  and he will schedule that test. I advised that we cannot schedule that test, gave scheduling phone number and instructions to schedule appt. Verbalized understanding and confirmed date and time for F[facility] with  tomorrow. Answered all questions and concerns, appreciative of call.

## 2021-11-05 ENCOUNTER — OFFICE VISIT (OUTPATIENT)
Dept: MEDICAL GROUP | Facility: PHYSICIAN GROUP | Age: 81
End: 2021-11-05
Payer: MEDICARE

## 2021-11-05 ENCOUNTER — OFFICE VISIT (OUTPATIENT)
Dept: CARDIOLOGY | Facility: MEDICAL CENTER | Age: 81
End: 2021-11-05
Attending: INTERNAL MEDICINE

## 2021-11-05 ENCOUNTER — HOSPITAL ENCOUNTER (OUTPATIENT)
Facility: MEDICAL CENTER | Age: 81
End: 2021-11-05
Attending: FAMILY MEDICINE
Payer: MEDICARE

## 2021-11-05 ENCOUNTER — HOSPITAL ENCOUNTER (OUTPATIENT)
Dept: RADIOLOGY | Facility: MEDICAL CENTER | Age: 81
End: 2021-11-05
Attending: FAMILY MEDICINE
Payer: MEDICARE

## 2021-11-05 VITALS
HEART RATE: 46 BPM | OXYGEN SATURATION: 96 % | DIASTOLIC BLOOD PRESSURE: 60 MMHG | HEIGHT: 70 IN | BODY MASS INDEX: 23.34 KG/M2 | SYSTOLIC BLOOD PRESSURE: 104 MMHG | WEIGHT: 163 LBS

## 2021-11-05 VITALS
TEMPERATURE: 97 F | HEART RATE: 60 BPM | OXYGEN SATURATION: 96 % | SYSTOLIC BLOOD PRESSURE: 110 MMHG | WEIGHT: 164.1 LBS | HEIGHT: 70 IN | BODY MASS INDEX: 23.49 KG/M2 | DIASTOLIC BLOOD PRESSURE: 58 MMHG | RESPIRATION RATE: 16 BRPM

## 2021-11-05 DIAGNOSIS — M25.551 ACUTE HIP PAIN, RIGHT: ICD-10-CM

## 2021-11-05 DIAGNOSIS — R41.3 MEMORY IMPAIRMENT: ICD-10-CM

## 2021-11-05 DIAGNOSIS — E11.22 CKD STAGE 3 DUE TO TYPE 2 DIABETES MELLITUS (HCC): ICD-10-CM

## 2021-11-05 DIAGNOSIS — I50.40 HEART FAILURE WITH REDUCED EJECTION FRACTION AND DIASTOLIC DYSFUNCTION (HCC): ICD-10-CM

## 2021-11-05 DIAGNOSIS — D64.9 ANEMIA, UNSPECIFIED TYPE: ICD-10-CM

## 2021-11-05 DIAGNOSIS — Z12.5 ENCOUNTER FOR SCREENING FOR MALIGNANT NEOPLASM OF PROSTATE: ICD-10-CM

## 2021-11-05 DIAGNOSIS — F32.A MILD DEPRESSION: ICD-10-CM

## 2021-11-05 DIAGNOSIS — Z85.46 HISTORY OF PROSTATE CANCER: ICD-10-CM

## 2021-11-05 DIAGNOSIS — N18.30 CKD STAGE 3 DUE TO TYPE 2 DIABETES MELLITUS (HCC): ICD-10-CM

## 2021-11-05 DIAGNOSIS — I48.20 ATRIAL FIBRILLATION, CHRONIC (HCC): ICD-10-CM

## 2021-11-05 DIAGNOSIS — R60.9 EDEMA, UNSPECIFIED TYPE: ICD-10-CM

## 2021-11-05 DIAGNOSIS — K70.31 ALCOHOLIC CIRRHOSIS OF LIVER WITH ASCITES (HCC): ICD-10-CM

## 2021-11-05 PROBLEM — N18.9 CKD (CHRONIC KIDNEY DISEASE): Status: RESOLVED | Noted: 2018-09-19 | Resolved: 2021-11-05

## 2021-11-05 PROCEDURE — 82728 ASSAY OF FERRITIN: CPT

## 2021-11-05 PROCEDURE — 99215 OFFICE O/P EST HI 40 MIN: CPT | Performed by: FAMILY MEDICINE

## 2021-11-05 PROCEDURE — 36415 COLL VENOUS BLD VENIPUNCTURE: CPT | Performed by: FAMILY MEDICINE

## 2021-11-05 PROCEDURE — 83970 ASSAY OF PARATHORMONE: CPT

## 2021-11-05 PROCEDURE — 82746 ASSAY OF FOLIC ACID SERUM: CPT

## 2021-11-05 PROCEDURE — 83520 IMMUNOASSAY QUANT NOS NONAB: CPT

## 2021-11-05 PROCEDURE — 83540 ASSAY OF IRON: CPT

## 2021-11-05 PROCEDURE — 99000 SPECIMEN HANDLING OFFICE-LAB: CPT | Performed by: FAMILY MEDICINE

## 2021-11-05 PROCEDURE — 99215 OFFICE O/P EST HI 40 MIN: CPT | Performed by: INTERNAL MEDICINE

## 2021-11-05 PROCEDURE — 82607 VITAMIN B-12: CPT

## 2021-11-05 PROCEDURE — 80307 DRUG TEST PRSMV CHEM ANLYZR: CPT

## 2021-11-05 PROCEDURE — 72100 X-RAY EXAM L-S SPINE 2/3 VWS: CPT

## 2021-11-05 PROCEDURE — 85025 COMPLETE CBC W/AUTO DIFF WBC: CPT

## 2021-11-05 PROCEDURE — 83880 ASSAY OF NATRIURETIC PEPTIDE: CPT

## 2021-11-05 PROCEDURE — 86335 IMMUNFIX E-PHORSIS/URINE/CSF: CPT

## 2021-11-05 PROCEDURE — 80048 BASIC METABOLIC PNL TOTAL CA: CPT

## 2021-11-05 PROCEDURE — 84153 ASSAY OF PSA TOTAL: CPT

## 2021-11-05 PROCEDURE — 73521 X-RAY EXAM HIPS BI 2 VIEWS: CPT

## 2021-11-05 PROCEDURE — 83550 IRON BINDING TEST: CPT

## 2021-11-05 PROCEDURE — 82306 VITAMIN D 25 HYDROXY: CPT

## 2021-11-05 PROCEDURE — 84156 ASSAY OF PROTEIN URINE: CPT

## 2021-11-05 RX ORDER — TRAMADOL HYDROCHLORIDE 50 MG/1
50 TABLET ORAL
Qty: 20 TABLET | Refills: 0 | Status: SHIPPED | OUTPATIENT
Start: 2021-11-05 | End: 2021-12-05

## 2021-11-05 RX ORDER — NALOXONE HYDROCHLORIDE 4 MG/.1ML
SPRAY NASAL
Qty: 1 EACH | Refills: 1 | Status: SHIPPED | OUTPATIENT
Start: 2021-11-05 | End: 2022-03-17

## 2021-11-05 ASSESSMENT — FIBROSIS 4 INDEX
FIB4 SCORE: 2.23
FIB4 SCORE: 2.23

## 2021-11-05 NOTE — PROGRESS NOTES
Pt was seated, confirmed pt name and , procedure explained to pt, venipuncture performed in LAC using aseptic technique, 1 lav., 2 green, 5 goldtubes collected, gauze placed with pressure on venipuncture site until hemostasis observed, site clean and dry, no redness or swelling observed, bandage placed, pt tolerated well and voiced no concerns.

## 2021-11-05 NOTE — PROGRESS NOTES
Arrhythmia Clinic Note (New patient)     DOS: 11/5/2021    Referring physician: Dr Goodman    Chief complaint/Reason for consult: CHF     HPI: 80 y/o M with new onset heart failure EF 35% concerning for possible amyloidosis, persistent atrial fibrillation, heart block/SVR, referred for consideration of pacemaker. Pt notes persistent fatigue and sleeps a lot. No syncope. Son is in from out of town and helps with history taking.    ROS (+ highlighted in bold):  Constitutional: Fevers/chills/fatigue/weightloss  HEENT: Blurry vision/eye pain/sore throat/hearing loss  Respiratory: Shortness of breath/cough  Cardiovascular: Chest pain/palpitations/edema/orthopnea/syncope  GI: Nausea/vomitting/diarrhea  MSK: Arthralgias/myagias/muscle weakness  Skin: Rash/sores  Neurological: Numbness/tremors/vertigo  Endocrine: Excessive thirst/polyuria/cold intolerance/heat intolerance  Psych: Depression/anxiety    Past Medical History:   Diagnosis Date   • HTN    • Type II or unspecified type diabetes mellitus without mention of complication, not stated as uncontrolled        Past Surgical History:   Procedure Laterality Date   • LAPAROSCOPIC GASTRIC ULCER OVER SEW     • PROSTATECTOMY, RADICAL RETRO         Social History     Socioeconomic History   • Marital status:      Spouse name: Not on file   • Number of children: Not on file   • Years of education: Not on file   • Highest education level: Not on file   Occupational History   • Not on file   Tobacco Use   • Smoking status: Never Smoker   • Smokeless tobacco: Never Used   Vaping Use   • Vaping Use: Never used   Substance and Sexual Activity   • Alcohol use: Not Currently     Alcohol/week: 0.0 oz   • Drug use: No   • Sexual activity: Yes   Other Topics Concern   • Not on file   Social History Narrative     in '[birth year]. They have 2 children. 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  He quit drinking alcohol when prior PCP told him about his liver damage around age 76. He was drinking at most 2oz of alcohol daily. Wife doesn't drink etoh, no h/o drug use.      Social Determinants of Health     Financial Resource Strain:    • Difficulty of Paying Living Expenses:    Food Insecurity:    • Worried About Running Out of Food in the Last Year:    • Ran Out of Food in the Last Year:    Transportation Needs:    • Lack of Transportation (Medical):    • Lack of Transportation (Non-Medical):    Physical Activity:    • Days of Exercise per Week:    • Minutes of Exercise per Session:    Stress:    • Feeling of Stress :    Social Connections:    • Frequency of Communication with Friends and Family:    • Frequency of Social Gatherings with Friends and Family:    • Attends Hinduism Services:    • Active Member of Clubs or Organizations:    • Attends Club or Organization Meetings:    • Marital Status:    Intimate Partner Violence:    • Fear of Current or Ex-Partner:    • Emotionally Abused:    • Physically Abused:    • Sexually Abused:        Family History   Problem Relation Age of Onset   • Stroke Mother    • Heart Disease Father    • Depression Sister    • Heart Disease Sister    • Depression Sister    • Depression Sister    • No Known Problems Son        No Known Allergies    Current Outpatient Medications   Medication Sig Dispense Refill   • traMADol (ULTRAM) 50 MG Tab Take 1 Tablet by mouth 1 time a day as needed for Severe Pain (take under supervision by family) for up to 30 days. 20 Tablet 0   • Naloxone (NARCAN) 4 MG/0.1ML Liquid One spray in one nostril for overdose and call 911. 1 Each 1   • glipiZIDE (GLUCOTROL) 5 MG Tab Take 2.5 mg daily daily and increase by 2.5 mg daily for fasting blood sugars above 160. Do not take more than max of 20 mg a day. Take 30 min before meals (Patient taking differently: Take 2.5 mg by mouth. Take 2.5 mg daily daily and increase by 2.5 mg daily for fasting blood  "sugars above 160. Do not take more than max of 20 mg a day. Take 30 min before meals) 30 Tablet 1   • furosemide (LASIX) 80 MG Tab Take 1 Tablet by mouth every day. Take one tablet twice daily for four days then one tablet daily therafter (Patient taking differently: Take 80 mg by mouth as needed. Take one tablet twice daily for four days then one tablet daily therafter) 90 Tablet 1   • Blood Glucose Meter Kit Test blood sugar as recommended by provider. Brand as covered by insurance - blood glucose monitoring kit. 1 Kit 0   • Blood Glucose Test Strips Use one Brand as covered by insurance  strip to test blood sugar once daily early morning before first meal and on occasion 2 hr post meal. 100 Strip 0   • Lancets Use one Brand as covered by [employer]  lancet to test blood sugar once daily early morning before first meal. 100 Each 0   • Alcohol Swabs Wipe site with prep pad prior to injection. 100 Each 0   • ELIQUIS 2.5 MG Tab 2 times a day.       No current facility-administered medications for this visit.       Physical Exam:  Vitals:    11/05/21 1234   BP: 104/60   BP Location: Left arm   Patient Position: Sitting   BP Cuff Size: Adult   Pulse: (!) 46   SpO2: 96%   Weight: 73.9 kg (163 lb)   Height: 1.778 m (5' 10\")     General appearance: NAD, conversant   Eyes: anicteric sclerae, moist conjunctivae; no lid-lag; PERRLA  HENT: Atraumatic; oropharynx clear with moist mucous membranes and no mucosal ulcerations; normal hard and soft palate  Neck: Trachea midline; FROM, supple, no thyromegaly or lymphadenopathy  Lungs: CTA, with normal respiratory effort and no intercostal retractions  CV: Bradycardic and irregular, no MRGs, no JVD   Abdomen: Soft, non-tender; no masses or HSM  Extremities: No peripheral edema or extremity lymphadenopathy  Skin: Normal temperature, turgor and texture; no rash, ulcers or subcutaneous nodules  Psych: Appropriate affect, alert and oriented to person, place and time    Data:  Lipids: "   Lab Results   Component Value Date/Time    CHOLSTRLTOT 101 06/30/2021 07:28 AM    TRIGLYCERIDE 51 06/30/2021 07:28 AM    HDL 44 06/30/2021 07:28 AM    LDL 47 06/30/2021 07:28 AM        BMP:  Lab Results   Component Value Date/Time    SODIUM 140 10/28/2021 1138    POTASSIUM 4.4 10/28/2021 1138    CHLORIDE 106 10/28/2021 1138    CO2 24 10/28/2021 1138    GLUCOSE 164 (H) 10/28/2021 1138    BUN 36 (H) 10/28/2021 1138    CREATININE 2.05 (H) 10/28/2021 1138    CALCIUM 9.2 10/28/2021 1138    ANION 10.0 10/28/2021 1138        TSH:   Lab Results   Component Value Date/Time    TSHULTRASEN 2.650 05/09/2018 1400        THYROXINE (T4):   No results found for: PORFIRIOIR     CBC:   Lab Results   Component Value Date/Time    WBC 7.6 10/12/2021 03:35 PM    RBC 3.71 (L) 10/12/2021 03:35 PM    HEMOGLOBIN 12.2 (L) 10/12/2021 03:35 PM    HEMATOCRIT 38.1 (L) 10/12/2021 03:35 PM    .7 (H) 10/12/2021 03:35 PM    MCH 32.9 10/12/2021 03:35 PM    MCHC 32.0 (L) 10/12/2021 03:35 PM    RDW 58.2 (H) 10/12/2021 03:35 PM    PLATELETCT 158 (L) 10/12/2021 03:35 PM    MPV 11.3 10/12/2021 03:35 PM    NEUTSPOLYS 76.40 (H) 10/12/2021 03:35 PM    LYMPHOCYTES 11.50 (L) 10/12/2021 03:35 PM    MONOCYTES 9.30 10/12/2021 03:35 PM    EOSINOPHILS 1.60 10/12/2021 03:35 PM    BASOPHILS 0.90 10/12/2021 03:35 PM    IMMGRAN 0.30 10/12/2021 03:35 PM    NRBC 0.00 10/12/2021 03:35 PM    NEUTS 5.84 10/12/2021 03:35 PM    LYMPHS 0.88 (L) 10/12/2021 03:35 PM    MONOS 0.71 10/12/2021 03:35 PM    EOS 0.12 10/12/2021 03:35 PM    BASO 0.07 10/12/2021 03:35 PM    IMMGRANAB 0.02 10/12/2021 03:35 PM    NRBCAB 0.00 10/12/2021 03:35 PM        CBC w/o DIFF  Lab Results   Component Value Date/Time    WBC 7.6 10/12/2021 03:35 PM    RBC 3.71 (L) 10/12/2021 03:35 PM    HEMOGLOBIN 12.2 (L) 10/12/2021 03:35 PM    .7 (H) 10/12/2021 03:35 PM    MCH 32.9 10/12/2021 03:35 PM    MCHC 32.0 (L) 10/12/2021 03:35 PM    RDW 58.2 (H) 10/12/2021 03:35 PM    MPV 11.3 10/12/2021 03:35 PM        Prior echo/stress results reviewed: EF 35%    EKG interpreted by me: AF with SVR    Impression/Plan:  1. Atrial fibrillation, chronic (HCC)  EKG     1. Persistent Afib  2. New systolic heart failure  3. Heart block/slow ventricular response  4. Infiltrative cardiomyopathy    - Discussed recommendation of BiV ICD. The risk, benefits, and alternatives to ICD placement were discussed in great detail, specific risks mentioned including bleeding, infection, cardiac perforation with possible tamponade requiring pericardiocentesis or open heart surgery. The [place] patient decision making tool was used to decide on ICD implantation.  In addition the possibility of lead dislodgment, inappropriate shocks, pneumothorax, hemothorax were discussed. Also mentioned were the possibility of death, stroke, and myocardial infarction. The patient verbalized understanding of these potential complications and wishes to proceed with this procedure.   - Given EF 35% with AF and bradycardia with expected pacing >40%, CRT is indicated and ICD also indicated, pts goals of care align with ICD    Pt will think about it and call to schedule if he decides to proceed. Probably CRT-D without atrial lead.    Walt Pathak MD  Cardiac Electrophysiology

## 2021-11-05 NOTE — PROGRESS NOTES
Subjective:     CC:   Chief Complaint   Patient presents with   • Follow-Up   • Lab Results     10/28/21 - Blood Work Imaging not completed   • Hip Pain     no fall no reported injury, massive hip pain, R hip, px going down, 10 out of 10         HPI:   Sammy presents today with his wife and son (who lives w/ them) to f/u acute on chronic CKD and CHF and dm.    His last blood work on October 28 was significant for creatinine of 2.05 and GFR of 31, his GFR was in the 40s in the 50s until around June 2021.On October 20 his creatinine was 2.72 and GFR was 23.  He does have elevated free kappa light chains of 114, upper end of normal is 20, his free lambda light chains are also elevated at 54, upper end of normal is 26.    Son says he and his dad began to be a bit more active together - they walked together once last week (up and down driveway 4 times, about 10min) and then the patient did this a few more times later in the week. There was no injury - but he had been very sedentary prior. Today the patient c/o a new pain in his hip over the past few days, it radiates down the front of his R leg all the way to his ankle, it is almost constant when he stands or walks. He has been using crutches for the past days bc of the pain. He woke up with this pain. No falls or trauma. No h/o any problems with his R hip or leg. Took 2 aleve a few days ago.    His home weights have been mostly 156lb, if he gains 2lb over he takes 20mg of lasix. He has needed to do that twice in the past 2 weeks. He limits salt. He does enjoy burgers and fries ([employer] etc) and pizza. No alcohol, last was 4 years ago other than a sip of a magarita last night.     Sees Dr. Goodman - was told to see Dr. Pathak today at 1pm.  Family needs to schedule the whole body scan ordered by Dr. Goodman.    Problem   Memory Impairment    MOCA 21/30. Recommend to obtain driving eval prior to further driving. Lives w/ wife and son. Will address this in more  detail at his f/u appt   I presume his dementia is more likely d/t small vessel dz, we can discuss treatment options at his follow-up appointment. Will also discuss advance directives. Wife states her memory is fine     Atrial Fibrillation, Chronic (Hcc)    He is on Xarelto     Ckd Stage 3 Due to Type 2 Diabetes Mellitus (Hcc)    He is having 1 meal /d with some snacks. Brought a glucose log but it only goes up to 10/31 - fbs 151, 148, 161, 155, 150, 153. This week his highest was 163 and typically has been in the 150s. currently has a rx for 5mg glipizide but holds it if fbs < 160, takes 1/2 tab if > 160 and has the option to increase by 2.5mg prn to max of 20mg/d but hasn't needed more than the 2.5mg/d prn  His A1c on October 12th 2021 was 7.1.       Alcoholic Cirrhosis of Liver With Ascites (Hcc)    Quit drinking alcohol heavily about 3-5 years ago. Sees GI     History of Prostate Cancer    Radical prostatectomy about in 2012. PSA has been around 0.05 since 2012     Ckd (Chronic Kidney Disease) (Resolved)       Current Outpatient Medications Ordered in Epic   Medication Sig Dispense Refill   • traMADol (ULTRAM) 50 MG Tab Take 1 Tablet by mouth 1 time a day as needed for Severe Pain (take under supervision by family) for up to 30 days. 20 Tablet 0   • Naloxone (NARCAN) 4 MG/0.1ML Liquid One spray in one nostril for overdose and call 911. 1 Each 1   • glipiZIDE (GLUCOTROL) 5 MG Tab Take 2.5 mg daily daily and increase by 2.5 mg daily for fasting blood sugars above 160. Do not take more than max of 20 mg a day. Take 30 min before meals (Patient taking differently: Take 2.5 mg by mouth. Take 2.5 mg daily daily and increase by 2.5 mg daily for fasting blood sugars above 160. Do not take more than max of 20 mg a day. Take 30 min before meals) 30 Tablet 1   • furosemide (LASIX) 80 MG Tab Take 1 Tablet by mouth every day. Take one tablet twice daily for four days then one tablet daily therafter (Patient taking  "differently: Take 80 mg by mouth as needed. Take one tablet twice daily for four days then one tablet daily therafter) 90 Tablet 1   • Blood Glucose Meter Kit Test blood sugar as recommended by provider. Brand as covered by insurance - blood glucose monitoring kit. 1 Kit 0   • Blood Glucose Test Strips Use one Brand as covered by insurance  strip to test blood sugar once daily early morning before first meal and on occasion 2 hr post meal. 100 Strip 0   • Lancets Use one Brand as covered by insurance  lancet to test blood sugar once daily early morning before first meal. 100 Each 0   • Alcohol Swabs Wipe site with prep pad prior to injection. 100 Each 0   • ELIQUIS 2.5 MG Tab 2 times a day.       No current [facility]-ordered facility-administered medications on file.       Past Medical History:   Diagnosis Date   • HTN    • Type II or unspecified type diabetes mellitus without mention of complication, not stated as uncontrolled        Social History     Tobacco Use   • Smoking status: Never Smoker   • Smokeless tobacco: Never Used   Vaping Use   • Vaping Use: Never used   Substance Use Topics   • Alcohol use: Not Currently     Alcohol/week: 0.0 oz   • Drug use: No       Allergies:  Patient has no known allergies.    Health Maintenance: Completed    ROS:  Per HPI      Objective:       Exam:  /58 (BP Location: Right arm, Patient Position: Sitting, BP Cuff Size: Adult)   Pulse 60   Temp 36.1 °C (97 °F) (Temporal)   Resp 16   Ht 1.778 m (5' 10\")   Wt 74.4 kg (164 lb 1.6 oz)   SpO2 96%   BMI 23.55 kg/m²   Body mass index is 23.55 kg/m².  Wt Readings from Last 4 Encounters:   11/05/21 74.4 kg (164 lb 1.6 oz)   10/22/21 70.8 kg (156 lb)   10/20/21 69.9 kg (154 lb)   10/20/21 69.2 kg (152 lb 9.6 oz)       Gen: Alert and oriented, No apparent distress. Appropriately groomed.  Neck: Neck is supple without lymphadenopathy.No thyromegaly.   Lungs: Normal effort, CTA bilaterally, no wheezes, rhonchi, or " rales.  CV: Regular rate and irreg irreg rhythm. No murmurs, rubs, or gallops.  ABD:  Soft, nontender, nondistended, NABSx4, no HSM or RBT or guarding or masses.  Ext: No clubbing, cyanosis, edema.  Skin: No rash noted.    He is barely able to stand and bear weight on his right hip.  He left his crutches just outside the exam room.  On exam he has pain in his upper lateral femur, minimal pain along his right more than left iliac crest and mild pain at his right ASIS.  He has pain in his right hip on both internal and external rotation.    Labs:   Results for orders placed or performed during the hospital encounter of 10/28/21   Basic Metabolic Panel   Result Value Ref Range    Sodium 140 135 - 145 mmol/L    Potassium 4.4 3.6 - 5.5 mmol/L    Chloride 106 96 - 112 mmol/L    Co2 24 20 - 33 mmol/L    Glucose 164 (H) 65 - 99 mg/dL    Bun 36 (H) 8 - 22 mg/dL    Creatinine 2.05 (H) 0.50 - 1.40 mg/dL    Calcium 9.2 8.5 - 10.5 mg/dL    Anion Gap 10.0 7.0 - 16.0   ESTIMATED GFR   Result Value Ref Range    GFR If  38 (A) >60 mL/min/1.73 m 2    GFR If Non  31 (A) >60 mL/min/1.73 m 2   xray today of hips and L spine:  1.  Grade 2 spondylolisthesis of L4 on L5.  2.  Minimal posterior offset of L2 on L3.  3.  Degenerative disc disease and advanced facet joint degenerative changes at L4-S1.  Hips:  No radiographic evidence of acute traumatic bone injury.  Very mild bilateral superior lateral acetabular spurring.  Assessment & Plan:     81 y.o. male with the following -   After reviewing his x-rays I am still unsure as to what is causing his acute onset of right hip pain and inability to walk without crutches.  I was able to speak with Vadim Flores at [facility] and the patient will be seen later this afternoon after his cardiology appointment by Dr. Olvera.  I was able to get in touch with his son Robles to update him, his phone number is [phone].    I explained to the patient and his family the  importance of not taking anti-inflammatories and agreed to give him a prescription for tramadol to use rarely for severe pain as we are sorting out what is causing his right hip pain.    I also ordered extra lab work to better evaluate his recent elevated light chains.  I am concerned regarding a possible recurrence of prostate cancer causing his right hip pain but I am reassured by the lack of lytic lesions on the x-ray.  He could possibly have an underlying multiple myeloma but again the x-ray did not show anything suggestive.    Should continue off the Metformin indefinitely due to his worsened renal function.  He needs to cut back on the fast food so he can continue to limit the use of Lasix.  While his weight is higher than expected in the clinic his home weights have been stable.  1. CKD stage 3 due to type 2 diabetes mellitus (HCC)  - Basic Metabolic Panel; Future  - SARA + Free Light Chains, Qnt Ur; Future  - VITAMIN D,25 HYDROXY; Future    2. Atrial fibrillation, chronic (HCC)    3. Mild depression (HCC)    4. Acute hip pain, right  - DX-HIP-BILATERAL-WITH PELVIS-2 VIEWS; Future  - PROSTATE SPECIFIC AG SCREENING; Future  - traMADol (ULTRAM) 50 MG Tab; Take 1 Tablet by mouth 1 time a day as needed for Severe Pain (take under supervision by family) for up to 30 days.  Dispense: 20 Tablet; Refill: 0  - PAIN MANAGEMENT PANEL, SCRN W/ RFLX TO QNT; Future  - Controlled Substance Treatment Agreement  - DX-LUMBAR SPINE-2 OR 3 VIEWS; Future  - Referral to Orthopedics    5. History of prostate cancer  - PROSTATE SPECIFIC AG SCREENING; Future    6. Encounter for screening for malignant neoplasm of prostate   - PROSTATE SPECIFIC AG SCREENING; Future    7. Anemia, unspecified type  - PTH INTACT (PTH ONLY); Future  - CBC WITH DIFFERENTIAL; Future  - FERRITIN; Future  - FOLATE; Future  - IRON/TOTAL IRON BIND; Future  - VITAMIN B12; Future    8. Edema, unspecified type  - proBrain Natriuretic Peptide, NT; Future    9.  Alcoholic cirrhosis of liver with ascites (HCC)    10. Heart failure with reduced ejection fraction and diastolic dysfunction (HCC)    11. Memory impairment    Other orders  - Consent for Opiate Prescription  - Naloxone (NARCAN) 4 MG/0.1ML Liquid; One spray in one nostril for overdose and call 911.  Dispense: 1 Each; Refill: 1        I spent a total of 45 minutes with record review, exam, communication with the patient, communication with other providers, and documentation of this encounter.      Return for with me in 2-3w and baljit early next week f/u hip pain and ckd and HF.    Please note that this dictation was created using voice recognition software. I have made every reasonable attempt to correct obvious errors, but I expect that there are errors of grammar and possibly content that I did not discover before finalizing the note.

## 2021-11-06 ENCOUNTER — HOSPITAL ENCOUNTER (OUTPATIENT)
Dept: RADIOLOGY | Facility: MEDICAL CENTER | Age: 81
End: 2021-11-06
Attending: STUDENT IN AN ORGANIZED HEALTH CARE EDUCATION/TRAINING PROGRAM
Payer: MEDICARE

## 2021-11-06 DIAGNOSIS — M84.353A STRESS FRACTURE OF NECK OF FEMUR, INITIAL ENCOUNTER: ICD-10-CM

## 2021-11-06 LAB
25(OH)D3 SERPL-MCNC: 11 NG/ML (ref 30–100)
ANION GAP SERPL CALC-SCNC: 15 MMOL/L (ref 7–16)
BASOPHILS # BLD AUTO: 0.9 % (ref 0–1.8)
BASOPHILS # BLD: 0.07 K/UL (ref 0–0.12)
BUN SERPL-MCNC: 33 MG/DL (ref 8–22)
CALCIUM SERPL-MCNC: 9.4 MG/DL (ref 8.5–10.5)
CHLORIDE SERPL-SCNC: 102 MMOL/L (ref 96–112)
CO2 SERPL-SCNC: 21 MMOL/L (ref 20–33)
CREAT SERPL-MCNC: 1.93 MG/DL (ref 0.5–1.4)
EOSINOPHIL # BLD AUTO: 0.16 K/UL (ref 0–0.51)
EOSINOPHIL NFR BLD: 2.2 % (ref 0–6.9)
ERYTHROCYTE [DISTWIDTH] IN BLOOD BY AUTOMATED COUNT: 57.4 FL (ref 35.9–50)
FERRITIN SERPL-MCNC: 75 NG/ML (ref 22–322)
FOLATE SERPL-MCNC: 7.7 NG/ML
GLUCOSE SERPL-MCNC: 142 MG/DL (ref 65–99)
HCT VFR BLD AUTO: 40.2 % (ref 42–52)
HGB BLD-MCNC: 12.8 G/DL (ref 14–18)
IMM GRANULOCYTES # BLD AUTO: 0.01 K/UL (ref 0–0.11)
IMM GRANULOCYTES NFR BLD AUTO: 0.1 % (ref 0–0.9)
IRON SATN MFR SERPL: 25 % (ref 15–55)
IRON SERPL-MCNC: 76 UG/DL (ref 50–180)
LYMPHOCYTES # BLD AUTO: 1.29 K/UL (ref 1–4.8)
LYMPHOCYTES NFR BLD: 17.3 % (ref 22–41)
MCH RBC QN AUTO: 33.2 PG (ref 27–33)
MCHC RBC AUTO-ENTMCNC: 31.8 G/DL (ref 33.7–35.3)
MCV RBC AUTO: 104.4 FL (ref 81.4–97.8)
MONOCYTES # BLD AUTO: 0.53 K/UL (ref 0–0.85)
MONOCYTES NFR BLD AUTO: 7.1 % (ref 0–13.4)
NEUTROPHILS # BLD AUTO: 5.38 K/UL (ref 1.82–7.42)
NEUTROPHILS NFR BLD: 72.4 % (ref 44–72)
NRBC # BLD AUTO: 0 K/UL
NRBC BLD-RTO: 0 /100 WBC
NT-PROBNP SERPL IA-MCNC: 7526 PG/ML (ref 0–125)
PLATELET # BLD AUTO: 158 K/UL (ref 164–446)
PMV BLD AUTO: 11.2 FL (ref 9–12.9)
POTASSIUM SERPL-SCNC: 4.1 MMOL/L (ref 3.6–5.5)
PSA SERPL-MCNC: 0.06 NG/ML (ref 0–4)
PTH-INTACT SERPL-MCNC: 60.9 PG/ML (ref 14–72)
RBC # BLD AUTO: 3.85 M/UL (ref 4.7–6.1)
SODIUM SERPL-SCNC: 138 MMOL/L (ref 135–145)
TIBC SERPL-MCNC: 309 UG/DL (ref 250–450)
UIBC SERPL-MCNC: 233 UG/DL (ref 110–370)
VIT B12 SERPL-MCNC: 462 PG/ML (ref 211–911)
WBC # BLD AUTO: 7.4 K/UL (ref 4.8–10.8)

## 2021-11-06 PROCEDURE — 73721 MRI JNT OF LWR EXTRE W/O DYE: CPT | Mod: RT,MH

## 2021-11-07 LAB
AMPHET CTO UR CFM-MCNC: NEGATIVE NG/ML
BARBITURATES CTO UR CFM-MCNC: NEGATIVE NG/ML
BENZODIAZ CTO UR CFM-MCNC: NEGATIVE NG/ML
BUPRENORPHINE UR-MCNC: NEGATIVE NG/ML
CANNABINOIDS CTO UR CFM-MCNC: NEGATIVE NG/ML
CARISOPRODOL UR-MCNC: NEGATIVE NG/ML
COCAINE CTO UR CFM-MCNC: NEGATIVE NG/ML
COLLECT DURATION TIME SPEC: ABNORMAL HRS
DRUG SCREEN COMMENT UR-IMP: NORMAL
ETHYL GLUCURONIDE UR QL SCN: NEGATIVE NG/ML
FENTANYL UR-MCNC: NEGATIVE NG/ML
INTERPRETATION UR IFE-IMP: ABNORMAL
KAPPA LC FREE 24H UR-MRATE: ABNORMAL MG/D
KAPPA LC FREE UR-MCNC: 293.72 MG/L (ref 0–32.9)
LAMBDA LC FREE 24H UR-MRATE: ABNORMAL MG/D
LAMBDA LC FREE UR-MCNC: 58.34 MG/L (ref 0–3.79)
MEPERIDINE CTO UR SCN-MCNC: NEGATIVE NG/ML
METHADONE CTO UR CFM-MCNC: NEGATIVE NG/ML
OPIATES UR QL SCN: NEGATIVE NG/ML
OXYCDOXYM URSCRN 2005102: NEGATIVE NG/ML
PCP CTO UR CFM-MCNC: NEGATIVE NG/ML
PROPOXYPH CTO UR CFM-MCNC: NEGATIVE NG/ML
PROT 24H UR-MRATE: ABNORMAL MG/D
SPECIMEN VOL ?TM UR: ABNORMAL ML
TAPENTADOL UR-MCNC: NEGATIVE NG/ML
TRAMADOL CTO UR SCN-MCNC: NEGATIVE NG/ML
ZOLPIDEM UR-MCNC: NEGATIVE NG/ML

## 2021-11-09 LAB — EKG IMPRESSION: NORMAL

## 2021-11-09 PROCEDURE — 93000 ELECTROCARDIOGRAM COMPLETE: CPT | Performed by: INTERNAL MEDICINE

## 2021-11-11 ENCOUNTER — TELEPHONE (OUTPATIENT)
Dept: CARDIOLOGY | Facility: MEDICAL CENTER | Age: 81
End: 2021-11-11

## 2021-11-11 ENCOUNTER — TELEPHONE (OUTPATIENT)
Dept: MEDICAL GROUP | Facility: PHYSICIAN GROUP | Age: 81
End: 2021-11-11

## 2021-11-11 DIAGNOSIS — D80.8 LIGHT CHAIN DISEASE (HCC): ICD-10-CM

## 2021-11-11 NOTE — TELEPHONE ENCOUNTER
----- Message from Gabe Kebede Ass't sent at 11/11/2021  1:38 PM PST -----    ----- Message -----  From: Gabe Kebede Ass't  Sent: 11/8/2021  10:00 AM PST  To: Gaye Carter    Thank you. I am filling out my part right now. I will have Dr. Clements review it on Wednesday when she gets back and we should have it ready for him to  or mail then.  ----- Message -----  From: Gaye Carter  Sent: 11/8/2021   9:48 AM PST  To: Gabe Kebede Ass't    GABRIELLA Clements      Patients wife stopped by to drop off Disabled Persons License Plates and/ or placards application   I will leave at your desk for DR. Clements to fill out .

## 2021-11-11 NOTE — TELEPHONE ENCOUNTER
Called and spoke with the patient and his family about recent test results  Clinically doing well with no recurrent CHF and renal function improving  Urine free light chains significantly elevated  After discussion will refer to hematology  Have spoken with Dr GLENDY Aaron

## 2021-11-11 NOTE — TELEPHONE ENCOUNTER
Finished form. Called patient yesterday to inform of completion. VM. Called again today and reached patient's wife. Patient has appointment tomorrow at noon with Mckenna Eugene. Will give to medical assistant to give to patient.    Adden:    Patient's wife wanted to know if she could have a more permanent license placard due to CHF. Dx needs to be CHF III or IV. If they need that diagnosis attached to placard, they will need a f/v with Dr. Clements.

## 2021-11-11 NOTE — TELEPHONE ENCOUNTER
ESTABLISHED PATIENT PRE-VISIT PLANNING     Patient was NOT contacted to complete PVP.     Note: Patient will not be contacted if there is no indication to call.     1.  Reviewed notes from the last few office visits within the medical group: Yes    2.  If any orders were placed at last visit or intended to be done for this visit (i.e. 6 mos follow-up), do we have Results/Consult Notes?         •  Labs - Labs ordered, completed on 11/06/21 & 11/07/21 and results are in chart.  Note: If patient appointment is for lab review and patient did not complete labs, check with provider if OK to reschedule patient until labs completed.       •  Imaging - Imaging ordered, completed and results are in chart.       •  Referrals - Referral ordered, patient has NOT been seen.    3. Is this appointment scheduled as a Hospital Follow-Up? No    4.  Immunizations were updated in Epic using Reconcile Outside Information activity? Yes    5.  Patient is due for the following Health Maintenance Topics:   Health Maintenance Due   Topic Date Due   • COVID-19 Vaccine (3 - Booster for Moderna series) 09/05/2021       - Patient plans to schedule appointment for Immunizations: COVID-19 Booster .    6.  AHA (Pulse8) form printed for Provider? No, already completed

## 2021-11-12 ENCOUNTER — OFFICE VISIT (OUTPATIENT)
Dept: MEDICAL GROUP | Facility: PHYSICIAN GROUP | Age: 81
End: 2021-11-12
Payer: MEDICARE

## 2021-11-12 ENCOUNTER — NON-PROVIDER VISIT (OUTPATIENT)
Dept: MEDICAL GROUP | Facility: PHYSICIAN GROUP | Age: 81
End: 2021-11-12
Payer: MEDICARE

## 2021-11-12 ENCOUNTER — APPOINTMENT (OUTPATIENT)
Dept: MEDICAL GROUP | Facility: PHYSICIAN GROUP | Age: 81
End: 2021-11-12
Payer: MEDICARE

## 2021-11-12 ENCOUNTER — HOSPITAL ENCOUNTER (OUTPATIENT)
Dept: RADIOLOGY | Facility: MEDICAL CENTER | Age: 81
End: 2021-11-12
Attending: FAMILY MEDICINE
Payer: MEDICARE

## 2021-11-12 VITALS
OXYGEN SATURATION: 100 % | RESPIRATION RATE: 16 BRPM | TEMPERATURE: 98 F | HEART RATE: 54 BPM | SYSTOLIC BLOOD PRESSURE: 114 MMHG | HEIGHT: 70 IN | WEIGHT: 170 LBS | BODY MASS INDEX: 24.34 KG/M2 | DIASTOLIC BLOOD PRESSURE: 56 MMHG

## 2021-11-12 DIAGNOSIS — S01.01XA LACERATION OF SCALP, INITIAL ENCOUNTER: ICD-10-CM

## 2021-11-12 DIAGNOSIS — Z91.81 HISTORY OF FALLING: ICD-10-CM

## 2021-11-12 DIAGNOSIS — D80.8 LIGHT CHAIN DISEASE (HCC): ICD-10-CM
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
## 2021-11-12 DIAGNOSIS — Z79.01 ANTICOAGULATED: ICD-10-CM

## 2021-11-12 DIAGNOSIS — D68.69 SECONDARY HYPERCOAGULABLE STATE (HCC): ICD-10-CM

## 2021-11-12 DIAGNOSIS — Z78.9 ADVANCE DIRECTIVE IN CHART: ICD-10-CM

## 2021-11-12 PROCEDURE — 99214 OFFICE O/P EST MOD 30 MIN: CPT | Performed by: FAMILY MEDICINE

## 2021-11-12 PROCEDURE — 70450 CT HEAD/BRAIN W/O DYE: CPT | Mod: ME

## 2021-11-12 PROCEDURE — 99999 PR NO CHARGE: CPT | Performed by: FAMILY MEDICINE

## 2021-11-12 ASSESSMENT — FIBROSIS 4 INDEX: FIB4 SCORE: 2.23

## 2021-11-12 NOTE — PROGRESS NOTES
Subjective:     CC:   Chief Complaint   Patient presents with   • Fall     11/11/21 evening, can walk but not far, hip injury exacerbation   • Paperwork     Handicap Placard   • Abrasion     top of head, on hip and legs, no reported concussion on head,   • Rib Injury     Pain on right side    • Neck Pain     stiffness, minor         HPI:   Sammy presents today with his son and wife, he fell last night and has a head lag, he is anticoagulated.    I last saw him November 5, since then he was seen by orthopedics and diagnosed with a right hip labral tear which is likely degenerative and should improve with physical therapy.    He had follow-up with Dr. Pathak in cardiology November 5 after our appointment regarding his new onset heart failure EF 35% concerning for possible amyloidosis and persistent A. fib with heart block/SVR and consideration of a pacemaker.  Dr. Pathak did recommend placement of CRT-D without atrial lead, the patient wanted time to consider this and will call to schedule if he decides to proceed.  There is also a telephone message from Dr. Goodman on November 11 indicating he discussed with the patient and his family regarding recent lab tests with significantly elevated urine free light chains and he referred him to hematology.  Today the patient brings in his log of his weight and glucose and blood pressure.  His sugar has been in the 1 46-1 86 range mostly 140s.  His weight has fluctuated from 157 to 162 pounds.     They are awaiting for approval from his insurance company to be able to have a steroid injection for his R hip labral tear. Still difficult to walk bc of R hip pain so using crutches. Last week I rx'd ultram to use rarely for severe pain, using about qod. Makes him tired but no other side effects.   Last night he fell backwards, wife says he was coming back from the toilet, unsure sure if it had to do w positioning his crutches, hit the back of his head.  He denies any loss of  consciousness, no headaches, no vision changes.    Problem   Advance Directive in Chart    He expresses he'd like to be DNR but in discussing more w/ family they are still unsure, can discuss further at f/u in early dec '21.         Current Outpatient Medications Ordered in Epic   Medication Sig Dispense Refill   • Nutritional Supplements (VITAMIN D BOOSTER PO) Take  by mouth.     • traMADol (ULTRAM) 50 MG Tab Take 1 Tablet by mouth 1 time a day as needed for Severe Pain (take under supervision by family) for up to 30 days. 20 Tablet 0   • Naloxone (NARCAN) 4 MG/0.1ML Liquid One spray in one nostril for overdose and call 911. 1 Each 1   • glipiZIDE (GLUCOTROL) 5 MG Tab Take 2.5 mg daily daily and increase by 2.5 mg daily for fasting blood sugars above 160. Do not take more than max of 20 mg a day. Take 30 min before meals (Patient taking differently: Take 2.5 mg by mouth. Take 2.5 mg daily daily and increase by 2.5 mg daily for fasting blood sugars above 160. Do not take more than max of 20 mg a day. Take 30 min before meals) 30 Tablet 1   • furosemide (LASIX) 80 MG Tab Take 1 Tablet by mouth every day. Take one tablet twice daily for four days then one tablet daily therafter (Patient taking differently: Take 80 mg by mouth as needed. Take one tablet twice daily for four days then one tablet daily therafter) 90 Tablet 1   • Blood Glucose Meter Kit Test blood sugar as recommended by provider. Brand as covered by insurance - blood glucose monitoring kit. 1 Kit 0   • Blood Glucose Test Strips Use one Brand as covered by insurance  strip to test blood sugar once daily early morning before first meal and on occasion 2 hr post meal. 100 Strip 0   • Lancets Use one Brand as covered by [employer]  lancet to test blood sugar once daily early morning before first meal. 100 Each 0   • Alcohol Swabs Wipe site with prep pad prior to injection. 100 Each 0   • ELIQUIS 2.5 MG Tab 2 times a day.       No current Epic-ordered  "facility-administered medications on file.       Past Medical History:   Diagnosis Date   • HTN    • Type II or unspecified type diabetes mellitus without mention of complication, not stated as uncontrolled        Social History     Tobacco Use   • Smoking status: Never Smoker   • Smokeless tobacco: Never Used   Vaping Use   • Vaping Use: Never used   Substance Use Topics   • Alcohol use: Not Currently     Alcohol/week: 0.0 oz   • Drug use: No       Allergies:  Patient has no known allergies.    Health Maintenance: Completed    ROS:  Per HPI      Objective:       Exam:  /56 (BP Location: Right arm, Patient Position: Sitting, BP Cuff Size: Adult)   Pulse (!) 54   Temp 36.7 °C (98 °F) (Temporal)   Resp 16   Ht 1.778 m (5' 10\")   Wt 77.1 kg (170 lb)   SpO2 100%   BMI 24.39 kg/m²   Body mass index is 24.39 kg/m².  Wt Readings from Last 4 Encounters:   11/12/21 77.1 kg (170 lb)   11/05/21 73.9 kg (163 lb)   11/05/21 74.4 kg (164 lb 1.6 oz)   10/22/21 70.8 kg (156 lb)       Gen: Alert and oriented, No apparent distress. Appropriately groomed.  Neck: Neck is supple without lymphadenopathy.No thyromegaly.   Lungs: Normal effort, CTA bilaterally, no wheezes, rhonchi, or rales.  CV: Regular rate and rhythm. No murmurs, rubs, or gallops.  ABD:  Soft, nontender, nondistended, NABSx4, no HSM or RBT or guarding or masses.  Ext: No clubbing, cyanosis, edema.  Skin: At the top of his scalp he does have approximately 2.5 cm superficial laceration, and it was covered with dried blood in my nurse irrigated it so I could better examine the wound.  It was hemostatic and so only a small amount of antibiotic ointment was applied.  There was no underlying induration, it was nontender.    Assessment & Plan:     81 y.o. male with the following -   He will obtain a head CT today.  He will establish with a hematologist oncologist regarding the elevated light chains on his recent testing.  Afterwards he will decide whether he wants " to have a pacemaker and further discuss his advanced directives with his family and me.  For now he will stay full code.  1. Fall, initial encounter  - CT-HEAD W/O; Future    2. Laceration of scalp, initial encounter  - CT-HEAD W/O; Future    3. Secondary hypercoagulable state (HCC)  - CT-HEAD W/O; Future    4. Anticoagulated  - CT-HEAD W/O; Future    5. History of falling   - CT-HEAD W/O; Future    6. Light chain disease (HCC)  - Referral to Hematology Oncology    7. Advance directive in chart    Other orders  - Nutritional Supplements (VITAMIN D BOOSTER PO); Take  by mouth.        I spent a total of 35 minutes with record review, exam, communication with the patient, communication with other providers, and documentation of this encounter.      No follow-ups on file.    Please note that this dictation was created using voice recognition software. I have made every reasonable attempt to correct obvious errors, but I expect that there are errors of grammar and possibly content that I did not discover before finalizing the note.

## 2021-11-12 NOTE — PROGRESS NOTES
Per PCP request, assessed and debrided head wound. Patient reports falling just after midnight and striking his head on the bedroom door.Wound was scabbed with no discharge or blood. . Used H2O2 and water with 20 mL syringe to debride. Wound approximately 3.5cm in length and 0.5cm in width at widest point. Located on R Tieton Vertex. Patient tolerated well and per PCP polysporin was applied.  Advised PCP that patient was not aware if tetanus vaccine was up-to-date.

## 2021-11-13 RX ORDER — NOREPINEPHRINE BITARTRATE 1 MG/ML
INJECTION, SOLUTION INTRAVENOUS
Status: DISCONTINUED
Start: 2021-11-13 | End: 2021-11-13

## 2021-12-01 ENCOUNTER — HOSPITAL ENCOUNTER (OUTPATIENT)
Facility: MEDICAL CENTER | Age: 81
End: 2021-12-01
Attending: INTERNAL MEDICINE
Payer: MEDICARE

## 2021-12-01 PROCEDURE — 84156 ASSAY OF PROTEIN URINE: CPT

## 2021-12-01 PROCEDURE — 86334 IMMUNOFIX E-PHORESIS SERUM: CPT

## 2021-12-01 PROCEDURE — 82232 ASSAY OF BETA-2 PROTEIN: CPT

## 2021-12-01 PROCEDURE — 82784 ASSAY IGA/IGD/IGG/IGM EACH: CPT

## 2021-12-01 PROCEDURE — 84165 PROTEIN E-PHORESIS SERUM: CPT

## 2021-12-01 PROCEDURE — 83520 IMMUNOASSAY QUANT NOS NONAB: CPT

## 2021-12-01 PROCEDURE — 84155 ASSAY OF PROTEIN SERUM: CPT

## 2021-12-04 LAB
B2 MICROGLOB SERPL-MCNC: 5 MG/L (ref 1.1–2.4)
KAPPA LC FREE 24H UR-MRATE: ABNORMAL MG/D
KAPPA LC FREE UR-MCNC: 134.12 MG/L (ref 0–32.9)
LAMBDA LC FREE 24H UR-MRATE: ABNORMAL MG/D
LAMBDA LC FREE UR-MCNC: 22.94 MG/L (ref 0–3.79)
PROT 24H UR-MRATE: ABNORMAL MG/D

## 2021-12-05 LAB
ALBUMIN SERPL ELPH-MCNC: 3.87 G/DL (ref 3.75–5.01)
ALPHA1 GLOB SERPL ELPH-MCNC: 0.33 G/DL (ref 0.19–0.46)
ALPHA2 GLOB SERPL ELPH-MCNC: 0.75 G/DL (ref 0.48–1.05)
B-GLOBULIN SERPL ELPH-MCNC: 0.97 G/DL (ref 0.48–1.1)
EER MONOCLONAL PROTEIN AND FLC, SERUM Q5224: ABNORMAL
GAMMA GLOB SERPL ELPH-MCNC: 1.47 G/DL (ref 0.62–1.51)
IGA SERPL-MCNC: 391 MG/DL (ref 68–408)
IGG SERPL-MCNC: 1609 MG/DL (ref 768–1632)
IGM SERPL-MCNC: 133 MG/DL (ref 35–263)
INTERPRETATION SERPL IFE-IMP: ABNORMAL
INTERPRETATION SERPL IFE-IMP: ABNORMAL
KAPPA LC FREE SER-MCNC: 116.37 MG/L (ref 3.3–19.4)
KAPPA LC FREE/LAMBDA FREE SER NEPH: 1.85 {RATIO} (ref 0.26–1.65)
LAMBDA LC FREE SERPL-MCNC: 62.93 MG/L (ref 5.71–26.3)
PROT SERPL-MCNC: 7.4 G/DL (ref 6.3–8.2)

## 2021-12-06 ENCOUNTER — TELEPHONE (OUTPATIENT)
Dept: MEDICAL GROUP | Facility: PHYSICIAN GROUP | Age: 81
End: 2021-12-06

## 2021-12-06 NOTE — TELEPHONE ENCOUNTER
VOICEMAIL  1. Caller Name: Sammy Oseguera                        Call Back Number: [phone] (home) [phone] (work)      2. Message: Patient's son, Robles, called with questions regarding patient's eliquis. He would like to know what to look out for as far as internal bleeding and other possible side effects. Patient was seen for a fall on 11/11/21 on 11/12/21. Eliquis dose 2.5 mg TID. Please advise.    3. Patient approves office to leave a detailed voicemail/MyChart message: yes

## 2021-12-07 NOTE — TELEPHONE ENCOUNTER
Called pt's son, Robles. Gave Robles Coker's serious s/e of bleeding that is severe or you cannot control, unexpected bleeding or bleeding that lasts a long time, coughing up or vomiting blood or vomit that looks like coffee grounds, etc...    Discussed use of soft bristle toothbrush to prevent gum damage & subsequent bleeding. Discussed how pts easily bruise.     Robles was thankful for call, had no further questions.

## 2021-12-09 ENCOUNTER — APPOINTMENT (OUTPATIENT)
Dept: MEDICAL GROUP | Facility: PHYSICIAN GROUP | Age: 81
End: 2021-12-09
Payer: MEDICARE

## 2021-12-09 ENCOUNTER — TELEPHONE (OUTPATIENT)
Dept: MEDICAL GROUP | Facility: PHYSICIAN GROUP | Age: 81
End: 2021-12-09

## 2021-12-09 DIAGNOSIS — M25.551 ACUTE HIP PAIN, RIGHT: ICD-10-CM

## 2021-12-09 LAB
ALBUMIN 24H MFR UR ELPH: 89 %
ALPHA1 GLOB 24H MFR UR ELPH: 3.6 %
ALPHA2 GLOB 24H MFR UR ELPH: 0.7 %
B-GLOBULIN 24H MFR UR ELPH: 1.3 %
COLLECT DURATION TIME SPEC: NORMAL HRS
EER MONOCLONAL PROTEIN STUDY, 24 HOUR U Q5964: NORMAL
GAMMA GLOB 24H MFR UR ELPH: 5.4 %
INTERPRETATION UR IFE-IMP: NORMAL
M PROTEIN 24H MFR UR ELPH: 0 %
M PROTEIN 24H UR ELPH-MRATE: NORMAL MG/24 HRS
PROT 24H UR-MRATE: NORMAL MG/D (ref 40–150)
PROT UR-MCNC: 19 MG/DL
SPECIMEN VOL ?TM UR: NORMAL ML

## 2021-12-09 RX ORDER — TRAMADOL HYDROCHLORIDE 50 MG/1
50 TABLET ORAL
Qty: 20 TABLET | Refills: 0 | Status: SHIPPED | OUTPATIENT
Start: 2021-12-09 | End: 2021-12-20 | Stop reason: SDUPTHER

## 2021-12-09 NOTE — TELEPHONE ENCOUNTER
I will agree to refill the ultram 1m supply but emphasize to patient and family I don't typically refill pain meds without an in office appt to assess the patient.  No more refills w/o appt.  Likely shouldn't need any more refills if doing PT and following up with ortho  Should see me in Jan, for routine f/u multiple issues including advanced directives, pls schedule

## 2021-12-09 NOTE — TELEPHONE ENCOUNTER
Relayed message to patient's son. Followed up with Ortho. Would like to do cortozone shot. Holding off until oncology tests are complete. Has been with PT. Patient is more mobile, but does have moments of pain. Weight and sugars are good. Hasn't been taking as needed diuretic. Missed today due to weather, but will make it 12/20 for a follow up with Dr. Clements.

## 2021-12-15 RX ORDER — GLIPIZIDE 5 MG/1
TABLET ORAL
Qty: 100 TABLET | Refills: 3 | Status: SHIPPED | OUTPATIENT
Start: 2021-12-15 | End: 2022-03-17 | Stop reason: SDUPTHER

## 2021-12-17 ENCOUNTER — HOSPITAL ENCOUNTER (OUTPATIENT)
Dept: RADIOLOGY | Facility: MEDICAL CENTER | Age: 81
End: 2021-12-17
Attending: INTERNAL MEDICINE
Payer: MEDICARE

## 2021-12-17 ENCOUNTER — TELEPHONE (OUTPATIENT)
Dept: MEDICAL GROUP | Facility: PHYSICIAN GROUP | Age: 81
End: 2021-12-17

## 2021-12-17 DIAGNOSIS — I50.20 HFREF (HEART FAILURE WITH REDUCED EJECTION FRACTION) (HCC): ICD-10-CM

## 2021-12-17 DIAGNOSIS — I51.7 LVH (LEFT VENTRICULAR HYPERTROPHY): ICD-10-CM

## 2021-12-17 NOTE — TELEPHONE ENCOUNTER
ESTABLISHED PATIENT PRE-VISIT PLANNING     Patient was NOT contacted to complete PVP.     Note: Patient will not be contacted if there is no indication to call.     1.  Reviewed notes from the last few office visits within the medical group: Yes    2.  If any orders were placed at last visit or intended to be done for this visit (i.e. 6 mos follow-up), do we have Results/Consult Notes?         •  Labs - Labs were not ordered at last office visit.  Note: If patient appointment is for lab review and patient did not complete labs, check with provider if OK to reschedule patient until labs completed.       •  Imaging - Imaging ordered, completed and results are in chart.       •  Referrals - Referral ordered, patient has NOT been seen.    3. Is this appointment scheduled as a Hospital Follow-Up? No    4.  Immunizations were updated in Epic using Reconcile Outside Information activity? Yes    5.  Patient is due for the following Health Maintenance Topics:   Health Maintenance Due   Topic Date Due   • COVID-19 Vaccine (3 - Booster for Moderna series) 09/05/2021       6.  AHA (Pulse8) form printed for Provider? No, already completed

## 2021-12-20 ENCOUNTER — OFFICE VISIT (OUTPATIENT)
Dept: MEDICAL GROUP | Facility: PHYSICIAN GROUP | Age: 81
End: 2021-12-20
Payer: MEDICARE

## 2021-12-20 VITALS
BODY MASS INDEX: 23.31 KG/M2 | SYSTOLIC BLOOD PRESSURE: 138 MMHG | HEIGHT: 70 IN | HEART RATE: 54 BPM | WEIGHT: 162.8 LBS | TEMPERATURE: 97.7 F | DIASTOLIC BLOOD PRESSURE: 60 MMHG

## 2021-12-20 DIAGNOSIS — M25.551 ACUTE HIP PAIN, RIGHT: ICD-10-CM

## 2021-12-20 DIAGNOSIS — I48.20 ATRIAL FIBRILLATION, CHRONIC (HCC): ICD-10-CM

## 2021-12-20 DIAGNOSIS — S73.191D TEAR OF RIGHT ACETABULAR LABRUM, SUBSEQUENT ENCOUNTER: ICD-10-CM

## 2021-12-20 PROBLEM — S73.191A TEAR OF RIGHT ACETABULAR LABRUM: Status: ACTIVE | Noted: 2021-12-20

## 2021-12-20 PROCEDURE — 99213 OFFICE O/P EST LOW 20 MIN: CPT | Performed by: FAMILY MEDICINE

## 2021-12-20 RX ORDER — TRAMADOL HYDROCHLORIDE 50 MG/1
50 TABLET ORAL
Qty: 20 TABLET | Refills: 0 | Status: SHIPPED | OUTPATIENT
Start: 2021-12-20 | End: 2022-01-04 | Stop reason: SDUPTHER

## 2021-12-20 ASSESSMENT — FIBROSIS 4 INDEX: FIB4 SCORE: 2.23

## 2021-12-20 NOTE — PATIENT INSTRUCTIONS
Start PT  Do about 6 weeks of PT, if you continue to have pain go back to the orthopedist for an injection  Use the ultram on the days you do PT  Use tylenol 1000mg twice daily

## 2021-12-20 NOTE — PROGRESS NOTES
Subjective:     CC:   Chief Complaint   Patient presents with   • Follow-Up     1 month   • Results     labs 12/01/21  Imaging in [place]   • Other     Robles son lives with parents         HPI:   Sammy presents today with his wife and son.  Was scheduled for cardiac mri last week but there was something wrong w/ the order, waiting to hear about getting it rescheduled.     Problem   Tear of Right Acetabular Labrum    Continues to use ultram for a labral tear on the right. Has been seen by ortho and was referred to PT, never started that. 12/20/21 I suggested he start PT and f/u with ortho as he continues to have pain in his R hip. Has been taking ultram q couple of days, not sure if it helps. I suggested he add tylenol 1g bid.          Current Outpatient Medications Ordered in Epic   Medication Sig Dispense Refill   • traMADol (ULTRAM) 50 MG Tab Take 1 Tablet by mouth every 48 hours as needed for Severe Pain for up to 30 days. 20 Tablet 0   • glipiZIDE (GLUCOTROL) 5 MG Tab TAKE 2.5 MG DAILY DAILY AND INCREASE BY 2.5 MG DAILY FOR FASTING BLOOD SUGARS ABOVE 160. DO NOT TAKE MORE THAN MAX OF 20 MG A DAY. TAKE 30 MIN BEFORE MEALS 100 Tablet 3   • Nutritional Supplements (VITAMIN D BOOSTER PO) Take  by mouth.     • Naloxone (NARCAN) 4 MG/0.1ML Liquid One spray in one nostril for overdose and call 911. 1 Each 1   • furosemide (LASIX) 80 MG Tab Take 1 Tablet by mouth every day. Take one tablet twice daily for four days then one tablet daily therafter (Patient taking differently: Take 80 mg by mouth as needed. Take one tablet twice daily for four days then one tablet daily therafter) 90 Tablet 1   • Blood Glucose Meter Kit Test blood sugar as recommended by provider. Brand as covered by insurance - blood glucose monitoring kit. 1 Kit 0   • Blood Glucose Test Strips Use one Brand as covered by insurance  strip to test blood sugar once daily early morning before first meal and on occasion 2 hr post meal. 100 Strip 0   •  "Lancets Use one Brand as covered by insurance  lancet to test blood sugar once daily early morning before first meal. 100 Each 0   • Alcohol Swabs Wipe site with prep pad prior to injection. 100 Each 0   • ELIQUIS 2.5 MG Tab 2 times a day.       No current [facility]-ordered facility-administered medications on file.       Past Medical History:   Diagnosis Date   • HTN    • Type II or unspecified type diabetes mellitus without mention of complication, not stated as uncontrolled        Social History     Tobacco Use   • Smoking status: Never Smoker   • Smokeless tobacco: Never Used   Vaping Use   • Vaping Use: Never used   Substance Use Topics   • Alcohol use: Not Currently     Alcohol/week: 0.0 oz   • Drug use: No       Allergies:  Patient has no known allergies.    Health Maintenance: Completed    ROS:  Per HPI      Objective:       Exam:  /60 (BP Location: Right arm, Patient Position: Sitting, BP Cuff Size: Adult)   Pulse (!) 54   Temp 36.5 °C (97.7 °F) (Temporal)   Ht 1.778 m (5' 10\")   Wt 73.8 kg (162 lb 12.8 oz)   BMI 23.36 kg/m²   Body mass index is 23.36 kg/m².  Wt Readings from Last 4 Encounters:   12/20/21 73.8 kg (162 lb 12.8 oz)   11/12/21 77.1 kg (170 lb)   11/05/21 73.9 kg (163 lb)   11/05/21 74.4 kg (164 lb 1.6 oz)       Gen: Alert and oriented, No apparent distress. Appropriately groomed.  Neck: Neck is supple without lymphadenopathy.No thyromegaly.   Lungs: Normal effort, CTA bilaterally, no wheezes, rhonchi, or rales.  CV: Regular rate and irreg irreg rhythm. No murmurs, rubs, or gallops.  ABD:  Soft, nontender, nondistended, NABSx4, no HSM or RBT or guarding or masses.  Ext: No clubbing, cyanosis, edema.  Skin: No rash noted.      Assessment & Plan:     81 y.o. male with the following -    Start PT  Do about 6 weeks of PT, if you continue to have pain go back to the orthopedist for an injection  Use the ultram on the days you do PT  Use tylenol 1000mg twice daily  1. Tear of right acetabular " labrum, subsequent encounter  - Referral to Physical Therapy    2. Acute hip pain, right  - traMADol (ULTRAM) 50 MG Tab; Take 1 Tablet by mouth every 48 hours as needed for Severe Pain for up to 30 days.  Dispense: 20 Tablet; Refill: 0      Return in about 3 months (around 3/20/2022).    Please note that this dictation was created using voice recognition software. I have made every reasonable attempt to correct obvious errors, but I expect that there are errors of grammar and possibly content that I did not discover before finalizing the note.

## 2021-12-22 RX ORDER — APIXABAN 2.5 MG/1
TABLET, FILM COATED ORAL
Qty: 180 TABLET | Refills: 3 | Status: SHIPPED | OUTPATIENT
Start: 2021-12-22 | End: 2022-03-17 | Stop reason: SDUPTHER

## 2022-01-01 ENCOUNTER — HOME CARE VISIT (OUTPATIENT)
Dept: HOSPICE | Facility: HOSPICE | Age: 82
End: 2022-01-01
Payer: MEDICARE

## 2022-01-01 ENCOUNTER — APPOINTMENT (OUTPATIENT)
Dept: RADIOLOGY | Facility: MEDICAL CENTER | Age: 82
End: 2022-01-01
Attending: EMERGENCY MEDICINE
Payer: MEDICARE

## 2022-01-01 ENCOUNTER — APPOINTMENT (OUTPATIENT)
Dept: MEDICAL GROUP | Facility: PHYSICIAN GROUP | Age: 82
End: 2022-01-01
Payer: MEDICARE

## 2022-01-01 ENCOUNTER — OFFICE VISIT (OUTPATIENT)
Dept: CARDIOLOGY | Facility: MEDICAL CENTER | Age: 82
End: 2022-01-01
Payer: MEDICARE

## 2022-01-01 ENCOUNTER — HOSPITAL ENCOUNTER (OUTPATIENT)
Dept: RADIOLOGY | Facility: MEDICAL CENTER | Age: 82
End: 2022-09-13
Attending: FAMILY MEDICINE
Payer: MEDICARE

## 2022-01-01 ENCOUNTER — TELEPHONE (OUTPATIENT)
Dept: NEPHROLOGY | Facility: MEDICAL CENTER | Age: 82
End: 2022-01-01

## 2022-01-01 ENCOUNTER — TELEPHONE (OUTPATIENT)
Dept: MEDICAL GROUP | Facility: PHYSICIAN GROUP | Age: 82
End: 2022-01-01
Payer: MEDICARE

## 2022-01-01 ENCOUNTER — OFFICE VISIT (OUTPATIENT)
Dept: MEDICAL GROUP | Facility: PHYSICIAN GROUP | Age: 82
End: 2022-01-01
Payer: MEDICARE

## 2022-01-01 ENCOUNTER — HOSPITAL ENCOUNTER (OUTPATIENT)
Dept: RADIOLOGY | Facility: MEDICAL CENTER | Age: 82
End: 2022-09-22
Attending: FAMILY MEDICINE
Payer: MEDICARE

## 2022-01-01 ENCOUNTER — HOSPITAL ENCOUNTER (OUTPATIENT)
Dept: LAB | Facility: MEDICAL CENTER | Age: 82
End: 2022-11-03
Attending: INTERNAL MEDICINE
Payer: MEDICARE

## 2022-01-01 ENCOUNTER — OFFICE VISIT (OUTPATIENT)
Dept: NEPHROLOGY | Facility: MEDICAL CENTER | Age: 82
End: 2022-01-01
Payer: MEDICARE

## 2022-01-01 ENCOUNTER — PHARMACY VISIT (OUTPATIENT)
Dept: PHARMACY | Facility: MEDICAL CENTER | Age: 82
End: 2022-01-01
Payer: COMMERCIAL

## 2022-01-01 ENCOUNTER — HOSPITAL ENCOUNTER (OUTPATIENT)
Dept: RADIOLOGY | Facility: MEDICAL CENTER | Age: 82
End: 2022-09-02
Attending: INTERNAL MEDICINE
Payer: MEDICARE

## 2022-01-01 ENCOUNTER — APPOINTMENT (OUTPATIENT)
Dept: RADIOLOGY | Facility: MEDICAL CENTER | Age: 82
DRG: 186 | End: 2022-01-01
Attending: HOSPITALIST
Payer: MEDICARE

## 2022-01-01 ENCOUNTER — HOSPITAL ENCOUNTER (EMERGENCY)
Facility: MEDICAL CENTER | Age: 82
End: 2022-09-14
Attending: EMERGENCY MEDICINE
Payer: MEDICARE

## 2022-01-01 ENCOUNTER — HOSPITAL ENCOUNTER (INPATIENT)
Facility: MEDICAL CENTER | Age: 82
LOS: 2 days | DRG: 186 | End: 2022-09-25
Attending: EMERGENCY MEDICINE | Admitting: INTERNAL MEDICINE
Payer: MEDICARE

## 2022-01-01 ENCOUNTER — APPOINTMENT (OUTPATIENT)
Dept: RADIOLOGY | Facility: MEDICAL CENTER | Age: 82
DRG: 186 | End: 2022-01-01
Attending: EMERGENCY MEDICINE
Payer: MEDICARE

## 2022-01-01 ENCOUNTER — APPOINTMENT (OUTPATIENT)
Dept: CARDIOLOGY | Facility: MEDICAL CENTER | Age: 82
DRG: 186 | End: 2022-01-01
Attending: INTERNAL MEDICINE
Payer: MEDICARE

## 2022-01-01 ENCOUNTER — PATIENT OUTREACH (OUTPATIENT)
Dept: MEDICAL GROUP | Facility: PHYSICIAN GROUP | Age: 82
End: 2022-01-01
Payer: MEDICARE

## 2022-01-01 ENCOUNTER — HOSPITAL ENCOUNTER (OUTPATIENT)
Dept: LAB | Facility: MEDICAL CENTER | Age: 82
End: 2022-09-20
Attending: FAMILY MEDICINE
Payer: MEDICARE

## 2022-01-01 ENCOUNTER — OFFICE VISIT (OUTPATIENT)
Dept: SLEEP MEDICINE | Facility: MEDICAL CENTER | Age: 82
End: 2022-01-01
Payer: MEDICARE

## 2022-01-01 ENCOUNTER — HOSPICE ADMISSION (OUTPATIENT)
Dept: HOSPICE | Facility: HOSPICE | Age: 82
End: 2022-01-01
Payer: MEDICARE

## 2022-01-01 VITALS
WEIGHT: 166.1 LBS | OXYGEN SATURATION: 97 % | SYSTOLIC BLOOD PRESSURE: 118 MMHG | TEMPERATURE: 97.3 F | RESPIRATION RATE: 12 BRPM | HEART RATE: 56 BPM | DIASTOLIC BLOOD PRESSURE: 62 MMHG | HEIGHT: 71 IN | BODY MASS INDEX: 23.25 KG/M2

## 2022-01-01 VITALS
BODY MASS INDEX: 19.96 KG/M2 | WEIGHT: 147.4 LBS | RESPIRATION RATE: 12 BRPM | HEART RATE: 42 BPM | DIASTOLIC BLOOD PRESSURE: 66 MMHG | HEIGHT: 72 IN | OXYGEN SATURATION: 100 % | TEMPERATURE: 97.9 F | SYSTOLIC BLOOD PRESSURE: 108 MMHG

## 2022-01-01 VITALS
OXYGEN SATURATION: 98 % | DIASTOLIC BLOOD PRESSURE: 62 MMHG | HEART RATE: 46 BPM | RESPIRATION RATE: 16 BRPM | SYSTOLIC BLOOD PRESSURE: 102 MMHG | WEIGHT: 142.4 LBS | TEMPERATURE: 98.5 F | BODY MASS INDEX: 19.29 KG/M2 | HEIGHT: 72 IN

## 2022-01-01 VITALS
SYSTOLIC BLOOD PRESSURE: 118 MMHG | HEART RATE: 40 BPM | RESPIRATION RATE: 16 BRPM | BODY MASS INDEX: 21.02 KG/M2 | WEIGHT: 155 LBS | DIASTOLIC BLOOD PRESSURE: 56 MMHG

## 2022-01-01 VITALS — SYSTOLIC BLOOD PRESSURE: 120 MMHG | RESPIRATION RATE: 12 BRPM | DIASTOLIC BLOOD PRESSURE: 58 MMHG | HEART RATE: 48 BPM

## 2022-01-01 VITALS
DIASTOLIC BLOOD PRESSURE: 49 MMHG | WEIGHT: 146.16 LBS | TEMPERATURE: 98.3 F | RESPIRATION RATE: 16 BRPM | OXYGEN SATURATION: 99 % | HEIGHT: 72 IN | SYSTOLIC BLOOD PRESSURE: 115 MMHG | BODY MASS INDEX: 19.8 KG/M2 | HEART RATE: 53 BPM

## 2022-01-01 VITALS
HEART RATE: 42 BPM | DIASTOLIC BLOOD PRESSURE: 66 MMHG | WEIGHT: 137 LBS | HEIGHT: 72 IN | BODY MASS INDEX: 18.56 KG/M2 | SYSTOLIC BLOOD PRESSURE: 122 MMHG | OXYGEN SATURATION: 98 %

## 2022-01-01 VITALS — RESPIRATION RATE: 24 BRPM | DIASTOLIC BLOOD PRESSURE: 60 MMHG | SYSTOLIC BLOOD PRESSURE: 126 MMHG | HEART RATE: 90 BPM

## 2022-01-01 VITALS — RESPIRATION RATE: 12 BRPM | HEART RATE: 44 BPM | SYSTOLIC BLOOD PRESSURE: 118 MMHG | DIASTOLIC BLOOD PRESSURE: 54 MMHG

## 2022-01-01 VITALS
WEIGHT: 169 LBS | BODY MASS INDEX: 23.66 KG/M2 | SYSTOLIC BLOOD PRESSURE: 137 MMHG | RESPIRATION RATE: 18 BRPM | DIASTOLIC BLOOD PRESSURE: 66 MMHG | HEIGHT: 71 IN | OXYGEN SATURATION: 98 % | HEART RATE: 49 BPM | TEMPERATURE: 98.2 F

## 2022-01-01 VITALS
TEMPERATURE: 97.1 F | WEIGHT: 143 LBS | SYSTOLIC BLOOD PRESSURE: 126 MMHG | BODY MASS INDEX: 20.02 KG/M2 | HEART RATE: 46 BPM | DIASTOLIC BLOOD PRESSURE: 64 MMHG | OXYGEN SATURATION: 100 % | HEIGHT: 71 IN

## 2022-01-01 VITALS — DIASTOLIC BLOOD PRESSURE: 58 MMHG | RESPIRATION RATE: 12 BRPM | HEART RATE: 48 BPM | SYSTOLIC BLOOD PRESSURE: 120 MMHG

## 2022-01-01 VITALS — RESPIRATION RATE: 16 BRPM | SYSTOLIC BLOOD PRESSURE: 110 MMHG | HEART RATE: 60 BPM | DIASTOLIC BLOOD PRESSURE: 58 MMHG

## 2022-01-01 VITALS
OXYGEN SATURATION: 93 % | WEIGHT: 154 LBS | HEIGHT: 72 IN | DIASTOLIC BLOOD PRESSURE: 66 MMHG | HEART RATE: 77 BPM | TEMPERATURE: 98.2 F | RESPIRATION RATE: 12 BRPM | BODY MASS INDEX: 20.86 KG/M2 | SYSTOLIC BLOOD PRESSURE: 110 MMHG

## 2022-01-01 VITALS
WEIGHT: 167.7 LBS | SYSTOLIC BLOOD PRESSURE: 112 MMHG | DIASTOLIC BLOOD PRESSURE: 64 MMHG | TEMPERATURE: 97.5 F | HEIGHT: 70 IN | HEART RATE: 56 BPM | RESPIRATION RATE: 14 BRPM | BODY MASS INDEX: 24.01 KG/M2 | OXYGEN SATURATION: 95 %

## 2022-01-01 VITALS — DIASTOLIC BLOOD PRESSURE: 56 MMHG | HEART RATE: 44 BPM | RESPIRATION RATE: 12 BRPM | SYSTOLIC BLOOD PRESSURE: 122 MMHG

## 2022-01-01 VITALS
HEART RATE: 54 BPM | WEIGHT: 150.4 LBS | HEIGHT: 71 IN | RESPIRATION RATE: 16 BRPM | DIASTOLIC BLOOD PRESSURE: 52 MMHG | SYSTOLIC BLOOD PRESSURE: 110 MMHG | BODY MASS INDEX: 21.06 KG/M2 | OXYGEN SATURATION: 99 %

## 2022-01-01 DIAGNOSIS — I50.30 DIASTOLIC HEART FAILURE, UNSPECIFIED HF CHRONICITY (HCC): ICD-10-CM

## 2022-01-01 DIAGNOSIS — E11.22 CKD STAGE 3 DUE TO TYPE 2 DIABETES MELLITUS (HCC): ICD-10-CM

## 2022-01-01 DIAGNOSIS — R06.02 SOB (SHORTNESS OF BREATH): ICD-10-CM

## 2022-01-01 DIAGNOSIS — R05.9 COUGH: ICD-10-CM

## 2022-01-01 DIAGNOSIS — K70.31 ALCOHOLIC CIRRHOSIS OF LIVER WITH ASCITES (HCC): ICD-10-CM

## 2022-01-01 DIAGNOSIS — N18.32 STAGE 3B CHRONIC KIDNEY DISEASE: ICD-10-CM

## 2022-01-01 DIAGNOSIS — N17.9 AKI (ACUTE KIDNEY INJURY) (HCC): ICD-10-CM

## 2022-01-01 DIAGNOSIS — J90 PLEURAL EFFUSION: ICD-10-CM

## 2022-01-01 DIAGNOSIS — N18.30 CKD STAGE 3 DUE TO TYPE 2 DIABETES MELLITUS (HCC): ICD-10-CM

## 2022-01-01 DIAGNOSIS — R06.02 SHORTNESS OF BREATH: ICD-10-CM

## 2022-01-01 DIAGNOSIS — I10 PRIMARY HYPERTENSION: ICD-10-CM

## 2022-01-01 DIAGNOSIS — K76.6 PORTAL HYPERTENSION (HCC): ICD-10-CM

## 2022-01-01 DIAGNOSIS — K70.30 ALCOHOLIC CIRRHOSIS, UNSPECIFIED WHETHER ASCITES PRESENT (HCC): ICD-10-CM

## 2022-01-01 DIAGNOSIS — Z23 NEED FOR VACCINATION: ICD-10-CM

## 2022-01-01 DIAGNOSIS — I50.40 HEART FAILURE WITH REDUCED EJECTION FRACTION AND DIASTOLIC DYSFUNCTION (HCC): ICD-10-CM

## 2022-01-01 DIAGNOSIS — N18.32 TYPE 2 DIABETES MELLITUS WITH STAGE 3B CHRONIC KIDNEY DISEASE, WITHOUT LONG-TERM CURRENT USE OF INSULIN (HCC): ICD-10-CM

## 2022-01-01 DIAGNOSIS — E11.22 TYPE 2 DIABETES MELLITUS WITH STAGE 3B CHRONIC KIDNEY DISEASE, WITHOUT LONG-TERM CURRENT USE OF INSULIN (HCC): ICD-10-CM

## 2022-01-01 DIAGNOSIS — Z09 HOSPITAL DISCHARGE FOLLOW-UP: ICD-10-CM

## 2022-01-01 DIAGNOSIS — N18.9 CHRONIC KIDNEY DISEASE, UNSPECIFIED CKD STAGE: ICD-10-CM

## 2022-01-01 DIAGNOSIS — E11.29 MICROALBUMINURIA DUE TO TYPE 2 DIABETES MELLITUS (HCC): ICD-10-CM

## 2022-01-01 DIAGNOSIS — D64.9 ANEMIA, UNSPECIFIED TYPE: ICD-10-CM

## 2022-01-01 DIAGNOSIS — R80.9 MICROALBUMINURIA DUE TO TYPE 2 DIABETES MELLITUS (HCC): ICD-10-CM

## 2022-01-01 DIAGNOSIS — Z87.09 HISTORY OF PLEURAL EFFUSION: ICD-10-CM

## 2022-01-01 DIAGNOSIS — R94.31 PROLONGED QT INTERVAL: ICD-10-CM

## 2022-01-01 DIAGNOSIS — R79.89 ELEVATED TROPONIN: ICD-10-CM

## 2022-01-01 DIAGNOSIS — I48.20 ATRIAL FIBRILLATION, CHRONIC (HCC): ICD-10-CM

## 2022-01-01 DIAGNOSIS — E11.29 TYPE 2 DIABETES MELLITUS WITH MICROALBUMINURIA, WITHOUT LONG-TERM CURRENT USE OF INSULIN (HCC): ICD-10-CM

## 2022-01-01 DIAGNOSIS — Z85.46 HISTORY OF PROSTATE CANCER: ICD-10-CM

## 2022-01-01 DIAGNOSIS — I50.9 ACUTE ON CHRONIC CONGESTIVE HEART FAILURE, UNSPECIFIED HEART FAILURE TYPE (HCC): ICD-10-CM

## 2022-01-01 DIAGNOSIS — E55.9 VITAMIN D DEFICIENCY: ICD-10-CM

## 2022-01-01 DIAGNOSIS — G31.9 DEGENERATIVE DISEASE OF NERVOUS SYSTEM, UNSPECIFIED (HCC): ICD-10-CM

## 2022-01-01 DIAGNOSIS — D68.69 SECONDARY HYPERCOAGULABLE STATE (HCC): ICD-10-CM

## 2022-01-01 DIAGNOSIS — J90 PLEURAL EFFUSION ON RIGHT: ICD-10-CM

## 2022-01-01 DIAGNOSIS — R80.9 TYPE 2 DIABETES MELLITUS WITH MICROALBUMINURIA, WITHOUT LONG-TERM CURRENT USE OF INSULIN (HCC): ICD-10-CM

## 2022-01-01 DIAGNOSIS — R41.3 MEMORY IMPAIRMENT: ICD-10-CM

## 2022-01-01 DIAGNOSIS — F41.9 ANXIETY: ICD-10-CM

## 2022-01-01 LAB
25(OH)D3 SERPL-MCNC: 10 NG/ML (ref 30–100)
25(OH)D3 SERPL-MCNC: 18 NG/ML (ref 30–100)
ALBUMIN SERPL BCP-MCNC: 3.7 G/DL (ref 3.2–4.9)
ALBUMIN SERPL BCP-MCNC: 4.2 G/DL (ref 3.2–4.9)
ALBUMIN SERPL BCP-MCNC: 4.3 G/DL (ref 3.2–4.9)
ALBUMIN SERPL BCP-MCNC: 4.3 G/DL (ref 3.2–4.9)
ALBUMIN/GLOB SERPL: 1 G/DL
ALBUMIN/GLOB SERPL: 1.1 G/DL
ALBUMIN/GLOB SERPL: 1.1 G/DL
ALBUMIN/GLOB SERPL: 1.4 G/DL
ALP SERPL-CCNC: 108 U/L (ref 30–99)
ALP SERPL-CCNC: 109 U/L (ref 30–99)
ALP SERPL-CCNC: 113 U/L (ref 30–99)
ALP SERPL-CCNC: 119 U/L (ref 30–99)
ALT SERPL-CCNC: 34 U/L (ref 2–50)
ALT SERPL-CCNC: 7 U/L (ref 2–50)
ALT SERPL-CCNC: 8 U/L (ref 2–50)
ALT SERPL-CCNC: 9 U/L (ref 2–50)
AMYLASE FLD-CCNC: 26 U/L
ANION GAP SERPL CALC-SCNC: 12 MMOL/L (ref 7–16)
ANION GAP SERPL CALC-SCNC: 12 MMOL/L (ref 7–16)
ANION GAP SERPL CALC-SCNC: 14 MMOL/L (ref 7–16)
ANION GAP SERPL CALC-SCNC: 15 MMOL/L (ref 7–16)
ANION GAP SERPL CALC-SCNC: 15 MMOL/L (ref 7–16)
ANION GAP SERPL CALC-SCNC: 9 MMOL/L (ref 7–16)
APPEARANCE FLD: CLEAR
APPEARANCE UR: CLEAR
AST SERPL-CCNC: 11 U/L (ref 12–45)
AST SERPL-CCNC: 12 U/L (ref 12–45)
AST SERPL-CCNC: 15 U/L (ref 12–45)
AST SERPL-CCNC: 27 U/L (ref 12–45)
BACTERIA FLD AEROBE CULT: NORMAL
BASOPHILS # BLD AUTO: 0.6 % (ref 0–1.8)
BASOPHILS # BLD AUTO: 0.6 % (ref 0–1.8)
BASOPHILS # BLD AUTO: 0.9 % (ref 0–1.8)
BASOPHILS # BLD AUTO: 1 % (ref 0–1.8)
BASOPHILS # BLD AUTO: 1.1 % (ref 0–1.8)
BASOPHILS # BLD: 0.05 K/UL (ref 0–0.12)
BASOPHILS # BLD: 0.06 K/UL (ref 0–0.12)
BASOPHILS # BLD: 0.07 K/UL (ref 0–0.12)
BASOPHILS # BLD: 0.07 K/UL (ref 0–0.12)
BASOPHILS # BLD: 0.08 K/UL (ref 0–0.12)
BILIRUB SERPL-MCNC: 1 MG/DL (ref 0.1–1.5)
BILIRUB SERPL-MCNC: 1.6 MG/DL (ref 0.1–1.5)
BILIRUB SERPL-MCNC: 1.7 MG/DL (ref 0.1–1.5)
BILIRUB SERPL-MCNC: 1.9 MG/DL (ref 0.1–1.5)
BILIRUB UR QL STRIP.AUTO: NEGATIVE
BODY FLD TYPE: NORMAL
BUN SERPL-MCNC: 104 MG/DL (ref 8–22)
BUN SERPL-MCNC: 105 MG/DL (ref 8–22)
BUN SERPL-MCNC: 24 MG/DL (ref 8–22)
BUN SERPL-MCNC: 25 MG/DL (ref 8–22)
BUN SERPL-MCNC: 25 MG/DL (ref 8–22)
BUN SERPL-MCNC: 26 MG/DL (ref 8–22)
CALCIUM SERPL-MCNC: 8.6 MG/DL (ref 8.5–10.5)
CALCIUM SERPL-MCNC: 8.8 MG/DL (ref 8.4–10.2)
CALCIUM SERPL-MCNC: 9.1 MG/DL (ref 8.4–10.2)
CALCIUM SERPL-MCNC: 9.3 MG/DL (ref 8.4–10.2)
CALCIUM SERPL-MCNC: 9.6 MG/DL (ref 8.5–10.5)
CALCIUM SERPL-MCNC: 9.6 MG/DL (ref 8.5–10.5)
CHLORIDE SERPL-SCNC: 105 MMOL/L (ref 96–112)
CHLORIDE SERPL-SCNC: 105 MMOL/L (ref 96–112)
CHLORIDE SERPL-SCNC: 106 MMOL/L (ref 96–112)
CHLORIDE SERPL-SCNC: 106 MMOL/L (ref 96–112)
CHLORIDE SERPL-SCNC: 95 MMOL/L (ref 96–112)
CHLORIDE SERPL-SCNC: 96 MMOL/L (ref 96–112)
CO2 SERPL-SCNC: 19 MMOL/L (ref 20–33)
CO2 SERPL-SCNC: 20 MMOL/L (ref 20–33)
CO2 SERPL-SCNC: 20 MMOL/L (ref 20–33)
CO2 SERPL-SCNC: 22 MMOL/L (ref 20–33)
CO2 SERPL-SCNC: 22 MMOL/L (ref 20–33)
CO2 SERPL-SCNC: 23 MMOL/L (ref 20–33)
COLOR FLD: YELLOW
COLOR UR: YELLOW
CREAT SERPL-MCNC: 1.58 MG/DL (ref 0.5–1.4)
CREAT SERPL-MCNC: 1.73 MG/DL (ref 0.5–1.4)
CREAT SERPL-MCNC: 1.76 MG/DL (ref 0.5–1.4)
CREAT SERPL-MCNC: 1.84 MG/DL (ref 0.5–1.4)
CREAT SERPL-MCNC: 2.62 MG/DL (ref 0.5–1.4)
CREAT SERPL-MCNC: 2.65 MG/DL (ref 0.5–1.4)
CREAT UR-MCNC: 51 MG/DL
CSF COMMENTS 1658: NORMAL
CYTOLOGY REG CYTOL: NORMAL
EKG IMPRESSION: NORMAL
EOSINOPHIL # BLD AUTO: 0.09 K/UL (ref 0–0.51)
EOSINOPHIL # BLD AUTO: 0.13 K/UL (ref 0–0.51)
EOSINOPHIL # BLD AUTO: 0.15 K/UL (ref 0–0.51)
EOSINOPHIL # BLD AUTO: 0.2 K/UL (ref 0–0.51)
EOSINOPHIL # BLD AUTO: 0.37 K/UL (ref 0–0.51)
EOSINOPHIL NFR BLD: 1 % (ref 0–6.9)
EOSINOPHIL NFR BLD: 1.5 % (ref 0–6.9)
EOSINOPHIL NFR BLD: 2.2 % (ref 0–6.9)
EOSINOPHIL NFR BLD: 2.5 % (ref 0–6.9)
EOSINOPHIL NFR BLD: 5.2 % (ref 0–6.9)
ERYTHROCYTE [DISTWIDTH] IN BLOOD BY AUTOMATED COUNT: 46.4 FL (ref 35.9–50)
ERYTHROCYTE [DISTWIDTH] IN BLOOD BY AUTOMATED COUNT: 46.7 FL (ref 35.9–50)
ERYTHROCYTE [DISTWIDTH] IN BLOOD BY AUTOMATED COUNT: 54.4 FL (ref 35.9–50)
ERYTHROCYTE [DISTWIDTH] IN BLOOD BY AUTOMATED COUNT: 55.5 FL (ref 35.9–50)
ERYTHROCYTE [DISTWIDTH] IN BLOOD BY AUTOMATED COUNT: 55.6 FL (ref 35.9–50)
ERYTHROCYTE [DISTWIDTH] IN BLOOD BY AUTOMATED COUNT: 56.2 FL (ref 35.9–50)
EST. AVERAGE GLUCOSE BLD GHB EST-MCNC: 194 MG/DL
FASTING STATUS PATIENT QL REPORTED: NORMAL
FUNGUS SPEC CULT: NORMAL
FUNGUS SPEC FUNGUS STN: NORMAL
FUNGUS SPEC FUNGUS STN: NORMAL
GFR SERPLBLD CREATININE-BSD FMLA CKD-EPI: 23 ML/MIN/1.73 M 2
GFR SERPLBLD CREATININE-BSD FMLA CKD-EPI: 24 ML/MIN/1.73 M 2
GFR SERPLBLD CREATININE-BSD FMLA CKD-EPI: 36 ML/MIN/1.73 M 2
GFR SERPLBLD CREATININE-BSD FMLA CKD-EPI: 38 ML/MIN/1.73 M 2
GFR SERPLBLD CREATININE-BSD FMLA CKD-EPI: 39 ML/MIN/1.73 M 2
GFR SERPLBLD CREATININE-BSD FMLA CKD-EPI: 43 ML/MIN/1.73 M 2
GLOBULIN SER CALC-MCNC: 3 G/DL (ref 1.9–3.5)
GLOBULIN SER CALC-MCNC: 3.4 G/DL (ref 1.9–3.5)
GLOBULIN SER CALC-MCNC: 3.7 G/DL (ref 1.9–3.5)
GLOBULIN SER CALC-MCNC: 4.1 G/DL (ref 1.9–3.5)
GLUCOSE BLD STRIP.AUTO-MCNC: 134 MG/DL (ref 65–99)
GLUCOSE BLD STRIP.AUTO-MCNC: 162 MG/DL (ref 65–99)
GLUCOSE BLD STRIP.AUTO-MCNC: 164 MG/DL (ref 65–99)
GLUCOSE BLD STRIP.AUTO-MCNC: 167 MG/DL (ref 65–99)
GLUCOSE BLD STRIP.AUTO-MCNC: 169 MG/DL (ref 65–99)
GLUCOSE FLD-MCNC: 136 MG/DL
GLUCOSE SERPL-MCNC: 102 MG/DL (ref 65–99)
GLUCOSE SERPL-MCNC: 120 MG/DL (ref 65–99)
GLUCOSE SERPL-MCNC: 158 MG/DL (ref 65–99)
GLUCOSE SERPL-MCNC: 160 MG/DL (ref 65–99)
GLUCOSE SERPL-MCNC: 444 MG/DL (ref 65–99)
GLUCOSE SERPL-MCNC: 447 MG/DL (ref 65–99)
GLUCOSE UR STRIP.AUTO-MCNC: 100 MG/DL
GRAM STN SPEC: NORMAL
GRAM STN SPEC: NORMAL
HBA1C MFR BLD: 8.4 % (ref 4–5.6)
HCT VFR BLD AUTO: 35.9 % (ref 42–52)
HCT VFR BLD AUTO: 36 % (ref 42–52)
HCT VFR BLD AUTO: 37.5 % (ref 42–52)
HCT VFR BLD AUTO: 38.3 % (ref 42–52)
HCT VFR BLD AUTO: 38.7 % (ref 42–52)
HCT VFR BLD AUTO: 39.4 % (ref 42–52)
HGB BLD-MCNC: 11.7 G/DL (ref 14–18)
HGB BLD-MCNC: 11.8 G/DL (ref 14–18)
HGB BLD-MCNC: 12.2 G/DL (ref 14–18)
HGB BLD-MCNC: 12.6 G/DL (ref 14–18)
HGB BLD-MCNC: 13.3 G/DL (ref 14–18)
HGB BLD-MCNC: 13.4 G/DL (ref 14–18)
HISTIOCYTES NFR FLD: 9 %
IMM GRANULOCYTES # BLD AUTO: 0.01 K/UL (ref 0–0.11)
IMM GRANULOCYTES # BLD AUTO: 0.02 K/UL (ref 0–0.11)
IMM GRANULOCYTES # BLD AUTO: 0.02 K/UL (ref 0–0.11)
IMM GRANULOCYTES # BLD AUTO: 0.03 K/UL (ref 0–0.11)
IMM GRANULOCYTES # BLD AUTO: 0.03 K/UL (ref 0–0.11)
IMM GRANULOCYTES NFR BLD AUTO: 0.1 % (ref 0–0.9)
IMM GRANULOCYTES NFR BLD AUTO: 0.3 % (ref 0–0.9)
IMM GRANULOCYTES NFR BLD AUTO: 0.4 % (ref 0–0.9)
KETONES UR STRIP.AUTO-MCNC: NEGATIVE MG/DL
LDH FLD L TO P-CCNC: 74 U/L
LEUKOCYTE ESTERASE UR QL STRIP.AUTO: NEGATIVE
LV EJECT FRACT  99904: 30
LV EJECT FRACT MOD 2C 99903: 29.29
LV EJECT FRACT MOD 4C 99902: 40.76
LV EJECT FRACT MOD BP 99901: 31.9
LYMPHOCYTES # BLD AUTO: 0.91 K/UL (ref 1–4.8)
LYMPHOCYTES # BLD AUTO: 0.98 K/UL (ref 1–4.8)
LYMPHOCYTES # BLD AUTO: 1.06 K/UL (ref 1–4.8)
LYMPHOCYTES # BLD AUTO: 1.13 K/UL (ref 1–4.8)
LYMPHOCYTES # BLD AUTO: 1.28 K/UL (ref 1–4.8)
LYMPHOCYTES NFR BLD: 11.4 % (ref 22–41)
LYMPHOCYTES NFR BLD: 12.2 % (ref 22–41)
LYMPHOCYTES NFR BLD: 12.5 % (ref 22–41)
LYMPHOCYTES NFR BLD: 14.4 % (ref 22–41)
LYMPHOCYTES NFR BLD: 18.1 % (ref 22–41)
LYMPHOCYTES NFR FLD: 61 %
MAGNESIUM SERPL-MCNC: 2 MG/DL (ref 1.5–2.5)
MCH RBC QN AUTO: 33.8 PG (ref 27–33)
MCH RBC QN AUTO: 34.1 PG (ref 27–33)
MCH RBC QN AUTO: 34.1 PG (ref 27–33)
MCH RBC QN AUTO: 34.4 PG (ref 27–33)
MCH RBC QN AUTO: 34.6 PG (ref 27–33)
MCH RBC QN AUTO: 34.8 PG (ref 27–33)
MCHC RBC AUTO-ENTMCNC: 32.5 G/DL (ref 33.7–35.3)
MCHC RBC AUTO-ENTMCNC: 32.6 G/DL (ref 33.7–35.3)
MCHC RBC AUTO-ENTMCNC: 32.8 G/DL (ref 33.7–35.3)
MCHC RBC AUTO-ENTMCNC: 32.9 G/DL (ref 33.7–35.3)
MCHC RBC AUTO-ENTMCNC: 33.8 G/DL (ref 33.7–35.3)
MCHC RBC AUTO-ENTMCNC: 34.6 G/DL (ref 33.7–35.3)
MCV RBC AUTO: 100 FL (ref 81.4–97.8)
MCV RBC AUTO: 104.7 FL (ref 81.4–97.8)
MCV RBC AUTO: 104.7 FL (ref 81.4–97.8)
MCV RBC AUTO: 105.6 FL (ref 81.4–97.8)
MCV RBC AUTO: 105.8 FL (ref 81.4–97.8)
MCV RBC AUTO: 99.5 FL (ref 81.4–97.8)
MICRO URNS: ABNORMAL
MICROALBUMIN UR-MCNC: 7.1 MG/DL
MICROALBUMIN/CREAT UR: 139 MG/G (ref 0–30)
MONOCYTES # BLD AUTO: 0.48 K/UL (ref 0–0.85)
MONOCYTES # BLD AUTO: 0.51 K/UL (ref 0–0.85)
MONOCYTES # BLD AUTO: 0.53 K/UL (ref 0–0.85)
MONOCYTES # BLD AUTO: 0.63 K/UL (ref 0–0.85)
MONOCYTES # BLD AUTO: 0.71 K/UL (ref 0–0.85)
MONOCYTES NFR BLD AUTO: 5.2 % (ref 0–13.4)
MONOCYTES NFR BLD AUTO: 7.2 % (ref 0–13.4)
MONOCYTES NFR BLD AUTO: 7.8 % (ref 0–13.4)
MONOCYTES NFR BLD AUTO: 7.9 % (ref 0–13.4)
MONOCYTES NFR BLD AUTO: 8.4 % (ref 0–13.4)
MONONUC CELLS NFR FLD: 13 %
MYCOBACTERIUM SPEC CULT: NORMAL
NEUTROPHILS # BLD AUTO: 4.81 K/UL (ref 1.82–7.42)
NEUTROPHILS # BLD AUTO: 5.04 K/UL (ref 1.82–7.42)
NEUTROPHILS # BLD AUTO: 6.18 K/UL (ref 1.82–7.42)
NEUTROPHILS # BLD AUTO: 6.51 K/UL (ref 1.82–7.42)
NEUTROPHILS # BLD AUTO: 7.47 K/UL (ref 1.82–7.42)
NEUTROPHILS NFR BLD: 68.1 % (ref 44–72)
NEUTROPHILS NFR BLD: 74.3 % (ref 44–72)
NEUTROPHILS NFR BLD: 76.6 % (ref 44–72)
NEUTROPHILS NFR BLD: 77.2 % (ref 44–72)
NEUTROPHILS NFR BLD: 80.7 % (ref 44–72)
NEUTROPHILS NFR FLD: 17 %
NITRITE UR QL STRIP.AUTO: NEGATIVE
NRBC # BLD AUTO: 0 K/UL
NRBC BLD-RTO: 0 /100 WBC
NT-PROBNP SERPL IA-MCNC: ABNORMAL PG/ML (ref 0–125)
NT-PROBNP SERPL IA-MCNC: ABNORMAL PG/ML (ref 0–125)
PH FLD: 7.5 [PH]
PH UR STRIP.AUTO: 5.5 [PH] (ref 5–8)
PLATELET # BLD AUTO: 139 K/UL (ref 164–446)
PLATELET # BLD AUTO: 146 K/UL (ref 164–446)
PLATELET # BLD AUTO: 160 K/UL (ref 164–446)
PLATELET # BLD AUTO: 174 K/UL (ref 164–446)
PLATELET # BLD AUTO: 189 K/UL (ref 164–446)
PLATELET # BLD AUTO: 204 K/UL (ref 164–446)
PMV BLD AUTO: 10.1 FL (ref 9–12.9)
PMV BLD AUTO: 10.3 FL (ref 9–12.9)
PMV BLD AUTO: 10.6 FL (ref 9–12.9)
PMV BLD AUTO: 10.9 FL (ref 9–12.9)
PMV BLD AUTO: 9.8 FL (ref 9–12.9)
PMV BLD AUTO: 9.9 FL (ref 9–12.9)
POTASSIUM SERPL-SCNC: 3.8 MMOL/L (ref 3.6–5.5)
POTASSIUM SERPL-SCNC: 4.1 MMOL/L (ref 3.6–5.5)
POTASSIUM SERPL-SCNC: 4.3 MMOL/L (ref 3.6–5.5)
POTASSIUM SERPL-SCNC: 4.7 MMOL/L (ref 3.6–5.5)
POTASSIUM SERPL-SCNC: 5.3 MMOL/L (ref 3.6–5.5)
POTASSIUM SERPL-SCNC: 5.3 MMOL/L (ref 3.6–5.5)
PROT FLD-MCNC: 3 G/DL
PROT SERPL-MCNC: 7.1 G/DL (ref 6–8.2)
PROT SERPL-MCNC: 7.3 G/DL (ref 6–8.2)
PROT SERPL-MCNC: 7.9 G/DL (ref 6–8.2)
PROT SERPL-MCNC: 8.4 G/DL (ref 6–8.2)
PROT UR QL STRIP: NEGATIVE MG/DL
PTH-INTACT SERPL-MCNC: 83.2 PG/ML (ref 14–72)
RBC # BLD AUTO: 3.41 M/UL (ref 4.7–6.1)
RBC # BLD AUTO: 3.43 M/UL (ref 4.7–6.1)
RBC # BLD AUTO: 3.58 M/UL (ref 4.7–6.1)
RBC # BLD AUTO: 3.62 M/UL (ref 4.7–6.1)
RBC # BLD AUTO: 3.89 M/UL (ref 4.7–6.1)
RBC # BLD AUTO: 3.94 M/UL (ref 4.7–6.1)
RBC # FLD: <2000 CELLS/UL
RBC UR QL AUTO: NEGATIVE
RHODAMINE-AURAMINE STN SPEC: NORMAL
RHODAMINE-AURAMINE STN SPEC: NORMAL
SIGNIFICANT IND 70042: NORMAL
SITE SITE: NORMAL
SODIUM SERPL-SCNC: 130 MMOL/L (ref 135–145)
SODIUM SERPL-SCNC: 131 MMOL/L (ref 135–145)
SODIUM SERPL-SCNC: 137 MMOL/L (ref 135–145)
SODIUM SERPL-SCNC: 138 MMOL/L (ref 135–145)
SODIUM SERPL-SCNC: 139 MMOL/L (ref 135–145)
SODIUM SERPL-SCNC: 141 MMOL/L (ref 135–145)
SOURCE SOURCE: NORMAL
SP GR UR STRIP.AUTO: 1.01
TROPONIN T SERPL-MCNC: 119 NG/L (ref 6–19)
TROPONIN T SERPL-MCNC: 119 NG/L (ref 6–19)
TROPONIN T SERPL-MCNC: 121 NG/L (ref 6–19)
TROPONIN T SERPL-MCNC: 124 NG/L (ref 6–19)
TROPONIN T SERPL-MCNC: 126 NG/L (ref 6–19)
TROPONIN T SERPL-MCNC: 126 NG/L (ref 6–19)
TROPONIN T SERPL-MCNC: 135 NG/L (ref 6–19)
TSH SERPL DL<=0.005 MIU/L-ACNC: 3.43 UIU/ML (ref 0.38–5.33)
UROBILINOGEN UR STRIP.AUTO-MCNC: 0.2 MG/DL
VIT B12 SERPL-MCNC: 375 PG/ML (ref 211–911)
WBC # BLD AUTO: 6.8 K/UL (ref 4.8–10.8)
WBC # BLD AUTO: 7.1 K/UL (ref 4.8–10.8)
WBC # BLD AUTO: 8 K/UL (ref 4.8–10.8)
WBC # BLD AUTO: 8.5 K/UL (ref 4.8–10.8)
WBC # BLD AUTO: 8.9 K/UL (ref 4.8–10.8)
WBC # BLD AUTO: 9.3 K/UL (ref 4.8–10.8)
WBC # FLD: 267 CELLS/UL

## 2022-01-01 PROCEDURE — S9126 HOSPICE CARE, IN THE HOME, P: HCPCS

## 2022-01-01 PROCEDURE — 87070 CULTURE OTHR SPECIMN AEROBIC: CPT

## 2022-01-01 PROCEDURE — 85025 COMPLETE CBC W/AUTO DIFF WBC: CPT

## 2022-01-01 PROCEDURE — 83735 ASSAY OF MAGNESIUM: CPT

## 2022-01-01 PROCEDURE — 82570 ASSAY OF URINE CREATININE: CPT

## 2022-01-01 PROCEDURE — G0299 HHS/HOSPICE OF RN EA 15 MIN: HCPCS

## 2022-01-01 PROCEDURE — 700111 HCHG RX REV CODE 636 W/ 250 OVERRIDE (IP): Performed by: INTERNAL MEDICINE

## 2022-01-01 PROCEDURE — 83986 ASSAY PH BODY FLUID NOS: CPT

## 2022-01-01 PROCEDURE — 99204 OFFICE O/P NEW MOD 45 MIN: CPT | Performed by: INTERNAL MEDICINE

## 2022-01-01 PROCEDURE — 93306 TTE W/DOPPLER COMPLETE: CPT

## 2022-01-01 PROCEDURE — 71250 CT THORAX DX C-: CPT

## 2022-01-01 PROCEDURE — 71046 X-RAY EXAM CHEST 2 VIEWS: CPT

## 2022-01-01 PROCEDURE — 87116 MYCOBACTERIA CULTURE: CPT

## 2022-01-01 PROCEDURE — 99221 1ST HOSP IP/OBS SF/LOW 40: CPT | Performed by: INTERNAL MEDICINE

## 2022-01-01 PROCEDURE — 99285 EMERGENCY DEPT VISIT HI MDM: CPT

## 2022-01-01 PROCEDURE — 87015 SPECIMEN INFECT AGNT CONCNTJ: CPT

## 2022-01-01 PROCEDURE — 36415 COLL VENOUS BLD VENIPUNCTURE: CPT

## 2022-01-01 PROCEDURE — 82962 GLUCOSE BLOOD TEST: CPT | Mod: 91

## 2022-01-01 PROCEDURE — 83036 HEMOGLOBIN GLYCOSYLATED A1C: CPT

## 2022-01-01 PROCEDURE — 700102 HCHG RX REV CODE 250 W/ 637 OVERRIDE(OP): Performed by: INTERNAL MEDICINE

## 2022-01-01 PROCEDURE — G0008 ADMIN INFLUENZA VIRUS VAC: HCPCS | Performed by: FAMILY MEDICINE

## 2022-01-01 PROCEDURE — 80053 COMPREHEN METABOLIC PANEL: CPT

## 2022-01-01 PROCEDURE — 88112 CYTOPATH CELL ENHANCE TECH: CPT

## 2022-01-01 PROCEDURE — 94760 N-INVAS EAR/PLS OXIMETRY 1: CPT

## 2022-01-01 PROCEDURE — 700102 HCHG RX REV CODE 250 W/ 637 OVERRIDE(OP): Performed by: HOSPITALIST

## 2022-01-01 PROCEDURE — 87205 SMEAR GRAM STAIN: CPT | Mod: 91

## 2022-01-01 PROCEDURE — 93010 ELECTROCARDIOGRAM REPORT: CPT | Performed by: INTERNAL MEDICINE

## 2022-01-01 PROCEDURE — 84484 ASSAY OF TROPONIN QUANT: CPT | Mod: 91

## 2022-01-01 PROCEDURE — 93005 ELECTROCARDIOGRAM TRACING: CPT

## 2022-01-01 PROCEDURE — 93005 ELECTROCARDIOGRAM TRACING: CPT | Performed by: EMERGENCY MEDICINE

## 2022-01-01 PROCEDURE — 82962 GLUCOSE BLOOD TEST: CPT

## 2022-01-01 PROCEDURE — A9270 NON-COVERED ITEM OR SERVICE: HCPCS | Performed by: HOSPITALIST

## 2022-01-01 PROCEDURE — 84484 ASSAY OF TROPONIN QUANT: CPT

## 2022-01-01 PROCEDURE — 96374 THER/PROPH/DIAG INJ IV PUSH: CPT

## 2022-01-01 PROCEDURE — A9270 NON-COVERED ITEM OR SERVICE: HCPCS | Performed by: INTERNAL MEDICINE

## 2022-01-01 PROCEDURE — 83880 ASSAY OF NATRIURETIC PEPTIDE: CPT

## 2022-01-01 PROCEDURE — 700101 HCHG RX REV CODE 250

## 2022-01-01 PROCEDURE — 83970 ASSAY OF PARATHORMONE: CPT

## 2022-01-01 PROCEDURE — 82607 VITAMIN B-12: CPT

## 2022-01-01 PROCEDURE — G0155 HHCP-SVS OF CSW,EA 15 MIN: HCPCS

## 2022-01-01 PROCEDURE — 80048 BASIC METABOLIC PNL TOTAL CA: CPT

## 2022-01-01 PROCEDURE — 85027 COMPLETE CBC AUTOMATED: CPT | Mod: XU

## 2022-01-01 PROCEDURE — 89051 BODY FLUID CELL COUNT: CPT

## 2022-01-01 PROCEDURE — 82306 VITAMIN D 25 HYDROXY: CPT

## 2022-01-01 PROCEDURE — 99239 HOSP IP/OBS DSCHRG MGMT >30: CPT | Performed by: HOSPITALIST

## 2022-01-01 PROCEDURE — 700111 HCHG RX REV CODE 636 W/ 250 OVERRIDE (IP): Performed by: EMERGENCY MEDICINE

## 2022-01-01 PROCEDURE — 99214 OFFICE O/P EST MOD 30 MIN: CPT | Performed by: FAMILY MEDICINE

## 2022-01-01 PROCEDURE — 96374 THER/PROPH/DIAG INJ IV PUSH: CPT | Mod: XU

## 2022-01-01 PROCEDURE — 93306 TTE W/DOPPLER COMPLETE: CPT | Mod: 26 | Performed by: INTERNAL MEDICINE

## 2022-01-01 PROCEDURE — 82043 UR ALBUMIN QUANTITATIVE: CPT

## 2022-01-01 PROCEDURE — 83615 LACTATE (LD) (LDH) ENZYME: CPT

## 2022-01-01 PROCEDURE — 770020 HCHG ROOM/CARE - TELE (206)

## 2022-01-01 PROCEDURE — 665036 HSPC NOTICE OF ELECTION NOE

## 2022-01-01 PROCEDURE — 99233 SBSQ HOSP IP/OBS HIGH 50: CPT | Performed by: HOSPITALIST

## 2022-01-01 PROCEDURE — 84157 ASSAY OF PROTEIN OTHER: CPT

## 2022-01-01 PROCEDURE — RXMED WILLOW AMBULATORY MEDICATION CHARGE: Performed by: REHABILITATION PRACTITIONER

## 2022-01-01 PROCEDURE — 87206 SMEAR FLUORESCENT/ACID STAI: CPT

## 2022-01-01 PROCEDURE — 82150 ASSAY OF AMYLASE: CPT

## 2022-01-01 PROCEDURE — 99215 OFFICE O/P EST HI 40 MIN: CPT | Mod: 25 | Performed by: FAMILY MEDICINE

## 2022-01-01 PROCEDURE — 99223 1ST HOSP IP/OBS HIGH 75: CPT | Mod: AI | Performed by: INTERNAL MEDICINE

## 2022-01-01 PROCEDURE — 90662 IIV NO PRSV INCREASED AG IM: CPT | Performed by: FAMILY MEDICINE

## 2022-01-01 PROCEDURE — 84443 ASSAY THYROID STIM HORMONE: CPT

## 2022-01-01 PROCEDURE — 71045 X-RAY EXAM CHEST 1 VIEW: CPT | Mod: XU

## 2022-01-01 PROCEDURE — 99215 OFFICE O/P EST HI 40 MIN: CPT | Performed by: FAMILY MEDICINE

## 2022-01-01 PROCEDURE — 93005 ELECTROCARDIOGRAM TRACING: CPT | Performed by: HOSPITALIST

## 2022-01-01 PROCEDURE — 76700 US EXAM ABDOM COMPLETE: CPT

## 2022-01-01 PROCEDURE — 99215 OFFICE O/P EST HI 40 MIN: CPT | Performed by: NURSE PRACTITIONER

## 2022-01-01 PROCEDURE — 71045 X-RAY EXAM CHEST 1 VIEW: CPT

## 2022-01-01 PROCEDURE — 99213 OFFICE O/P EST LOW 20 MIN: CPT | Performed by: PHYSICIAN ASSISTANT

## 2022-01-01 PROCEDURE — 82945 GLUCOSE OTHER FLUID: CPT

## 2022-01-01 PROCEDURE — 99496 TRANSJ CARE MGMT HIGH F2F 7D: CPT | Performed by: FAMILY MEDICINE

## 2022-01-01 PROCEDURE — 87102 FUNGUS ISOLATION CULTURE: CPT

## 2022-01-01 PROCEDURE — 99214 OFFICE O/P EST MOD 30 MIN: CPT | Performed by: INTERNAL MEDICINE

## 2022-01-01 PROCEDURE — 81003 URINALYSIS AUTO W/O SCOPE: CPT

## 2022-01-01 PROCEDURE — 32555 ASPIRATE PLEURA W/ IMAGING: CPT | Mod: RT

## 2022-01-01 PROCEDURE — 88305 TISSUE EXAM BY PATHOLOGIST: CPT

## 2022-01-01 RX ORDER — FUROSEMIDE 40 MG/1
TABLET ORAL
Qty: 100 TABLET | Refills: 0 | Status: SHIPPED | OUTPATIENT
Start: 2022-01-01 | End: 2023-01-01

## 2022-01-01 RX ORDER — SPIRONOLACTONE 50 MG/1
100 TABLET, FILM COATED ORAL
Status: DISCONTINUED | OUTPATIENT
Start: 2022-01-01 | End: 2022-01-01 | Stop reason: HOSPADM

## 2022-01-01 RX ORDER — BENZONATATE 100 MG/1
100 CAPSULE ORAL 3 TIMES DAILY PRN
Status: DISCONTINUED | OUTPATIENT
Start: 2022-01-01 | End: 2022-01-01 | Stop reason: HOSPADM

## 2022-01-01 RX ORDER — FUROSEMIDE 40 MG/1
40 TABLET ORAL DAILY
Qty: 100 TABLET | Refills: 0
Start: 2022-01-01 | End: 2022-01-01

## 2022-01-01 RX ORDER — BISACODYL 10 MG
10 SUPPOSITORY, RECTAL RECTAL
Status: DISCONTINUED | OUTPATIENT
Start: 2022-01-01 | End: 2022-01-01 | Stop reason: HOSPADM

## 2022-01-01 RX ORDER — HYDRALAZINE HYDROCHLORIDE 20 MG/ML
10 INJECTION INTRAMUSCULAR; INTRAVENOUS EVERY 4 HOURS PRN
Status: DISCONTINUED | OUTPATIENT
Start: 2022-01-01 | End: 2022-01-01 | Stop reason: HOSPADM

## 2022-01-01 RX ORDER — POLYETHYLENE GLYCOL 3350 17 G/17G
1 POWDER, FOR SOLUTION ORAL
Status: DISCONTINUED | OUTPATIENT
Start: 2022-01-01 | End: 2022-01-01 | Stop reason: HOSPADM

## 2022-01-01 RX ORDER — MORPHINE SULFATE 100 MG/5ML
5 SOLUTION ORAL
Qty: 120 ML | Refills: 0 | Status: SHIPPED | OUTPATIENT
Start: 2022-01-01 | End: 2023-01-01

## 2022-01-01 RX ORDER — FUROSEMIDE 40 MG/1
40 TABLET ORAL
Qty: 60 TABLET | Refills: 3 | Status: SHIPPED | OUTPATIENT
Start: 2022-01-01 | End: 2022-01-01

## 2022-01-01 RX ORDER — AMOXICILLIN 250 MG
2 CAPSULE ORAL DAILY
Qty: 10 TABLET | Refills: 3 | Status: SHIPPED | OUTPATIENT
Start: 2022-01-01 | End: 2023-01-01 | Stop reason: SDUPTHER

## 2022-01-01 RX ORDER — OXYCODONE HYDROCHLORIDE 5 MG/1
5 TABLET ORAL
Status: CANCELLED | OUTPATIENT
Start: 2022-01-01 | End: 2022-01-01

## 2022-01-01 RX ORDER — DIAZEPAM 5 MG/ML
2.5 INJECTION, SOLUTION INTRAMUSCULAR; INTRAVENOUS EVERY 6 HOURS PRN
Status: DISCONTINUED | OUTPATIENT
Start: 2022-01-01 | End: 2022-01-01 | Stop reason: HOSPADM

## 2022-01-01 RX ORDER — SPIRONOLACTONE 100 MG/1
100 TABLET, FILM COATED ORAL DAILY
Qty: 100 TABLET | Refills: 3 | Status: SHIPPED | OUTPATIENT
Start: 2022-01-01 | End: 2022-01-01

## 2022-01-01 RX ORDER — FUROSEMIDE 40 MG/1
40 TABLET ORAL
Qty: 60 TABLET | Refills: 0 | Status: SHIPPED | OUTPATIENT
Start: 2022-01-01 | End: 2022-01-01 | Stop reason: SDUPTHER

## 2022-01-01 RX ORDER — AMOXICILLIN 250 MG
2 CAPSULE ORAL 2 TIMES DAILY
Status: DISCONTINUED | OUTPATIENT
Start: 2022-01-01 | End: 2022-01-01 | Stop reason: HOSPADM

## 2022-01-01 RX ORDER — ONDANSETRON 2 MG/ML
4 INJECTION INTRAMUSCULAR; INTRAVENOUS EVERY 4 HOURS PRN
Status: DISCONTINUED | OUTPATIENT
Start: 2022-01-01 | End: 2022-01-01

## 2022-01-01 RX ORDER — FUROSEMIDE 40 MG/1
40 TABLET ORAL DAILY
Qty: 100 TABLET | Refills: 0 | Status: ON HOLD | OUTPATIENT
Start: 2022-01-01 | End: 2022-01-01 | Stop reason: SDUPTHER

## 2022-01-01 RX ORDER — ONDANSETRON 4 MG/1
4 TABLET, ORALLY DISINTEGRATING ORAL EVERY 4 HOURS PRN
Status: DISCONTINUED | OUTPATIENT
Start: 2022-01-01 | End: 2022-01-01

## 2022-01-01 RX ORDER — ONDANSETRON 4 MG/1
4 TABLET, ORALLY DISINTEGRATING ORAL EVERY 6 HOURS
Qty: 10 TABLET | Refills: 3 | OUTPATIENT
Start: 2022-01-01

## 2022-01-01 RX ORDER — GLIPIZIDE 5 MG/1
5 TABLET ORAL DAILY
Qty: 100 TABLET | Refills: 1 | Status: SHIPPED | OUTPATIENT
Start: 2022-01-01 | End: 2022-01-01

## 2022-01-01 RX ORDER — ACETAMINOPHEN 500 MG
1000 TABLET ORAL EVERY 6 HOURS
Qty: 10 TABLET | Refills: 3 | OUTPATIENT
Start: 2022-01-01

## 2022-01-01 RX ORDER — ONDANSETRON 2 MG/ML
4 INJECTION INTRAMUSCULAR; INTRAVENOUS ONCE
Status: COMPLETED | OUTPATIENT
Start: 2022-01-01 | End: 2022-01-01

## 2022-01-01 RX ORDER — FUROSEMIDE 10 MG/ML
40 INJECTION INTRAMUSCULAR; INTRAVENOUS
Status: DISCONTINUED | OUTPATIENT
Start: 2022-01-01 | End: 2022-01-01 | Stop reason: HOSPADM

## 2022-01-01 RX ORDER — ONDANSETRON 4 MG/1
4 TABLET, ORALLY DISINTEGRATING ORAL ONCE
Status: COMPLETED | OUTPATIENT
Start: 2022-01-01 | End: 2022-01-01

## 2022-01-01 RX ORDER — BISACODYL 10 MG
10 SUPPOSITORY, RECTAL RECTAL DAILY
Qty: 3 SUPPOSITORY | Refills: 3 | OUTPATIENT
Start: 2022-01-01

## 2022-01-01 RX ORDER — ACETAMINOPHEN 325 MG/1
650 TABLET ORAL EVERY 6 HOURS PRN
Status: DISCONTINUED | OUTPATIENT
Start: 2022-01-01 | End: 2022-01-01 | Stop reason: HOSPADM

## 2022-01-01 RX ORDER — FUROSEMIDE 10 MG/ML
40 INJECTION INTRAMUSCULAR; INTRAVENOUS ONCE
Status: COMPLETED | OUTPATIENT
Start: 2022-01-01 | End: 2022-01-01

## 2022-01-01 RX ORDER — LORAZEPAM 2 MG/ML
0.5 CONCENTRATE ORAL EVERY 4 HOURS PRN
Qty: 360 ML | Refills: 0 | Status: ON HOLD | OUTPATIENT
Start: 2022-01-01 | End: 2023-01-01 | Stop reason: SDUPTHER

## 2022-01-01 RX ORDER — SPIRONOLACTONE 100 MG/1
100 TABLET, FILM COATED ORAL DAILY
Qty: 30 TABLET | Refills: 3 | Status: SHIPPED | OUTPATIENT
Start: 2022-01-01 | End: 2022-01-01 | Stop reason: SDUPTHER

## 2022-01-01 RX ORDER — LIDOCAINE HYDROCHLORIDE 10 MG/ML
INJECTION, SOLUTION INFILTRATION; PERINEURAL
Status: COMPLETED
Start: 2022-01-01 | End: 2022-01-01

## 2022-01-01 RX ADMIN — APIXABAN 2.5 MG: 2.5 TABLET, FILM COATED ORAL at 06:10

## 2022-01-01 RX ADMIN — LIDOCAINE HYDROCHLORIDE: 10 INJECTION, SOLUTION INFILTRATION; PERINEURAL at 15:58

## 2022-01-01 RX ADMIN — INSULIN HUMAN 1 UNITS: 100 INJECTION, SOLUTION PARENTERAL at 21:15

## 2022-01-01 RX ADMIN — SENNOSIDES AND DOCUSATE SODIUM 2 TABLET: 50; 8.6 TABLET ORAL at 06:10

## 2022-01-01 RX ADMIN — FUROSEMIDE 40 MG: 10 INJECTION, SOLUTION INTRAMUSCULAR; INTRAVENOUS at 19:11

## 2022-01-01 RX ADMIN — SENNOSIDES AND DOCUSATE SODIUM 2 TABLET: 50; 8.6 TABLET ORAL at 17:05

## 2022-01-01 RX ADMIN — SPIRONOLACTONE 100 MG: 50 TABLET ORAL at 16:20

## 2022-01-01 RX ADMIN — INSULIN HUMAN 1 UNITS: 100 INJECTION, SOLUTION PARENTERAL at 11:23

## 2022-01-01 RX ADMIN — FUROSEMIDE 40 MG: 10 INJECTION, SOLUTION INTRAMUSCULAR; INTRAVENOUS at 06:10

## 2022-01-01 RX ADMIN — INSULIN HUMAN 1 UNITS: 100 INJECTION, SOLUTION PARENTERAL at 16:13

## 2022-01-01 RX ADMIN — ONDANSETRON 4 MG: 2 INJECTION INTRAMUSCULAR; INTRAVENOUS at 16:33

## 2022-01-01 RX ADMIN — APIXABAN 2.5 MG: 2.5 TABLET, FILM COATED ORAL at 21:47

## 2022-01-01 RX ADMIN — DIAZEPAM 2.5 MG: 5 INJECTION, SOLUTION INTRAMUSCULAR; INTRAVENOUS at 23:45

## 2022-01-01 RX ADMIN — SPIRONOLACTONE 100 MG: 50 TABLET ORAL at 06:10

## 2022-01-01 RX ADMIN — APIXABAN 2.5 MG: 2.5 TABLET, FILM COATED ORAL at 17:05

## 2022-01-01 RX ADMIN — FUROSEMIDE 40 MG: 10 INJECTION, SOLUTION INTRAMUSCULAR; INTRAVENOUS at 05:21

## 2022-01-01 RX ADMIN — APIXABAN 2.5 MG: 2.5 TABLET, FILM COATED ORAL at 05:21

## 2022-01-01 RX ADMIN — ONDANSETRON 4 MG: 4 TABLET, ORALLY DISINTEGRATING ORAL at 17:43

## 2022-01-01 RX ADMIN — FUROSEMIDE 40 MG: 10 INJECTION, SOLUTION INTRAMUSCULAR; INTRAVENOUS at 16:19

## 2022-01-01 SDOH — ECONOMIC STABILITY: HOUSING INSECURITY: EVIDENCE OF SMOKING MATERIAL: 0

## 2022-01-01 ASSESSMENT — ENCOUNTER SYMPTOMS
DYSPNEA ACTIVITY LEVEL: AFTER AMBULATING 10 - 20 FT
VOMITING: 0
SHORTNESS OF BREATH: 0
VOMITING: 0
COUGH: 0
CHILLS: 0
BOWEL PATTERN NORMAL: 1
HEMOPTYSIS: 0
LOWER EXTREMITY EDEMA: 1
SORE THROAT: 0
FATIGUES EASILY: 1
FEVER: 0
COUGH: 1
LAST BOWEL MOVEMENT: 66430
SHORTNESS OF BREATH: 1
ORTHOPNEA: 0
HEARTBURN: 0
SINUS PAIN: 0
CHILLS: 0
DESCRIPTION OF MEMORY LOSS: SHORT TERM
NAUSEA: 0
COUGH: 0
VOMITING: 0
BLURRED VISION: 0
SLEEP QUALITY: ADEQUATE
NAUSEA: 1
FATIGUES EASILY: 1
DENIES PAIN: 1
ABDOMINAL PAIN: 0
FORGETFULNESS: 1
FEVER: 0
STOOL FREQUENCY: DAILY
DENIES PAIN: 1
SPUTUM PRODUCTION: 0
COUGH CHARACTERISTICS: DRY
SHORTNESS OF BREATH: 1
SHORTNESS OF BREATH: 1
DESCRIPTION OF MEMORY LOSS: SHORT TERM
DIZZINESS: 0
STOOL FREQUENCY: DAILY
PALPITATIONS: 0
SHORTNESS OF BREATH: 1
SEIZURES: 0
SEIZURES: 0
HEADACHES: 1
DESCRIPTION OF MEMORY LOSS: SHORT TERM
DESCRIPTION OF MEMORY LOSS: SHORT TERM
DIZZINESS: 0
FEVER: 0
BOWEL PATTERN NORMAL: 1
COUGH: 1
LAST BOWEL MOVEMENT: 66451
COUGH: 1
NAUSEA: 0
VOMITING: 0
WEIGHT LOSS: 1
PALPITATIONS: 0
FEVER: 0
FATIGUES EASILY: 1
ORTHOPNEA: 0
NAUSEA: 0
FORGETFULNESS: 1
COUGH: 1
FATIGUES EASILY: 1
COUGH CHARACTERISTICS: DRY
DYSPNEA ACTIVITY LEVEL: AFTER AMBULATING 10 - 20 FT
SLEEP QUALITY: ADEQUATE
DIZZINESS: 0
FORGETFULNESS: 1
DIARRHEA: 0
COUGH: 1
BLOOD IN STOOL: 0
ORTHOPNEA: 0
SHORTNESS OF BREATH: 0
SORE THROAT: 0
SHORTNESS OF BREATH: 0
LAST BOWEL MOVEMENT: 66458
MUSCULOSKELETAL NEGATIVE: 1
DYSPNEA ACTIVITY LEVEL: AFTER AMBULATING 10 - 20 FT
CONSTIPATION: 0
SHORTNESS OF BREATH: 1
COUGH CHARACTERISTICS: DRY
HEADACHES: 0
EYE REDNESS: 0
CONSTIPATION: 0
DENIES PAIN: 1
STOOL FREQUENCY: DAILY
FORGETFULNESS: 1
PERSON REPORTING PAIN: PATIENT
BRUISES/BLEEDS EASILY: 0
ABDOMINAL PAIN: 0
FORGETFULNESS: 1
EYES NEGATIVE: 1
WEIGHT LOSS: 1
LOSS OF CONSCIOUSNESS: 0
DEPRESSION: 0
DENIES PAIN: 1
DIARRHEA: 0
SPUTUM PRODUCTION: 0
SHORTNESS OF BREATH: 1
LAST BOWEL MOVEMENT: 66473
LOWER EXTREMITY EDEMA: 1
COUGH: 1
SHORTNESS OF BREATH: 1
LAST BOWEL MOVEMENT: 66445
COUGH CHARACTERISTICS: DRY
NERVOUS/ANXIOUS: 0
SHORTNESS OF BREATH: 1
SLEEP QUALITY: ADEQUATE
PERSON REPORTING PAIN: PATIENT
BOWEL PATTERN NORMAL: 1
DIAPHORESIS: 0
WHEEZING: 0
DESCRIPTION OF MEMORY LOSS: SHORT TERM
ABDOMINAL PAIN: 1
DENIES PAIN: 1
SINUS PAIN: 0
DESCRIPTION OF MEMORY LOSS: SHORT TERM
GASTROINTESTINAL NEGATIVE: 1
HEADACHES: 1
CHILLS: 0
SLEEP QUALITY: ADEQUATE
VOMITING: 0
BACK PAIN: 0
LOWER EXTREMITY EDEMA: 1
COUGH: 1
SHORTNESS OF BREATH: 1
HEMOPTYSIS: 0
DYSPNEA ACTIVITY LEVEL: AFTER AMBULATING 10 - 20 FT
COUGH: 1
SPUTUM PRODUCTION: 0
SINUS PAIN: 0
FOCAL WEAKNESS: 0
DESCRIPTION OF MEMORY LOSS: SHORT TERM
WEIGHT LOSS: 1
PERSON REPORTING PAIN: PATIENT
ABDOMINAL PAIN: 0
BOWEL PATTERN NORMAL: 1
SORE THROAT: 0
PERSON REPORTING PAIN: PATIENT
WEIGHT LOSS: 1
ORTHOPNEA: 0
PSYCHIATRIC NEGATIVE: 1
CHILLS: 0
SLEEP QUALITY: ADEQUATE
FOCAL WEAKNESS: 0
WEIGHT LOSS: 1
FEVER: 0
STOOL FREQUENCY: DAILY
FATIGUES EASILY: 1
EYES NEGATIVE: 1
PERSON REPORTING PAIN: PATIENT
HEADACHES: 0
DENIES PAIN: 1
FATIGUES EASILY: 1
ABDOMINAL PAIN: 0
ABDOMINAL PAIN: 0
SPUTUM PRODUCTION: 0
LAST BOWEL MOVEMENT: 66437
FLANK PAIN: 0
WHEEZING: 0
FORGETFULNESS: 1
CHILLS: 0
NECK PAIN: 0
STOOL FREQUENCY: DAILY
FATIGUES EASILY: 1
STOOL FREQUENCY: DAILY
COUGH: 1
FORGETFULNESS: 1
EYES NEGATIVE: 1
DIZZINESS: 1
PALPITATIONS: 0
DOUBLE VISION: 0
SLEEP QUALITY: ADEQUATE
WEAKNESS: 1
SLEEP QUALITY: ADEQUATE
TREMORS: 0
BOWEL PATTERN NORMAL: 1
BOWEL PATTERN NORMAL: 1
DYSPNEA ACTIVITY LEVEL: AFTER AMBULATING 10 - 20 FT
MEMORY LOSS: 0
PALPITATIONS: 0
PERSON REPORTING PAIN: PATIENT
FEVER: 0
WHEEZING: 0
NAUSEA: 0
SLEEP QUALITY: ADEQUATE
PERSON REPORTING PAIN: PATIENT
COUGH CHARACTERISTICS: DRY
DENIES PAIN: 1
FLANK PAIN: 0
SINUS PAIN: 0
WHEEZING: 0
HEADACHES: 0
HEMOPTYSIS: 0
HEARTBURN: 0

## 2022-01-01 ASSESSMENT — PATIENT HEALTH QUESTIONNAIRE - PHQ9
1. LITTLE INTEREST OR PLEASURE IN DOING THINGS: NOT AT ALL
CLINICAL INTERPRETATION OF PHQ2 SCORE: 0
CLINICAL INTERPRETATION OF PHQ2 SCORE: 0
SUM OF ALL RESPONSES TO PHQ9 QUESTIONS 1 AND 2: 0
2. FEELING DOWN, DEPRESSED, IRRITABLE, OR HOPELESS: NOT AT ALL

## 2022-01-01 ASSESSMENT — COGNITIVE AND FUNCTIONAL STATUS - GENERAL
MOBILITY SCORE: 24
SUGGESTED CMS G CODE MODIFIER MOBILITY: CH
DAILY ACTIVITIY SCORE: 24
SUGGESTED CMS G CODE MODIFIER DAILY ACTIVITY: CH

## 2022-01-01 ASSESSMENT — FIBROSIS 4 INDEX
FIB4 SCORE: 2.01
FIB4 SCORE: 2.01
FIB4 SCORE: 2.38
FIB4 SCORE: 2.36
FIB4 SCORE: 2.38
FIB4 SCORE: 2.38
FIB4 SCORE: 2.17
FIB4 SCORE: 2.17
FIB4 SCORE: 2.38
FIB4 SCORE: 2.38
FIB4 SCORE: 2.5
FIB4 SCORE: 2.5
FIB4 SCORE: 2.01
FIB4 SCORE: 2.36

## 2022-01-01 ASSESSMENT — ACTIVITIES OF DAILY LIVING (ADL)
MONEY MANAGEMENT (EXPENSES/BILLS): NEEDS ASSISTANCE

## 2022-01-01 ASSESSMENT — LIFESTYLE VARIABLES
TOTAL SCORE: 0
EVER HAD A DRINK FIRST THING IN THE MORNING TO STEADY YOUR NERVES TO GET RID OF A HANGOVER: NO
TOTAL SCORE: 0
ON A TYPICAL DAY WHEN YOU DRINK ALCOHOL HOW MANY DRINKS DO YOU HAVE: 0
ALCOHOL_USE: NO
TOTAL SCORE: 0
EVER FELT BAD OR GUILTY ABOUT YOUR DRINKING: NO
HAVE YOU EVER FELT YOU SHOULD CUT DOWN ON YOUR DRINKING: NO
HAVE PEOPLE ANNOYED YOU BY CRITICIZING YOUR DRINKING: NO
HOW MANY TIMES IN THE PAST YEAR HAVE YOU HAD 5 OR MORE DRINKS IN A DAY: 0
CONSUMPTION TOTAL: NEGATIVE
AVERAGE NUMBER OF DAYS PER WEEK YOU HAVE A DRINK CONTAINING ALCOHOL: 0
SUBSTANCE_ABUSE: 0

## 2022-01-01 ASSESSMENT — SOCIAL DETERMINANTS OF HEALTH (SDOH)
ACTIVE STRESSOR - HEALTH CHANGES: 1

## 2022-01-04 ENCOUNTER — NON-PROVIDER VISIT (OUTPATIENT)
Dept: MEDICAL GROUP | Facility: PHYSICIAN GROUP | Age: 82
End: 2022-01-04
Payer: MEDICARE

## 2022-01-04 DIAGNOSIS — M25.551 ACUTE HIP PAIN, RIGHT: ICD-10-CM

## 2022-01-04 PROCEDURE — 99999 PR NO CHARGE: CPT | Performed by: FAMILY MEDICINE

## 2022-01-04 RX ORDER — TRAMADOL HYDROCHLORIDE 50 MG/1
50 TABLET ORAL 2 TIMES DAILY PRN
Qty: 60 TABLET | Refills: 1 | Status: SHIPPED | OUTPATIENT
Start: 2022-01-04 | End: 2022-02-03

## 2022-01-04 NOTE — PROGRESS NOTES
Patient's wife arrived and was very concerned about 's pain level after hip injury. Patient currently taking 50 mg of Ultram before bed and is in pain throughout day. Wife explained that she is trying to set up an appointment with Dr. Pathak for hip injection. Discussed with Dr. Kolb, she approved Ultram 50mg in the morning, acetaminophen 100 mg midday, and Ultram 50 mg again at bedtime. Patient almost out of Ultram therefore pended and sent refill to Dr. Kolb. Advised patient and wife with  read back. Will also call Dr. Goodman to confirm referral to cardio imaging.

## 2022-01-05 ENCOUNTER — TELEPHONE (OUTPATIENT)
Dept: CARDIOLOGY | Facility: MEDICAL CENTER | Age: 82
End: 2022-01-05

## 2022-01-05 NOTE — TELEPHONE ENCOUNTER
Veronica Dickinson RN called and stated that this order from [facility] for the NM-CARDIAC AMYLOIDOSIS PYP SCAN was maybe entered incorrectly. She said the patient came in yesterday to get done, but imaging wouldn't do as it was a bone density test and not the right thing. Can you please call her back at [phone] to go over.      Thank you,    SUNG

## 2022-01-07 NOTE — TELEPHONE ENCOUNTER
Attempted to reach Jillian MURCIA. Left detailed message on personal voicemail to return call and discuss further.

## 2022-01-10 ENCOUNTER — TELEPHONE (OUTPATIENT)
Dept: MEDICAL GROUP | Facility: PHYSICIAN GROUP | Age: 82
End: 2022-01-10

## 2022-01-11 ENCOUNTER — TELEPHONE (OUTPATIENT)
Dept: MEDICAL GROUP | Facility: PHYSICIAN GROUP | Age: 82
End: 2022-01-11

## 2022-01-11 NOTE — TELEPHONE ENCOUNTER
Called Jillian back.   She wants to know if SW needs both of these tests.     They are wanting to get qualified for a medication that has been on the market only for a couple years that they want and they want to know how to get qualified, advised I did not know. Patient is going ahead with the injection in his hip. Advised I would message SW and see what his recommendations are and then let her know. Answered all questions and appreciative of call.     Routed to [facility]. Notified via voalte.

## 2022-01-11 NOTE — TELEPHONE ENCOUNTER
Jessica called and left multiple VMs inquiring about appt w/Cardiologist? Ortho appt w/Dr. Vargas and hip injection appt w/Dr. Pathak.     Called Jessica back, she was not in, Robles answered. Advised Robles Walsh would be in the office tomorrow.     Also, Janet from Cardiology called regarding an inappropriate test being ordered. Janet stated her call back number is [phone].

## 2022-01-11 NOTE — TELEPHONE ENCOUNTER
"Called patient's home phone and cell phone again. Patient's home phone continues to go to 2 different answering machines at once. Patient's cell continues to give \"unavailable\" message.     Upon calling Dr. Olvera office, was advised they referred patient to Dr. Romo for pain injection into hip, not to Dr. Pathak as patient's wife originally reported.     Spoke with Kerrie at Dr. Olvera and she will have Dr. Romo's office call patient to schedule injection for hip pain. Left message with Dr. Goodman's office again to clarify what order should have been placed to imaging from cardio. Wife reports patient arrived for imaging and they had the wrong order so this needs clarification, correction and rescheduling.    Called son and he is aware of the difficulty with parent's phones and requested that we contact him directly from now on. He also reports patient is requesting a new cancer drug that has only been on the market for 2 years and that is why cardio imaging is needed for approval of the new med. Told him I would clarify process with Rex's office and will call him.  "

## 2022-01-11 NOTE — TELEPHONE ENCOUNTER
BYRON Bass,    Can you please contact Jillian (RN) back in regards to this conversation. She was at a different desk extension last Friday with Yessenia called her back. Can you please call her back at [phone].      Thank you,    SUNG

## 2022-01-13 NOTE — TELEPHONE ENCOUNTER
Received voalte message back from  and he stated the nuclear cardiac amyloid PYP test needs to be completed.     Routed to Jillian SEALS RN.

## 2022-01-17 ENCOUNTER — PATIENT MESSAGE (OUTPATIENT)
Dept: HEALTH INFORMATION MANAGEMENT | Facility: OTHER | Age: 82
End: 2022-01-17
Payer: MEDICARE

## 2022-01-20 ENCOUNTER — APPOINTMENT (OUTPATIENT)
Dept: RADIOLOGY | Facility: MEDICAL CENTER | Age: 82
End: 2022-01-20
Attending: INTERNAL MEDICINE
Payer: MEDICARE

## 2022-02-09 ENCOUNTER — PHYSICAL THERAPY (OUTPATIENT)
Dept: PHYSICAL THERAPY | Facility: MEDICAL CENTER | Age: 82
End: 2022-02-09
Attending: FAMILY MEDICINE
Payer: MEDICARE

## 2022-02-09 DIAGNOSIS — S73.191D TEAR OF ACETABULAR LABRUM, RIGHT, SUBSEQUENT ENCOUNTER: ICD-10-CM

## 2022-02-09 PROCEDURE — 97162 PT EVAL MOD COMPLEX 30 MIN: CPT

## 2022-02-09 PROCEDURE — 97110 THERAPEUTIC EXERCISES: CPT

## 2022-02-09 ASSESSMENT — ENCOUNTER SYMPTOMS
PAIN SCALE: 2
QUALITY: ACHING

## 2022-02-09 ASSESSMENT — ACTIVITIES OF DAILY LIVING (ADL): POOR_BALANCE: 1

## 2022-02-09 NOTE — OP THERAPY EVALUATION
Outpatient Physical Therapy  INITIAL EVALUATION    [facility]  [address]  [place] [address]  Phone:  [phone]  Fax:  [phone]    Date of Evaluation: 2022    Patient: Sammy Oseguera  YOB: 1940  MRN: [id number]     Referring Provider: CARMENCITA Walter   [address]  [place] [address]   Referring Diagnosis Tear of right acetabular labrum, subsequent encounter [S73.191D]     Time Calculation  Start time: 925  Stop time: 1015 Time Calculation (min): 50 minutes         Chief Complaint: Difficulty Walking and Hip Problem    Visit Diagnoses     ICD-10-CM   1. Tear of acetabular labrum, right, subsequent encounter  S73.191D       Date of onset of impairment: 2021    Subjective   History of Present Illness:     History of chief complaint:  Sammy presents today for evaluation of Low back and R radicular leg R hip pain. He is accompanied by his wife Mercedes and his son Robles. They are heavily involved in his care and medical management. Has been seen by ortho who feels his symptoms are more related to this. Son reports that in Oct, Lucio was diagnosed with a heart issues. They were instructed to Increase his walking and the start or flare of his symptoms     Pain:     Current pain ratin    Quality:  Aching    Aggravating factors:  Long periods of standing, sitting or walking Only able to stand for a few minutes    Only able to walk 15-20 mins before needing to sit    Relieving factors:  Medication  Position change     Progression:  Unchanged    Pain comments:  Walking 15-20 mins    Using walker 3 month     Activity Tolerance:     Work:  Retired     Social Support:     Lives with:  Spouse    Diagnostic Tests:     X-ray: abnormal      MRI studies: normal        IMPRESSION:     1.  Grade 2 spondylolisthesis of L4 on L5.     2.  Minimal posterior offset of L2 on L3.     3.   Degenerative disc disease and advanced facet joint degenerative changes at L4-S1.      MRI hip   IMPRESSION:     1.  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  Moderate right hip arthritis     3.  Mild gluteus minimus tendinopathy and fatty atrophy. Mild ischiofemoral impingement    Past Medical History:   Diagnosis Date   • HTN    • Type II or unspecified type diabetes mellitus without mention of complication, not stated as uncontrolled      Past Surgical History:   Procedure Laterality Date   • LAPAROSCOPIC GASTRIC ULCER OVER SEW     • PROSTATECTOMY, RADICAL RETRO         Precautions:       Objective   Observation and functional movement:  Using a FWW forward posture. Without walker he shows significant R trendelenburg with poor stance time on the R LE    Range of motion and strength:    Knee flexion to 110 bilateral (tightness and motor control)  SLR R Side 55-60 deg (sharp pain the HS SI  SLR 75 deg    Approx 45 deg bent leg lower leg/trunk rotation    Hip ER 45 bilateral  Hip IR 20 L side, 10 deg R side.         Sensation and reflexes:     Reports normal sensation in the feet    Palpation and joint mobility:     Hypersensitive to palpation to R hip and R SI joint     Balance:     Warrants balance testing     ORDOÑEZ/mini best or similar     Coordination and tone:     Coordination is intact.    Tone is normal.    Basic self care and IADL's:     Independent with all self care.    Independent with all ADL's.    Cognition and visual perception:     Hearing is poor despite hearing aids    Not sure if hearing or cognitive issues but some need for repeating physical cues for testing             Therapeutic Exercises (CPT 13385):     1. Develop HEP       Therapeutic Exercise Summary: Access Code: [email or web address]  URL: [email or web address]  Date: 02/09/2022  Prepared by: Tae Stubbs    Exercises  Supine Posterior Pelvic Tilt - 2 x daily - 5 x weekly - 2 sets - 5-7  reps  Beginner Bridge - 2 x daily - 5 x weekly - 2 sets - 5-7 reps  Supine Lower Trunk Rotation - 2 x daily - 5 x weekly - 2 sets - 5-7 reps  Standing 'L' Stretch at Counter - 1-2 x daily - 5 x weekly - 1 sets - 8-10 reps  Standing Hip Abduction with Counter Support - 1-2 x daily - 5 x weekly - 2 sets - 5-7 reps  Standing Tandem Balance with Counter Support - 1-2 x daily - 5 x weekly - 4 sets - 10 sec hold        Time-based treatments/modalities:    Physical Therapy Timed Treatment Charges  Therapeutic exercise minutes (CPT 29815): 15 minutes      Assessment, Response and Plan:   Impairments: abnormal muscle tone, abnormal or restricted ROM, activity intolerance, impaired balance, impaired physical strength, pain with function and weight-bearing intolerance    Assessment details:  Lucio is a pleasant 81 year old with R low back and R radicular pain. It was thought that he had a labral tear but upon consult with ortho it seems to be more stemming from his back. He has some consistent pain with standing and walking. He has some weakness in the R glute/piriformis area. He has some balance and safety concerns as well.  He would benefit from a trial of therapy to see if we can increase his range of motion, decreased his pain and hopefully treat this with conservative measures.   Barriers to therapy:  Comorbidities  Prognosis: fair    Goals:   Short Term Goals:   1. Develop HEP   2. Patient to establish ORDOÑEZ balance or mini best  3. Patient to show R SLR to within 5 deg of L side  4. Patient to report ability to perfrom mod lunge to air ex pad x 1 with counter support x 3 reps     Short term goal time span:  2-4 weeks      Long Term Goals:    1. Update and progress HEP  2. Patient to report WOMAC decreased 10 points  3. Patient to show ability to transition from standing to half kneeling and recover to standing with UE support and SBA with VC   Long term goal time span:  6-8 weeks    Plan:   Therapy options:  Physical  therapy treatment to continue  Planned therapy interventions:  E Stim Unattended (CPT 32146), Manual Therapy (CPT 21932), Neuromuscular Re-education (CPT 33480) and Therapeutic Exercise (CPT 68688)  Frequency:  1x week  Duration in weeks:  12  Duration in visits:  12  Plan details:  Will update goals 30 days       Functional Assessment Used  WOMAC Grand Total: 71.88     Referring provider co-signature:  I have reviewed this plan of care and my co-signature certifies the need for services.    Certification Period: 02/09/2022 to  05/10/22    Physician Signature: ________________________________ Date: ______________

## 2022-02-10 ENCOUNTER — APPOINTMENT (OUTPATIENT)
Dept: RADIOLOGY | Facility: MEDICAL CENTER | Age: 82
End: 2022-02-10
Attending: INTERNAL MEDICINE
Payer: MEDICARE

## 2022-02-11 ENCOUNTER — PHYSICAL THERAPY (OUTPATIENT)
Dept: PHYSICAL THERAPY | Facility: MEDICAL CENTER | Age: 82
End: 2022-02-11
Attending: FAMILY MEDICINE
Payer: MEDICARE

## 2022-02-11 DIAGNOSIS — G89.29 CHRONIC BILATERAL LOW BACK PAIN WITHOUT SCIATICA: ICD-10-CM

## 2022-02-11 DIAGNOSIS — M54.50 CHRONIC BILATERAL LOW BACK PAIN WITHOUT SCIATICA: ICD-10-CM

## 2022-02-11 PROCEDURE — 97140 MANUAL THERAPY 1/> REGIONS: CPT

## 2022-02-11 PROCEDURE — 97112 NEUROMUSCULAR REEDUCATION: CPT

## 2022-02-11 PROCEDURE — 97110 THERAPEUTIC EXERCISES: CPT

## 2022-02-11 ASSESSMENT — BALANCE ASSESSMENTS
STANDING TO SITTING: 4
PLACE ALTERNATE FOOT ON STEP OR STOOL WHILE STANDING UNSUPPORTED: 0
SITTING TO STANDING: 3
SITTING UNSUPPORTED: 4
TRANSFERS: 3
STANDING UNSUPPORTED: 4
STANDING UNSUPPORTED ONE FOOT IN FRONT: 1
STANDING ON ONE LEG: 0
REACHING FORWARD WITH OUTSTRETCHED ARM WHILE STANDING: 3
TURN 360 DEGREES: 2
STANDING UNSUPPORTED WITH EYES CLOSED: 4
STANDING UNSUPPORTED WITH FEET TOGETHER: 4
LOOK OVER LEFT AND RIGHT SHOULDERS WHILE STANDING: 2
LONG VERSION TOTAL SCORE (MAX 56): 38
LONG VERSION TOTAL SCORE (MAX 56): 38
PICK UP OBJECT FROM THE FLOOR FROM A STANDING POSITION: 4

## 2022-02-11 NOTE — OP THERAPY DAILY TREATMENT
Outpatient Physical Therapy  DAILY TREATMENT     [facility]  [address]  [place] [address]  Phone:  [phone]  Fax:  [phone]    Date: 02/11/2022    Patient: Sammy Oseguera  YOB: 1940  MRN: [id number]     Time Calculation    Start time: 1100  Stop time: 1150 Time Calculation (min): 50 minutes         Chief Complaint: Loss Of Balance and Back Problem    Visit #: 2    SUBJECTIVE:  Patient states that he has not done his exercises due to his low back being painful. Patient stated that he vomited this morning taking his medications. He is not having a great morning     OBJECTIVE:  Current objective measures:     BP: 137/65  Oxygen: 96%  HR: 87 bpm     Tomlinson Balance Total Score (0-56): 38  (av for his age: 42/56)      Therapeutic Exercises (CPT 57547):     1. Nu Step, 3 min     2. Gastroc Stretch , 1 min    3. True Stretch , mod kitchen sink 10x     4. Supine piriformis stretch , 20 sec holds x 1each side     Therapeutic Treatments and Modalities:     1. Neuromuscular Re-education (CPT 44874), TOMLINSON balance test    2. Manual Therapy (CPT 48662),  contract relax HS/hip adduction    Time-based treatments/modalities:    Physical Therapy Timed Treatment Charges  Manual therapy minutes (CPT 50884): 15 minutes  Neuromusc re-ed, balance, coor, post minutes (CPT 36409): 15 minutes  Therapeutic exercise minutes (CPT 07724): 15 minutes      ASSESSMENT:   Response to treatment: Patient focused on testing for the TOMLINSON balance and some movement in supine. Patient was having a bad day and was not confident in his ability to exercise today. He was able to add some movements after the TOMLINSON balance in supine. Patient is relatively below average for his age on the TOMLINSON balance test. Will want to make sure patient is doing well during the next visits for exercise.   PLAN/RECOMMENDATIONS:   Plan for treatment: therapy treatment to continue next  visit.  Planned interventions for next visit: continue with current treatment.      [x] As the licensed therapist supervising this student, I was present during the entire treatment session directing the care and reviewing the assessment plan.  I reviewed all documentation prior to signing.

## 2022-02-18 ENCOUNTER — PHYSICAL THERAPY (OUTPATIENT)
Dept: PHYSICAL THERAPY | Facility: MEDICAL CENTER | Age: 82
End: 2022-02-18
Attending: FAMILY MEDICINE
Payer: MEDICARE

## 2022-02-18 DIAGNOSIS — S73.191D TEAR OF ACETABULAR LABRUM, RIGHT, SUBSEQUENT ENCOUNTER: ICD-10-CM

## 2022-02-18 PROCEDURE — 97014 ELECTRIC STIMULATION THERAPY: CPT

## 2022-02-18 PROCEDURE — 97110 THERAPEUTIC EXERCISES: CPT

## 2022-02-18 NOTE — OP THERAPY DAILY TREATMENT
Outpatient Physical Therapy  DAILY TREATMENT     [facility]  [address]  [place] [address]  Phone:  [phone]  Fax:  [phone]    Date: 02/18/2022    Patient: Sammy Oseguera  YOB: 1940  MRN: [id number]     Time Calculation    Start time: 1050  Stop time: 1150 Time Calculation (min): 60 minutes         Chief Complaint: Back Problem    Visit #: 3    SUBJECTIVE:  Patient states that he still has some pain more towards his low back and right hip. He has been having a hard time walking due to the hip pain.     OBJECTIVE:  Current objective measures:       After first round of Nu Step   HR 64 bpm  O2 98  After second round of Nu Step   HR 64 bpm  O2 97          Therapeutic Exercises (CPT 24305):     1. Nu Step , 2 min intervals : 1 min rest , 3 rounds, 6 min total      2. Gastroc Stretch , 2 min     3. Sit to stands , 10x     4. Supine open book , 10x each side     5. Bridges , 2 x 10    6. Knee to chest stetch , 30 x 3    7. Bent knee fall outs , 2 x 10 each side     8. Supine leg lifts, 2 x 5, right side weaker compared to the left     Therapeutic Treatments and Modalities:     1. E Stim Unattended (CPT 02953), IFC with MHP x 15 min     Time-based treatments/modalities:    Physical Therapy Timed Treatment Charges  Therapeutic exercise minutes (CPT 61769): 45 minutes          ASSESSMENT:   Response to treatment: Patient focused on increasing mobility and LE strengthening. Patient shows weaker strength on the right side compared to the left side of the LE based on the leg lifts. Will continue to progress patient to LE strengthening based on his tolerance. Patient became dizzy towards the end of the session from supine to sitting up and needed some time to rest but then was fine to continue with treatment.    PLAN/RECOMMENDATIONS:   Plan for treatment: therapy treatment to continue next visit.  Planned interventions for next  visit: continue with current treatment.      [x] As the licensed therapist supervising this student, I was present during the entire treatment session directing the care and reviewing the assessment plan.  I reviewed all documentation prior to signing.

## 2022-02-25 ENCOUNTER — PHYSICAL THERAPY (OUTPATIENT)
Dept: PHYSICAL THERAPY | Facility: MEDICAL CENTER | Age: 82
End: 2022-02-25
Attending: FAMILY MEDICINE
Payer: MEDICARE

## 2022-02-25 DIAGNOSIS — S73.191D TEAR OF ACETABULAR LABRUM, RIGHT, SUBSEQUENT ENCOUNTER: ICD-10-CM

## 2022-02-25 DIAGNOSIS — M54.50 CHRONIC BILATERAL LOW BACK PAIN WITHOUT SCIATICA: ICD-10-CM

## 2022-02-25 DIAGNOSIS — G89.29 CHRONIC BILATERAL LOW BACK PAIN WITHOUT SCIATICA: ICD-10-CM

## 2022-02-25 PROCEDURE — 97110 THERAPEUTIC EXERCISES: CPT

## 2022-02-25 NOTE — OP THERAPY DAILY TREATMENT
Outpatient Physical Therapy  DAILY TREATMENT     [facility] Outpatient Physical Therapy  [address]  [place] [address]  Phone:  [phone]  Fax:  [phone]    Date: 02/25/2022    Patient: Sammy Oseguera  YOB: 1940  MRN: [id number]     Time Calculation    Start time: 1345  Stop time: 1430 Time Calculation (min): 45 minutes         Chief Complaint: Hip Problem and Back Problem    Visit #: 4    SUBJECTIVE:  Patient states that he has some trouble with his right hip where it is sore. He came to therapy after taking a quick nap.     OBJECTIVE:  Current objective measures:           Therapeutic Exercises (CPT 27073):     1. Nu Step , 2 min intervals : 1 min rest , 3 rounds, 6 min total      2. Gastroc Stretch , 2 min     3. Sit to stands , 2x 10     4. Standing marches , 2x10     5. Hip abduction standing, 2x10     6. Hip extension standing , 2x10     7. Supine ball curls , 3x10     8. Supine lateral rotations with ball , 2x10     9. Clamshells , 2x10       Time-based treatments/modalities:    Physical Therapy Timed Treatment Charges  Therapeutic exercise minutes (CPT 61826): 45 minutes    ASSESSMENT:   Response to treatment: Patient was able to tolerate today's session with frequent rest breaks. Patient was able to do more exercises showing more tolerance for endurance for exercise. Will continue to progress patient based on his toleration.     PLAN/RECOMMENDATIONS:   Plan for treatment: therapy treatment to continue next visit.  Planned interventions for next visit: continue with current treatment.      [x] As the licensed therapist supervising this student, I was present during the entire treatment session directing the care and reviewing the assessment plan.  I reviewed all documentation prior to signing.

## 2022-03-04 ENCOUNTER — PHYSICAL THERAPY (OUTPATIENT)
Dept: PHYSICAL THERAPY | Facility: MEDICAL CENTER | Age: 82
End: 2022-03-04
Attending: FAMILY MEDICINE
Payer: MEDICARE

## 2022-03-04 DIAGNOSIS — M54.50 CHRONIC BILATERAL LOW BACK PAIN WITHOUT SCIATICA: ICD-10-CM

## 2022-03-04 DIAGNOSIS — S73.191D TEAR OF ACETABULAR LABRUM, RIGHT, SUBSEQUENT ENCOUNTER: ICD-10-CM

## 2022-03-04 DIAGNOSIS — G89.29 CHRONIC BILATERAL LOW BACK PAIN WITHOUT SCIATICA: ICD-10-CM

## 2022-03-04 PROCEDURE — 97110 THERAPEUTIC EXERCISES: CPT

## 2022-03-04 NOTE — OP THERAPY DAILY TREATMENT
Outpatient Physical Therapy  DAILY TREATMENT     [facility]patient Physical Therapy  [address]  [place] [address]  Phone:  [phone]  Fax:  [phone]    Date: 03/04/2022    Patient: Sammy Oseguera  YOB: 1940  MRN: [id number]     Time Calculation    Start time: 1100  Stop time: 1145 Time Calculation (min): 45 minutes         Chief Complaint: Back Problem and Hip Problem    Visit #: 5    SUBJECTIVE:  Patient states that he is doing well. He continues to say that he has a hard time working out because he has pain to his hip.     OBJECTIVE:  Current objective measures:           Therapeutic Exercises (CPT 60630):     1. Nu Step , 3 min intervals: 1.5 min rest, 2 rounds     2. Gastroc Stretch , 2 min     4. Standing marches , 2x10     5. Hip abduction standing, 2x10 pink band     6. Hip extension standing , 3x10 pink band     7. Lateral stepping with resistance, 2x10 pink band     8. Banded leg presses, blue band 2x10       Time-based treatments/modalities:    Physical Therapy Timed Treatment Charges  Therapeutic exercise minutes (CPT 59126): 45 minutes        ASSESSMENT:   Response to treatment: Patient focused on weightbearing activities in standing in the parallel bars. Patient was able to improve his tolerance to standing exercises with 3 rest breaks in between. Continue to work on LE strengthening with slow and controlled motions.     PLAN/RECOMMENDATIONS:   Plan for treatment: therapy treatment to continue next visit.  Planned interventions for next visit: continue with current treatment.      [] As the licensed therapist supervising this student, I was present during the entire treatment session directing the care and reviewing the assessment plan.  I reviewed all documentation prior to signing.

## 2022-03-07 ENCOUNTER — HOSPITAL ENCOUNTER (OUTPATIENT)
Dept: RADIOLOGY | Facility: MEDICAL CENTER | Age: 82
End: 2022-03-07
Attending: INTERNAL MEDICINE
Payer: MEDICARE

## 2022-03-07 DIAGNOSIS — K70.30 ALCOHOLIC CIRRHOSIS, UNSPECIFIED WHETHER ASCITES PRESENT (HCC): ICD-10-CM

## 2022-03-07 PROCEDURE — 76700 US EXAM ABDOM COMPLETE: CPT

## 2022-03-11 ENCOUNTER — PHYSICAL THERAPY (OUTPATIENT)
Dept: PHYSICAL THERAPY | Facility: MEDICAL CENTER | Age: 82
End: 2022-03-11
Attending: FAMILY MEDICINE
Payer: MEDICARE

## 2022-03-11 DIAGNOSIS — S73.191D TEAR OF ACETABULAR LABRUM, RIGHT, SUBSEQUENT ENCOUNTER: ICD-10-CM

## 2022-03-11 DIAGNOSIS — M54.50 CHRONIC BILATERAL LOW BACK PAIN WITHOUT SCIATICA: ICD-10-CM

## 2022-03-11 DIAGNOSIS — G89.29 CHRONIC BILATERAL LOW BACK PAIN WITHOUT SCIATICA: ICD-10-CM

## 2022-03-11 PROCEDURE — 97110 THERAPEUTIC EXERCISES: CPT

## 2022-03-11 NOTE — OP THERAPY DAILY TREATMENT
Outpatient Physical Therapy  DAILY TREATMENT     [facility] Outpatient Physical Therapy  [address]  [place] [address]  Phone:  [phone]  Fax:  [phone]    Date: 03/11/2022    Patient: Sammy Oseguera  YOB: 1940  MRN: [id number]     Time Calculation    Start time: 1115  Stop time: 1200 Time Calculation (min): 45 minutes         Chief Complaint: Difficulty Walking, Hip Problem, and Back Problem    Visit #: 6    SUBJECTIVE:  Patient states that he is doing well. He is moving well today,   OBJECTIVE:  Current objective measures:           Therapeutic Exercises (CPT 49140):     1. Nu Step , 3 min intervals: 1.5 min rest, 2 rounds     2. Gastroc Stretch , 2 min     4. Standing marches , 2x10     5. Hip abduction standing, 2x10 pink band     6. Hip extension standing , 3x10 pink band     7. Lateral stepping with resistance, 2x10 pink band     8. Banded leg presses, blue band 2x10       Therapeutic Exercise Summary: Some cues for standing tall and fighting that hip drop    Talking with wife about getting a taller cane for walker, will look around      Time-based treatments/modalities:    Physical Therapy Timed Treatment Charges  Therapeutic exercise minutes (CPT 31253): 45 minutes        ASSESSMENT:   Response to treatment: Keep working DL and SL work for hip and leg strength. He is doing well but has some cognitive delay at times.   PLAN/RECOMMENDATIONS:   Plan for treatment: therapy treatment to continue next visit.  Planned interventions for next visit: continue with current treatment.

## 2022-03-17 ENCOUNTER — OFFICE VISIT (OUTPATIENT)
Dept: MEDICAL GROUP | Facility: PHYSICIAN GROUP | Age: 82
End: 2022-03-17
Payer: MEDICARE

## 2022-03-17 VITALS
HEART RATE: 58 BPM | TEMPERATURE: 96.7 F | SYSTOLIC BLOOD PRESSURE: 108 MMHG | OXYGEN SATURATION: 92 % | DIASTOLIC BLOOD PRESSURE: 66 MMHG | HEIGHT: 70 IN | BODY MASS INDEX: 23.39 KG/M2 | RESPIRATION RATE: 16 BRPM | WEIGHT: 163.4 LBS

## 2022-03-17 DIAGNOSIS — K70.31 ALCOHOLIC CIRRHOSIS OF LIVER WITH ASCITES (HCC): ICD-10-CM

## 2022-03-17 DIAGNOSIS — I48.91 HYPERCOAGULABILITY DUE TO ATRIAL FIBRILLATION (HCC): ICD-10-CM

## 2022-03-17 DIAGNOSIS — I50.40 HEART FAILURE WITH REDUCED EJECTION FRACTION AND DIASTOLIC DYSFUNCTION (HCC): ICD-10-CM

## 2022-03-17 DIAGNOSIS — I48.20 ATRIAL FIBRILLATION, CHRONIC (HCC): ICD-10-CM

## 2022-03-17 DIAGNOSIS — D68.69 SECONDARY HYPERCOAGULABLE STATE (HCC): ICD-10-CM

## 2022-03-17 DIAGNOSIS — S73.191D TEAR OF RIGHT ACETABULAR LABRUM, SUBSEQUENT ENCOUNTER: ICD-10-CM

## 2022-03-17 DIAGNOSIS — F32.A MILD DEPRESSION: ICD-10-CM

## 2022-03-17 DIAGNOSIS — D68.69 HYPERCOAGULABILITY DUE TO ATRIAL FIBRILLATION (HCC): ICD-10-CM

## 2022-03-17 DIAGNOSIS — N18.30 CKD STAGE 3 DUE TO TYPE 2 DIABETES MELLITUS (HCC): ICD-10-CM

## 2022-03-17 DIAGNOSIS — I70.0 ATHEROSCLEROSIS OF AORTA (HCC): ICD-10-CM

## 2022-03-17 DIAGNOSIS — K76.6 PORTAL HYPERTENSION (HCC): ICD-10-CM

## 2022-03-17 DIAGNOSIS — E11.22 CKD STAGE 3 DUE TO TYPE 2 DIABETES MELLITUS (HCC): ICD-10-CM

## 2022-03-17 LAB
HBA1C MFR BLD: 8.2 % (ref 0–5.6)
INT CON NEG: NEGATIVE
INT CON POS: POSITIVE

## 2022-03-17 PROCEDURE — 99213 OFFICE O/P EST LOW 20 MIN: CPT | Performed by: FAMILY MEDICINE

## 2022-03-17 PROCEDURE — 83036 HEMOGLOBIN GLYCOSYLATED A1C: CPT | Performed by: FAMILY MEDICINE

## 2022-03-17 RX ORDER — GLIPIZIDE 5 MG/1
TABLET ORAL
Qty: 100 TABLET | Refills: 3 | Status: SHIPPED | OUTPATIENT
Start: 2022-03-17 | End: 2022-06-28 | Stop reason: SDUPTHER

## 2022-03-17 ASSESSMENT — FIBROSIS 4 INDEX: FIB4 SCORE: 2.23

## 2022-03-17 ASSESSMENT — PATIENT HEALTH QUESTIONNAIRE - PHQ9: CLINICAL INTERPRETATION OF PHQ2 SCORE: 0

## 2022-03-17 NOTE — PROGRESS NOTES
Subjective:     CC:   Chief Complaint   Patient presents with   • Follow-Up   • Lab Results     12/1/21 , 3/1/22 US Abd   • Medication Management     Questions about eliquis, tramadol  traMADol (ULTRAM) 50 MG Tab [[id number]]  ENDED    Order Details  Dose: 50 mg Route: Oral Frequency: 2 TIMES DAILY PRN for Severe Pain  Dispense Quantity: 60 Tablet Refills: 1        Sig: Take 1 Tablet by mouth 2 times a day as needed for Severe Pain for up to 30 days.       Start Date: 01/04/22 End Date: 02/03/22  Written Date: 01/04/22 Expiration Date: 07/03/             HPI:   Sammy presents today with wife  Using ultram at most once a day - for his R acetabular labral tear. Using a walker. Last rx was for 20 lbas 12/20/21. Has tylenol, doing PT.   Problem   Heart Failure With Reduced Ejection Fraction and Diastolic Dysfunction (Hcc)    Stable, continue current plan of care with current medications.        Atherosclerosis of Aorta (Hcc)    Stable, continue current plan of care with current medications.          Secondary Hypercoagulable State (Hcc)    Stable, continue current plan of care with current medications.          Atrial Fibrillation, Chronic (Hcc)    Stable, continue current plan of care with current medications.        Hypercoagulability Due to Atrial Fibrillation (Hcc)    Stable, continue current plan of care with current medications.        Ckd Stage 3 Due to Type 2 Diabetes Mellitus (Hcc)    Hasn't been needing glipizide, has a rx at home for prn elevated sugars. He checks his sugars but isn't sure how to interpret them.  His A1c on October 12th 2021 was 7.1. 03/17/22 8.1  unStable, continue current plan of care with current medications. Will refer to [facility] for dm education. rec resume 2.5mg/d of glipizide.         Portal Hypertension (Hcc)    Stable, continue current plan of care with current medications.        Alcoholic Cirrhosis of Liver With Ascites (Hcc)    Quit drinking alcohol heavily about 3-5 years ago. Sees  GI, Stable, continue current plan of care with current medications.        Mild Depression (Hcc) (Resolved)    He denies this diagnosis, will remove         Current Outpatient Medications Ordered in Epic   Medication Sig Dispense Refill   • apixaban (ELIQUIS) 2.5mg Tab Take 1 Tablet by mouth 2 times a day. 180 Tablet 3   • glipiZIDE (GLUCOTROL) 5 MG Tab Start 1/2 a pill every am. May increase to 1 pill daily if fasting sugars > 140 100 Tablet 3   • Nutritional Supplements (VITAMIN D BOOSTER PO) Take  by mouth.     • furosemide (LASIX) 80 MG Tab Take 1 Tablet by mouth every day. Take one tablet twice daily for four days then one tablet daily therafter 90 Tablet 1   • Blood Glucose Meter Kit Test blood sugar as recommended by provider. Brand as covered by insurance - blood glucose monitoring kit. 1 Kit 0   • Blood Glucose Test Strips Use one Brand as covered by insurance  strip to test blood sugar once daily early morning before first meal and on occasion 2 hr post meal. 100 Strip 0   • Lancets Use one Brand as covered by [employer]  lancet to test blood sugar once daily early morning before first meal. 100 Each 0   • Alcohol Swabs Wipe site with prep pad prior to injection. 100 Each 0     No current [facility]-ordered facility-administered medications on file.       Past Medical History:   Diagnosis Date   • HTN    • Type II or unspecified type diabetes mellitus without mention of complication, not stated as uncontrolled        Social History     Tobacco Use   • Smoking status: Never Smoker   • Smokeless tobacco: Never Used   Vaping Use   • Vaping Use: Never used   Substance Use Topics   • Alcohol use: Not Currently     Alcohol/week: 0.0 oz   • Drug use: No       Allergies:  Patient has no known allergies.    Health Maintenance: Completed    ROS:  Per HPI      Objective:       Exam:  /66 (BP Location: Right arm, Patient Position: Sitting, BP Cuff Size: Adult)   Pulse (!) 58   Temp 35.9 °C (96.7 °F) (Temporal)    "Resp 16   Ht 1.778 m (5' 10\")   Wt 74.1 kg (163 lb 6.4 oz)   SpO2 92%   BMI 23.45 kg/m²   Body mass index is 23.45 kg/m².  Wt Readings from Last 4 Encounters:   03/17/22 74.1 kg (163 lb 6.4 oz)   12/20/21 73.8 kg (162 lb 12.8 oz)   11/12/21 77.1 kg (170 lb)   11/05/21 73.9 kg (163 lb)       Gen: Alert and oriented, No apparent distress. Appropriately groomed.  Neck: Neck is supple without lymphadenopathy.No thyromegaly.   Lungs: Normal effort, CTA bilaterally, no wheezes, rhonchi, or rales.  CV: Regular rate and rhythm. No murmurs, rubs, or gallops.  ABD:  Soft, nontender, nondistended, NABSx4, no HSM or RBT or guarding or masses.  Ext: No clubbing, cyanosis, edema.  Skin: No rash noted.      Assessment & Plan:     81 y.o. male with the following -     rec to use tylenol and otc topicals for his labral tear pain - if not enough can try heat/ice. Contineu w/ PT. I won't refill ultram now.   1. Tear of right acetabular labrum, subsequent encounter    2. Atrial fibrillation, chronic (HCC)  - apixaban (ELIQUIS) 2.5mg Tab; Take 1 Tablet by mouth 2 times a day.  Dispense: 180 Tablet; Refill: 3    3. Mild depression (HCC)    4. Secondary hypercoagulable state (HCC)    5. Atherosclerosis of aorta (HCC)    6. Alcoholic cirrhosis of liver with ascites (HCC)    7. CKD stage 3 due to type 2 diabetes mellitus (HCC)  - POCT  A1C  - REFERRAL TO COMPLEX CHRONIC CARE MANAGEMENT  - Referral to Diabetic Education    8. Heart failure with reduced ejection fraction and diastolic dysfunction (HCC)    9. Hypercoagulability due to atrial fibrillation (HCC)    10. Portal hypertension (HCC)    Other orders  - glipiZIDE (GLUCOTROL) 5 MG Tab; Start 1/2 a pill every am. May increase to 1 pill daily if fasting sugars > 140  Dispense: 100 Tablet; Refill: 3        Return in about 3 months (around 6/17/2022) for DM.    Please note that this dictation was created using voice recognition software. I have made every reasonable attempt to correct " obvious errors, but I expect that there are errors of grammar and possibly content that I did not discover before finalizing the note.

## 2022-03-17 NOTE — PATIENT INSTRUCTIONS
You can check your sugars.  In the morning fasting the goal is to have it between the range of .  If you check it 2 hours after the meal the goal is to have it less than 160.    Please call and schedule follow-up with cardiology, your last appointment was in November, also see about follow up with your nephrologist.

## 2022-03-18 ENCOUNTER — PHYSICAL THERAPY (OUTPATIENT)
Dept: PHYSICAL THERAPY | Facility: MEDICAL CENTER | Age: 82
End: 2022-03-18
Attending: FAMILY MEDICINE
Payer: MEDICARE

## 2022-03-18 DIAGNOSIS — M54.50 CHRONIC BILATERAL LOW BACK PAIN WITHOUT SCIATICA: ICD-10-CM

## 2022-03-18 DIAGNOSIS — G89.29 CHRONIC BILATERAL LOW BACK PAIN WITHOUT SCIATICA: ICD-10-CM

## 2022-03-18 DIAGNOSIS — S73.191D TEAR OF ACETABULAR LABRUM, RIGHT, SUBSEQUENT ENCOUNTER: ICD-10-CM

## 2022-03-18 PROCEDURE — 97110 THERAPEUTIC EXERCISES: CPT

## 2022-03-18 NOTE — OP THERAPY DAILY TREATMENT
Outpatient Physical Therapy  DAILY TREATMENT     [facility] Outpatient Physical Therapy  [address]  [place] [address]  Phone:  [phone]  Fax:  [phone]    Date: 03/18/2022    Patient: Sammy Oseguera  YOB: 1940  MRN: [id number]     Time Calculation    Start time: 1100  Stop time: 1145 Time Calculation (min): 45 minutes         Chief Complaint: Difficulty Walking, Back Problem, and Hip Problem    Visit #: 7    SUBJECTIVE:  Patient states that he is doing well. He presents with wife Mercedes today. She reports they are in the process of getting more imaging (MRI) of the back      OBJECTIVE:  Current objective measures:           Therapeutic Exercises (CPT 99566):     1. Nu Step , 3 min intervals: 1.5 min rest, 2 rounds     2. Gastroc Stretch , 2 min     3. True strech cage, hs and hip flexor stretch    4. Shuttle , Double and single leg press, 30 deg incline, 3 band x 2 x 15 reps, single leg 3 x 10     5. Large step up with hha x 2 , Focus on slow down backwards       Therapeutic Exercise Summary: Some cues for standing tall and fighting that hip drop    Talking with wife about getting a taller cane for walker, will look around      Time-based treatments/modalities:    Physical Therapy Timed Treatment Charges  Therapeutic exercise minutes (CPT 11002): 45 minutes        ASSESSMENT:   Response to treatment: He moves well but certainly has weakness in bilateral legs. Talking to him wife they are trying to get him more active walking in the home.   PLAN/RECOMMENDATIONS:   Plan for treatment: therapy treatment to continue next visit.  Planned interventions for next visit: continue with current treatment.

## 2022-03-23 ENCOUNTER — APPOINTMENT (OUTPATIENT)
Dept: RADIOLOGY | Facility: MEDICAL CENTER | Age: 82
End: 2022-03-23
Attending: INTERNAL MEDICINE
Payer: MEDICARE

## 2022-03-25 ENCOUNTER — PHYSICAL THERAPY (OUTPATIENT)
Dept: PHYSICAL THERAPY | Facility: MEDICAL CENTER | Age: 82
End: 2022-03-25
Attending: FAMILY MEDICINE
Payer: MEDICARE

## 2022-03-25 DIAGNOSIS — M54.50 CHRONIC BILATERAL LOW BACK PAIN WITHOUT SCIATICA: ICD-10-CM

## 2022-03-25 DIAGNOSIS — S73.191D TEAR OF ACETABULAR LABRUM, RIGHT, SUBSEQUENT ENCOUNTER: ICD-10-CM

## 2022-03-25 DIAGNOSIS — G89.29 CHRONIC BILATERAL LOW BACK PAIN WITHOUT SCIATICA: ICD-10-CM

## 2022-03-25 PROCEDURE — 97112 NEUROMUSCULAR REEDUCATION: CPT

## 2022-03-25 PROCEDURE — 97110 THERAPEUTIC EXERCISES: CPT

## 2022-03-25 ASSESSMENT — BALANCE ASSESSMENTS
STANDING ON ONE LEG: 1
STANDING TO SITTING: 4
STANDING UNSUPPORTED: 4
PLACE ALTERNATE FOOT ON STEP OR STOOL WHILE STANDING UNSUPPORTED: 2
LONG VERSION TOTAL SCORE (MAX 56): 37
STANDING UNSUPPORTED: 4
TRANSFERS: 3
SITTING UNSUPPORTED: 4
LONG VERSION TOTAL SCORE (MAX 56): 37
STANDING ON ONE LEG: 1
STANDING UNSUPPORTED ONE FOOT IN FRONT: 1
SITTING UNSUPPORTED: 4
STANDING UNSUPPORTED WITH FEET TOGETHER: 3
STANDING UNSUPPORTED WITH FEET TOGETHER: 3
STANDING UNSUPPORTED WITH EYES CLOSED: 4
LOOK OVER LEFT AND RIGHT SHOULDERS WHILE STANDING: 2
TURN 360 DEGREES: 2
LONG VERSION TOTAL SCORE (MAX 56): 40
REACHING FORWARD WITH OUTSTRETCHED ARM WHILE STANDING: 3
PLACE ALTERNATE FOOT ON STEP OR STOOL WHILE STANDING UNSUPPORTED: 2
SITTING TO STANDING: 3
TURN 360 DEGREES: 2
PICK UP OBJECT FROM THE FLOOR FROM A STANDING POSITION: 3
PICK UP OBJECT FROM THE FLOOR FROM A STANDING POSITION: 3
STANDING UNSUPPORTED ONE FOOT IN FRONT: 1
SITTING TO STANDING: 4
STANDING TO SITTING: 4
LONG VERSION TOTAL SCORE (MAX 56): 40
LOOK OVER LEFT AND RIGHT SHOULDERS WHILE STANDING: 2
TRANSFERS: 3
STANDING UNSUPPORTED WITH EYES CLOSED: 2
REACHING FORWARD WITH OUTSTRETCHED ARM WHILE STANDING: 3

## 2022-03-25 NOTE — OP THERAPY DAILY TREATMENT
Outpatient Physical Therapy  DAILY TREATMENT     [facility] Outpatient Physical Therapy  [address]  [place] [address]  Phone:  [phone]  Fax:  [phone]    Date: 03/25/2022    Patient: Sammy Oesguera  YOB: 1940  MRN: [id number]     Time Calculation    Start time: 1055  Stop time: 1150 Time Calculation (min): 55 minutes         Chief Complaint: Back Problem, Hip Problem, and Difficulty Walking    Visit #: 8    SUBJECTIVE:  Patient states that he is doing well. He presents with wife Mercedes today. She reports they are in the process of getting more imaging (MRI) of the back      OBJECTIVE:  Current objective measures:   Tomlinson Balance Total Score (0-56): 38  (av for his age: 42/56)    Tomlinson Balance Total Score (0-56): 40  WOMAC Grand Total: 54.17  Short Term Goals:   1. Develop HEP GOAL MET  2. Patient to establish TOMLINSON balance or mini best GOAL MET 38 at eval (40/56  3/25)  3. Patient to show R SLR to within 5 deg of L side L leg yes/ R leg no GOAL met   4. Patient to report ability to perfrom mod lunge to air ex pad x 1 with counter support x 3 reps GOAL NOT MET         Long Term Goals:    1. Update and progress HEP GOAL MET/ONGOING  2. Patient to report WOMAC decreased 10 points  3. Patient to show ability to transition from standing to half kneeling and recover to standing with UE support and SBA with VC       NEW goals  ST goals  1. Able to stand tandem mod (3 points) for 10 sec for improved hip strength  2. Able to walk up 4 steps using reciprocal pattern and hha x 1  3. Patient able to assume mod lunge to air ex x 2 with counter for support for increased LE strength  4. Able to perfrom 10 reps of STS in 30 sec with use of hands x 2     LT goals   1. Update HEP  2. WOMAC decreased to 40 or better  3. TOMLINSON balance to 42 for age average          Therapeutic Exercises (CPT 48401):     1. Nu Step , 3 min intervals: 1.5 min rest, 2 rounds     2. Gastroc  Stretch , 2 min     3. True strech cage, hs and hip flexor stretch    4. Review and update HEP, See below     20. [place] [facility] [place]      Therapeutic Exercise Summary: Access Code: [email or web address]  URL: [email or web address]  Date: 03/25/2022  Prepared by: Tae Stubbs    Exercises  Standing 'L' Stretch at Counter - 1 x daily - 5-7 x weekly - 1 sets - 10 reps  Standing Tandem Balance with Counter Support - 1 x daily - 5-7 x weekly - 1 sets - 10 reps  Standing Hip Abduction with Counter Support - 1 x daily - 5-7 x weekly - 1 sets - 10 reps  Standing Hip Extension with Counter Support - 1 x daily - 5-7 x weekly - 1 sets - 10 reps  Mountain Climber on Counter - 1 x daily - 5-7 x weekly - 1 sets - 10 reps  Mini Squats with Walker and Chair - 1 x daily - 7 x weekly - 3 sets - 10 reps      Therapeutic Treatments and Modalities:     1. Neuromuscular Re-education (CPT 91568), Tomlinson balance , Improve to 40/56 from 38/40 on eval 2/9    Time-based treatments/modalities:    Physical Therapy Timed Treatment Charges  Neuromusc re-ed, balance, coor, post minutes (CPT 47804): 15 minutes  Therapeutic exercise minutes (CPT 86808): 30 minutes        ASSESSMENT:   Response to treatment: He moves well today and is standing taller. Just still with significant hip drop with stance on the R LE.  PLAN/RECOMMENDATIONS:   Plan for treatment: therapy treatment to continue next visit.  Planned interventions for next visit: continue with current treatment.

## 2022-03-27 NOTE — TELEPHONE ENCOUNTER
Pt is set with an appointment with . Pt stated he hurt his left knee and wants to make sure there isn't any damage. He would like to know if you can check that for him or if needs to be referred. Also pt also needs further clarification on RBC BW. Please advise. Thanks    Pt. Complains of left knee pain secondary to a soccer injury that occurred on yesterday. Pt. States that he thinks that someone kicked him in the knee.

## 2022-03-30 ENCOUNTER — APPOINTMENT (RX ONLY)
Dept: URBAN - METROPOLITAN AREA CLINIC 4 | Facility: CLINIC | Age: 82
Setting detail: DERMATOLOGY
End: 2022-03-30

## 2022-03-30 DIAGNOSIS — Z71.89 OTHER SPECIFIED COUNSELING: ICD-10-CM

## 2022-03-30 DIAGNOSIS — L11.1 TRANSIENT ACANTHOLYTIC DERMATOSIS [GROVER]: ICD-10-CM

## 2022-03-30 DIAGNOSIS — L82.1 OTHER SEBORRHEIC KERATOSIS: ICD-10-CM

## 2022-03-30 DIAGNOSIS — L81.4 OTHER MELANIN HYPERPIGMENTATION: ICD-10-CM

## 2022-03-30 DIAGNOSIS — Z85.828 PERSONAL HISTORY OF OTHER MALIGNANT NEOPLASM OF SKIN: ICD-10-CM

## 2022-03-30 DIAGNOSIS — D22 MELANOCYTIC NEVI: ICD-10-CM

## 2022-03-30 DIAGNOSIS — D18.0 HEMANGIOMA: ICD-10-CM

## 2022-03-30 PROBLEM — D22.62 MELANOCYTIC NEVI OF LEFT UPPER LIMB, INCLUDING SHOULDER: Status: ACTIVE | Noted: 2022-03-30

## 2022-03-30 PROBLEM — D18.01 HEMANGIOMA OF SKIN AND SUBCUTANEOUS TISSUE: Status: ACTIVE | Noted: 2022-03-30

## 2022-03-30 PROBLEM — D22.5 MELANOCYTIC NEVI OF TRUNK: Status: ACTIVE | Noted: 2022-03-30

## 2022-03-30 PROBLEM — D22.39 MELANOCYTIC NEVI OF OTHER PARTS OF FACE: Status: ACTIVE | Noted: 2022-03-30

## 2022-03-30 PROBLEM — D22.61 MELANOCYTIC NEVI OF RIGHT UPPER LIMB, INCLUDING SHOULDER: Status: ACTIVE | Noted: 2022-03-30

## 2022-03-30 PROCEDURE — ? DIAGNOSIS COMMENT

## 2022-03-30 PROCEDURE — ? COUNSELING

## 2022-03-30 PROCEDURE — 99213 OFFICE O/P EST LOW 20 MIN: CPT

## 2022-03-30 PROCEDURE — ? SUNSCREEN TREATMENT REGIMEN

## 2022-03-30 ASSESSMENT — LOCATION DETAILED DESCRIPTION DERM
LOCATION DETAILED: EPIGASTRIC SKIN
LOCATION DETAILED: RIGHT MID-UPPER BACK
LOCATION DETAILED: RIGHT SUPERIOR HELIX
LOCATION DETAILED: LEFT ANTERIOR DISTAL UPPER ARM
LOCATION DETAILED: RIGHT MEDIAL MALAR CHEEK
LOCATION DETAILED: LEFT INFERIOR MEDIAL MIDBACK
LOCATION DETAILED: LEFT CENTRAL MANDIBULAR CHEEK
LOCATION DETAILED: RIGHT INFERIOR LATERAL MIDBACK
LOCATION DETAILED: RIGHT MEDIAL UPPER BACK
LOCATION DETAILED: RIGHT CENTRAL MALAR CHEEK
LOCATION DETAILED: RIGHT INFERIOR UPPER BACK
LOCATION DETAILED: RIGHT PROXIMAL PRETIBIAL REGION
LOCATION DETAILED: LEFT ANTERIOR PROXIMAL UPPER ARM
LOCATION DETAILED: LEFT CENTRAL MALAR CHEEK
LOCATION DETAILED: LEFT MEDIAL UPPER BACK
LOCATION DETAILED: LEFT INFERIOR MEDIAL FOREHEAD
LOCATION DETAILED: SUPERIOR THORACIC SPINE
LOCATION DETAILED: MID-FRONTAL SCALP
LOCATION DETAILED: LOWER STERNUM
LOCATION DETAILED: RIGHT INFERIOR CENTRAL MALAR CHEEK
LOCATION DETAILED: RIGHT ANTERIOR PROXIMAL UPPER ARM

## 2022-03-30 ASSESSMENT — LOCATION ZONE DERM
LOCATION ZONE: ARM
LOCATION ZONE: EAR
LOCATION ZONE: LEG
LOCATION ZONE: SCALP
LOCATION ZONE: FACE
LOCATION ZONE: TRUNK

## 2022-03-30 ASSESSMENT — LOCATION SIMPLE DESCRIPTION DERM
LOCATION SIMPLE: RIGHT UPPER BACK
LOCATION SIMPLE: LEFT UPPER ARM
LOCATION SIMPLE: ANTERIOR SCALP
LOCATION SIMPLE: CHEST
LOCATION SIMPLE: LEFT FOREHEAD
LOCATION SIMPLE: RIGHT CHEEK
LOCATION SIMPLE: LEFT LOWER BACK
LOCATION SIMPLE: UPPER BACK
LOCATION SIMPLE: RIGHT PRETIBIAL REGION
LOCATION SIMPLE: RIGHT EAR
LOCATION SIMPLE: RIGHT UPPER ARM
LOCATION SIMPLE: RIGHT LOWER BACK
LOCATION SIMPLE: ABDOMEN
LOCATION SIMPLE: LEFT CHEEK
LOCATION SIMPLE: LEFT UPPER BACK

## 2022-04-01 ENCOUNTER — PHYSICAL THERAPY (OUTPATIENT)
Dept: PHYSICAL THERAPY | Facility: MEDICAL CENTER | Age: 82
End: 2022-04-01
Attending: FAMILY MEDICINE
Payer: MEDICARE

## 2022-04-01 DIAGNOSIS — G89.29 CHRONIC BILATERAL LOW BACK PAIN WITHOUT SCIATICA: ICD-10-CM

## 2022-04-01 DIAGNOSIS — S73.191D TEAR OF ACETABULAR LABRUM, RIGHT, SUBSEQUENT ENCOUNTER: ICD-10-CM

## 2022-04-01 DIAGNOSIS — M54.50 CHRONIC BILATERAL LOW BACK PAIN WITHOUT SCIATICA: ICD-10-CM

## 2022-04-01 PROCEDURE — 97110 THERAPEUTIC EXERCISES: CPT

## 2022-04-01 NOTE — OP THERAPY DAILY TREATMENT
"  Outpatient Physical Therapy  DAILY TREATMENT     [facility] Outpatient Physical Therapy  [address]  [place] [address]  Phone:  [phone]  Fax:  [phone]    Date: 04/01/2022    Patient: Sammy Oseguera  YOB: 1940  MRN: [id number]     Time Calculation    Start time: 1050  Stop time: 1145 Time Calculation (min): 55 minutes         Chief Complaint: Loss Of Balance, Back Problem, and Fall    Visit #: 9    SUBJECTIVE:  Patient states that he is doing well. He presents with wife Mercedes today. She reports they are in the process of getting more imaging (MRI) of the back      OBJECTIVE:  Current objective measures:   Tomlinson Balance Total Score (0-56): 38  (av for his age: 42/56)            Therapeutic Exercises (CPT 57798):     1. Nu Step , 3 min intervals: 1.5 min rest, 2 rounds , He is more tired today    2. Gastroc Stretch , 2 min     3. True strech cage, hs and hip flexor stretch    5. Step up to 14\" step alt , 1 min rounds with rest between, ave 50 step a couple of missed steps , Needs 1 min rest 02 sats above 90 but hr increased from 70-88    7. Step up 6\" step , 3 sets of 4 steps 5 x up and down     8. Step up 12\" step , 3 sets double step     10. Seated trunk and back ext at end of box, 20 reps     20. 4/29 [facility]      Therapeutic Exercise Summary: Access Code: [email or web address]  URL: [email or web address]  Date: 03/25/2022  Prepared by: Tae Stubbs    Exercises  Standing 'L' Stretch at Counter - 1 x daily - 5-7 x weekly - 1 sets - 10 reps  Standing Tandem Balance with Counter Support - 1 x daily - 5-7 x weekly - 1 sets - 10 reps  Standing Hip Abduction with Counter Support - 1 x daily - 5-7 x weekly - 1 sets - 10 reps  Standing Hip Extension with Counter Support - 1 x daily - 5-7 x weekly - 1 sets - 10 reps  Mountain Climber on Counter - 1 x daily - 5-7 x weekly - 1 sets - 10 reps  Mini Squats with Walker and Chair - 1 x daily - 7 x weekly - 3 sets - 10 " reps      Therapeutic Treatments and Modalities:     1. Manual Therapy (CPT 08929), LE , Stretch hs and glute     Time-based treatments/modalities:    Physical Therapy Timed Treatment Charges  Therapeutic exercise minutes (CPT 71311): 55 minutes        ASSESSMENT:   Response to treatment: He is more tired today with exercise. He needs lots of breaks but does well with step up today.  PLAN/RECOMMENDATIONS:   Plan for treatment: therapy treatment to continue next visit.  Planned interventions for next visit: continue with current treatment.

## 2022-04-07 DIAGNOSIS — I50.20 HFREF (HEART FAILURE WITH REDUCED EJECTION FRACTION) (HCC): ICD-10-CM

## 2022-04-08 ENCOUNTER — PHYSICAL THERAPY (OUTPATIENT)
Dept: PHYSICAL THERAPY | Facility: MEDICAL CENTER | Age: 82
End: 2022-04-08
Attending: FAMILY MEDICINE
Payer: MEDICARE

## 2022-04-08 DIAGNOSIS — G89.29 CHRONIC BILATERAL LOW BACK PAIN WITHOUT SCIATICA: ICD-10-CM

## 2022-04-08 DIAGNOSIS — S73.191D TEAR OF ACETABULAR LABRUM, RIGHT, SUBSEQUENT ENCOUNTER: ICD-10-CM

## 2022-04-08 DIAGNOSIS — M54.50 CHRONIC BILATERAL LOW BACK PAIN WITHOUT SCIATICA: ICD-10-CM

## 2022-04-08 PROCEDURE — 97110 THERAPEUTIC EXERCISES: CPT

## 2022-04-08 RX ORDER — FUROSEMIDE 80 MG
80 TABLET ORAL DAILY
Qty: 100 TABLET | Refills: 0 | Status: SHIPPED | OUTPATIENT
Start: 2022-04-08 | End: 2022-01-01

## 2022-04-08 NOTE — OP THERAPY DAILY TREATMENT
"  Outpatient Physical Therapy  DAILY TREATMENT     [facility] Outpatient Physical Therapy  [address]  [place] [address]  Phone:  [phone]  Fax:  [phone]    Date: 04/08/2022    Patient: Sammy Oseguera  YOB: 1940  MRN: [id number]     Time Calculation    Start time: 1107  Stop time: 1150 Time Calculation (min): 43 minutes         Chief Complaint: Back Problem and Hip Injury    Visit #: 10    SUBJECTIVE:  Patient states that he is doing well. He presents with wife Mercedes today. She reports they are in the process of getting more imaging (MRI) of the back      OBJECTIVE:  Current objective measures:   Tomlinson Balance Total Score (0-56): 38  (av for his age: 42/56)            Therapeutic Exercises (CPT 74650):     1. Nu Step , 3 min intervals: 1.5 min rest, 2 rounds , He is more tired today    2. Gastroc Stretch , 2 min     3. True strech cage, hs and hip flexor stretch    5. Step up to 14\" step alt , 1 min rounds with rest between, 30 steps in 30 sec, 58 in 60 secs     6. Standing row single arm , Stadnign with walker 15# x 10 each side, 20# x 10 each side     20. 4/29 [facility]      Therapeutic Exercise Summary: Access Code: [email or web address]  URL: [email or web address]  Date: 03/25/2022  Prepared by: Tae Stubbs    Exercises  Standing 'L' Stretch at Counter - 1 x daily - 5-7 x weekly - 1 sets - 10 reps  Standing Tandem Balance with Counter Support - 1 x daily - 5-7 x weekly - 1 sets - 10 reps  Standing Hip Abduction with Counter Support - 1 x daily - 5-7 x weekly - 1 sets - 10 reps  Standing Hip Extension with Counter Support - 1 x daily - 5-7 x weekly - 1 sets - 10 reps  Mountain Climber on Counter - 1 x daily - 5-7 x weekly - 1 sets - 10 reps  Mini Squats with Walker and Chair - 1 x daily - 7 x weekly - 3 sets - 10 reps      Therapeutic Treatments and Modalities:     1. Manual Therapy (CPT 85081), LE , Stretch hs and glute     Time-based " treatments/modalities:             ASSESSMENT:   Response to treatment: He  Is peppy with his energy today. Will try some seated ball exercises next visit. He continue to tolerate movement upright activities well. I am hoping it shows well during testing next visit PLAN/RECOMMENDATIONS:   Plan for treatment: therapy treatment to continue next visit.  Planned interventions for next visit: continue with current treatment.

## 2022-04-15 ENCOUNTER — PHYSICAL THERAPY (OUTPATIENT)
Dept: PHYSICAL THERAPY | Facility: MEDICAL CENTER | Age: 82
End: 2022-04-15
Attending: FAMILY MEDICINE
Payer: MEDICARE

## 2022-04-15 DIAGNOSIS — S73.191D TEAR OF ACETABULAR LABRUM, RIGHT, SUBSEQUENT ENCOUNTER: ICD-10-CM

## 2022-04-15 DIAGNOSIS — G89.29 CHRONIC BILATERAL LOW BACK PAIN WITHOUT SCIATICA: ICD-10-CM

## 2022-04-15 DIAGNOSIS — M54.50 CHRONIC BILATERAL LOW BACK PAIN WITHOUT SCIATICA: ICD-10-CM

## 2022-04-15 PROCEDURE — 97110 THERAPEUTIC EXERCISES: CPT

## 2022-04-15 NOTE — OP THERAPY DAILY TREATMENT
Outpatient Physical Therapy  DAILY TREATMENT     [facility] Outpatient Physical Therapy  [address]  [place] [address]  Phone:  [phone]  Fax:  [phone]    Date: 04/15/2022    Patient: Sammy Oseguera  YOB: 1940  MRN: [id number]     Time Calculation    Start time: 1015  Stop time: 1100 Time Calculation (min): 45 minutes         Chief Complaint: Loss Of Balance, Back Problem, and Hip Problem    Visit #: 11    SUBJECTIVE:  Patient states that he is doing well. He presents with wife Mercedes today. Wife reports that he was able to walk the length of the house a couple times without use of the walker.   OBJECTIVE:  Current objective measures:   Tomlinson Balance Total Score (0-56): 38  (av for his age: 42/56)            Therapeutic Exercises (CPT 77222):     1. Nu Step , level 4, 10 min no rest, 710 step , seat 12/UE 12, vitals 97+%    2. Gastroc Stretch , 2 min     3. True strech cage, hs and hip flexor stretch    5. Shuttle work, Double and sinlge eg , 2 x 15 double at 4 bands , 2 x 10 single leg 3 bands each side     8. Standing P bar work , lateral step with cone , 2 x 10 no band, 10 reps with orange band, Marching step to P bar, 2 x 10 no band, 10 reps with orange band    20. 4/29 [facility]      Therapeutic Exercise Summary: Access Code: [email or web address]  URL: [email or web address]  Date: 03/25/2022  Prepared by: Tae Stubbs    Exercises  Standing 'L' Stretch at Counter - 1 x daily - 5-7 x weekly - 1 sets - 10 reps  Standing Tandem Balance with Counter Support - 1 x daily - 5-7 x weekly - 1 sets - 10 reps  Standing Hip Abduction with Counter Support - 1 x daily - 5-7 x weekly - 1 sets - 10 reps  Standing Hip Extension with Counter Support - 1 x daily - 5-7 x weekly - 1 sets - 10 reps  Mountain Climber on Counter - 1 x daily - 5-7 x weekly - 1 sets - 10 reps  Mini Squats with Walker and Chair - 1 x daily - 7 x weekly - 3 sets - 10 reps        Time-based  treatments/modalities:    Physical Therapy Timed Treatment Charges  Therapeutic exercise minutes (CPT 16592): 45 minutes        ASSESSMENT:   Response to treatment: He is walking better. Will try some lite gait walking feedback and some standing balance as well.    PLAN/RECOMMENDATIONS:   Plan for treatment: therapy treatment to continue next visit.  Planned interventions for next visit: continue with current treatment.

## 2022-04-22 ENCOUNTER — APPOINTMENT (OUTPATIENT)
Dept: PHYSICAL THERAPY | Facility: MEDICAL CENTER | Age: 82
End: 2022-04-22
Attending: FAMILY MEDICINE
Payer: MEDICARE

## 2022-04-29 ENCOUNTER — APPOINTMENT (OUTPATIENT)
Dept: RADIOLOGY | Facility: MEDICAL CENTER | Age: 82
End: 2022-04-29
Attending: EMERGENCY MEDICINE
Payer: MEDICARE

## 2022-04-29 ENCOUNTER — PHYSICAL THERAPY (OUTPATIENT)
Dept: PHYSICAL THERAPY | Facility: MEDICAL CENTER | Age: 82
End: 2022-04-29
Attending: FAMILY MEDICINE
Payer: MEDICARE

## 2022-04-29 ENCOUNTER — HOSPITAL ENCOUNTER (EMERGENCY)
Facility: MEDICAL CENTER | Age: 82
End: 2022-04-30
Attending: EMERGENCY MEDICINE
Payer: MEDICARE

## 2022-04-29 DIAGNOSIS — K40.91 UNILATERAL RECURRENT INGUINAL HERNIA WITHOUT OBSTRUCTION OR GANGRENE: ICD-10-CM

## 2022-04-29 DIAGNOSIS — G89.29 CHRONIC BILATERAL LOW BACK PAIN WITHOUT SCIATICA: ICD-10-CM

## 2022-04-29 DIAGNOSIS — S73.191D TEAR OF ACETABULAR LABRUM, RIGHT, SUBSEQUENT ENCOUNTER: ICD-10-CM

## 2022-04-29 DIAGNOSIS — M54.50 CHRONIC BILATERAL LOW BACK PAIN WITHOUT SCIATICA: ICD-10-CM

## 2022-04-29 DIAGNOSIS — J90 PLEURAL EFFUSION: ICD-10-CM

## 2022-04-29 LAB
ALBUMIN SERPL BCP-MCNC: 4.2 G/DL (ref 3.2–4.9)
ALBUMIN/GLOB SERPL: 1.2 G/DL
ALP SERPL-CCNC: 117 U/L (ref 30–99)
ALT SERPL-CCNC: 12 U/L (ref 2–50)
ANION GAP SERPL CALC-SCNC: 13 MMOL/L (ref 7–16)
APPEARANCE UR: CLEAR
AST SERPL-CCNC: 12 U/L (ref 12–45)
BACTERIA #/AREA URNS HPF: NEGATIVE /HPF
BASOPHILS # BLD AUTO: 0.9 % (ref 0–1.8)
BASOPHILS # BLD: 0.06 K/UL (ref 0–0.12)
BILIRUB SERPL-MCNC: 1.3 MG/DL (ref 0.1–1.5)
BILIRUB UR QL STRIP.AUTO: NEGATIVE
BUN SERPL-MCNC: 27 MG/DL (ref 8–22)
CALCIUM SERPL-MCNC: 9 MG/DL (ref 8.4–10.2)
CHLORIDE SERPL-SCNC: 101 MMOL/L (ref 96–112)
CO2 SERPL-SCNC: 23 MMOL/L (ref 20–33)
COLOR UR: YELLOW
CREAT SERPL-MCNC: 1.76 MG/DL (ref 0.5–1.4)
EOSINOPHIL # BLD AUTO: 0.09 K/UL (ref 0–0.51)
EOSINOPHIL NFR BLD: 1.3 % (ref 0–6.9)
EPI CELLS #/AREA URNS HPF: NEGATIVE /HPF
ERYTHROCYTE [DISTWIDTH] IN BLOOD BY AUTOMATED COUNT: 53.2 FL (ref 35.9–50)
GFR SERPLBLD CREATININE-BSD FMLA CKD-EPI: 38 ML/MIN/1.73 M 2
GLOBULIN SER CALC-MCNC: 3.6 G/DL (ref 1.9–3.5)
GLUCOSE SERPL-MCNC: 170 MG/DL (ref 65–99)
GLUCOSE UR STRIP.AUTO-MCNC: 100 MG/DL
HCT VFR BLD AUTO: 39.8 % (ref 42–52)
HGB BLD-MCNC: 12.9 G/DL (ref 14–18)
HYALINE CASTS #/AREA URNS LPF: ABNORMAL /LPF
IMM GRANULOCYTES # BLD AUTO: 0.02 K/UL (ref 0–0.11)
IMM GRANULOCYTES NFR BLD AUTO: 0.3 % (ref 0–0.9)
KETONES UR STRIP.AUTO-MCNC: NEGATIVE MG/DL
LACTATE BLD-SCNC: 1.7 MMOL/L (ref 0.5–2)
LEUKOCYTE ESTERASE UR QL STRIP.AUTO: NEGATIVE
LYMPHOCYTES # BLD AUTO: 1.25 K/UL (ref 1–4.8)
LYMPHOCYTES NFR BLD: 18.2 % (ref 22–41)
MCH RBC QN AUTO: 33.2 PG (ref 27–33)
MCHC RBC AUTO-ENTMCNC: 32.4 G/DL (ref 33.7–35.3)
MCV RBC AUTO: 102.3 FL (ref 81.4–97.8)
MICRO URNS: ABNORMAL
MONOCYTES # BLD AUTO: 0.52 K/UL (ref 0–0.85)
MONOCYTES NFR BLD AUTO: 7.6 % (ref 0–13.4)
NEUTROPHILS # BLD AUTO: 4.94 K/UL (ref 1.82–7.42)
NEUTROPHILS NFR BLD: 71.7 % (ref 44–72)
NITRITE UR QL STRIP.AUTO: NEGATIVE
NRBC # BLD AUTO: 0 K/UL
NRBC BLD-RTO: 0 /100 WBC
PH UR STRIP.AUTO: 5 [PH] (ref 5–8)
PLATELET # BLD AUTO: 149 K/UL (ref 164–446)
PMV BLD AUTO: 10.3 FL (ref 9–12.9)
POTASSIUM SERPL-SCNC: 4.3 MMOL/L (ref 3.6–5.5)
PROT SERPL-MCNC: 7.8 G/DL (ref 6–8.2)
PROT UR QL STRIP: 100 MG/DL
RBC # BLD AUTO: 3.89 M/UL (ref 4.7–6.1)
RBC # URNS HPF: ABNORMAL /HPF
RBC UR QL AUTO: ABNORMAL
SODIUM SERPL-SCNC: 137 MMOL/L (ref 135–145)
SP GR UR STRIP.AUTO: >=1.03
WBC # BLD AUTO: 6.9 K/UL (ref 4.8–10.8)
WBC #/AREA URNS HPF: ABNORMAL /HPF

## 2022-04-29 PROCEDURE — 700117 HCHG RX CONTRAST REV CODE 255: Performed by: EMERGENCY MEDICINE

## 2022-04-29 PROCEDURE — 81001 URINALYSIS AUTO W/SCOPE: CPT

## 2022-04-29 PROCEDURE — 97110 THERAPEUTIC EXERCISES: CPT

## 2022-04-29 PROCEDURE — 99284 EMERGENCY DEPT VISIT MOD MDM: CPT

## 2022-04-29 PROCEDURE — 36415 COLL VENOUS BLD VENIPUNCTURE: CPT

## 2022-04-29 PROCEDURE — 83605 ASSAY OF LACTIC ACID: CPT

## 2022-04-29 PROCEDURE — 74177 CT ABD & PELVIS W/CONTRAST: CPT | Mod: ME

## 2022-04-29 PROCEDURE — 85025 COMPLETE CBC W/AUTO DIFF WBC: CPT

## 2022-04-29 PROCEDURE — 80053 COMPREHEN METABOLIC PANEL: CPT

## 2022-04-29 RX ORDER — MORPHINE SULFATE 4 MG/ML
4 INJECTION INTRAVENOUS
Status: DISCONTINUED | OUTPATIENT
Start: 2022-04-29 | End: 2022-04-30 | Stop reason: HOSPADM

## 2022-04-29 RX ADMIN — IOHEXOL 100 ML: 350 INJECTION, SOLUTION INTRAVENOUS at 23:42

## 2022-04-29 RX ADMIN — IOHEXOL 25 ML: 240 INJECTION, SOLUTION INTRATHECAL; INTRAVASCULAR; INTRAVENOUS; ORAL at 23:42

## 2022-04-29 ASSESSMENT — FIBROSIS 4 INDEX: FIB4 SCORE: 2.26

## 2022-04-29 NOTE — OP THERAPY DAILY TREATMENT
"  Outpatient Physical Therapy  DAILY TREATMENT     [facility] Outpatient Physical Therapy  [address]  [place] [address]  Phone:  [phone]  Fax:  [phone]    Date: 04/29/2022    Patient: Sammy Oseguera  YOB: 1940  MRN: [id number]     Time Calculation    Start time: 1100  Stop time: 1150 Time Calculation (min): 50 minutes         Chief Complaint: Back Problem and Loss Of Balance    Visit #: 12    SUBJECTIVE:  Patient states that he is doing well. He with son wicho today. He reports pain in the low back is persistent   OBJECTIVE:  Current objective measures:   Tomlinson Balance Total Score (0-56): 38  (av for his age: 42/56)            Therapeutic Exercises (CPT 13747):     1. Nu Step , level 4, 10 min no rest, 710 step , seat 12/UE 12, vitals 97+%    2. Gastroc Stretch , 2 min     3. True strech cage, hs and hip flexor stretch    5. P bar banded leg press , 2 x 10 black, 1 x 10 black and blue band    6. P bar assisted squats , 15 reps green band and hha x 2 and 1    7. Large step up 12\" step , Hha x 2, 3 set of 5 reps    8. Walking uo 6\" step , 5 step x 5    20. 4/29 [facility]      Therapeutic Exercise Summary: Access Code: [email or web address]  URL: [email or web address]  Date: 03/25/2022  Prepared by: Tae Stubbs    Exercises  Standing 'L' Stretch at Counter - 1 x daily - 5-7 x weekly - 1 sets - 10 reps  Standing Tandem Balance with Counter Support - 1 x daily - 5-7 x weekly - 1 sets - 10 reps  Standing Hip Abduction with Counter Support - 1 x daily - 5-7 x weekly - 1 sets - 10 reps  Standing Hip Extension with Counter Support - 1 x daily - 5-7 x weekly - 1 sets - 10 reps  Mountain Climber on Counter - 1 x daily - 5-7 x weekly - 1 sets - 10 reps  Mini Squats with Walker and Chair - 1 x daily - 7 x weekly - 3 sets - 10 reps        Time-based treatments/modalities:    Physical Therapy Timed Treatment Charges  Therapeutic exercise minutes (CPT 03363): 45 " minutes        ASSESSMENT:   Response to treatment: He is walking better. Per son he is getting around the house better as well.  PLAN/RECOMMENDATIONS:   Plan for treatment: therapy treatment to continue next visit.  Planned interventions for next visit: continue with current treatment.

## 2022-04-30 VITALS
TEMPERATURE: 97.8 F | BODY MASS INDEX: 22.57 KG/M2 | DIASTOLIC BLOOD PRESSURE: 64 MMHG | SYSTOLIC BLOOD PRESSURE: 146 MMHG | HEIGHT: 70 IN | HEART RATE: 46 BPM | RESPIRATION RATE: 14 BRPM | WEIGHT: 157.63 LBS | OXYGEN SATURATION: 99 %

## 2022-04-30 NOTE — ED PROVIDER NOTES
"ED Provider Note    CHIEF COMPLAINT  Chief Complaint   Patient presents with   • Hernia     To L groin \"for months\". Reports this area is painful \"it comes on when I eat a lot\".        HPI  Sammy Oseguera is a 82 y.o. male who presents for evaluation of what appears to be swelling which is painful in the left groin region.  Patient states this is been on and off for at least a year and is worse when he strains.  Sometimes he feels it gets stuck and gets \"hard as a rock\" but eventually goes back in.  He notes he has very mild left lower quadrant  pain and some slight soreness in the left testicle although he has no left testicular or scrotal swelling/edema.  He has no trouble starting a urine stream and has had no hematuria or dysuria.  He notes no nausea, vomiting, recent diarrhea.    REVIEW OF SYSTEMS  Constitutional: No fevers or chills  Skin: No rashes  HEENT: No sore throat, runny nose, sores, trouble swallowing, trouble speaking.  Neck: No neck pain, stiffness, or masses.  Chest: No pain or rashes  Pulm: No shortness of breath, cough, wheezing, stridor, or pain with inspiration/expiration  Gastrointestinal: No nausea, vomiting, diarrhea, constipation, bloating, melena, hematochezia   Genitourinary: No dysuria or hematuria  Musculoskeletal: No  pain, swelling, weakness  Neurologic: No sensory or motor changes. No confusion or disorientation.  Heme: No bleeding or bruising problems.   Immuno: No hx of recurrent infections    PAST FAM HISTORY  Family History   Problem Relation Age of Onset   • Stroke Mother    • Heart Disease Father    • Depression Sister    • Heart Disease Sister    • Depression Sister    • Depression Sister    • No Known Problems Son        PAST MEDICAL HISTORY   has a past medical history of HTN and Type II or unspecified type diabetes mellitus without mention of complication, not stated as uncontrolled.    SOCIAL HISTORY  Social History     Tobacco Use   • Smoking status: Never Smoker   • " "Smokeless tobacco: Never Used   Vaping Use   • Vaping Use: Never used   Substance and Sexual Activity   • Alcohol use: Not Currently     Alcohol/week: 0.0 oz   • Drug use: No   • Sexual activity: Yes       SURGICAL HISTORY   has a past surgical history that includes laparoscopic gastric ulcer over sew and prostatectomy, radical retro.    CURRENT MEDICATIONS  Home Medications    **Home medications have not yet been reviewed for this encounter**         ALLERGIES  No Known Allergies    PHYSICAL EXAM  VITAL SIGNS: /64   Pulse (!) 46   Temp 36.6 °C (97.8 °F) (Temporal)   Resp 14   Ht 1.778 m (5' 10\")   Wt 71.5 kg (157 lb 10.1 oz)   SpO2 99%   BMI 22.62 kg/m²    Gen: Alert in no apparent distress.  HEENT: No signs of trauma, Bilateral external ears normal, Nose normal. Conjunctiva normal, Non-icteric.   Neck:  No tenderness, Supple, No masses  Lymphatic: No cervical lymphadenopathy noted.   Cardiovascular: Regular rate and rhythm, no murmurs.  Capillary refill less than 3 seconds to all extremities, 2+ distal pulses.  Thorax & Lungs: Normal breath sounds, No respiratory distress, No wheezing bilateral chest rise  Abdomen: Bowel sounds normal, Soft, mild left lower quadrant tenderness.  There is an inguinal mass with no overlying skin changes.  It is firm and nonmobile.  Patient notes tenderness with palpation.  Skin: Warm, Dry, No erythema, No rash noted to exposed areas.   Extremities: Intact distal pulses, No edema  Neurologic: Alert , no facial droop, grossly normal coordination and strength  Psychiatric: Affect pleasant    LABS  Results for orders placed or performed during the hospital encounter of 04/29/22   CBC WITH DIFFERENTIAL   Result Value Ref Range    WBC 6.9 4.8 - 10.8 K/uL    RBC 3.89 (L) 4.70 - 6.10 M/uL    Hemoglobin 12.9 (L) 14.0 - 18.0 g/dL    Hematocrit 39.8 (L) 42.0 - 52.0 %    .3 (H) 81.4 - 97.8 fL    MCH 33.2 (H) 27.0 - 33.0 pg    MCHC 32.4 (L) 33.7 - 35.3 g/dL    RDW 53.2 (H) " 35.9 - 50.0 fL    Platelet Count 149 (L) 164 - 446 K/uL    MPV 10.3 9.0 - 12.9 fL    Neutrophils-Polys 71.70 44.00 - 72.00 %    Lymphocytes 18.20 (L) 22.00 - 41.00 %    Monocytes 7.60 0.00 - 13.40 %    Eosinophils 1.30 0.00 - 6.90 %    Basophils 0.90 0.00 - 1.80 %    Immature Granulocytes 0.30 0.00 - 0.90 %    Nucleated RBC 0.00 /100 WBC    Neutrophils (Absolute) 4.94 1.82 - 7.42 K/uL    Lymphs (Absolute) 1.25 1.00 - 4.80 K/uL    Monos (Absolute) 0.52 0.00 - 0.85 K/uL    Eos (Absolute) 0.09 0.00 - 0.51 K/uL    Baso (Absolute) 0.06 0.00 - 0.12 K/uL    Immature Granulocytes (abs) 0.02 0.00 - 0.11 K/uL    NRBC (Absolute) 0.00 K/uL   COMP METABOLIC PANEL   Result Value Ref Range    Sodium 137 135 - 145 mmol/L    Potassium 4.3 3.6 - 5.5 mmol/L    Chloride 101 96 - 112 mmol/L    Co2 23 20 - 33 mmol/L    Anion Gap 13.0 7.0 - 16.0    Glucose 170 (H) 65 - 99 mg/dL    Bun 27 (H) 8 - 22 mg/dL    Creatinine 1.76 (H) 0.50 - 1.40 mg/dL    Calcium 9.0 8.4 - 10.2 mg/dL    AST(SGOT) 12 12 - 45 U/L    ALT(SGPT) 12 2 - 50 U/L    Alkaline Phosphatase 117 (H) 30 - 99 U/L    Total Bilirubin 1.3 0.1 - 1.5 mg/dL    Albumin 4.2 3.2 - 4.9 g/dL    Total Protein 7.8 6.0 - 8.2 g/dL    Globulin 3.6 (H) 1.9 - 3.5 g/dL    A-G Ratio 1.2 g/dL   URINALYSIS (UA)    Specimen: Blood   Result Value Ref Range    Color Yellow     Character Clear     Specific Gravity >=1.030 <1.035    Ph 5.0 5.0 - 8.0    Glucose 100 (A) Negative mg/dL    Ketones Negative Negative mg/dL    Protein 100 (A) Negative mg/dL    Bilirubin Negative Negative    Nitrite Negative Negative    Leukocyte Esterase Negative Negative    Occult Blood Small (A) Negative    Micro Urine Req Microscopic    LACTIC ACID   Result Value Ref Range    Lactic Acid 1.7 0.5 - 2.0 mmol/L   URINE MICROSCOPIC (W/UA)   Result Value Ref Range    WBC 0-2 (A) /hpf    RBC 2-5 (A) /hpf    Bacteria Negative None /hpf    Epithelial Cells Negative Few /hpf    Hyaline Cast 0-2 /lpf   ESTIMATED GFR   Result Value Ref  Range    GFR (CKD-EPI) 38 (A) >60 mL/min/1.73 m 2       RADIOLOGY  CT-ABDOMEN-PELVIS WITH   Final Result         1. No acute inflammatory change in the abdomen or pelvis.   2. Fat-containing bilateral inguinal hernias, left larger than right, similar to prior.   3. Moderate right pleural effusion.        COURSE & MEDICAL DECISION MAKING  Patient arrives for evaluation of what appears to be a hernia in the left inguinal region.  There are no overlying skin changes to suggest infection or strangulated bowel however the patient is very tender and I feel CT imaging is necessary to rule out an obstruction or incarceration.  He states understanding of this.  12:08 AM  Patient reevaluated at bedside.  He states understanding that while the CT was reassuring, did demonstrate the same hernia however appeared unchanged from the last time.  Physical exam of the patient demonstrates that the hernia has completely resolved spontaneously.  He has no tenderness, overlying erythema, or pain in the region.  He has no abdominal pain.  He is not nauseous or vomiting.  He states understanding he also has a pleural effusion which may be related to his ascites and cirrhosis however, as there are no criteria for admission and he is not hypoxic, I feel he is safe for discharge home and outpatient follow-up regarding this issue as it is likely chronic.  Patient will be discharged to the care of his son and will follow up with his primary care physician.  He will be encouraged to speak with his primary physician regarding referral to a surgeon for repair of the hernia.    FINAL IMPRESSION  1. Unilateral recurrent inguinal hernia without obstruction or gangrene    2. Pleural effusion        Electronically signed by: Kirill Cheng M.D., 4/29/2022 9:31 PM

## 2022-04-30 NOTE — ED NOTES
Reviewed discharge instructions w/ pt and visitor, verbalized understanding to information provided including follow up care and return precautions, denied questions/concerns.  Pt ambulated from ED w/ visitor.

## 2022-05-02 ENCOUNTER — OFFICE VISIT (OUTPATIENT)
Dept: MEDICAL GROUP | Facility: PHYSICIAN GROUP | Age: 82
End: 2022-05-02
Payer: MEDICARE

## 2022-05-02 VITALS
OXYGEN SATURATION: 99 % | HEIGHT: 70 IN | TEMPERATURE: 97.9 F | HEART RATE: 55 BPM | DIASTOLIC BLOOD PRESSURE: 70 MMHG | SYSTOLIC BLOOD PRESSURE: 120 MMHG | WEIGHT: 160 LBS | RESPIRATION RATE: 16 BRPM | BODY MASS INDEX: 22.9 KG/M2

## 2022-05-02 DIAGNOSIS — K40.20 NON-RECURRENT BILATERAL INGUINAL HERNIA WITHOUT OBSTRUCTION OR GANGRENE: ICD-10-CM

## 2022-05-02 PROCEDURE — 99213 OFFICE O/P EST LOW 20 MIN: CPT | Performed by: FAMILY MEDICINE

## 2022-05-02 ASSESSMENT — FIBROSIS 4 INDEX: FIB4 SCORE: 1.91

## 2022-05-02 NOTE — PATIENT INSTRUCTIONS
Inguinal Hernia, Adult  An inguinal hernia is when fat or your intestines push through a weak spot in a muscle where your leg meets your lower belly (groin). This causes a rounded lump (bulge). This kind of hernia could also be:  · In your scrotum, if you are male.  · In folds of skin around your vagina, if you are female.  There are three types of inguinal hernias. These include:  · Hernias that can be pushed back into the belly (are reducible). This type rarely causes pain.  · Hernias that cannot be pushed back into the belly (are incarcerated).  · Hernias that cannot be pushed back into the belly and lose their blood supply (are strangulated). This type needs emergency surgery.  If you do not have symptoms, you may not need treatment. If you have symptoms or a large hernia, you may need surgery.  Follow these instructions at home:  Lifestyle  · Do these things if told by your doctor so you do not have trouble pooping (constipation):  ? Drink enough fluid to keep your pee (urine) pale yellow.  ? Eat foods that have a lot of fiber. These include fresh fruits and vegetables, whole grains, and beans.  ? Limit foods that are high in fat and processed sugars. These include foods that are fried or sweet.  ? Take medicine for trouble pooping.  · Avoid lifting heavy objects.  · Avoid standing for long amounts of time.  · Do not use any products that contain nicotine or tobacco. These include cigarettes and e-cigarettes. If you need help quitting, ask your doctor.  · Stay at a healthy weight.  General instructions  · You may try to push your hernia in by very gently pressing on it when you are lying down. Do not try to force the bulge back in if it will not push in easily.  · Watch your hernia for any changes in shape, size, or color. Tell your doctor if you see any changes.  · Take over-the-counter and prescription medicines only as told by your doctor.  · Keep all follow-up visits as told by your doctor. This is  important.  Contact a doctor if:  · You have a fever.  · You have new symptoms.  · Your symptoms get worse.  Get help right away if:  · The area where your leg meets your lower belly has:  ? Pain that gets worse suddenly.  ? A bulge that gets bigger suddenly, and it does not get smaller after that.  ? A bulge that turns red or purple.  ? A bulge that is painful when you touch it.  · You are a man, and your scrotum:  ? Suddenly feels painful.  ? Suddenly changes in size.  · You cannot push the hernia in by very gently pressing on it when you are lying down. Do not try to force the bulge back in if it will not push in easily.  · You feel sick to your stomach (nauseous), and that feeling does not go away.  · You throw up (vomit), and that keeps happening.  · You have a fast heartbeat.  · You cannot poop (have a bowel movement) or pass gas.  These symptoms may be an emergency. Do not wait to see if the symptoms will go away. Get medical help right away. Call your local emergency services (911 in the [place]  Summary  · An inguinal hernia is when fat or your intestines push through a weak spot in a muscle where your leg meets your lower belly (groin). This causes a rounded lump (bulge).  · If you do not have symptoms, you may not need treatment. If you have symptoms or a large hernia, you may need surgery.  · Avoid lifting heavy objects. Also avoid standing for long amounts of time.  · Do not try to force the bulge back in if it will not push in easily.  This information is not intended to replace advice given to you by your health care provider. Make sure you discuss any questions you have with your health care provider.  Document Released: 01/18/2008 Document Revised: 01/19/2019 Document Reviewed: 09/19/2018  Elsevier Patient Education © 2020 [employer]

## 2022-05-02 NOTE — PROGRESS NOTES
Subjective:     CC:   Chief Complaint   Patient presents with   • Follow-Up     Surgery possibly needed for hernia. Pain is on and off        HPI:   Sammy presents today with follow up from ER.  He presented to the ED on 4/29/2022 for left groin pain and swelling.  He does have bilateral inguinal hernia's, fat containing left greater than the right. He is needing a f/u with general surgery. States the pain is worse on the left than right. Tylenol at times, but most of the time does not taking anything.  Normal bowel movements, denies nausea, vomiting, fevers, chills, or abdominal pain. States he eats naturally to help with BM, discussed if he ever has issues with bowel movements to take a stool softener such as docusate sodium and/or Metamucil so he has regular bowel movements.    No problem-specific Assessment & Plan notes found for this encounter.      Current Outpatient Medications Ordered in Epic   Medication Sig Dispense Refill   • apixaban (ELIQUIS) 2.5mg Tab Take 1 Tablet by mouth 2 times a day. 180 Tablet 3   • glipiZIDE (GLUCOTROL) 5 MG Tab Start 1/2 a pill every am. May increase to 1 pill daily if fasting sugars > 140 100 Tablet 3   • Nutritional Supplements (VITAMIN D BOOSTER PO) Take  by mouth.     • Blood Glucose Meter Kit Test blood sugar as recommended by provider. Brand as covered by insurance - blood glucose monitoring kit. 1 Kit 0   • Blood Glucose Test Strips Use one Brand as covered by insurance  strip to test blood sugar once daily early morning before first meal and on occasion 2 hr post meal. 100 Strip 0   • Lancets Use one Brand as covered by insurance  lancet to test blood sugar once daily early morning before first meal. 100 Each 0   • furosemide (LASIX) 80 MG Tab Take 1 Tablet by mouth every day. (Patient not taking: Reported on 5/2/2022) 100 Tablet 0   • Alcohol Swabs Wipe site with prep pad prior to injection. 100 Each 0     No current [facility]-ordered facility-administered medications on  "file.       Health Maintenance: Completed    ROS:  Gen: no fevers/chills, no changes in weight  Eyes: no changes in vision  ENT: no sore throat, no hearing loss, no bloody nose  Pulm: no sob, no cough  CV: no chest pain, no palpitations  GI: no nausea/vomiting, no diarrhea, + hernia  : no dysuria  MSk: no myalgias  Skin: no rash  Neuro: no headaches, no numbness/tingling  Heme/Lymph: no easy bruising      Objective:     Exam:  /70 (BP Location: Left arm, Patient Position: Sitting, BP Cuff Size: Adult)   Pulse (!) 55   Temp 36.6 °C (97.9 °F) (Temporal)   Resp 16   Ht 1.778 m (5' 10\")   Wt 72.6 kg (160 lb)   SpO2 99%   BMI 22.96 kg/m²  Body mass index is 22.96 kg/m².    Physical Exam  Vitals reviewed.   Constitutional:       Appearance: Normal appearance. He is not ill-appearing.   HENT:      Mouth/Throat:      Mouth: Mucous membranes are moist.   Eyes:      Conjunctiva/sclera: Conjunctivae normal.      Pupils: Pupils are equal, round, and reactive to light.   Abdominal:      General: Abdomen is flat. There is no distension.      Palpations: Abdomen is soft. There is no mass.      Tenderness: There is no abdominal tenderness. There is no right CVA tenderness, left CVA tenderness, guarding or rebound.      Hernia: A hernia is present.      Comments: Bilateral inguinal hernias, left greater than the right   Musculoskeletal:      Right lower leg: No edema.      Left lower leg: No edema.   Skin:     General: Skin is warm.   Neurological:      Mental Status: He is alert and oriented to person, place, and time.   Psychiatric:         Mood and Affect: Mood normal.         Behavior: Behavior normal.           A chaperone was offered to the patient during today's exam. Patient declined chaperone.      Assessment & Plan:     82 y.o. male with the following -     1. Non-recurrent bilateral inguinal hernia without obstruction or gangrene  Chronic, recently in the ER for acute pain at the site of his inguinal " hernias, left greater than the right.  On exam he does not have pain on palpation or discoloration of skin around hernia sites.  Discussed avoiding lifting heavy objects.  Discussed taking a stool softener and/or Metamucil to help have regular bowel movements.  Discussed if he develops fever, chills, inability to have bowel movements, nausea, vomiting or severe abdominal pain to go to the ER.  Patient referral placed to general surgery.  - Referral to General Surgery       I spent a total of 21 minutes with record review, exam, communication with the patient, communication with other providers, and documentation of this encounter.      No follow-ups on file.    Please note that this dictation was created using voice recognition software. I have made every reasonable attempt to correct obvious errors, but I expect that there are errors of grammar and possibly content that I did not discover before finalizing the note.

## 2022-05-05 ENCOUNTER — TELEPHONE (OUTPATIENT)
Dept: HEALTH INFORMATION MANAGEMENT | Facility: OTHER | Age: 82
End: 2022-05-05

## 2022-05-13 ENCOUNTER — APPOINTMENT (OUTPATIENT)
Dept: PHYSICAL THERAPY | Facility: MEDICAL CENTER | Age: 82
End: 2022-05-13
Attending: FAMILY MEDICINE
Payer: MEDICARE

## 2022-05-17 ENCOUNTER — TELEPHONE (OUTPATIENT)
Dept: MEDICAL GROUP | Facility: PHYSICIAN GROUP | Age: 82
End: 2022-05-17
Payer: MEDICARE

## 2022-05-17 NOTE — TELEPHONE ENCOUNTER
Patient's son brought paperwork to my office, his name is Héctor Oseguera and he wants me to fill out in [place] [employer] form for him essentially stating that I advised him to quit his job due to his family members medical position on October 15, 2021.  I do not recall telling him to do that nor is it documented in my notes.  I did see him on October 20, at that time he came to the appointment with his son and his wife who was leaving on a trip from [place] from October 25 to November 4.  I had said he should not be left alone during that time and his son had said he would keep an eye on his father.  However, this was not me telling him to quit his job so I cannot fill out this form for him.  Please offer to return the paperwork and he can schedule follow-up or let me know if there is anything else I can do.  Thank you.

## 2022-05-18 NOTE — TELEPHONE ENCOUNTER
"Sammy Oseguera's son came into the office on 5/18/2022 to  the paperwork. Avril informed the son why Dr. Clements would not be able to sign the paperwork. The son disagreed saying \"Dr. Clements explained to me that my father would need around the clock care.\" and explained that he would be the only one able to care for his father. After this discussion the son asked if he could speak to the manager. Avril then led him over to [place]'s Office.  "

## 2022-05-20 ENCOUNTER — APPOINTMENT (OUTPATIENT)
Dept: PHYSICAL THERAPY | Facility: MEDICAL CENTER | Age: 82
End: 2022-05-20
Payer: MEDICARE

## 2022-05-27 ENCOUNTER — APPOINTMENT (OUTPATIENT)
Dept: PHYSICAL THERAPY | Facility: MEDICAL CENTER | Age: 82
End: 2022-05-27
Payer: MEDICARE

## 2022-06-03 ENCOUNTER — APPOINTMENT (OUTPATIENT)
Dept: PHYSICAL THERAPY | Facility: MEDICAL CENTER | Age: 82
End: 2022-06-03
Payer: MEDICARE

## 2022-06-10 ENCOUNTER — APPOINTMENT (OUTPATIENT)
Dept: PHYSICAL THERAPY | Facility: MEDICAL CENTER | Age: 82
End: 2022-06-10
Payer: MEDICARE

## 2022-06-24 ENCOUNTER — APPOINTMENT (OUTPATIENT)
Dept: PHYSICAL THERAPY | Facility: MEDICAL CENTER | Age: 82
End: 2022-06-24
Payer: MEDICARE

## 2022-06-28 ENCOUNTER — PATIENT OUTREACH (OUTPATIENT)
Dept: HEALTH INFORMATION MANAGEMENT | Facility: OTHER | Age: 82
End: 2022-06-28

## 2022-06-28 ENCOUNTER — OFFICE VISIT (OUTPATIENT)
Dept: MEDICAL GROUP | Facility: PHYSICIAN GROUP | Age: 82
End: 2022-06-28
Payer: MEDICARE

## 2022-06-28 VITALS
WEIGHT: 167.6 LBS | TEMPERATURE: 97.4 F | SYSTOLIC BLOOD PRESSURE: 108 MMHG | RESPIRATION RATE: 12 BRPM | HEART RATE: 48 BPM | DIASTOLIC BLOOD PRESSURE: 62 MMHG | BODY MASS INDEX: 23.99 KG/M2 | OXYGEN SATURATION: 100 % | HEIGHT: 70 IN

## 2022-06-28 DIAGNOSIS — K70.31 ALCOHOLIC CIRRHOSIS OF LIVER WITH ASCITES (HCC): ICD-10-CM

## 2022-06-28 DIAGNOSIS — E11.22 TYPE 2 DIABETES MELLITUS WITH CHRONIC KIDNEY DISEASE, WITHOUT LONG-TERM CURRENT USE OF INSULIN, UNSPECIFIED CKD STAGE (HCC): ICD-10-CM

## 2022-06-28 DIAGNOSIS — I50.40 HEART FAILURE WITH REDUCED EJECTION FRACTION AND DIASTOLIC DYSFUNCTION (HCC): ICD-10-CM

## 2022-06-28 DIAGNOSIS — D64.9 ANEMIA, UNSPECIFIED TYPE: ICD-10-CM

## 2022-06-28 DIAGNOSIS — N18.30 CKD STAGE 3 DUE TO TYPE 2 DIABETES MELLITUS (HCC): ICD-10-CM

## 2022-06-28 DIAGNOSIS — E11.22 CKD STAGE 3 DUE TO TYPE 2 DIABETES MELLITUS (HCC): ICD-10-CM

## 2022-06-28 DIAGNOSIS — D68.69 SECONDARY HYPERCOAGULABLE STATE (HCC): ICD-10-CM

## 2022-06-28 DIAGNOSIS — I48.20 ATRIAL FIBRILLATION, CHRONIC (HCC): ICD-10-CM

## 2022-06-28 DIAGNOSIS — Z76.89 ESTABLISHING CARE WITH NEW DOCTOR, ENCOUNTER FOR: ICD-10-CM

## 2022-06-28 PROBLEM — E11.9 DIABETES MELLITUS (HCC): Status: ACTIVE | Noted: 2022-05-06

## 2022-06-28 PROBLEM — I48.91 HYPERCOAGULABILITY DUE TO ATRIAL FIBRILLATION (HCC): Status: RESOLVED | Noted: 2020-12-04 | Resolved: 2022-06-28

## 2022-06-28 LAB
HBA1C MFR BLD: 7.6 % (ref 0–5.6)
INT CON NEG: ABNORMAL
INT CON POS: ABNORMAL

## 2022-06-28 PROCEDURE — G2212 PROLONG OUTPT/OFFICE VIS: HCPCS | Performed by: FAMILY MEDICINE

## 2022-06-28 PROCEDURE — 83036 HEMOGLOBIN GLYCOSYLATED A1C: CPT | Performed by: FAMILY MEDICINE

## 2022-06-28 PROCEDURE — 99215 OFFICE O/P EST HI 40 MIN: CPT | Performed by: FAMILY MEDICINE

## 2022-06-28 RX ORDER — TRAMADOL HYDROCHLORIDE 50 MG/1
TABLET ORAL
COMMUNITY
End: 2022-06-28

## 2022-06-28 RX ORDER — GLIPIZIDE 5 MG/1
TABLET ORAL
Qty: 100 TABLET | Refills: 3 | Status: SHIPPED | OUTPATIENT
Start: 2022-06-28 | End: 2022-01-01

## 2022-06-28 ASSESSMENT — FIBROSIS 4 INDEX: FIB4 SCORE: 1.91

## 2022-06-28 NOTE — ASSESSMENT & PLAN NOTE
Chronic. Takes eliquis 2.5mg bid.  F/u with cards, Dr Goodman.  Last seen by Dr Pathak in Nov 2021 and at that time an ICD was discussed.

## 2022-06-28 NOTE — ASSESSMENT & PLAN NOTE
Chronic medical diagnoses.  Most recent labs from April had GFR at 38, creatinine 1.76.  His last urine microalbumin creatinine ratio from August was increased at 329.

## 2022-06-28 NOTE — ASSESSMENT & PLAN NOTE
Chronic medical diagnosis.  Continues to follow-up with cardiology.  States that he takes his Lasix 80 mg as needed based on his daily weights.  States that he last took it 1 or 2 days ago.

## 2022-06-28 NOTE — PROGRESS NOTES
CC:    Chief Complaint   Patient presents with   • Establish Care       HISTORY OF THE PRESENT ILLNESS: Patient is a 82 y.o. male. This pleasant patient is here today to establish care. His prior PCP was Annalise Clements.  Last seen by her on March 17.  He was seen in May for an ER follow-up for bilateral inguinal hernia and referred to general surgery--Dr Steven Moore..  S/p right cataract surgery 2 weeks ago by Dr Young       Type 2 diabetes mellitus with chronic kidney disease, without long-term current use of insulin (HCC)  Chronic.  Last hemoglobin A1c from March was increased from 7.1 to 8.2%.. currently not taking glipizide 5mg . Records reviewed and was on janumet in 2020. Has a glucometer and currently not checking fingersticks. A1c checked in office improved to 7.6%.    Atrial fibrillation, chronic (HCC)  Chronic. Takes eliquis 2.5mg bid.  F/u with cards, Dr Goodman.  Last seen by Dr Pathak in Nov 2021 and at that time an ICD was discussed.     Heart failure with reduced ejection fraction and diastolic dysfunction (HCC)  Chronic medical diagnosis.  Continues to follow-up with cardiology.  States that he takes his Lasix 80 mg as needed based on his daily weights.  States that he last took it 1 or 2 days ago.    CKD stage 3 due to type 2 diabetes mellitus (HCC)  Chronic medical diagnoses.  Most recent labs from April had GFR at 38, creatinine 1.76.  His last urine microalbumin creatinine ratio from August was increased at 329.    Alcoholic cirrhosis of liver with ascites (HCC)  Last consultation note available for review was from March 2021.  At that time recommendations were to continue with alpha-fetoprotein and abdominal sonogram every 6 months.  He was to be scheduled for endoscopy to rule out esophageal varices as his last EGD was in 2018.  In addition, recommendations were to repeat a colonoscopy as his last one was in 2018 and he does have a history of polyps.  The gastroenterologist did not want  cardiac clearance from his cardiologist.  In addition, MRI for further evaluation of pancreatic cysts was recommended.  We will go ahead and request more recent consultation notes from [facility].    Anemia  Chronic issue since 2016..  Recent April labs have hemoglobin hematocrit 12.9 and 39.8.  His November labs had normal iron studies.  Ferritin was in normal range at 75 at that time.  Spouse states that he f/u with GI every 4-6 months for his cirrhosis..      Allergies: Patient has no known allergies.    Current Outpatient Medications Ordered in Epic   Medication Sig Dispense Refill   • glipiZIDE (GLUCOTROL) 5 MG Tab Take half a tablet every morning with food 100 Tablet 3   • furosemide (LASIX) 80 MG Tab Take 1 Tablet by mouth every day. 100 Tablet 0   • apixaban (ELIQUIS) 2.5mg Tab Take 1 Tablet by mouth 2 times a day. 180 Tablet 3   • Blood Glucose Meter Kit Test blood sugar as recommended by provider. Brand as covered by insurance - blood glucose monitoring kit. 1 Kit 0   • Blood Glucose Test Strips Use one Brand as covered by insurance  strip to test blood sugar once daily early morning before first meal and on occasion 2 hr post meal. 100 Strip 0   • Lancets Use one Brand as covered by [employer]  lancet to test blood sugar once daily early morning before first meal. 100 Each 0   • Alcohol Swabs Wipe site with prep pad prior to injection. 100 Each 0     No current [facility]-ordered facility-administered medications on file.       Past Medical History:   Diagnosis Date   • HTN    • Type II or unspecified type diabetes mellitus without mention of complication, not stated as uncontrolled        Past Surgical History:   Procedure Laterality Date   • LAPAROSCOPIC GASTRIC ULCER OVER SEW     • PROSTATECTOMY, RADICAL RETRO         Social History     Tobacco Use   • Smoking status: Never Smoker   • Smokeless tobacco: Never Used   Vaping Use   • Vaping Use: Never used   Substance Use Topics   • Alcohol use: Not  "Currently     Comment: quit 4-5 yr ago   • Drug use: No       Social History     Social History Narrative     in '68. They have 2 children. 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 He quit drinking alcohol when prior PCP told him about his liver damage around age 76. He was drinking at most 2oz of alcohol daily. Wife doesn't drink etoh, no h/o drug use.        Family History   Problem Relation Age of Onset   • Stroke Mother    • Heart Disease Father    • Depression Sister    • Heart Disease Sister    • Depression Sister    • Depression Sister    • No Known Problems Son        Health Maintenance:  D/w patient and encouraged to receive covid booster #2. Patient agrees.       Objective:     Exam:   /62   Pulse (!) 48   Temp 36.3 °C (97.4 °F) (Temporal)   Resp 12   Ht 1.778 m (5' 10\")   Wt 76 kg (167 lb 9.6 oz)   SpO2 100%   BMI 24.05 kg/m²   Body mass index is 24.05 kg/m².  Wt Readings from Last 4 Encounters:   06/28/22 76 kg (167 lb 9.6 oz)   05/02/22 72.6 kg (160 lb)   04/29/22 71.5 kg (157 lb 10.1 oz)   03/17/22 74.1 kg (163 lb 6.4 oz)     General: Normal appearing. No distress. Appropriately groomed.  HEENT:bilateral hearing aides, Normocephalic. Eyes conjunctiva clear lids without ptosis,   Neck: Supple, Thyroid is not enlarged.   Pulmonary: Clear to ausculation.  Normal effort. No rales, ronchi, or wheezing.  Cardiovascular: Regular rate and rhythm, no lower extremity edema  Neurologic: Grossly nonfocal  Skin: Warm and dry.  No obvious lesions.  Musculoskeletal: Normal gait. No extremity cyanosis, clubbing, or edema.  Psych: Normal mood and affect. Alert and oriented x3. Judgment and insight is normal.      Assessment & Plan:   82 y.o. male with the following -    1. Establishing care with new doctor, encounter for  Transferring care from Dr. Annalise Hernandez heart    2. Type 2 diabetes mellitus with " chronic kidney disease, without long-term current use of insulin, unspecified CKD stage (HCC)  -Chronic medical diagnosis.  Improving.  Recent labs with A1c improved to 7.6%.  Encouraged him to start checking fasting blood sugars daily and bring in his blood pressure log to the next appointment.  We will also start him on glipizide 5 mg, half a tablet daily with breakfast.  Encouraged to complete labs prior to the next appointment with me in 6 weeks.    POCT Hemoglobin A1C  - CBC WITHOUT DIFFERENTIAL; Future  - Comp Metabolic Panel; Future  - Lipid Profile; Future  - MICROALBUMIN CREAT RATIO URINE; Future  - glipiZIDE (GLUCOTROL) 5 MG Tab; Take half a tablet every morning with food  Dispense: 100 Tablet; Refill: 3    3. Atrial fibrillation, chronic (HCC)  4. Secondary hypercoagulable state (HCC)  5. Heart failure with reduced ejection fraction and diastolic dysfunction (HCC)  Chronic medical diagnosis.  Continue with Eliquis 2.5 mg twice a day.  Last evaluated by cardiology in November.  Encouraged to schedule follow-up visit.  Currently taking Lasix 80 mg as needed.    6. Anemia, unspecified type  7. Alcoholic cirrhosis of liver with ascites (HCC)  Chronic medical diagnosis.  Continues to be under the care of digestive [facility].  Last consultation note available for review was from March 2021.  We will request the records.  Patient was to have a repeat endoscopy, colonoscopy, and MRI for further evaluation of pancreatic cyst at that time.  - IRON/TOTAL IRON BIND; Future  - FERRITIN; Future    8. CKD stage 3 due to type 2 diabetes mellitus (HCC)  Chronic. Not well controlled. Will repeat labs. Continue to avoid nsaids. Precautions with tylenol also d/w both.  Per wife, hasn't been evaluated by a nephrologist in the past.   Other orders  - traMADol (ULTRAM) 50 MG Tab; tramadol 50 mg tablet   TAKE 1 TABLET BY MOUTH 2 TIMES A DAY AS NEEDED FOR SEVERE PAIN FOR UP TO 30 DAYS.NTC    I spent a total of 62 minutes with record  review, exam, communication with the patient, communication with other providers, and documentation of this encounter.       Return in about 6 weeks (around 8/9/2022) for Diabetes.    Please note that this dictation was created using voice recognition software. I have made every reasonable attempt to correct obvious errors, but I expect that there are errors of grammar and possibly content that I did not discover before finalizing the note.

## 2022-06-28 NOTE — ASSESSMENT & PLAN NOTE
Last consultation note available for review was from March 2021.  At that time recommendations were to continue with alpha-fetoprotein and abdominal sonogram every 6 months.  He was to be scheduled for endoscopy to rule out esophageal varices as his last EGD was in 2018.  In addition, recommendations were to repeat a colonoscopy as his last one was in 2018 and he does have a history of polyps.  The gastroenterologist did not want cardiac clearance from his cardiologist.  In addition, MRI for further evaluation of pancreatic cysts was recommended.  We will go ahead and request more recent consultation notes from digestive [facility].

## 2022-06-28 NOTE — ASSESSMENT & PLAN NOTE
Chronic issue since 2016..  Recent April labs have hemoglobin hematocrit 12.9 and 39.8.  His November labs had normal iron studies.  Ferritin was in normal range at 75 at that time.  Spouse states that he f/u with GI every 4-6 months for his cirrhosis..

## 2022-06-28 NOTE — ASSESSMENT & PLAN NOTE
Chronic.  Last hemoglobin A1c from March was increased from 7.1 to 8.2%.. currently not taking glipizide 5mg . Records reviewed and was on janumet in 2020. Has a glucometer and currently not checking fingersticks. A1c checked in office improved to 7.6%.

## 2022-06-28 NOTE — PROGRESS NOTES
Nurse CM met with patient after PCP appointment today. Nurse identified patient through risk score and chronic health problems including diabetes.  Nurse explained CCM program to patient and spouse. Pt is agreeable to enroll in program. Brochure on program with nurse FRANDY telephone provided. Patient reports his provider started him on a new diabetes medication today. Patient states he will go  at pharmacy. PCP ordered glipizide 5mg, one half tablet every morning with food. Nurse reviewed hypoglycemia, signs and symptoms and appropriate treatment. Pt and spouse voiced understanding. Pt agreeable to have nurse CM call tomorrow afternoon to complete intake assessment. Pt has no other questions at this time.

## 2022-06-29 ENCOUNTER — PATIENT OUTREACH (OUTPATIENT)
Dept: HEALTH INFORMATION MANAGEMENT | Facility: OTHER | Age: 82
End: 2022-06-29
Payer: MEDICARE

## 2022-06-29 DIAGNOSIS — I50.40 HEART FAILURE WITH REDUCED EJECTION FRACTION AND DIASTOLIC DYSFUNCTION (HCC): ICD-10-CM

## 2022-06-29 NOTE — PROGRESS NOTES
6/29/22 2:45pm:  Nurse CM outreach call to complete intake assessment for CCM program as discussed with patient at appointment on 6/28/22.  Patient's spouse answered telephone. Reports pt is currently resting. States this isn't a good time to complete intake assessment. Spouse would like CM to contact patient next week to see about enrollment in CCM program.

## 2022-07-01 ENCOUNTER — TELEPHONE (OUTPATIENT)
Dept: PHYSICAL THERAPY | Facility: MEDICAL CENTER | Age: 82
End: 2022-07-01
Payer: MEDICARE

## 2022-07-01 NOTE — OP THERAPY DISCHARGE SUMMARY
Outpatient Physical Therapy  DISCHARGE SUMMARY NOTE      [facility] Outpatient Physical Therapy  [address]  [place] [address]  Phone:  [phone]  Fax:  [phone]    Date of Visit: 07/01/2022    Patient: Sammy Oseguera  YOB: 1940  MRN: [id number]     Referring Provider: No referring provider defined for this encounter.   Referring Diagnosis No admission diagnoses are documented for this encounter.         Functional Assessment Used        Your patient is being discharged from Physical Therapy with the following comments:   · Patient cancelled or missed more than 2 scheduled appointments/non-compliant  · Patient has failed to schedule or reschedule follow-up visits    Comments:  Patient was last seen in our dept 4/29/22. He was having some complications with hernia and was needing medical follow up. Per our policy, it has been too long a gap with out services that we will close this chart. He will need a new referral to resume services. He is very pleasant and we would be happy to see him again if needed.     Limitations Remaining:  Pain, balance, other comorbidities.     Recommendations:  Stay active HEP, follow up with primary care recommendations     Tae Stubbs, PT, DPT    Date: 7/1/2022

## 2022-07-06 ENCOUNTER — PATIENT OUTREACH (OUTPATIENT)
Dept: HEALTH INFORMATION MANAGEMENT | Facility: OTHER | Age: 82
End: 2022-07-06
Payer: MEDICARE

## 2022-07-06 DIAGNOSIS — E11.22 TYPE 2 DIABETES MELLITUS WITH CHRONIC KIDNEY DISEASE, WITHOUT LONG-TERM CURRENT USE OF INSULIN, UNSPECIFIED CKD STAGE (HCC): ICD-10-CM

## 2022-07-06 NOTE — PROGRESS NOTES
7/6/22 10:00am:  Nurse FRANDY second outreach attempt to enroll patient in CCM program and complete intake assessment.   left with CM contact number.

## 2022-07-08 ENCOUNTER — APPOINTMENT (OUTPATIENT)
Dept: PHYSICAL THERAPY | Facility: MEDICAL CENTER | Age: 82
End: 2022-07-08
Attending: FAMILY MEDICINE
Payer: MEDICARE

## 2022-07-13 ENCOUNTER — PATIENT OUTREACH (OUTPATIENT)
Dept: HEALTH INFORMATION MANAGEMENT | Facility: OTHER | Age: 82
End: 2022-07-13
Payer: MEDICARE

## 2022-07-13 DIAGNOSIS — E11.22 CKD STAGE 3 DUE TO TYPE 2 DIABETES MELLITUS (HCC): ICD-10-CM

## 2022-07-13 DIAGNOSIS — I50.40 HEART FAILURE WITH REDUCED EJECTION FRACTION AND DIASTOLIC DYSFUNCTION (HCC): ICD-10-CM

## 2022-07-13 DIAGNOSIS — N18.30 CKD STAGE 3 DUE TO TYPE 2 DIABETES MELLITUS (HCC): ICD-10-CM

## 2022-07-13 NOTE — PROGRESS NOTES
Nurse CM outreach call third attempt. Nurse previously spoke to patient about CCM program at office visit with PCP on 6/28/22. Nurse has been unable to reach patient to complete intake assessment. Patient can call nurse at [phone] if wanting to enroll in program.

## 2022-07-15 ENCOUNTER — APPOINTMENT (OUTPATIENT)
Dept: PHYSICAL THERAPY | Facility: MEDICAL CENTER | Age: 82
End: 2022-07-15
Attending: FAMILY MEDICINE
Payer: MEDICARE

## 2022-07-22 ENCOUNTER — APPOINTMENT (OUTPATIENT)
Dept: PHYSICAL THERAPY | Facility: MEDICAL CENTER | Age: 82
End: 2022-07-22
Attending: FAMILY MEDICINE
Payer: MEDICARE

## 2022-07-29 ENCOUNTER — APPOINTMENT (OUTPATIENT)
Dept: PHYSICAL THERAPY | Facility: MEDICAL CENTER | Age: 82
End: 2022-07-29
Attending: FAMILY MEDICINE
Payer: MEDICARE

## 2022-08-09 ENCOUNTER — HOSPITAL ENCOUNTER (OUTPATIENT)
Dept: LAB | Facility: MEDICAL CENTER | Age: 82
End: 2022-08-09
Attending: FAMILY MEDICINE
Payer: MEDICARE

## 2022-08-09 ENCOUNTER — HOSPITAL ENCOUNTER (OUTPATIENT)
Dept: LAB | Facility: MEDICAL CENTER | Age: 82
End: 2022-08-09
Attending: INTERNAL MEDICINE
Payer: MEDICARE

## 2022-08-09 DIAGNOSIS — E11.22 TYPE 2 DIABETES MELLITUS WITH CHRONIC KIDNEY DISEASE, WITHOUT LONG-TERM CURRENT USE OF INSULIN, UNSPECIFIED CKD STAGE (HCC): ICD-10-CM

## 2022-08-09 DIAGNOSIS — D64.9 ANEMIA, UNSPECIFIED TYPE: ICD-10-CM

## 2022-08-09 LAB
ALBUMIN SERPL BCP-MCNC: 4.2 G/DL (ref 3.2–4.9)
ALBUMIN/GLOB SERPL: 1.2 G/DL
ALP SERPL-CCNC: 101 U/L (ref 30–99)
ALT SERPL-CCNC: 8 U/L (ref 2–50)
ANION GAP SERPL CALC-SCNC: 13 MMOL/L (ref 7–16)
AST SERPL-CCNC: 12 U/L (ref 12–45)
BILIRUB SERPL-MCNC: 1.8 MG/DL (ref 0.1–1.5)
BUN SERPL-MCNC: 23 MG/DL (ref 8–22)
CALCIUM SERPL-MCNC: 8.9 MG/DL (ref 8.5–10.5)
CHLORIDE SERPL-SCNC: 105 MMOL/L (ref 96–112)
CHOLEST SERPL-MCNC: 87 MG/DL (ref 100–199)
CO2 SERPL-SCNC: 21 MMOL/L (ref 20–33)
CREAT SERPL-MCNC: 1.63 MG/DL (ref 0.5–1.4)
CREAT UR-MCNC: 159.39 MG/DL
ERYTHROCYTE [DISTWIDTH] IN BLOOD BY AUTOMATED COUNT: 55.2 FL (ref 35.9–50)
FASTING STATUS PATIENT QL REPORTED: NORMAL
FERRITIN SERPL-MCNC: 81.6 NG/ML (ref 22–322)
GFR SERPLBLD CREATININE-BSD FMLA CKD-EPI: 42 ML/MIN/1.73 M 2
GLOBULIN SER CALC-MCNC: 3.4 G/DL (ref 1.9–3.5)
GLUCOSE SERPL-MCNC: 106 MG/DL (ref 65–99)
HCT VFR BLD AUTO: 38.4 % (ref 42–52)
HDLC SERPL-MCNC: 47 MG/DL
HGB BLD-MCNC: 12.4 G/DL (ref 14–18)
IRON SATN MFR SERPL: 35 % (ref 15–55)
IRON SERPL-MCNC: 87 UG/DL (ref 50–180)
LDLC SERPL CALC-MCNC: 31 MG/DL
MCH RBC QN AUTO: 34.1 PG (ref 27–33)
MCHC RBC AUTO-ENTMCNC: 32.3 G/DL (ref 33.7–35.3)
MCV RBC AUTO: 105.5 FL (ref 81.4–97.8)
MICROALBUMIN UR-MCNC: 49.1 MG/DL
MICROALBUMIN/CREAT UR: 308 MG/G (ref 0–30)
PLATELET # BLD AUTO: 139 K/UL (ref 164–446)
PMV BLD AUTO: 10.3 FL (ref 9–12.9)
POTASSIUM SERPL-SCNC: 4.3 MMOL/L (ref 3.6–5.5)
PROT SERPL-MCNC: 7.6 G/DL (ref 6–8.2)
RBC # BLD AUTO: 3.64 M/UL (ref 4.7–6.1)
SODIUM SERPL-SCNC: 139 MMOL/L (ref 135–145)
TIBC SERPL-MCNC: 251 UG/DL (ref 250–450)
TRIGL SERPL-MCNC: 44 MG/DL (ref 0–149)
UIBC SERPL-MCNC: 164 UG/DL (ref 110–370)
WBC # BLD AUTO: 6.3 K/UL (ref 4.8–10.8)

## 2022-08-09 PROCEDURE — 80053 COMPREHEN METABOLIC PANEL: CPT

## 2022-08-09 PROCEDURE — 85027 COMPLETE CBC AUTOMATED: CPT

## 2022-08-09 PROCEDURE — 83540 ASSAY OF IRON: CPT

## 2022-08-09 PROCEDURE — 82105 ALPHA-FETOPROTEIN SERUM: CPT

## 2022-08-09 PROCEDURE — 36415 COLL VENOUS BLD VENIPUNCTURE: CPT

## 2022-08-09 PROCEDURE — 82728 ASSAY OF FERRITIN: CPT

## 2022-08-09 PROCEDURE — 82570 ASSAY OF URINE CREATININE: CPT

## 2022-08-09 PROCEDURE — 82043 UR ALBUMIN QUANTITATIVE: CPT

## 2022-08-09 PROCEDURE — 80061 LIPID PANEL: CPT

## 2022-08-09 PROCEDURE — 83550 IRON BINDING TEST: CPT

## 2022-08-11 LAB — AFP-TM SERPL-MCNC: 2 NG/ML (ref 0–9)

## 2022-08-11 NOTE — RESULT ENCOUNTER NOTE
Please call patient nd let him know that recent labs show     His lipid panel, electrolytes,  and liver function were all within normal range.  He is still anemic and has slightly decreased platelets.    His kidney function has improved over the last 3 months. Please ask him to c/w current meds and let him know that we can discuss these results further at his upcoming appt with me in September.  Thanks,  Monica Layton M.D.

## 2022-09-04 NOTE — PROGRESS NOTES
"Chief Complaint   Patient presents with   • Hypertension   • Atrial Fibrillation       Subjective:   Lucio Oseguera is a 80 y.o. male who presents today by his PCP Benji Olivas MD for cardiology consultation for management of newly diagnosed atrial fibrillation.    The patient has a significant history of hypertension, diabetes mellitus, alcoholic cirrhosis with portal hypertension.    He is followed by [facility] for his cirrhosis and portal hypertension.  An abdominal ultrasound 9/2020 which showed prominent IVC and hepatic veins with suggestion of right heart failure\".  His PCP ordered an echocardiogram which demonstrated atrial fibrillation otherwise unremarkable with normal EF and right heart pressures.  At follow-up appointment the patient was started on metoprolol with a baseline heart rate of 60 and aspirin.    The patient has no prior history of heart disease.  He denies palpitations.  No lightheadedness or syncope.  No angina pectoris or shortness of breath.  Since starting metoprolol his wife states that he is more lethargic and \"sleeps more\".  No dyslipidemia.  Never smoked cigarettes.  Stop drinking alcohol approximately 8 years ago.  No prior history of GI bleeding.  Not aware of undergoing banding for gastric or esophageal varices.    Past Medical History:   Diagnosis Date   • HTN    • Type II or unspecified type diabetes mellitus without mention of complication, not stated as uncontrolled      Past Surgical History:   Procedure Laterality Date   • LAPAROSCOPIC GASTRIC ULCER OVER SEW     • PROSTATECTOMY, RADICAL RETRO       Family History   Problem Relation Age of Onset   • Stroke Mother    • Stroke Father      Social History     Socioeconomic History   • Marital status:      Spouse name: Not on file   • Number of children: Not on file   • Years of education: Not on file   • Highest education level: Not on file   Occupational History   • Not on file   Social Needs   • Financial resource strain: Not " on file   • Food insecurity     Worry: Never true     Inability: Never true   • Transportation needs     Medical: No     Non-medical: No   Tobacco Use   • Smoking status: Never Smoker   • Smokeless tobacco: Never Used   Substance and Sexual Activity   • Alcohol use: Not Currently     Alcohol/week: 0.0 oz     Comment: 4-5 weekly    • Drug use: No   • Sexual activity: Yes   Lifestyle   • Physical activity     Days per week: Not on file     Minutes per session: Not on file   • Stress: Not on file   Relationships   • Social connections     Talks on phone: Not on file     Gets together: Not on file     Attends Denominational service: Not on file     Active member of club or organization: Not on file     Attends meetings of clubs or organizations: Not on file     Relationship status: Not on file   • Intimate partner violence     Fear of current or ex partner: Not on file     Emotionally abused: Not on file     Physically abused: Not on file     Forced sexual activity: Not on file   Other Topics Concern   • Not on file   Social History Narrative   • Not on file     No Known Allergies  Outpatient Encounter Medications as of 11/25/2020   Medication Sig Dispense Refill   • metoprolol SR (TOPROL XL) 25 MG TABLET SR 24 HR Take 1 Tab by mouth every day. 90 Tab 3   • aspirin EC (ECOTRIN) 81 MG Tablet Delayed Response Take 1 Tab by mouth every day. 90 Tab 3   • sitagliptan-metformin (JANUMET)  MG per tablet Take 0.5 Tabs by mouth 2 times a day, with meals. 200 Tab 3   • spironolactone (ALDACTONE) 100 MG Tab Take 1 Tab by mouth every day. 90 Tab 3   • Blood Glucose Monitoring Suppl Device Meter: Dispense Device of Insurance Preference. Sig. Use as directed for blood sugar monitoring. #1. NR. 1 Device 0   • Blood Glucose Monitoring Suppl Supplies Misc Test strips order: Test strips for Device of Insurance Preference. Sig: use once daily. 100 Each 3   • Lancets Misc Lancets order: Lancets for Device of Insurance Preference. Sig:  "use once daily. 100 Each 3   • Acetaminophen-Caffeine 500-65 MG Tab Take 1 Tab by mouth every four hours as needed (for headache). (Patient not taking: Reported on 11/25/2020) 60 Tab 0   • cyanocobalamin (VITAMIN B12) 1000 MCG Tab Take 3 Tabs by mouth every day.     • vitamin D (CHOLECALCIFEROL) 1000 UNIT TABS Take 1,000 Units by mouth every day.       No facility-administered encounter medications on file as of 11/25/2020.      Review of Systems   Constitutional: Positive for malaise/fatigue. Negative for chills and fever.   HENT: Negative for congestion.    Eyes: Negative for blurred vision.   Respiratory: Negative for cough and shortness of breath.    Cardiovascular: Negative for chest pain, palpitations, orthopnea, leg swelling and PND.   Gastrointestinal: Negative for abdominal pain.   Genitourinary: Negative for dysuria.   Musculoskeletal: Negative for joint pain and myalgias.   Skin: Negative for rash.   Neurological: Negative for dizziness and loss of consciousness.   Psychiatric/Behavioral: The patient does not have insomnia.         Objective:   /68 (BP Location: Left arm, Patient Position: Sitting, BP Cuff Size: Adult)   Pulse (!) 46   Resp 16   Ht 1.803 m (5' 11\")   Wt 83.9 kg (185 lb)   SpO2 98%   BMI 25.80 kg/m²     Physical Exam   Constitutional: He is oriented to person, place, and time. He appears well-developed and well-nourished. No distress.   Eyes: Pupils are equal, round, and reactive to light. Conjunctivae and EOM are normal.   Neck: Normal range of motion. Neck supple. No JVD present.   Cardiovascular: Normal heart sounds and intact distal pulses. An irregularly irregular rhythm present. Bradycardia present. Exam reveals no gallop and no friction rub.   No murmur heard.  Pulses:       Carotid pulses are 2+ on the right side and 2+ on the left side.  Pulmonary/Chest: Effort normal and breath sounds normal. No accessory muscle usage. No respiratory distress. He has no wheezes. He " has no rales.   Abdominal: Soft. He exhibits no distension and no mass. There is no hepatosplenomegaly. There is no abdominal tenderness.   Musculoskeletal:         General: No edema.   Neurological: He is alert and oriented to person, place, and time.   Skin: Skin is warm, dry and intact. No rash noted. No cyanosis. Nails show no clubbing.   Psychiatric: He has a normal mood and affect. His behavior is normal.   Vitals reviewed.    ABDOMINAL ULTRASOUND 09/16/2020  Prominent IVC and hepatic veins suggesting right heart failure.  No evidence of hepatic lesion.    ECHOCARDIOGRAM 11/17/2020  No prior study is available for comparison.   Moderate to severe concentric left ventricular hypertrophy.  Normal left ventricular systolic function. Left ventricular ejection   fraction is visually estimated to be 60%.  Diastolic function is difficult to assess with atrial fibrillation.  Normal inferior vena cava size and inspiratory collapse.  Estimated right ventricular systolic pressure  is 37 mmHg.    EKG 11/25/2020 atrial fibrillation, rate 40s.  Diffuse ST-T wave abnormalities.  Reviewed and interpreted.    Assessment:     1. Atrial fibrillation, persistent (HCC)  Cardiac Event Monitor   2. Bradycardia     3. Essential hypertension     4. Essential hypertension, benign  EKG     Medical Decision Making:  Today's Assessment / Status / Plan:     Assessment  1.  Atrial fibrillation, new diagnosis.  2.  Bradycardia on metoprolol.  3.  Hypertension.  4.  Diabetes mellitus.  5.  Alcoholic cirrhosis with portal hypertension.  6.  CKD.    Recommendation Discussion  1.  I had a detailed discussion with the patient and his wife concerning his diagnosis of atrial fibrillation and recommended treatment.  2.  He is asymptomatic for his atrial fibrillation and has normal EF.  3.  [id number] Score is 4, recommend Eliquis 2.5 mg, creatinine 1.5 and age, discussed stroke risk versus bleeding risk.  4.  Probably symptomatic due to  bradycardia with metoprolol and will have him discontinue this along with aspirin.  5.  24-hour Holter monitor off metoprolol.  6.  [facility] medical records.  7.  Further recommendations based on Holter monitor.  8.  RTC 3 months, sooner if necessary.     Normal rate, regular rhythm.  Heart sounds S1, S2.  No murmurs, rubs or gallops.

## 2022-09-13 NOTE — PROGRESS NOTES
CC:   Chief Complaint   Patient presents with    Follow-Up     6 weeks.     Lab Results    Hernia     Ongoing issue. Abdominal hernia. Ultrasound done on 09/02            HPI:   Sammy presents today for f/u appt with wife and son.  Last seen by me on 6/28/22 to establish care. Family states that only med he's been taking is eliquis 2.5bid.  last time he took lasix 80 was in May.  He had been instructed in the past to check daily weights and based on that to take this med. Not checking daily weights.     Since our last appointment, has been seen by:  Had abdominal sono on 9/2    Upcoming appointments with:  9/15- GI      Alcoholic cirrhosis of liver with ascites (HCC)  Chronic. Recent 9/2 sonogram with findings of cirrhosis, bilateral pleural effusion and cough.  Scheduled to see GI in two days. Weight stable.     Type 2 diabetes mellitus with chronic kidney disease, without long-term current use of insulin (HCC)  Chronic.  Last A1c was 7.6%  Glipizide 5mg, 1/2 tablet was recommended. Not taking it.   Not checking fingersticks.   Recent labs with GFR 38 and urine microalbumin ratio at 329.       Current Outpatient Medications Ordered in Epic   Medication Sig Dispense Refill    apixaban (ELIQUIS) 2.5mg Tab Take 1 Tablet by mouth 2 times a day. 180 Tablet 3    Blood Glucose Meter Kit Test blood sugar as recommended by provider. Brand as covered by insurance - blood glucose monitoring kit. 1 Kit 0    Blood Glucose Test Strips Use one Brand as covered by insurance  strip to test blood sugar once daily early morning before first meal and on occasion 2 hr post meal. 100 Strip 0    Lancets Use one Brand as covered by [employer]  lancet to test blood sugar once daily early morning before first meal. 100 Each 0    Alcohol Swabs Wipe site with prep pad prior to injection. 100 Each 0     No current [facility]-ordered facility-administered medications on file.       Past Medical History:   Diagnosis Date    HTN     Type II or  "unspecified type diabetes mellitus without mention of complication, not stated as uncontrolled        Social History     Tobacco Use    Smoking status: Never    Smokeless tobacco: Never   Vaping Use    Vaping Use: Never used   Substance Use Topics    Alcohol use: Not Currently     Comment: quit 4-5 yr ago    Drug use: No       Allergies:  Patient has no known allergies.      Objective:       Exam:  /64   Pulse (!) 56   Temp 36.4 °C (97.5 °F) (Temporal)   Resp 14   Ht 1.778 m (5' 10\")   Wt 76.1 kg (167 lb 11.2 oz)   SpO2 95%   BMI 24.06 kg/m²   Body mass index is 24.06 kg/m².  Wt Readings from Last 4 Encounters:   09/13/22 76.1 kg (167 lb 11.2 oz)   06/28/22 76 kg (167 lb 9.6 oz)   05/02/22 72.6 kg (160 lb)   04/29/22 71.5 kg (157 lb 10.1 oz)       Gen: Alert and oriented, No apparent distress. Appropriately groomed.  Neck: Neck is supple without lymphadenopathy.No thyromegaly.   Lungs: Normal effort, decreased breathsounds  CV: Regular rate and rhythm. Bilateral 1+ Lower extremity edema  Skin: No rash noted.      Assessment & Plan:     82 y.o. male with the following -     1. Cough  New issue. Not improving. Recent sonogram with bilateral pleural effusion, left greater than right. No hx thoracocentesis in past. Check cxr. Start checking daily weights and lasix as prescribed. Encouraged to f/u cards  - DX-CHEST-2 VIEWS; Future    2. Alcoholic cirrhosis of liver with ascites (HCC)  Chronic. Not well controlled. Recent sonogram with cirrhosis. Appt with GI on 9/15. Has been sober x 5 years.    3. Type 2 diabetes mellitus with stage 3b chronic kidney disease, without long-term current use of insulin (HCC)  Chronic. Previous A1c had improved to 7.6. not taking glipizide. Will start checking fingersticks and daily weights. Will f/u with me next week.    I spent a total of 43 minutes with record review, exam, communication with the patient, communication with other providers, and documentation of this " encounter.      Return 9/22.    Please note that this dictation was created using voice recognition software. I have made every reasonable attempt to correct obvious errors, but I expect that there are errors of grammar and possibly content that I did not discover before finalizing the note.

## 2022-09-14 NOTE — ED NOTES
US at bedside. Patient assisted to side of bed for procedure. VS monitoring in progress. Pt's wife assisted to waiting room and updated with expected timeline of procedure and repeat imaging.

## 2022-09-14 NOTE — ASSESSMENT & PLAN NOTE
Chronic. Recent 9/2 sonogram with findings of cirrhosis, bilateral pleural effusion and cough.  Scheduled to see GI in two days. Weight stable.

## 2022-09-14 NOTE — ED PROVIDER NOTES
"ED Provider Note    Primary care provider: Monica Layton M.D.  Means of arrival: private vehicle  History obtained from: patient  History limited by: none    CHIEF COMPLAINT  Chief Complaint   Patient presents with    Cough     Pt. Reports has had a cough x 1 month, CXR done yesterday and \"I have water on my lung\". Denies fever, sputum production, or CP. States \"I guess you could say I have some SOB\".        HPI  Sammy Oseguera is a 82 y.o. male who presents to the Emergency Department for evaluation of shortness of breath.  Patient has had shortness of breath and cough x1 month.  He does have a history of alcoholic cirrhosis and saw his primary care physician yesterday.  She ordered a chest x-ray and he was noted to have a large right pleural effusion and therefore he was sent in for management of this.  He denies fevers, chills, chest pain or sputum production.  He does have a history of atrial fibrillation and is on Eliquis, has been compliant with this.  He had not been taking his Lasix and was restarted on this yesterday.    REVIEW OF SYSTEMS  Review of Systems   Constitutional:  Negative for fever.   Respiratory:  Positive for cough and shortness of breath. Negative for sputum production.    Cardiovascular:  Negative for chest pain.   Gastrointestinal:  Negative for abdominal pain, nausea and vomiting.   Genitourinary:  Negative for dysuria.   Neurological:  Negative for dizziness and headaches.   All other systems reviewed and are negative.      PAST MEDICAL HISTORY   has a past medical history of HTN and Type II or unspecified type diabetes mellitus without mention of complication, not stated as uncontrolled.    SURGICAL HISTORY   has a past surgical history that includes laparoscopic gastric ulcer over sew and prostatectomy, radical retro.    SOCIAL HISTORY  Social History     Tobacco Use    Smoking status: Never    Smokeless tobacco: Never   Vaping Use    Vaping Use: Never used   Substance Use Topics " "   Alcohol use: Not Currently     Comment: quit 4-5 yr ago    Drug use: No      Social History     Substance and Sexual Activity   Drug Use No       FAMILY HISTORY  Family History   Problem Relation Age of Onset    Stroke Mother     Heart Disease Father     Depression Sister     Heart Disease Sister     Depression Sister     Depression Sister     No Known Problems Son        CURRENT MEDICATIONS  Home Medications       Reviewed by Fritz Veronica (Pharmacy Tech) on 09/14/22 at 1637  Med List Status: Complete     Medication Last Dose Status   Alcohol Swabs  Active   apixaban (ELIQUIS) 2.5mg Tab 9/14/2022 Active   Blood Glucose Meter Kit  Active   Blood Glucose Test Strips  Active   Lancets  Active                    ALLERGIES  No Known Allergies    PHYSICAL EXAM  VITAL SIGNS: BP (!) 133/97   Pulse 66   Temp 36.8 °C (98.2 °F) (Temporal)   Resp 20   Ht 1.803 m (5' 11\")   Wt 76.7 kg (169 lb)   SpO2 96%   BMI 23.57 kg/m²   Vitals reviewed by myself.  Physical Exam  Nursing note and vitals reviewed.  Constitutional: Well-developed and well-nourished. No acute distress.   HENT: Head is normocephalic and atraumatic.  Eyes: extra-ocular movements intact  Cardiovascular: regular rate and  regular rhythm. No murmur heard.  Pulmonary/Chest: Diminished breath sounds in right lower lung fields, normal breath sounds in left lung fields  Musculoskeletal: Extremities exhibit normal range of motion without edema or tenderness.   Neurological: Awake and alert  Skin: Skin is warm and dry. No rash.         DIAGNOSTIC STUDIES /  LABS  Labs Reviewed   CBC WITH DIFFERENTIAL - Abnormal; Notable for the following components:       Result Value    RBC 3.41 (*)     Hemoglobin 11.8 (*)     Hematocrit 36.0 (*)     .6 (*)     MCH 34.6 (*)     MCHC 32.8 (*)     RDW 56.2 (*)     Platelet Count 139 (*)     Neutrophils-Polys 74.30 (*)     Lymphocytes 14.40 (*)     Lymphs (Absolute) 0.98 (*)     All other components within normal " limits   COMP METABOLIC PANEL - Abnormal; Notable for the following components:    Glucose 102 (*)     Bun 24 (*)     Creatinine 1.84 (*)     AST(SGOT) 11 (*)     Alkaline Phosphatase 109 (*)     Total Bilirubin 1.6 (*)     All other components within normal limits   ESTIMATED GFR - Abnormal; Notable for the following components:    GFR (CKD-EPI) 36 (*)     All other components within normal limits   FLUID PH   FLUID TOTAL PROTEIN   FLUID GLUCOSE   FLUID CELL COUNT   CYTOLOGY   FLUID AMYLASE   FLUID LDH   CYTOLOGY   FLUID CULTURE W/GRAM STAIN   AFB CULTURE   FUNGAL CULTURE       All labs reviewed by me.    EKG Interpretation:  Interpreted by myself    12 Lead EKG interpreted by me to show:  EKG at 1431: Atrial fibrillation at a rate of 56, right axis deviation, normal intervals, , QTc 431, no acute ST-T segment changes, Q waves are present in anteroseptal leads consistent with prior EKG, no dynamic changes when compared to prior EKG, no evidence of acute arrhythmia or ischemia, atrial fibrillation is chronic for this patient  My impression of this EKG: Does not indicate ischemia or arrhythmia at this time.    RADIOLOGY  DX-CHEST-PORTABLE (1 VIEW)   Final Result         No appreciable pneumothorax status post right thoracentesis.      US-THORACENTESIS PUNCTURE RIGHT    (Results Pending)   IR-THORACENTESIS PUNCTURE RIGHT    (Results Pending)     The radiologist's interpretation of all radiological studies have been reviewed by me.    COURSE & MEDICAL DECISION MAKING  Nursing notes, VS, PMSFHx reviewed in chart.    Patient is a 82-year-old male who comes in for evaluation of shortness of breath and cough.  Differential diagnosis includes pleural effusion, alcoholic cirrhosis, fluid overload state.  Screening EKG was done in triage as patient complained of shortness of breath, EKG demonstrates his chronic atrial fibrillation without evidence of acute arrhythmia or ischemia.  Patient is otherwise well-appearing  with vitals in normal limits.  I discussed the case with interventional radiologist Dr. Walker who will plan for thoracentesis.  Screening labs are also obtained.    Labs returned are notable for chronic kidney injury, baseline is around 1.6-1.7 creatinine today it is 1.8.  I did speak with his primary care physician Dr. Layton as he was restarted on his Lasix and she reports that she will follow-up with him for repeat labs to assess his renal function about 1 week.  She is aware that IR drainage is happening in the emergency department and patient will subsequently be discharged.  She will follow-up with the fluid studies as well.  Thoracentesis procedure is done by Dr. Walker and repeat chest x-ray demonstrates no evidence of pneumothorax.  Upon reassessment patient is feeling improved, slightly nauseated after procedure.  He is treated with Zofran.  He is observed in the emergency department and feels improved.  Therefore he is advised to follow-up with primary care physician and given strict return precautions.  Patient is then discharged in stable condition.      FINAL IMPRESSION  1. Pleural effusion    2. SOB (shortness of breath)    3. Cough

## 2022-09-14 NOTE — TELEPHONE ENCOUNTER
Spoke with interventional radiology and they need an order placed in epic before they can schedule the patient to get the pleural effusion drained.

## 2022-09-14 NOTE — ASSESSMENT & PLAN NOTE
Chronic.  Last A1c was 7.6%  Glipizide 5mg, 1/2 tablet was recommended. Not taking it.   Not checking fingersticks.   Recent labs with GFR 38 and urine microalbumin ratio at 329.

## 2022-09-15 NOTE — ED NOTES
Patient tolerating procedure well, continues to drain. Wife updated in waiting room per pt request.

## 2022-09-15 NOTE — ED NOTES
"Pt reports nausea resolved, breathing \" a little better\" and denies pain at this time. VS stable. Wife at bedside.   "

## 2022-09-21 NOTE — RESULT ENCOUNTER NOTE
Please call patient's family  and let patient  know that i've reviewed  recent lab results Overall They look stable and we can discuss these results further in detail at the upcoming appt with me  tomorrow on 9/22/22.    Thanks,  Monica Layton M.D.

## 2022-09-22 PROBLEM — Z87.09 HISTORY OF PLEURAL EFFUSION: Status: ACTIVE | Noted: 2022-01-01

## 2022-09-22 PROBLEM — R05.9 COUGH: Status: ACTIVE | Noted: 2022-01-01

## 2022-09-22 NOTE — PROGRESS NOTES
CC:   Chief Complaint   Patient presents with    Follow-Up     1 week.     Lab Results    Cough     Stopped for about 2 days after draining but has returned.        HPI:   Sammy presents today for a follow-up visit..  Last seen by me on September 13..  At that time he was complaining of a cough and abdominal sonogram was significant for large pleural effusion.  He went to the emergency room on September 14 and 2 L of fluid was drained from the right side.    Since our last appointment, has been seen by:  ED visit on September 14 for right pleural effusion-2 L of fluid was drained.  September 15-GI consultation with digestive [facility].    Alcoholic cirrhosis of liver with ascites (HCC)  Chronic medical diagnosis..  His follow-up with GI on September 15.  Consultation notes were received and reviewed.  Recommendations were to repeat an abdominal sonogram and alpha-fetoprotein every 6 months.  He also recommended that patient take Lasix 40 mg if his weight exceeds 160 pounds.  Patient has been checking weights at home and they have been in the range of 1 57-1 63.  States that he has not been taking his Lasix.  Recommendations to follow-up in 3 months.  Last alcoholic drink was over 5 years ago    CKD stage 3 due to type 2 diabetes mellitus (HCC)  Chronic medical diagnosis.  This is been going on since at least 2016.  Most recent labs had GFR slightly improved to 38.  Denies any urinary symptoms.  Has never been evaluated by a nephrologist.  Urine microalbumin creatinine ratio was elevated at 308 in August.    Type 2 diabetes mellitus with chronic kidney disease, without long-term current use of insulin (HCC)  Chronic medical diagnosis.  Hemoglobin A1c was 7.6.  He has been checking fasting blood sugars at home.  Over the last week his blood sugars have been in the range of 112 250.  It was discussed that he restart glipizide at the last appointment.  Currently not taking it.    Cough  Ongoing medical problem.  He did  notice an improvement in his cough after thoracocentesis.  However notes that 2 days afterwards he started coughing again.  States it is nonproductive and does not make a difference if he is standing or lying flat.  Denies any lower extremity edema.  Denies any fevers or chills.  Does not have any sick contacts.  Wife states that she is not currently having similar symptoms.      Current Outpatient Medications Ordered in Epic   Medication Sig Dispense Refill    furosemide (LASIX) 40 MG Tab Take 1 Tablet by mouth every day. 100 Tablet 0    glipiZIDE (GLUCOTROL) 5 MG Tab Take 1 Tablet by mouth every day. 100 Tablet 1    apixaban (ELIQUIS) 2.5mg Tab Take 1 Tablet by mouth 2 times a day. 180 Tablet 3    Blood Glucose Meter Kit Test blood sugar as recommended by provider. Brand as covered by insurance - blood glucose monitoring kit. 1 Kit 0    Blood Glucose Test Strips Use one Brand as covered by insurance  strip to test blood sugar once daily early morning before first meal and on occasion 2 hr post meal. 100 Strip 0    Lancets Use one Brand as covered by insurance  lancet to test blood sugar once daily early morning before first meal. 100 Each 0    Alcohol Swabs Wipe site with prep pad prior to injection. 100 Each 0     No current [facility]-ordered facility-administered medications on file.       Past Medical History:   Diagnosis Date    HTN     Type II or unspecified type diabetes mellitus without mention of complication, not stated as uncontrolled        Social History     Tobacco Use    Smoking status: Never    Smokeless tobacco: Never   Vaping Use    Vaping Use: Never used   Substance Use Topics    Alcohol use: Not Currently     Comment: quit 4-5 yr ago    Drug use: No       Allergies:  Patient has no known allergies.    Health Maintenance: flu vaccine today  Encouraged to get covid booster  Will obtain records from Dr Young      Objective:       Exam:  /62   Pulse (!) 56   Temp 36.3 °C (97.3 °F) (Temporal)    "Resp 12    1.803 m (5' 11\")   Wt 75.3 kg (166 lb 1.6 oz)   SpO2 97%   BMI 23.17 kg/m²   Body mass index is 23.17 kg/m².  Wt Readings from Last 4 Encounters:   09/22/22 75.3 kg (166 lb 1.6 oz)   09/14/22 76.7 kg (169 lb)   09/13/22 76.1 kg (167 lb 11.2 oz)   06/28/22 76 kg (167 lb 9.6 oz)       Gen: Alert and oriented, No apparent distress. Appropriately groomed.  Neck: Neck is supple without lymphadenopathy.No thyromegaly.   Lungs: Normal effort, decreased breath sounds  CV: Regular rate and rhythm. No Lower extremity edema  Skin: No rash noted., left 4th toe with pigmentation a the nail bed.     Monofilament examination shows intact sensation in 6 over 6 areas tested bilaterally.   Visual examination revels no lesions, ulcers. Pulses 2+ and symmetrical -DP and PT. Good capillary refill, less than 2 seconds     Assessment & Plan:     82 y.o. male with the following -     1. Alcoholic cirrhosis of liver with ascites (HCC)  -Chronic medical diagnosis.  Not stable.  Continue to avoid alcohol.  Continue with daily weights.  Discussed with wife and patient that his weight is over 160 and that he can take his Lasix 40 mg as needed.  GI consultation notes were reviewed.  Improving up   with him in 3 months.  Furosemide (LASIX) 40 MG Tab; Take 1 Tablet by mouth every day.  Dispense: 100 Tablet; Refill: 0    2. Need for vaccination  - INFLUENZA VACCINE, HIGH DOSE (65+ ONLY)    3. CKD stage 3 due to type 2 diabetes mellitus (HCC)  Chronic medical diagnosis.  Not well controlled.  Precautions with Lasix if kidney function discussed with patient and spouse.  We will plan on repeating these labs in 1 month.  Discussed with them that we may need to have a nephrology consultation.    4. Type 2 diabetes mellitus with stage 3b chronic kidney disease, without long-term current use of insulin (HCC)  -Chronic medical diagnosis.  Not well controlled.  Currently not taking any antidiabetic medications.  New prescription for " glipizide 5 mg sent to his pharmacy.  Discussed with patient that he is only to take this with food once a day.  Continue checking blood sugars and bring in   blood sugar log with him to the next appointment in 1 month with me.  Diabetic Monofilament Lower Extremity Exam  - US-EXTREMITY ARTERY LOWER BILAT W/KRUPA (COMBO); Future  - CBC WITH DIFFERENTIAL; Future  - HEMOGLOBIN A1C; Future  - Comp Metabolic Panel; Future  - glipiZIDE (GLUCOTROL) 5 MG Tab; Take 1 Tablet by mouth every day.  Dispense: 100 Tablet; Refill: 1    5. History of pleural effusion  -Status post 2 L thoracocentesis.    DX-CHEST-2 VIEWS; Future    6. Cough  Ongoing medical problem.  Not improving.  High suspicion for recurrent pleural effusion.  Encouraged to get chest x-rays completed today.    I spent a total of 51 minutes with record review, exam, communication with the patient, communication with other providers, and documentation of this encounter.      Return in about 4 weeks (around 10/20/2022) for pleural effusion, DM.    Please note that this dictation was created using voice recognition software. I have made every reasonable attempt to correct obvious errors, but I expect that there are errors of grammar and possibly content that I did not discover before finalizing the note.

## 2022-09-22 NOTE — ASSESSMENT & PLAN NOTE
Ongoing medical problem.  He did notice an improvement in his cough after thoracocentesis.  However notes that 2 days afterwards he started coughing again.  States it is nonproductive and does not make a difference if he is standing or lying flat.  Denies any lower extremity edema.  Denies any fevers or chills.  Does not have any sick contacts.  Wife states that she is not currently having similar symptoms.

## 2022-09-22 NOTE — ASSESSMENT & PLAN NOTE
Chronic medical diagnosis.  Hemoglobin A1c was 7.6.  He has been checking fasting blood sugars at home.  Over the last week his blood sugars have been in the range of 112 250.  It was discussed that he restart glipizide at the last appointment.  Currently not taking it.

## 2022-09-22 NOTE — ASSESSMENT & PLAN NOTE
Chronic medical diagnosis..  His follow-up with GI on September 15.  Consultation notes were received and reviewed.  Recommendations were to repeat an abdominal sonogram and alpha-fetoprotein every 6 months.  He also recommended that patient take Lasix 40 mg if his weight exceeds 160 pounds.  Patient has been checking weights at home and they have been in the range of 1 57-1 63.  States that he has not been taking his Lasix.  Recommendations to follow-up in 3 months.  Last alcoholic drink was over 5 years ago

## 2022-09-22 NOTE — ASSESSMENT & PLAN NOTE
Chronic medical diagnosis.  This is been going on since at least 2016.  Most recent labs had GFR slightly improved to 38.  Denies any urinary symptoms.  Has never been evaluated by a nephrologist.  Urine microalbumin creatinine ratio was elevated at 308 in August.

## 2022-09-23 PROBLEM — R79.89 TROPONIN I ABOVE REFERENCE RANGE: Status: ACTIVE | Noted: 2022-01-01

## 2022-09-23 PROBLEM — R94.31 PROLONGED QT INTERVAL: Status: ACTIVE | Noted: 2022-01-01

## 2022-09-23 PROBLEM — R06.02 SHORTNESS OF BREATH: Status: ACTIVE | Noted: 2022-01-01

## 2022-09-23 NOTE — RESULT ENCOUNTER NOTE
Please call patient and let him know that his repeat chest xray shows that he has another large right pleural effusion.  This was drained last week and appears that it is once again on the right side and large.  I would like him to get this drained again.  I have placed orders, please call and schedule it for him.  If you are unable to get it scheduled for tomorrow then please ask him to go the ED.    Thanks,  Monica Layton M.D.

## 2022-09-23 NOTE — ED TRIAGE NOTES
"Pt thinks he needs a thoracentesis   Here 2 wks ago for 1 \" they took 2 L  off \"  Pt has no insight into cause of his pleural effusions  "

## 2022-09-24 PROBLEM — J90 PLEURAL EFFUSION: Status: ACTIVE | Noted: 2022-01-01

## 2022-09-24 NOTE — ASSESSMENT & PLAN NOTE
Currently avoid any medications that prolong QT interval including antiemetics and antipsychotics as well as benzodiazepines

## 2022-09-24 NOTE — CONSULTS
Pulmonary Consultation    Date of consult: 9/24/2022    Referring Physician  Flor Beckham M.D.    Reason for Consultation  Pleural effusion    History of Presenting Illness  82 y.o. male who presented 9/23/2022 with a history of cirrhosis, DM and HTN.  He developed a cough early this month which prompted a CXR that showed a moderate pleural effusion.  This was drained by IR on the 14th with complete clearance on CXR.  His cough resolved for just 3 days and then returned although not as severely as prior.  He does not describe significant shortness of breath.  He is saturating % on room air here, speaking in full sentences without dyspnea.  He is unsure if he has dyspnea on exertion.  Apparently he was told only to take his lasix if his weight increased above 160lbs so between the thoracentesis and representation he was not on any diuretic therapy.      Code Status  DNAR/DNI    Review of Systems  Review of Systems   Constitutional:  Negative for chills, fever and malaise/fatigue.   HENT:  Positive for hearing loss. Negative for congestion and sinus pain.    Respiratory:  Positive for cough. Negative for shortness of breath and wheezing.    Cardiovascular:  Negative for chest pain, palpitations and leg swelling.   Gastrointestinal:  Negative for abdominal pain and heartburn.   Genitourinary:  Negative for dysuria.   Neurological:  Negative for dizziness and headaches.     Past Medical History   has a past medical history of HTN and Type II or unspecified type diabetes mellitus without mention of complication, not stated as uncontrolled.    Surgical History   has a past surgical history that includes laparoscopic gastric ulcer over sew and prostatectomy, radical retro.    Family History  family history includes Depression in his sister, sister, and sister; Heart Disease in his father and sister; No Known Problems in his son; Stroke in his mother.    Social History   reports that he has never smoked. He has  never used smokeless tobacco. He reports that he does not currently use alcohol. He reports that he does not use drugs.    Medications  Home Medications    **Home medications have not yet been reviewed for this encounter**       Current Facility-Administered Medications   Medication Dose Route Frequency Provider Last Rate Last Admin    benzonatate (TESSALON) capsule 100 mg  100 mg Oral TID PRN Ligia Sesay M.D.        guaiFENesin (ROBITUSSIN) 100 MG/5ML solution 200 mg  10 mL Oral Q4HRS PRN Ligia Sesay M.D.        apixaban (ELIQUIS) tablet 2.5 mg  2.5 mg Oral BID Ligia Sesay M.D.   2.5 mg at 09/24/22 0521    senna-docusate (PERICOLACE or SENOKOT S) 8.6-50 MG per tablet 2 Tablet  2 Tablet Oral BID Ligia Sesay M.D.        And    polyethylene glycol/lytes (MIRALAX) PACKET 1 Packet  1 Packet Oral QDAY SELINA Sesay M.D.        And    magnesium hydroxide (MILK OF MAGNESIA) suspension 30 mL  30 mL Oral QDAY SELINA Sesay M.D.        And    bisacodyl (DULCOLAX) suppository 10 mg  10 mg Rectal QDAY SELINA Sesay M.D.        acetaminophen (Tylenol) tablet 650 mg  650 mg Oral Q6HRS SELINA Sesay M.D.        hydrALAZINE (APRESOLINE) injection 10 mg  10 mg Intravenous Q4HRS PRJEANNA Sesay M.D.        furosemide (LASIX) injection 40 mg  40 mg Intravenous BID DIURETIC Ligia Sesay M.D.   40 mg at 09/24/22 0521    insulin regular (HumuLIN R,NovoLIN R) injection  1-6 Units Subcutaneous 4X/DAY ACHS Ligia Sesay M.D.   1 Units at 09/24/22 1123    dextrose 10 % BOLUS 25 g  25 g Intravenous Q15 MIN PRN Ligia Sesay M.D.        diazePAM (VALIUM) injection 2.5 mg  2.5 mg Intravenous Q6HRS PRJEANNA Sseay M.D.   2.5 mg at 09/23/22 1875       Allergies  No Known Allergies    Vital Signs last 24 hours  Temp:  [36.3 °C (97.4 °F)-36.7 °C (98 °F)] 36.5 °C (97.7 °F)  Pulse:  [49-63] 54  Resp:  [16-27] 18  BP: (124-182)/(51-91) 132/61  SpO2:  [97 %-100 %] 97 %    Physical Exam  Physical Exam  Constitutional:       Appearance: Normal  appearance.   HENT:      Head: Normocephalic and atraumatic.      Mouth/Throat:      Mouth: Mucous membranes are dry.   Eyes:      Extraocular Movements: Extraocular movements intact.      Pupils: Pupils are equal, round, and reactive to light.   Cardiovascular:      Rate and Rhythm: Normal rate and regular rhythm.      Heart sounds: Normal heart sounds.   Pulmonary:      Effort: Pulmonary effort is normal.      Comments: Slightly diminished breath sounds at the right base  Abdominal:      General: There is no distension.      Palpations: There is no mass.      Tenderness: There is no abdominal tenderness.   Musculoskeletal:         General: No swelling, tenderness or deformity.   Skin:     General: Skin is warm and dry.   Neurological:      General: No focal deficit present.      Mental Status: He is alert and oriented to person, place, and time.       Fluids    Intake/Output Summary (Last 24 hours) at 2022 1517  Last data filed at 2022 1113  Gross per 24 hour   Intake 360 ml   Output 2475 ml   Net -2115 ml       Laboratory  Recent Results (from the past 48 hour(s))   EKG    Collection Time: 22  5:07 PM   Result Value Ref Range    Report       [facility]    Test Date:  2022  Pt Name:    CHUCHO CARMONA                Department: EDS  MRN:        [id number]                      Room:  Gender:     Male                         Technician:   :        1940                   Requested By:ER TRIAGE PROTOCOL  Order #:    [id number]                    Reading MD: Indira Hou MD    Measurements  Intervals                                Axis  Rate:       67                           P:  NV:                                      QRS:        117  QRSD:       102                          T:          238  QT:         575  QTc:        607    Interpretive Statements  Atrial fibrillation  Paired ventricular premature complexes  Probable lateral infarct, age  indeterminate  Probable anteroseptal infarct, old  Prolonged QT interval  Compared to ECG 09/14/2022 14:31:01  Increased PVCs, no other change  Electronically Signed On 9- 18:34:37 PDT by Indira Hou MD     CBC with Differential    Collection Time: 09/23/22  5:23 PM   Result Value Ref Range    WBC 8.0 4.8 - 10.8 K/uL    RBC 3.62 (L) 4.70 - 6.10 M/uL    Hemoglobin 12.6 (L) 14.0 - 18.0 g/dL    Hematocrit 38.3 (L) 42.0 - 52.0 %    .8 (H) 81.4 - 97.8 fL    MCH 34.8 (H) 27.0 - 33.0 pg    MCHC 32.9 (L) 33.7 - 35.3 g/dL    RDW 55.5 (H) 35.9 - 50.0 fL    Platelet Count 160 (L) 164 - 446 K/uL    MPV 9.8 9.0 - 12.9 fL    Neutrophils-Polys 77.20 (H) 44.00 - 72.00 %    Lymphocytes 11.40 (L) 22.00 - 41.00 %    Monocytes 7.90 0.00 - 13.40 %    Eosinophils 2.50 0.00 - 6.90 %    Basophils 0.90 0.00 - 1.80 %    Immature Granulocytes 0.10 0.00 - 0.90 %    Nucleated RBC 0.00 /100 WBC    Neutrophils (Absolute) 6.18 1.82 - 7.42 K/uL    Lymphs (Absolute) 0.91 (L) 1.00 - 4.80 K/uL    Monos (Absolute) 0.63 0.00 - 0.85 K/uL    Eos (Absolute) 0.20 0.00 - 0.51 K/uL    Baso (Absolute) 0.07 0.00 - 0.12 K/uL    Immature Granulocytes (abs) 0.01 0.00 - 0.11 K/uL    NRBC (Absolute) 0.00 K/uL   Complete Metabolic Panel (CMP)    Collection Time: 09/23/22  5:23 PM   Result Value Ref Range    Sodium 139 135 - 145 mmol/L    Potassium 3.8 3.6 - 5.5 mmol/L    Chloride 105 96 - 112 mmol/L    Co2 22 20 - 33 mmol/L    Anion Gap 12.0 7.0 - 16.0    Glucose 158 (H) 65 - 99 mg/dL    Bun 25 (H) 8 - 22 mg/dL    Creatinine 1.58 (H) 0.50 - 1.40 mg/dL    Calcium 8.8 8.4 - 10.2 mg/dL    AST(SGOT) 12 12 - 45 U/L    ALT(SGPT) 8 2 - 50 U/L    Alkaline Phosphatase 119 (H) 30 - 99 U/L    Total Bilirubin 1.9 (H) 0.1 - 1.5 mg/dL    Albumin 4.2 3.2 - 4.9 g/dL    Total Protein 7.9 6.0 - 8.2 g/dL    Globulin 3.7 (H) 1.9 - 3.5 g/dL    A-G Ratio 1.1 g/dL   Troponin    Collection Time: 09/23/22  5:23 PM   Result Value Ref Range    Troponin T 135 (H) 6 - 19 ng/L    proBrain Natriuretic Peptide, NT    Collection Time: 09/23/22  5:23 PM   Result Value Ref Range    NT-proBNP 68459 (H) 0 - 125 pg/mL   ESTIMATED GFR    Collection Time: 09/23/22  5:23 PM   Result Value Ref Range    GFR (CKD-EPI) 43 (A) >60 mL/min/1.73 m 2   MAGNESIUM    Collection Time: 09/23/22  5:23 PM   Result Value Ref Range    Magnesium 2.0 1.5 - 2.5 mg/dL   POCT glucose device results    Collection Time: 09/23/22  9:54 PM   Result Value Ref Range    POC Glucose, Blood 169 (H) 65 - 99 mg/dL   TROPONIN    Collection Time: 09/23/22 10:40 PM   Result Value Ref Range    Troponin T 119 (H) 6 - 19 ng/L   CBC With Differential    Collection Time: 09/24/22  5:28 AM   Result Value Ref Range    WBC 8.5 4.8 - 10.8 K/uL    RBC 3.43 (L) 4.70 - 6.10 M/uL    Hemoglobin 11.7 (L) 14.0 - 18.0 g/dL    Hematocrit 35.9 (L) 42.0 - 52.0 %    .7 (H) 81.4 - 97.8 fL    MCH 34.1 (H) 27.0 - 33.0 pg    MCHC 32.6 (L) 33.7 - 35.3 g/dL    RDW 54.4 (H) 35.9 - 50.0 fL    Platelet Count 146 (L) 164 - 446 K/uL    MPV 9.9 9.0 - 12.9 fL    Neutrophils-Polys 76.60 (H) 44.00 - 72.00 %    Lymphocytes 12.50 (L) 22.00 - 41.00 %    Monocytes 8.40 0.00 - 13.40 %    Eosinophils 1.50 0.00 - 6.90 %    Basophils 0.60 0.00 - 1.80 %    Immature Granulocytes 0.40 0.00 - 0.90 %    Nucleated RBC 0.00 /100 WBC    Neutrophils (Absolute) 6.51 1.82 - 7.42 K/uL    Lymphs (Absolute) 1.06 1.00 - 4.80 K/uL    Monos (Absolute) 0.71 0.00 - 0.85 K/uL    Eos (Absolute) 0.13 0.00 - 0.51 K/uL    Baso (Absolute) 0.05 0.00 - 0.12 K/uL    Immature Granulocytes (abs) 0.03 0.00 - 0.11 K/uL    NRBC (Absolute) 0.00 K/uL   Basic Metabolic Panel    Collection Time: 09/24/22  5:28 AM   Result Value Ref Range    Sodium 141 135 - 145 mmol/L    Potassium 4.7 3.6 - 5.5 mmol/L    Chloride 105 96 - 112 mmol/L    Co2 22 20 - 33 mmol/L    Glucose 160 (H) 65 - 99 mg/dL    Bun 26 (H) 8 - 22 mg/dL    Creatinine 1.73 (H) 0.50 - 1.40 mg/dL    Calcium 9.1 8.4 - 10.2 mg/dL    Anion Gap 14.0  7.0 - 16.0   proBrain Natriuretic Peptide, NT    Collection Time: 22  5:28 AM   Result Value Ref Range    NT-proBNP 19215 (H) 0 - 125 pg/mL   TROPONIN    Collection Time: 22  5:28 AM   Result Value Ref Range    Troponin T 119 (H) 6 - 19 ng/L   ESTIMATED GFR    Collection Time: 22  5:28 AM   Result Value Ref Range    GFR (CKD-EPI) 39 (A) >60 mL/min/1.73 m 2   POCT glucose device results    Collection Time: 22  6:50 AM   Result Value Ref Range    POC Glucose, Blood 134 (H) 65 - 99 mg/dL   EKG    Collection Time: 22  9:04 AM   Result Value Ref Range    Report       [facility]    Test Date:  2022  Pt Name:    CHUCHO CARMONA                Department: MED  MRN:        [id number]                      Room:       [facility]  Gender:     Male                         Technician: [id number]  :        1940                   Requested By:SUZETTE CHAVEZ  Order #:    [id number]                    Reading MD: Sarwat Lynch MD    Measurements  Intervals                                Axis  Rate:       55                           P:          0  MD:         258                          QRS:        116  QRSD:       106                          T:          232  QT:         525  QTc:        503    Interpretive Statements  ATRIAL FIBRILLATION  Repol abnrm  Prolonged QT interval  Compared to ECG 2022 17:07:06  NO SIGNIFICANT CHANGES  Electronically Signed On 2022 9:28:03 PDT by Sarwat Lynch MD     POCT glucose device results    Collection Time: 22 10:45 AM   Result Value Ref Range    POC Glucose, Blood 164 (H) 65 - 99 mg/dL   EC-ECHOCARDIOGRAM COMPLETE W/O CONT    Collection Time: 22 10:53 AM   Result Value Ref Range    Eject.Frac. MOD BP 31.9     Eject.Frac. MOD 4C 40.76     Eject.Frac. MOD 2C 29.29     Left Ventrical Ejection Fraction 30    TROPONIN    Collection Time: 22 11:05 AM   Result Value Ref Range    Troponin T 126 (H) 6 - 19 ng/L        Imaging  CT-CHEST (THORAX) W/O   Final Result      1.  Moderate right pleural effusion with presumed compressive atelectasis within the right lower lobe. Correlate clinically for infection.   2.  Trace left pleural fluid.   3.  Cardiomegaly.   4.  Atherosclerosis including prominent coronary calcification.   5.  Small amount of perihepatic and perisplenic fluid      Fleischner Society pulmonary nodule recommendations:   Not Applicable         EC-ECHOCARDIOGRAM COMPLETE W/O CONT   Final Result      DX-CHEST-PORTABLE (1 VIEW)   Final Result      Stable at least moderate right pleural effusion.          Assessment/Plan  Pleural effusion  Assessment & Plan  I met with the patient and his wife at bedside to discuss thoracentesis.  I explained that pleural effusions are common in cirrhosis and best treated with appropriate diuretics.  The reaccumulation of fluid this fast demonstrates a need for maintenance diuretic therapy. The patient appears comfortable and is saturating well.  We discussed performing a thoracentesis today however I am not convinced that the benefits outweigh the risks at this time. Repeat thoracentesis for transudative effusions are not recommended unless patients are demonstrating hypoxic respiratory failure with significant symptoms and should not be routinely performed.  Thoracentesis comes with risks of bleeding, hydropneumothorax and and bronchopleural fistula formation.  I would recommend the addition of Aldactone to Lasix therapy.  I would use this regimen and make adjustments as possible with outpatient drainage by IR only in the instance of significant shortness of breath refractory to adequate trials of diuretic.  If the effusion recurs in that situation I strongly recommend PleurX catheter placement for home drainage.  If significant shortness of breath develops during his stay I would be more than happy to drain his effusion.  I will refer to pulmonary for follow up.     Summer  MD Norma RD  Pulmonary and Critical Care    Available on Voalte

## 2022-09-24 NOTE — ASSESSMENT & PLAN NOTE
Most recent left ventricular ejection fraction 35%  Continue with diuresis and avoid beta-blockers given low heart rate  Optimize intake and output monitoring.  Most recent BMP 17,909

## 2022-09-24 NOTE — ASSESSMENT & PLAN NOTE
Chronic atrial fibrillation  Continue anticoagulation utilizing Eliquis especially renal impairment  Avoid any beta-blockers or rate lowering agents

## 2022-09-24 NOTE — ASSESSMENT & PLAN NOTE
Monitor PSA level  The patient apparently had radical prostatectomy and then also had ablation therapy with needles.

## 2022-09-24 NOTE — H&P
Hospital Medicine History & Physical Note    Date of Service  9/23/2022    Primary Care Physician  Monica Layton M.D.    Consultants  None    Code Status  DNR discussed with patient and wife    Chief Complaint  Chief Complaint   Patient presents with    Shortness of Breath     X several days No CP  Pt vaccinated for Covid x 3 shots    Cough     Dry No fever    Leg Swelling     Mild Bilat ankle swelling       History of Presenting Illness  Sammy Oseguera is a 82 y.o. male who presented 9/23/2022 with CHF, DM, afib, alcoholic cirrhosis, CKD3 who presented with SOB and CXR showing pleural effusion.  Patient has been having cough and SOB so he had outpatient CXR that showed pleural effusion and was sent to ER for thoracentesis 9/14. LDH was not done but likely transudative. He returned to his PCP still with cough and had repeat CXR with unchanged pleural effusion.   Here he says he has SOB when talking for prolonged time or with walking, going on for maybe 2 months. He has a dry cough and leg edema that comes and goes. He denies any chest pain or orthopnea. He sometimes has dizziness and had nausea yesterday. He has not been taking lasix. He denies any fever or recent illness. He denies any alcohol use.  He does have known inguinal hernia, that causes him pain. He saw Dr. Moore with surgery for this, given his surgical risks he did not move forward with corrective surgery. He says he manages with the pain. He denies any difficulty urinating.     I discussed the plan of care with patient and family.    Review of Systems  Review of Systems   Constitutional:  Positive for malaise/fatigue and weight loss. Negative for fever.   Respiratory:  Positive for cough and shortness of breath. Negative for sputum production.    Cardiovascular:  Positive for leg swelling. Negative for chest pain and orthopnea.   Gastrointestinal:  Positive for abdominal pain (near hernia) and nausea. Negative for constipation and diarrhea.    Genitourinary:  Negative for dysuria and urgency.   Neurological:  Positive for dizziness and weakness.   All other systems reviewed and are negative.    Past Medical History   has a past medical history of HTN and Type II or unspecified type diabetes mellitus without mention of complication, not stated as uncontrolled.    Surgical History   has a past surgical history that includes laparoscopic gastric ulcer over sew and prostatectomy, radical retro.     Family History  family history includes Depression in his sister, sister, and sister; Heart Disease in his father and sister; No Known Problems in his son; Stroke in his mother.     Social History   reports that he has never smoked. He has never used smokeless tobacco. He reports that he does not currently use alcohol. He reports that he does not use drugs.    Allergies  No Known Allergies    Medications  Prior to Admission Medications   Prescriptions Last Dose Informant Patient Reported? Taking?   Alcohol Swabs   No No   Sig: Wipe site with prep pad prior to injection.   Blood Glucose Meter Kit   No No   Sig: Test blood sugar as recommended by provider. Brand as covered by insurance - blood glucose monitoring kit.   Blood Glucose Test Strips   No No   Sig: Use one Brand as covered by insurance  strip to test blood sugar once daily early morning before first meal and on occasion 2 hr post meal.   Lancets   No No   Sig: Use one Brand as covered by insurance  lancet to test blood sugar once daily early morning before first meal.   apixaban (ELIQUIS) 2.5mg Tab   No No   Sig: Take 1 Tablet by mouth 2 times a day.   furosemide (LASIX) 40 MG Tab   No No   Sig: Take 1 Tablet by mouth every day.   glipiZIDE (GLUCOTROL) 5 MG Tab   No No   Sig: Take 1 Tablet by mouth every day.      Facility-Administered Medications: None       Physical Exam  Temp:  [36.7 °C (98 °F)] 36.7 °C (98 °F)  Pulse:  [53-63] 63  Resp:  [18-27] 19  BP: (133-182)/(58-85) 162/73  SpO2:  [99 %] 99  %  Blood Pressure : 133/58   Temperature: 36.7 °C (98 °F)   Pulse: (!) 53   Respiration: 18   Pulse Oximetry: 99 %       Physical Exam  Vitals and nursing note reviewed.   Constitutional:       General: He is not in acute distress.     Appearance: He is not toxic-appearing.   HENT:      Head: Normocephalic.      Mouth/Throat:      Mouth: Mucous membranes are moist.   Eyes:      General:         Right eye: No discharge.         Left eye: No discharge.   Cardiovascular:      Rate and Rhythm: Bradycardia present. Rhythm irregular.   Pulmonary:      Effort: No respiratory distress.      Breath sounds: No wheezing or rales.      Comments: Diminished at bases  Abdominal:      Palpations: Abdomen is soft.      Tenderness: There is no abdominal tenderness.      Comments: Small umbilical hernia, nontender   Genitourinary:     Comments: Large left inguinal hernia, soft but tender to palpation  Musculoskeletal:      Cervical back: Neck supple.      Right lower leg: Edema (3+) present.      Left lower leg: Edema (3+) present.   Skin:     General: Skin is warm and dry.   Neurological:      Mental Status: He is alert and oriented to person, place, and time.       Laboratory:  Recent Labs     09/23/22  1723   WBC 8.0   RBC 3.62*   HEMOGLOBIN 12.6*   HEMATOCRIT 38.3*   .8*   MCH 34.8*   MCHC 32.9*   RDW 55.5*   PLATELETCT 160*   MPV 9.8     Recent Labs     09/23/22  1723   SODIUM 139   POTASSIUM 3.8   CHLORIDE 105   CO2 22   GLUCOSE 158*   BUN 25*   CREATININE 1.58*   CALCIUM 8.8     Recent Labs     09/23/22  1723   ALTSGPT 8   ASTSGOT 12   ALKPHOSPHAT 119*   TBILIRUBIN 1.9*   GLUCOSE 158*         Recent Labs     09/23/22  1723   NTPROBNP 81720*         Recent Labs     09/23/22  1723   TROPONINT 135*       Imaging:  DX-CHEST-PORTABLE (1 VIEW)   Final Result      Stable at least moderate right pleural effusion.      EC-ECHOCARDIOGRAM COMPLETE W/O CONT    (Results Pending)       EKG - afib, QTC  607    Assessment/Plan:  Justification for Admission Status  I anticipate this patient will require at least two midnights for appropriate medical management, necessitating inpatient admission because needing IV diuresis with close monitoring of CKD    Patient will need a  bed on EMERGENCY service .  The need is secondary to CHF exacerbation.    * Shortness of breath- (present on admission)  Assessment & Plan  Due to pleural effusion from CHF vs cirrhosis with underlying CKD  Diuresis ordered    Heart failure with reduced ejection fraction and diastolic dysfunction (HCC)- (present on admission)  Assessment & Plan  Last TTE 35-40%  Dr. Goodman's note 10/20/2021 notes he's not on BB due to bradycardia and was monitoring renal function before starting ACEI/ARB  He appears fluid overloaded and BNP is >08904. He has not been taking lasix.  IV lasix 40mg BID  Monitor IOs and daily weight  Check TTE  If renal function stable, consider starting ACEI/ARB  Also consider SGLT2 given his concurrent DM      Prolonged QT interval  Assessment & Plan  QTc >600  Avoid qt prolonging meds  Telemetry     Troponin I above reference range  Assessment & Plan  Denies any chest pain  May be from CHF exacerbation  Trend troponin  telemetry    Type 2 diabetes mellitus with chronic kidney disease, without long-term current use of insulin (HCC)- (present on admission)  Assessment & Plan  A1c 7.6%  Hold glipizide  ISS    Atrial fibrillation, chronic (HCC)- (present on admission)  Assessment & Plan  Cont Eliquis  Bradycardic, not on kina blockers because of this    CKD stage 3 due to type 2 diabetes mellitus (HCC)- (present on admission)  Assessment & Plan  Chronic, monitor    Alcoholic cirrhosis of liver with ascites (HCC)- (present on admission)  Assessment & Plan  Followed by GI, not on any medications for this  Quit alcohol years ago  If no CHF on TTE, consider starting aldactone/lasix     Anemia- (present on admission)  Assessment &  Plan  Chronic, stable    History of prostate cancer- (present on admission)  Assessment & Plan  Noted      VTE prophylaxis: therapeutic anticoagulation with Eliquis

## 2022-09-24 NOTE — ASSESSMENT & PLAN NOTE
Secondary to pleural effusion the patient has considerable shortness of breath  Patient will need thoracentesis  Discussed long-term management of recurrent pleural effusion with the patient and wife at bedside.  Continue with oxygen support

## 2022-09-24 NOTE — ASSESSMENT & PLAN NOTE
Patient is pleural effusion at this point initially found on chest x-ray is recurrent  Repeat CT ordered as the patient's chest x-ray was very ambiguous on the location of the effusion and discussed that with critical care  CT at this point shows significant right-sided pleural effusion with mild left-sided pleural effusion  Most likely amenable to drainage and discussed this with critical care.

## 2022-09-24 NOTE — ED NOTES
Report from Ximena MURCIA.  Introduced self and RN role. Oriented pt. Room and how to get help. Pt verbalizes understanding. Warm blanket given.   Wife at bedside.

## 2022-09-24 NOTE — PROGRESS NOTES
12-hour chart check complete.    Monitor Summary  Rhythm: Afib/Aflutter, SB  Rate: 47-63  Ectopy: 5 beat run Vtach, neeraj, trigem,  Measurements: /0.08/

## 2022-09-24 NOTE — ASSESSMENT & PLAN NOTE
I met with the patient and his wife at bedside to discuss thoracentesis.  I explained that pleural effusions are common in cirrhosis and best treated with appropriate diuretics.  The reaccumulation of fluid this fast demonstrates a need for maintenance diuretic therapy. The patient appears comfortable and is saturating well.  We discussed performing a thoracentesis today however I am not convinced that the benefits outweigh the risks at this time. Repeat thoracentesis for transudative effusions are not recommended unless patients are demonstrating hypoxic respiratory failure with significant symptoms and should not be routinely performed.  Thoracentesis comes with risks of bleeding, hydropneumothorax and and bronchopleural fistula formation.  I would recommend the addition of Aldactone to Lasix therapy.  I would use this regimen and make adjustments as possible with outpatient drainage by IR only in the instance of significant shortness of breath refractory to adequate trials of diuretic.  If the effusion recurs in that situation I strongly recommend PleurX catheter placement for home drainage.  If significant shortness of breath develops during his stay I would be more than happy to drain his effusion.  I will refer to pulmonary for follow up.     Summer Green MD RD  Pulmonary and Critical Care    Available on Voalte

## 2022-09-24 NOTE — ASSESSMENT & PLAN NOTE
Gastroenterology follows the patient as an outpatient.  Patient states that he was drinking in the past maybe 1 gin and tonic at nighttime.  Never was a heavy drinker though.  Patient never had complete work-up on the cause of his cirrhosis  At this point given the patient's age and comorbidities he would not be a good candidate for transplant and thus no further biopsies or work-up will be done at this point in time  Patient will need ongoing stabilization of his fluid overload.  Continue at this point with diuresis

## 2022-09-24 NOTE — PROGRESS NOTES
4 Eyes Skin Assessment Completed by Deshaun RN and Diane RN.    Head WDL  Ears WDL  Nose WDL  Mouth WDL  Neck WDL  Breast/Chest WDL  Shoulder Blades WDL  Spine WDL  (R) Arm/Elbow/Hand Bruising  (L) Arm/Elbow/Hand Bruising  Abdomen WDL  Groin WDL  Scrotum/Coccyx/Buttocks Blanching  (R) Leg Edema  (L) Leg Edema  (R) Heel/Foot/Toe WDL  (L) Heel/Foot/Toe WDL          Devices In Places Tele Box and Condom Cath      Interventions In Place Pillows and Low Air Loss Mattress    Possible Skin Injury No    Pictures Uploaded Into Epic N/A  Wound Consult Placed N/A  RN Wound Prevention Protocol Ordered no

## 2022-09-24 NOTE — ASSESSMENT & PLAN NOTE
-accus with sliding scale coverage  -diabetic diet  -diabetic education  -follow glycohemoglobin levels long term, most recent hemoglobin A1c 7.6  -Home glipizide at this point being held.  -monitor for hypoglycemic episodes and adjust control if he should get low

## 2022-09-24 NOTE — PROGRESS NOTES
Hospital Medicine Daily Progress Note    Date of Service  9/24/2022    Chief Complaint  Sammy Oseguera is a 82 y.o. male admitted 9/23/2022 with shortness of breath    Hospital Course  Sammy is an 82-year-old gentleman who comes into the hospital at the request of his primary care physician for evaluation of right-sided pleural effusion.  The patient most recently had a sudden onset of shortness of breath and was found to have right pleural effusion which was drained about a week ago.  Patient has recurrence of the pleural effusion.  Primary care physician asked that the patient come to the hospital for drainage.  Patient has a long history of cirrhosis of the liver which is ongoing for about the past 4 years.  He does see gastroenterology Dr Dillard for this as an outpatient.  The patient also has very poor cardiac function with left ventricular ejection fraction about 35%.  The patient has significant fluid overload especially given his poor renal functions.  CT evaluation was done today which does show significant pleural effusion.  The patient will need at this point a thoracentesis.  This was discussed with critical care, they will evaluate for thoracentesis.    Interval Problem Update  Evaluation for thoracentesis by critical care will be done  Continue with diuresis  Currently patient is saturating 100% on room air  Optimize diabetic management  Optimize blood pressure  Continue anticoagulation for atrial fibrillation    I have discussed this patient's plan of care and discharge plan at IDT rounds today with Case Management, Nursing, Nursing leadership, and other members of the IDT team.    Consultants/Specialty  critical care    Code Status  DNAR/DNI    Disposition  Patient is not medically cleared for discharge.   Anticipate discharge to to home with close outpatient follow-up.  I have placed the appropriate orders for post-discharge needs.    Review of Systems  Review of Systems   Constitutional:   Positive for malaise/fatigue and weight loss. Negative for chills, diaphoresis and fever.   HENT: Negative.     Eyes: Negative.  Negative for double vision.   Respiratory:  Positive for cough and shortness of breath. Negative for hemoptysis and sputum production.    Cardiovascular:  Positive for leg swelling. Negative for chest pain and palpitations.   Gastrointestinal: Negative.  Negative for abdominal pain, blood in stool, constipation, diarrhea, heartburn, nausea and vomiting.   Genitourinary: Negative.  Negative for frequency, hematuria and urgency.   Musculoskeletal: Negative.  Negative for joint pain.   Skin: Negative.  Negative for itching and rash.   Neurological:  Positive for headaches. Negative for dizziness, focal weakness, seizures and loss of consciousness.   Endo/Heme/Allergies: Negative.  Does not bruise/bleed easily.   Psychiatric/Behavioral: Negative.  Negative for depression, memory loss, substance abuse and suicidal ideas. The patient is not nervous/anxious.    All other systems reviewed and are negative.     Physical Exam  Temp:  [36.3 °C (97.4 °F)-36.7 °C (98 °F)] 36.5 °C (97.7 °F)  Pulse:  [49-63] 54  Resp:  [16-27] 18  BP: (124-182)/(51-91) 132/61  SpO2:  [97 %-100 %] 97 %    Physical Exam  Vitals and nursing note reviewed. Exam conducted with a chaperone present.   Constitutional:       Appearance: Normal appearance. He is well-developed and normal weight. He is ill-appearing.   HENT:      Head: Normocephalic and atraumatic.      Right Ear: External ear normal.      Left Ear: External ear normal.      Nose: Nose normal.      Mouth/Throat:      Mouth: Mucous membranes are dry.   Eyes:      Extraocular Movements: Extraocular movements intact.      Conjunctiva/sclera: Conjunctivae normal.      Pupils: Pupils are equal, round, and reactive to light.   Neck:      Thyroid: No thyromegaly.      Vascular: No JVD.   Cardiovascular:      Rate and Rhythm: Normal rate. Rhythm irregular.      Pulses:  Normal pulses.      Heart sounds: Normal heart sounds.   Pulmonary:      Breath sounds: Examination of the right-middle field reveals decreased breath sounds. Examination of the right-lower field reveals decreased breath sounds. Decreased breath sounds present.   Chest:      Chest wall: No tenderness.   Abdominal:      General: Abdomen is flat. Bowel sounds are normal. There is no distension.      Palpations: Abdomen is soft. There is no mass.      Tenderness: There is no abdominal tenderness. There is no guarding or rebound.   Musculoskeletal:         General: Normal range of motion.      Cervical back: Normal range of motion and neck supple.      Right lower leg: Edema present.      Left lower leg: Edema present.   Lymphadenopathy:      Cervical: No cervical adenopathy.   Skin:     General: Skin is warm and dry.      Capillary Refill: Capillary refill takes less than 2 seconds.      Findings: No rash.   Neurological:      General: No focal deficit present.      Mental Status: He is alert and oriented to person, place, and time. Mental status is at baseline.      Cranial Nerves: No cranial nerve deficit.      Deep Tendon Reflexes: Reflexes are normal and symmetric.   Psychiatric:         Mood and Affect: Mood normal.         Behavior: Behavior normal.         Thought Content: Thought content normal.         Judgment: Judgment normal.       Fluids    Intake/Output Summary (Last 24 hours) at 9/24/2022 1418  Last data filed at 9/24/2022 1036  Gross per 24 hour   Intake --   Output 2475 ml   Net -2475 ml       Laboratory  Recent Labs     09/23/22  1723 09/24/22  0528   WBC 8.0 8.5   RBC 3.62* 3.43*   HEMOGLOBIN 12.6* 11.7*   HEMATOCRIT 38.3* 35.9*   .8* 104.7*   MCH 34.8* 34.1*   MCHC 32.9* 32.6*   RDW 55.5* 54.4*   PLATELETCT 160* 146*   MPV 9.8 9.9     Recent Labs     09/23/22  1723 09/24/22  0528   SODIUM 139 141   POTASSIUM 3.8 4.7   CHLORIDE 105 105   CO2 22 22   GLUCOSE 158* 160*   BUN 25* 26*   CREATININE  1.58* 1.73*   CALCIUM 8.8 9.1                   Imaging  CT-CHEST (THORAX) W/O   Final Result      1.  Moderate right pleural effusion with presumed compressive atelectasis within the right lower lobe. Correlate clinically for infection.   2.  Trace left pleural fluid.   3.  Cardiomegaly.   4.  Atherosclerosis including prominent coronary calcification.   5.  Small amount of perihepatic and perisplenic fluid      Fleischner Society pulmonary nodule recommendations:   Not Applicable         EC-ECHOCARDIOGRAM COMPLETE W/O CONT   Final Result      DX-CHEST-PORTABLE (1 VIEW)   Final Result      Stable at least moderate right pleural effusion.           Assessment/Plan  * Shortness of breath- (present on admission)  Assessment & Plan  Secondary to pleural effusion the patient has considerable shortness of breath  Patient will need thoracentesis  Discussed long-term management of recurrent pleural effusion with the patient and wife at bedside.  Continue with oxygen support    Pleural effusion  Assessment & Plan  Patient is pleural effusion at this point initially found on chest x-ray is recurrent  Repeat CT ordered as the patient's chest x-ray was very ambiguous on the location of the effusion and discussed that with critical care  CT at this point shows significant right-sided pleural effusion with mild left-sided pleural effusion  Most likely amenable to drainage and discussed this with critical care.    Heart failure with reduced ejection fraction and diastolic dysfunction (HCC)- (present on admission)  Assessment & Plan  Most recent left ventricular ejection fraction 35%  Continue with diuresis and avoid beta-blockers given low heart rate  Optimize intake and output monitoring.  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  Patient states that he was drinking in the past maybe 1 gin and  tonic at nighttime.  Never was a heavy drinker though.  Patient never had complete work-up on the cause of his cirrhosis  At this point given the patient's age and comorbidities he would not be a good candidate for transplant and thus no further biopsies or work-up will be done at this point in time  Patient will need ongoing stabilization of his fluid overload.  Continue at this point with diuresis    Troponin I above reference range  Assessment & Plan  Most likely related to chronic renal insufficiency  Currently right heart in the 120 range.    Type 2 diabetes mellitus with chronic kidney disease, without long-term current use of insulin (HCC)- (present on admission)  Assessment & Plan  -accus with sliding scale coverage  -diabetic diet  -diabetic education  -follow glycohemoglobin levels long term, most recent hemoglobin A1c 7.6  -Home glipizide at this point being held.  -monitor for hypoglycemic episodes and adjust control if he should get low    Atrial fibrillation, chronic (HCC)- (present on admission)  Assessment & Plan  Chronic atrial fibrillation  Continue anticoagulation utilizing Eliquis especially renal impairment  Avoid any beta-blockers or rate lowering agents    CKD stage 3 due to type 2 diabetes mellitus (HCC)- (present on admission)  Assessment & Plan  Monitor renal functions and adjust medications to avoid nephrotoxicity    Prolonged QT interval  Assessment & Plan  Currently avoid any medications that prolong QT interval including antiemetics and antipsychotics as well as benzodiazepines      Anemia- (present on admission)  Assessment & Plan  Monitor H&H if drops below 7 or 21 transfuse.    History of prostate cancer- (present on admission)  Assessment & Plan  Monitor PSA level  The patient apparently had radical prostatectomy and then also had ablation therapy with needles.       VTE prophylaxis: SCDs/TEDs    I have performed a physical exam and reviewed and updated ROS and Plan today  (9/24/2022). In review of yesterday's note (9/23/2022), there are no changes except as documented above.

## 2022-09-25 NOTE — CARE PLAN
The patient is Stable - Low risk of patient condition declining or worsening    Shift Goals  Clinical Goals: fall prevention    Progress made toward(s) clinical / shift goals:  Pt being discharged. Spouse at bedside .    Patient is not progressing towards the following goals:

## 2022-09-25 NOTE — DISCHARGE INSTRUCTIONS
Discharge Instructions    Discharged to home by car with relative. Discharged via wheelchair, hospital escort: Refused.  Special equipment needed: Not Applicable    Be sure to schedule a follow-up appointment with your primary care doctor or any specialists as instructed.     Discharge Plan:        I understand that a diet low in cholesterol, fat, and sodium is recommended for good health. Unless I have been given specific instructions below for another diet, I accept this instruction as my diet prescription.   Other diet: Heart healthy diet    Special Instructions:     HF Patient Discharge Instructions  Monitor your weight daily, and maintain a weight chart, to track your weight changes.   Activity as tolerated, unless your Doctor has ordered otherwise. Other activity order: As tolerated.  Follow a low fat, low cholesterol, low salt diet unless instructed otherwise by your Doctor. Read the labels on the back of food products and track your intake of fat, cholesterol and salt.   Fluid Restriction No. If a Fluid Restriction has been ordered by your Doctor, measure fluids with a measuring cup to ensure that you are not exceeding the restriction.   No smoking.  Oxygen No. If your Doctor has ordered that you wear Oxygen at home, it is important to wear it as ordered.  Did you receive an explanation from staff on the importance of taking each of your medications and why it is necessary to stay on the medications the physician/care provider has ordered? Yes  Do you have any questions concerning how to manage your heart failure and what to do should you have any increased signs and symptoms after you go home? No  Do you feel like your heart failure care team involved you in the care treatment plan and allowed you to make decisions regarding your care while in the hospital and addressed any discharge needs you might have? Yes    See the educational handout provided at discharge for more information on monitoring your daily  weight, activity and diet. This also explains more about Heart Failure, symptoms of a flare-up and some of the tests that you have undergone.     Warning Signs of a Flare-Up include:  Swelling in the ankles or lower legs.  Shortness of breath, while at rest, or while doing normal activities.   Shortness of breath at night when in bed, or coughing in bed.   Requiring more pillows to sleep at night, or needing to sit up at night to sleep.  Feeling weak, dizzy or fatigued.     When to call your Doctor:  Call [facility] about questions regarding the discharge instructions you were given [phone].  (Discharge Unit Med-tele)  Call your Primary Care Physician or Cardiologist if:   You experience any pain radiating to your jaw or neck.  You have any difficulty breathing.  You experience weight gain of 2 lbs in a day or 5 lbs in a week.   You feel any palpitations or irregular heartbeats.  You become dizzy or lose consciousness.   If you have had an angiogram or had a pacemaker or AICD placed, and experience:  Bleeding, drainage or swelling at the surgical / puncture site.  Fever greater than 100.0 F  Shock from internal defibrillator.  Cool and / or numb extremities.    -Is this patient being discharged with medication to prevent blood clots?  No    Is patient discharged on Warfarin / Coumadin?   No

## 2022-09-25 NOTE — DISCHARGE SUMMARY
Discharge Summary    CHIEF COMPLAINT ON ADMISSION  Chief Complaint   Patient presents with    Shortness of Breath     X several days No CP  Pt vaccinated for Covid x 3 shots    Cough     Dry No fever    Leg Swelling     Mild Bilat ankle swelling       Reason for Admission  Shortness of breath, Sent by MD.     Admission Date  9/23/2022    CODE STATUS  DNAR/DNI    HPI & HOSPITAL COURSE  Mr. Lucio Hanson is an 82-year-old gentleman who has multiple comorbidities including end-stage liver disease with ongoing cirrhosis and ascites.  Patient also has heart failure with most recent left ventricular ejection fraction of 35%.  Patient has developed as of recent right-sided pleural effusion that has been drained x1.  The primary care physician sent the patient into the hospital because of worsening shortness of breath with ongoing right-sided pleural effusion for possible thoracentesis.  After a CT of the chest patient does have significant effusion which I discussed with critical care.  After further discussion with the patient and discussion with critical care it was decided that the patient's best course of action would be diuresis and not to tap the lung again.  The patient was placed thus on a combination of Lasix and Aldactone.  Lasix is increased to 40 mg twice daily Aldactone 100 mg daily.  With this combination the patient has put out over 4 L of fluid.  The patient's shortness of breath has considerably improved.  After discussion with long-term management regarding his cirrhosis as well as his heart failure is decided that the patient will need long-term management with diuretics as well as follow-ups with gastroenterology and cardiology as well as pulmonary.  Patient at this point can just discharge home with outpatient follow-ups and monitoring as he has been stabilized.  Patient and his wife at this point had extensive discussion with me at bedside and understand at this point the plan for care.    Therefore,  he is discharged in good and stable condition to home with close outpatient follow-up.    The patient met 2-midnight criteria for an inpatient stay at the time of discharge.    Discharge Date  9/25/2022    FOLLOW UP ITEMS POST DISCHARGE  Follow-up with the primary care physician in 2 to 3 days  Follow-up with cardiology as scheduled  Follow-up with gastroenterology as scheduled  Referral for pulmonology has been placed and patient should follow-up with pulmonology as scheduled    DISCHARGE DIAGNOSES  Principal Problem:    Shortness of breath POA: Yes  Active Problems:    Alcoholic cirrhosis of liver with ascites (HCC) POA: Yes      Overview: Quit drinking alcohol heavily about 3-5 years ago. Sees GI, Stable,       continue current plan of care with current medications.           Heart failure with reduced ejection fraction and diastolic dysfunction (HCC) POA: Yes      Overview: Stable, continue current plan of care with current medications.           Pleural effusion POA: Unknown    CKD stage 3 due to type 2 diabetes mellitus (HCC) POA: Yes      Overview: Hasn't been needing glipizide, has a rx at home for prn elevated sugars.       He checks his sugars but isn't sure how to interpret them.      His A1c on October 12th 2021 was 7.1. 03/17/22 8.1      unStable, continue current plan of care with current medications. Will       refer to CCM for dm education. rec resume 2.5mg/d of glipizide.                Atrial fibrillation, chronic (HCC) POA: Yes      Overview: Stable, continue current plan of care with current medications.           Type 2 diabetes mellitus with chronic kidney disease, without long-term current use of insulin (HCC) POA: Yes    Troponin I above reference range POA: Unknown    History of prostate cancer POA: Yes      Overview: Radical prostatectomy about in 2012. PSA has been around 0.05 since 2012    Anemia POA: Yes    Prolonged QT interval POA: Unknown  Resolved Problems:    * No resolved hospital  problems. *      FOLLOW UP  Future Appointments   Date Time Provider Department Center   10/17/2022 12:00 PM [facility] [place] None     No follow-up provider specified.    MEDICATIONS ON DISCHARGE     Medication List        START taking these medications        Instructions   spironolactone 100 MG Tabs  Start taking on: September 26, 2022  Commonly known as: ALDACTONE   Take 1 Tablet by mouth every day.  Dose: 100 mg            CHANGE how you take these medications        Instructions   furosemide 40 MG Tabs  What changed: when to take this  Commonly known as: LASIX   Take 1 Tablet by mouth 2 times a day.  Dose: 40 mg            CONTINUE taking these medications        Instructions   Alcohol Swabs   Doctor's comments: Per formulary preference. ICD-10 code: E11.65 Uncontrolled type 2 Diabetes Mellitus  Wipe site with prep pad prior to injection.     apixaban 2.5mg Tabs  Commonly known as: Eliquis   Doctor's comments: Please refill when requested by the patient.  Take 1 Tablet by mouth 2 times a day.  Dose: 2.5 mg     * Blood Glucose Meter Kit   Doctor's comments: Or per formulary preference. ICD-10 code: E11.65 Uncontrolled type 2 Diabetes Mellitus Brand as covered by insurance  Test blood sugar as recommended by provider. Brand as covered by insurance - blood glucose monitoring kit.     * Blood Glucose Test Strips   Doctor's comments: Or per formulary preference. ICD-10 code: E11.65 Uncontrolled type 2 Diabetes Mellitus  Use one Brand as covered by insurance  strip to test blood sugar once daily early morning before first meal and on occasion 2 hr post meal.     glipiZIDE 5 MG Tabs  Commonly known as: GLUCOTROL   Doctor's comments: Take one tablet daily with breakfast  Take 1 Tablet by mouth every day.  Dose: 5 mg     Lancets   Doctor's comments: Or per formulary preference. ICD-10 code: E11.65 Uncontrolled type 2 Diabetes Mellitus  Use one Brand as covered by insurance  lancet to test blood sugar once daily  early morning before first meal.           * This list has 2 medication(s) that are the same as other medications prescribed for you. Read the directions carefully, and ask your doctor or other care provider to review them with you.                  Allergies  No Known Allergies    DIET  Orders Placed This Encounter   Procedures    Diet Order Diet: 2 Gram Sodium     Standing Status:   Standing     Number of Occurrences:   1     Order Specific Question:   Diet:     Answer:   2 Gram Sodium [7]       ACTIVITY  As tolerated.  Weight bearing as tolerated    CONSULTATIONS  Critical care    PROCEDURES  None    LABORATORY  Lab Results   Component Value Date    SODIUM 141 09/24/2022    POTASSIUM 4.7 09/24/2022    CHLORIDE 105 09/24/2022    CO2 22 09/24/2022    GLUCOSE 160 (H) 09/24/2022    BUN 26 (H) 09/24/2022    CREATININE 1.73 (H) 09/24/2022        Lab Results   Component Value Date    WBC 8.5 09/24/2022    HEMOGLOBIN 11.7 (L) 09/24/2022    HEMATOCRIT 35.9 (L) 09/24/2022    PLATELETCT 146 (L) 09/24/2022        Total time of the discharge process exceeds 38 minutes.

## 2022-09-25 NOTE — CARE PLAN
The patient is Watcher - Medium risk of patient condition declining or worsening    Shift Goals  Clinical Goals: Maintain pt safety    Progress made toward(s) clinical / shift goals:  Injury free this shift    Patient is not progressing towards the following goals:

## 2022-09-25 NOTE — PROGRESS NOTES
12-hour chart check complete.    Monitor Summary  Rhythm: AFIB/SB  Rate: 32-61  Ectopy: 5 beats VTAC  Measurements: --/0.08/--

## 2022-09-26 PROBLEM — E11.29 TYPE 2 DIABETES MELLITUS WITH MICROALBUMINURIA (HCC): Status: ACTIVE | Noted: 2022-01-01

## 2022-09-26 PROBLEM — R80.9 TYPE 2 DIABETES MELLITUS WITH MICROALBUMINURIA (HCC): Status: ACTIVE | Noted: 2022-01-01

## 2022-09-26 NOTE — PROGRESS NOTES
Subjective:     Sammy Oseguera is a 82 y.o. male who presents for Hospital Follow-up.    POST DISCHARGE CALL:  Discharge Date:9/25/2022   Date of Outreach Call: 9/26/2022  9:03 AM  Now that you're home, how are you doing? Good  Comment:Son reports patient doing better since  discharge.  Did you receive any new prescriptions? Yes  Were you able to fill those medications? Yes  How did you fill those prescriptions? *  If not able to fill prescriptions, why? *    Do you have questions about your medications? Yes  Comment:Son would like to review all of medications with  PCP  Do you have a follow-up appointment scheduled?Yes  Comment:9/26/22  Any issues or paperwork you wish to discuss with your PCP? Discuss recent hospitalization  Does patient qualify for CCM Program? Yes  If patient qualifies, was CCM Program Introduced? *  If patient does not qualify, comment? *  Number of Attempts: 1  Current or previous attempts completed within two business days of discharge? Yes  Provided education regarding treatment plan, medication, self-management, ADLs? Yes  Has patient completed Advance Directive? If yes, advise them to bring to appointment. Yes  Care Manager phone number provided? No  Is there anything else I can help you with? No  Comment:Son wants to discuss recent discharge  instructions with PCP  Chief Complaint? Shortness of breath  Admitting Dx? *  Discharge Diagnosis? Shortness of breath  Additional Comments? *    HPI:   Recently hospitalized for right pleural effusion    He was hospitalized from September 23 through September 25 for right pleural effusion.  He had this drained approximately 2 weeks ago.  At that time 2 L of fluid was drained.  He was seen in our office last week and at that time had not been taking any diuretics.  During his hospitalization CT chest was ordered which did show right moderate pleural effusion.  He was started on Lasix 40 twice daily and Aldactone 100 mg daily.  Pulmonology was  also consulted.  Her consultation notes were reviewed.  Her recommendations were to continue with the Aldactone and the Lasix instead of a repeat thoracocentesis.  She recommended that if he has a recurrent effusion and that she would recommend Pleurx catheter placement for home drainage.  With this combination he did have approximately 4 L of fluid output.  He has upcoming appointments with cardiology APRN on October 7.  Upcoming appointment with pulmonary APRN on October 24.    He was discharged on spironolactone 100 mg.  States that the pharmacy does not have the spironolactone ready currently.  Has been taking  Lasix 40 twice daily.  He has lost 12 pounds since hospitalization    Current medicines (including reconciliation performed today)  Current Outpatient Medications   Medication Sig Dispense Refill    furosemide (LASIX) 40 MG Tab Take 1 Tablet by mouth 2 times a day. 60 Tablet 0    spironolactone (ALDACTONE) 100 MG Tab Take 1 Tablet by mouth every day. 30 Tablet 3    glipiZIDE (GLUCOTROL) 5 MG Tab Take 1 Tablet by mouth every day. 100 Tablet 1    apixaban (ELIQUIS) 2.5mg Tab Take 1 Tablet by mouth 2 times a day. 180 Tablet 3    Blood Glucose Meter Kit Test blood sugar as recommended by provider. Brand as covered by insurance - blood glucose monitoring kit. 1 Kit 0    Blood Glucose Test Strips Use one Brand as covered by insurance  strip to test blood sugar once daily early morning before first meal and on occasion 2 hr post meal. 100 Strip 0    Lancets Use one Brand as covered by insurance  lancet to test blood sugar once daily early morning before first meal. 100 Each 0    Alcohol Swabs Wipe site with prep pad prior to injection. 100 Each 0     No current facility-administered medications for this visit.       Allergies:   Patient has no known allergies.    Social History     Tobacco Use    Smoking status: Never    Smokeless tobacco: Never   Vaping Use    Vaping Use: Never used   Substance Use Topics     Alcohol use: Not Currently     Comment: quit 4-5 yr ago    Drug use: No       ROS:  Cough and SOB improved    Objective:     Vitals:    09/26/22 1546   BP: 110/66   Pulse: 77   Resp: 12   Temp: 36.8 °C (98.2 °F)   TempSrc: Temporal   SpO2: 93%   Weight: 69.9 kg (154 lb)   Height: 1.829 m (6')     Body mass index is 20.89 kg/m².    Physical Exam:  Constitutional: Alert, no distress, well-groomed.  Skin: Warm, dry, good turgor, no rashes in visible areas.  Eye: conjunctiva clear, lids normal.  ENMT: Lips without lesions, good dentition, moist mucous membranes.  Neck: supple  Respiratory: Unlabored respiratory effort, decreased breath sounds right bases  Cardio: No LE edema  Abdomen:  no gross masses.  MSK: Normal gait, moves all extremities.  Neuro: no tremors  Psych: Alert and oriented x3, normal affect and mood.     Assessment and Plan:   1. Hospital discharge follow-up  2. Pleural effusion  Hospital records reviewed.  Medication reconciliation completed today.  Received his last spironolactone 100 mg yesterday.  Still waiting for pharmacy to have this medication ready for pickup.  Encourage patient to start checking daily weights along with daily fingersticks.  Discussed with patient and family that he is to take these diuretics every day.  Follow-up with cardiology as scheduled on October 7 and with pulmonology as scheduled on October 24.    3. Type 2 diabetes mellitus with microalbuminuria, without long-term current use of insulin (HCC)  4. CKD stage 3 due to type 2 diabetes mellitus (HCC)  -Chronic medical diagnosis.  Not well controlled.  Recent labs with GFR at 39.  Most recent A1c was at 7.6%.  Encouraged to start checking daily fasting fingersticks.  He had been taking glipizide 5 mg, half a tablet, if his blood sugars were over 160.  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  Has never been evaluated by a nephrologist.    Referral to  Nephrology    - Chart and discharge summary were reviewed.   - Hospitalization and results reviewed with patient.   - Medications reviewed including instructions regarding high risk medications, dosing and side effects.  - Recommended Services: No services needed at this time  - Advance directive/POLST on file?  Yes    Follow-up:No follow-ups on file.    Face-to-face transitional care management services with HIGH (today's visit is within days post discharge & LACE+ score 59+) medical decision complexity were provided.     LACE+ Historical Score Over Time (0-28: Low, 29-58: Medium, 59+: High): 84

## 2022-09-26 NOTE — PROGRESS NOTES
Nurse CM post discharge outreach for TCM. Patient's son, Robles Oseguera answered telephone. Reports they have questions regarding medications and discharge instructions. Nurse reviewed discharge medications with son. Son reports over the past year they had specific instructions as to when to give the lasix depending on patients weight. Son would like to discuss with PCP. States they currently don't have a follow-up scheduled with cardiology. Nurse offered to provide cardiology telephone. Son declining, states he will have patient follow-up with PCP.   Patient is scheduled to see PCP today at 3:45 pm for hospital follow-up. Son aware of this appointment. Son has no other questions for CM at this time.

## 2022-10-05 NOTE — TELEPHONE ENCOUNTER
Standard of care for cirrhosis is diuretics at a ratio of spironolactone 100 mg : lasix 40 mg to keep fluid accumulation in the lungs/abdomen/legs etc well controlled. Looks like he will see cardiology in 2 days who can assess his fluid status and help determine if he is dehydrated. Have son look in mouth to see if his tongue/cheeks are excessively dry and report his blood pressure and heart rate to make sure he is not orthostatic. Is patient eating well? Also we need to determine an ideal weight for him so when they check at home they know when to be concerned.

## 2022-10-05 NOTE — TELEPHONE ENCOUNTER
Patient's son called and is concerned about his weight. He was seen in the office on 09/26 and his weight was 154 lbs. Today is weight is 146 lbs. Patient feels good, he is no longer coughing. Son is concerned he is losing weight daily and might be dehydrated. He was started on Lasix and spironolactone daily instead of as needed. Son wants to know if we need to go back to having the medications as needed to avoid any more weight loss. Please advise.

## 2022-10-05 NOTE — TELEPHONE ENCOUNTER
Patient informed of message. Vitals are stable and he has no tongue/cheek dryness. He has been eating well and family will continue to monitor him. They will follow up with Cardiology and determine a new ideal weight and adjust medications as needed during that appointment.

## 2022-10-07 NOTE — Clinical Note
With end-stage liver disease and impression that patient did not want undergo any further diagnostic testing.  He agreed on maintenance diuresis and further advanced care planning at home. Let me know if you want to do anything else for this gentleman.

## 2022-10-07 NOTE — PROGRESS NOTES
Chief Complaint   Patient presents with    Atrial Fibrillation     F/V DX: Atrial fibrillation, chronic (HCC)        Subjective     Lucio Oseguera is a 82 y.o. male who presents today as heart failure new hospital follow-up after recent hospitalization on 9/25/2022 for fluid volume overload with end-stage liver disease, with ascites and portal hypertension, chronic kidney disease stage III, recent thoracentesis for large pleural effusion.  Patient was last seen by Dr. Goodman on 10/20/2021 and Dr. Pathak on 11/5/2021.     Since patient was last seen, his EF further reduced to 35%.  There is previous concern of infiltrative cardiomyopathy and work-up partially completed with positive light chains and referral to oncology.  Patient was also referred to EP for consideration of BiV ICD, discussed with Dr. Pathak, no further discussion made.  Per patient prior to recent hospitalization he was doing well and asymptomatic, and lost to follow-up.      Today, patient reports he is fatigued and is unsure why he is here in the cardiology office today.  His son notes that he gets intermittent lower extremity edema.  Patient denies chest pain, shortness of breath, palpitations, dizziness/lightheadedness, orthopnea, PND.  Patient saw his gastroenterologist last week, cannot remember recommendations.  We will request most recent office visit note.  Patient has follow-up appointment with nephrology next week.    Based on physical examination findings, patient is near euvolemic. No JVD, lungs are diminished, but clear to auscultation, no pitting edema in bilateral lower extremities, + ascites. Dry weight is currently unknown.  Weight in office today is 150 pounds.    In light of worsening liver function and further reduced EF, lengthy discussion regarding pathophysiology and limited options for treatment at this time.  Patient declines further work-up for amyloid.  We discussed lifestyle self-care for monitoring fluid volume status  at home.  Advance care planning discussion took place as ACP docs completed in 2019.  Offered referral to palliative care; patient and family declined at this time.  We discussed close follow-up with GI and monitoring platelets as well as risk versus benefit for continuing OAC.  We discussed continuing high-dose spironolactone for portal hypertension and Lasix as tolerated.  In light of possible infiltrative cardiomyopathy and reduced EF, evidence shows poor ACE/ARB/Arni's and beta-blockers which we will not initiate at this time.  Family appreciated extensive discussion and will let us know if goals of care change for additional recommendations.      Past Medical History:   Diagnosis Date    HTN     Type II or unspecified type diabetes mellitus without mention of complication, not stated as uncontrolled      Past Surgical History:   Procedure Laterality Date    LAPAROSCOPIC GASTRIC ULCER OVER SEW      PROSTATECTOMY, RADICAL RETRO       Family History   Problem Relation Age of Onset    Stroke Mother     Heart Disease Father     Depression Sister     Heart Disease Sister     Depression Sister     Depression Sister     No Known Problems Son      Social History     Socioeconomic History    Marital status:      Spouse name: Not on file    Number of children: Not on file    Years of education: Not on file    Highest education level: Not on file   Occupational History    Not on file   Tobacco Use    Smoking status: Never    Smokeless tobacco: Never   Vaping Use    Vaping Use: Never used   Substance and Sexual Activity    Alcohol use: Not Currently     Comment: quit 4-5 yr ago    Drug use: No    Sexual activity: Yes     Comment:    Other Topics Concern    Not on file   Social History Narrative     in '[birth year]. They have 2 children. He was an  at 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 He quit drinking alcohol when prior PCP told  "him about his liver damage around age 76. He was drinking at most 2oz of alcohol daily. Wife doesn't drink etoh, no h/o drug use.      Social Determinants of Health     Financial Resource Strain: Not on file   Food Insecurity: Not on file   Transportation Needs: Not on file   Physical Activity: Not on file   Stress: Not on file   Social Connections: Not on file   Intimate Partner Violence: Not on file   Housing Stability: Not on file     No Known Allergies  Outpatient Encounter Medications as of 10/7/2022   Medication Sig Dispense Refill    furosemide (LASIX) 40 MG Tab Take 1 Tablet by mouth 2 times a day. 60 Tablet 3    spironolactone (ALDACTONE) 100 MG Tab Take 1 Tablet by mouth every day. 30 Tablet 3    glipiZIDE (GLUCOTROL) 5 MG Tab Take 1 Tablet by mouth every day. 100 Tablet 1    apixaban (ELIQUIS) 2.5mg Tab Take 1 Tablet by mouth 2 times a day. 180 Tablet 3    Blood Glucose Meter Kit Test blood sugar as recommended by provider. Brand as covered by insurance - blood glucose monitoring kit. 1 Kit 0    Blood Glucose Test Strips Use one Brand as covered by insurance  strip to test blood sugar once daily early morning before first meal and on occasion 2 hr post meal. 100 Strip 0    Lancets Use one Brand as covered by insurance  lancet to test blood sugar once daily early morning before first meal. 100 Each 0    Alcohol Swabs Wipe site with prep pad prior to injection. 100 Each 0    [DISCONTINUED] furosemide (LASIX) 40 MG Tab Take 1 Tablet by mouth 2 times a day. 60 Tablet 0     No facility-administered encounter medications on file as of 10/7/2022.     ROS negative except as noted in HPI           Objective     /52 (BP Location: Left arm, Patient Position: Sitting, BP Cuff Size: Adult)   Pulse (!) 54   Resp 16   Ht 1.803 m (5' 11\")   Wt 68.2 kg (150 lb 6.4 oz)   SpO2 99%   BMI 20.98 kg/m²     Physical Exam  Vitals reviewed.   Constitutional:       Appearance: He is well-developed. He is ill-appearing " and toxic-appearing.   HENT:      Head: Normocephalic and atraumatic.   Eyes:      Pupils: Pupils are equal, round, and reactive to light.   Neck:      Vascular: No JVD.   Cardiovascular:      Rate and Rhythm: Normal rate and regular rhythm.      Pulses: Normal pulses.      Heart sounds: Normal heart sounds. No murmur heard.    No friction rub. No gallop.   Pulmonary:      Effort: Pulmonary effort is normal. No respiratory distress.      Breath sounds: Normal breath sounds.   Abdominal:      General: Bowel sounds are normal. There is no distension.      Palpations: Abdomen is soft.   Musculoskeletal:      Right lower leg: No edema.      Left lower leg: No edema.   Skin:     General: Skin is warm and dry.      Findings: No erythema.   Neurological:      General: No focal deficit present.      Mental Status: He is alert and oriented to person, place, and time.   Psychiatric:         Behavior: Behavior normal.          Lab Results   Component Value Date/Time    CHOLSTRLTOT 87 (L) 08/09/2022 11:10 AM    LDL 31 08/09/2022 11:10 AM    HDL 47 08/09/2022 11:10 AM    TRIGLYCERIDE 44 08/09/2022 11:10 AM       Lab Results   Component Value Date/Time    SODIUM 141 09/24/2022 05:28 AM    POTASSIUM 4.7 09/24/2022 05:28 AM    CHLORIDE 105 09/24/2022 05:28 AM    CO2 22 09/24/2022 05:28 AM    GLUCOSE 160 (H) 09/24/2022 05:28 AM    BUN 26 (H) 09/24/2022 05:28 AM    CREATININE 1.73 (H) 09/24/2022 05:28 AM     Lab Results   Component Value Date/Time    ALKPHOSPHAT 119 (H) 09/23/2022 05:23 PM    ASTSGOT 12 09/23/2022 05:23 PM    ALTSGPT 8 09/23/2022 05:23 PM    TBILIRUBIN 1.9 (H) 09/23/2022 05:23 PM      ECHOCARDIOGRAM 11/17/2020  No prior study is available for comparison.   Moderate to severe concentric left ventricular hypertrophy.  Normal left ventricular systolic function. Left ventricular ejection   fraction is visually estimated to be 60%.  Diastolic function is difficult to assess with atrial fibrillation.  Normal inferior vena  cava size and inspiratory collapse.  Estimated right ventricular systolic pressure  is 37 mmHg.    TTE (9/3/2021): Compared to the images of the prior study done 11/17/2020, LV ejection fraction is declined.  Consider amyloidosis as a differential diagnosis.  Left ventricular ejection fraction is visually estimated to be 35-40%.  Global hypokinesis.  Grade III diastolic dysfunction.  Moderate mitral regurgitation.  Moderate tricuspid regurgitation.  Estimated right ventricular systolic pressure  is 40 mmHg.    TTE (10/7/2022):   Compared to the images of the prior study 09/03/2021, there has been no   significant change.   Moderately reduced left ventricular systolic function.  The left ventricular ejection fraction is visually estimated to be 35%.  Global hypokinesis.  Diastolic function is abnormal, but grade cannot be determined.  Reduced right ventricular systolic function.  Moderate mitral regurgitation.  The ascending aorta is not well visualized; possible dilatation.  LVPW Diastolic Thickness          1.8 cm                  Assessment & Plan     1. Pleural effusion        2. Alcoholic cirrhosis of liver with ascites (HCC)  furosemide (LASIX) 40 MG Tab      3. Primary hypertension        4. Portal hypertension (HCC)        5. Atrial fibrillation, chronic (HCC)        6. Secondary hypercoagulable state (HCC)        7. Heart failure with reduced ejection fraction and diastolic dysfunction (HCC)        8. Memory impairment        9. 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  Discussed risks versus benefits and close follow-up if patient's cirrhosis progresses and unsafe to continue.  -Positive  for light chain amyloid.  Unk note unavailable.  Did not complete PYP.  Patient has no intention to complete additional diagnostic testing at this time due to end-stage liver disease.  -If truly amyloid with HFrEF evidence, shows poor tolerance to ACE/ARB/Arni and beta-blocker therapy.  Hold at this time  -Continue spironolactone 100 mg daily for portal hypertension  -Continue Lasix 40 mg twice daily  -Declined referral to palliative care at this time  -Updated advance care planning documents provided to patient and family today    Etoh Cirrhosis with portal hypertension; CKD; DM  -Appreciate GI recommendations as well as nephrology    FU in clinic in 6 months with Dr. Goodman. Sooner if needed.    Patient verbalizes understanding and agrees with the plan of care.     I personally spent a total of 45 minutes which includes face-to-face time and non-face-to-face time spent on preparing to see the patient, reviewing prior notes and tests, obtaining history from the patient, performing a medically appropriate exam, counseling and educating the patient, ordering medications/tests/procedures/referrals as clinically indicated, and documenting information in the electronic medical record.    JONATHAN Koenig.   [facility]  [phone]    PLEASE NOTE: This Note was created using voice recognition Software. I have made every reasonable attempt to correct obvious errors, but I expect that there are errors of grammar and possibly content that I did not discover before finalizing the note

## 2022-10-12 NOTE — PROGRESS NOTES
Subjective     Lucio Oseguera is a 82 y.o. male who presents with New Patient (Type 2 diabetes mellitus with microalbuminuria, without long-term current use of insulin (HCC)) and Chronic Kidney Disease            HPI  Lucio is coming today for initial evaluation of CKD IIIb  Recently hospitalized volume overloaded with plural effusions, CHF exacerbation -EF 30 %  Hx/o liver cirrhosis with ascites, portal HTN  A.fib on Eliquis  Hx/of prostate cancer -s/p XRT -stable  CKD IIIb -baseline creat level at 1.6-1.9 -stable  Weight loss -BMI 19.9!    Review of Systems   Constitutional:  Positive for malaise/fatigue and weight loss. Negative for chills and fever.   HENT:  Positive for hearing loss. Negative for congestion, sinus pain and sore throat.    Eyes: Negative.    Respiratory:  Negative for cough, hemoptysis, shortness of breath and wheezing.    Cardiovascular:  Negative for chest pain, palpitations, orthopnea and leg swelling.   Gastrointestinal:  Negative for abdominal pain, nausea and vomiting.   Genitourinary:  Negative for dysuria, flank pain, frequency, hematuria and urgency.   Skin: Negative.    All other systems reviewed and are negative.       Past Medical History:   Diagnosis Date    HTN     Type II or unspecified type diabetes mellitus without mention of complication, not stated as uncontrolled        Family History   Problem Relation Age of Onset    Stroke Mother     Heart Disease Father     Depression Sister     Heart Disease Sister     Depression Sister     Depression Sister     No Known Problems Son        Social History     Socioeconomic History    Marital status:    Tobacco Use    Smoking status: Never    Smokeless tobacco: Never   Vaping Use    Vaping Use: Never used   Substance and Sexual Activity    Alcohol use: Not Currently     Comment: quit 4-5 yr ago    Drug use: No    Sexual activity: Yes     Comment:    Social History Narrative     in '68. They have 2 children. 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 He quit drinking alcohol when prior PCP told him about his liver damage around age 76. He was drinking at most 2oz of alcohol daily. Wife doesn't drink etoh, no h/o drug use.          Objective     /64 (BP Location: Right arm, Patient Position: Sitting, BP Cuff Size: Adult)   Pulse (!) 46   Temp 36.2 °C (97.1 °F) (Temporal)   Ht 1.803 m (5' 11\")   Wt 64.9 kg (143 lb)   SpO2 100%   BMI 19.94 kg/m²      Physical Exam  Vitals reviewed.   Constitutional:       General: He is not in acute distress.     Appearance: Normal appearance. He is well-developed. He is not diaphoretic.   HENT:      Head: Normocephalic and atraumatic.      Nose: Nose normal.      Mouth/Throat:      Mouth: Mucous membranes are moist.      Pharynx: Oropharynx is clear.   Eyes:      Extraocular Movements: Extraocular movements intact.      Conjunctiva/sclera: Conjunctivae normal.      Pupils: Pupils are equal, round, and reactive to light.   Cardiovascular:      Rate and Rhythm: Normal rate and regular rhythm.      Pulses: Normal pulses.      Heart sounds: Normal heart sounds.   Pulmonary:      Effort: Pulmonary effort is normal. No respiratory distress.      Breath sounds: Normal breath sounds. No wheezing or rales.   Abdominal:      General: Bowel sounds are normal. There is no distension.      Palpations: Abdomen is soft. There is no mass.      Tenderness: There is no abdominal tenderness. There is no right CVA tenderness or left CVA tenderness.   Musculoskeletal:      Cervical back: Normal range of motion and neck supple.      Right lower leg: No edema.      Left lower leg: No edema.   Skin:     General: Skin is warm and dry.      Coloration: Skin is pale.      Findings: No erythema or rash.   Neurological:      General: No focal deficit present.      Mental Status: He is alert and oriented to person, place, and " time.      Cranial Nerves: No cranial nerve deficit.      Coordination: Coordination normal.   Psychiatric:         Mood and Affect: Mood normal.         Behavior: Behavior normal.         Thought Content: Thought content normal.         Judgment: Judgment normal.          Laboratory /imaging results reviewed  D/w Pt and his wife  Lab Results   Component Value Date/Time    CREATININE 1.73 (H) 09/24/2022 05:28 AM    POTASSIUM 4.7 09/24/2022 05:28 AM           Assessment & Plan        1. Stage 3b chronic kidney disease (HCC)      Stable at baseline -to monitor  - CBC WITHOUT DIFFERENTIAL; Future  - URINALYSIS; Future  - Basic Metabolic Panel; Future  - PTH INTACT (PTH ONLY); Future    2. History of prostate cancer      stable    3. Anemia, unspecified type      To monitor  - CBC WITHOUT DIFFERENTIAL; Future    4. Microalbuminuria due to type 2 diabetes mellitus (HCC)    - MICROALBUMIN CREAT RATIO URINE; Future    5. Vitamin D deficiency    - PTH INTACT (PTH ONLY); Future  - VITAMIN D 25-HYDROXY     Recs:  Continue current treatment  Low salt diet  Monitor BP  Monitor weight  Increase solid food intake  F/u in 1 month  Thank you for the consult

## 2022-10-20 NOTE — TELEPHONE ENCOUNTER
Requested Prescriptions     Pending Prescriptions Disp Refills   • spironolactone (ALDACTONE) 100 MG Tab 100 Tablet 3     Sig: Take 1 Tablet by mouth every day.   Monica Layton M.D.

## 2022-10-24 NOTE — PATIENT INSTRUCTIONS
1-furosemide = Lasix (R) taking in there morning  2-Spironolactone is also being taken in the evening  3-follow up with primary regarding diuretics  4-chest imaging in 3 weeks    4-decreased  weight, needs small frequent meals and protein shakes  5-follow up in 3 months

## 2022-10-24 NOTE — PROGRESS NOTES
CC: Hospital follow-up    HPI:  Sammy Oseguera is a 82 y.o. year old male here today for hospital follow-up, patient was admitted for shortness of breath, recurrent large right pleural effusion on 9/23/2022 x 2 days.  He was seen for pulmonary consult 9/24/2022 by Dr. Green.  Patient is a never smoker.    Past medical history includes end-stage liver disease with ongoing cirrhosis and ascites, portal hypertension, heart failure with LVEF less than 35%, BMI less than 19, type 2 diabetes, history of prostate cancer with radical prostatectomy in 2012, prolonged QT interval, memory issues.  He does report sleeping in a recliner.    Reviewed in clinic vitals including /66, heart rate 42, O2 sat of 98% and BMI of 18.58 kg/m².    Reviewed home medication regimen which includes Eliquis for his chronic A. fib.  He is on spironolactone and furosemide for diuresis.    Reviewed most recent imaging including CT scan obtained 9/24/2022 demonstrating moderate right-sided pleural effusion with presumed compressive atelectasis right lower lobe.  Trace left pleural fluid, cardiomegaly, small amount of perihepatic and perisplenic fluid.    Echocardiogram obtained 9/23/2022 demonstrated normal left ventricular chamber size, severe concentric left ventricular hypertrophy.  Moderately reduced left ventricular systolic function with LVEF estimated 30%, global hypokinesis, abnormal diastolic function.  Dilated right ventricle and reduced RVSP.  Enlarged right atrium.  Severely dilated left atrium, moderate mitral regurgitation.  Moderate tricuspid regurgitation, estimated RVSP of 40 mmHg.      Patient underwent thoracentesis 9/14/2022 for large pleural effusion with approximately 2 L of yellow-clear fluid removed, no infection or malignant cells noted.    No pulmonary function testing on file    Review of Systems   Constitutional:  Positive for malaise/fatigue (mild +) and weight loss. Negative for chills and fever.   HENT:   Positive for hearing loss. Negative for congestion, nosebleeds, sinus pain, sore throat and tinnitus.    Respiratory:  Positive for cough and shortness of breath (with activity but not doing much). Negative for sputum production and wheezing.    Cardiovascular:  Negative for chest pain, palpitations, orthopnea and leg swelling.   Gastrointestinal:  Negative for heartburn.        No dentures, mild difficulty with swallow    Neurological:  Positive for headaches (occasional sinus headache). Negative for dizziness and tremors.     Past Medical History:   Diagnosis Date    HTN     Type II or unspecified type diabetes mellitus without mention of complication, not stated as uncontrolled        Past Surgical History:   Procedure Laterality Date    LAPAROSCOPIC GASTRIC ULCER OVER SEW      PROSTATECTOMY, RADICAL RETRO         Family History   Problem Relation Age of Onset    Stroke Mother     Heart Disease Father     Depression Sister     Heart Disease Sister     Depression Sister     Depression Sister     No Known Problems Son        Social History     Socioeconomic History    Marital status:      Spouse name: Not on file    Number of children: Not on file    Years of education: Not on file    Highest education level: Not on file   Occupational History    Not on file   Tobacco Use    Smoking status: Never    Smokeless tobacco: Never   Vaping Use    Vaping Use: Never used   Substance and Sexual Activity    Alcohol use: Not Currently     Comment: quit 4-5 yr ago    Drug use: No    Sexual activity: Yes     Comment:    Other Topics Concern    Not on file   Social History Narrative     in '68. They have 2 children. 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 He quit drinking alcohol when prior PCP told him about his liver damage around age 76. He was drinking at most 2oz of alcohol daily. Wife doesn't drink etoh, no h/o  drug use.      Social Determinants of Health     Financial Resource Strain: Not on file   Food Insecurity: Not on file   Transportation Needs: Not on file   Physical Activity: Not on file   Stress: Not on file   Social Connections: Not on file   Intimate Partner Violence: Not on file   Housing Stability: Not on file       Allergies as of 10/24/2022    (No Known Allergies)        @Vital signs for this encounter:  /66 (BP Location: Right arm, Patient Position: Sitting, BP Cuff Size: Adult)   Pulse (!) 42   Ht 1.829 m (6')   Wt 62.1 kg (137 lb)   SpO2 98%     Current medications as of today   Current Outpatient Medications   Medication Sig Dispense Refill    spironolactone (ALDACTONE) 100 MG Tab Take 1 Tablet by mouth every day. 100 Tablet 3    furosemide (LASIX) 40 MG Tab Take 1 Tablet by mouth 2 times a day. (Patient not taking: Reported on 10/12/2022) 60 Tablet 3    glipiZIDE (GLUCOTROL) 5 MG Tab Take 1 Tablet by mouth every day. 100 Tablet 1    apixaban (ELIQUIS) 2.5mg Tab Take 1 Tablet by mouth 2 times a day. 180 Tablet 3    Blood Glucose Meter Kit Test blood sugar as recommended by provider. Brand as covered by insurance - blood glucose monitoring kit. 1 Kit 0    Blood Glucose Test Strips Use one Brand as covered by insurance  strip to test blood sugar once daily early morning before first meal and on occasion 2 hr post meal. 100 Strip 0    Lancets Use one Brand as covered by insurance  lancet to test blood sugar once daily early morning before first meal. 100 Each 0    Alcohol Swabs Wipe site with prep pad prior to injection. 100 Each 0     No current facility-administered medications for this visit.         Physical Exam:   Gen:           Alert and oriented, No apparent distress. Mood and affect appropriate, normal interaction with provider.  Eyes:          sclere white, conjunctive moist.  Hearing:     Grossly intact.  Dentition:    Fair dentition.  Oropharynx:   Tongue normal, posterior pharynx  without erythema or exudate.  Neck:        Supple, trachea midline, no masses.  Respiratory Effort: No intercostal retractions or use of accessory muscles.   Lung Auscultation:     Decreased bases right greater than left with fine bibasilar rales, no rhonchi or wheezing.  CV:            Regular rate and rhythm. No edema. No murmurs, rubs or gallops.  Digits, Nails, Ext: No clubbing, cyanosis, petechiae, or nodes.   Skin:        No rashes, lesions or ulcers noted on exposed skin surfaces.                     Assessment:  1. Pleural effusion  DX-CHEST-2 VIEWS      2. 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 Portal hypertension (HCC)        4. Prolonged QT interval            Immunizations:    Flu: 9/22/2022  Pneumovax 23: 6/29/2015  Prevnar 13: 4/25/2017  Moderna SARS-CoV-2 vaccine: 12/15/2021, 3/5/2021, 2/6/2021    Plan:  82-year-old male here to follow-up on recent hospitalization.    history of recurrent right pleural effusion: Attributed to alcoholic cirrhosis of the liver.  He is on 2 types of diuretic with improved lower extremity edema.  Under went thoracentesis 9/14/2022 for 2 L transudative fluid.  Reports mild increase in work of breathing from baseline, experiences shortness of breath with any activity.  Repeat thoracentesis not recommended unless significant respiratory compromise see Dr. Green's evaluation 9/24/2022.  Follow-up with primary for diuretic management.  Continue to monitor, follow-up x-ray in 3 weeks.    Prolonged QT interval: Decreased ejection fraction, chronic A. fib on Eliquis.  Heart rate 42 in clinic, follow-up with primary.    BMI less than 19: Patient was encouraged to ingest small frequent meals, consider protein shakes to help supplement caloric intake.    Follow-up in 3 months, check in with them after chest x-ray obtained.        This dictation was created using voice recognition software. The accuracy of the dictation is limited to the abilities of the software.  I expect there may be some errors of grammar and possibly content.

## 2022-11-04 NOTE — TELEPHONE ENCOUNTER
Called and spoke with spouse regarding regarding recent lab results. Per spouse, patient has been feeling fine. Not taking diabetic meds. Last weight from 11/4 was 128.2 lbs. He has been taking lasix  in am and spironolactone in evening.    Asked him to start taking the glipizide which he has at home. D/w wife that blood glucose was over 400.     Also asked him to hold the spironolactone due to worsening renal function and rise in K to 5.3.  will c/w lasix in am.    Will also forward this to his nephrology and cardiology providers to see if they would like to adjust any other meds.    ED precautions d/w her. Worsening edema, SOB, etc..  Monica Layton M.D.

## 2022-11-10 NOTE — PROGRESS NOTES
Subjective     Lucio Oseguera is a 82 y.o. male who presents with Follow-Up (Stage 3 CKD)            HPI  Lucio is coming today for f/u of CKD IIIb, in [place]ASIA  Recently hospitalized volume overloaded with plural effusions, CHF exacerbation -EF 30 %  Hx/o liver cirrhosis with ascites, portal HTN  A.fib on Eliquis  Hx/of prostate cancer -s/p XRT -stable  CKD IIIb -baseline creat level at 1.6-1.9 - worse to 2.6, !  Weight loss -BMI 19.9! -no improvement  Poor energy level  Overall not doing well.  Considering hospice evaluation    Review of Systems   Constitutional:  Positive for malaise/fatigue and weight loss. Negative for chills and fever.   HENT:  Positive for hearing loss. Negative for congestion and sinus pain.    Eyes: Negative.    Respiratory:  Negative for cough, hemoptysis, shortness of breath and wheezing.    Cardiovascular:  Negative for chest pain, palpitations, orthopnea and leg swelling.   Gastrointestinal:  Negative for abdominal pain, nausea and vomiting.   Genitourinary:  Negative for dysuria, flank pain, frequency, hematuria and urgency.   Skin: Negative.    All other systems reviewed and are negative.       Past Medical History:   Diagnosis Date    HTN     Type II or unspecified type diabetes mellitus without mention of complication, not stated as uncontrolled        Family History   Problem Relation Age of Onset    Stroke Mother     Heart Disease Father     Depression Sister     Heart Disease Sister     Depression Sister     Depression Sister     No Known Problems Son        Social History     Socioeconomic History    Marital status:    Tobacco Use    Smoking status: Never    Smokeless tobacco: Never   Vaping Use    Vaping Use: Never used   Substance and Sexual Activity    Alcohol use: Not Currently     Comment: quit 4-5 yr ago    Drug use: No    Sexual activity: Yes     Comment:    Social History Narrative     in '68. They have 2 children. 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 He quit drinking alcohol when prior PCP told him about his liver damage around age 76. He was drinking at most 2oz of alcohol daily. Wife doesn't drink etoh, no h/o drug use.          Objective     /62 (BP Location: Right arm, Patient Position: Sitting, BP Cuff Size: Adult)   Pulse (!) 46   Temp 36.9 °C (98.5 °F) (Temporal)   Resp 16   Ht 1.829 m (6')   Wt 64.6 kg (142 lb 6.4 oz)   SpO2 98%   BMI 19.31 kg/m²      Physical Exam  Vitals reviewed.   Constitutional:       General: He is not in acute distress.     Appearance: He is well-developed. He is cachectic. He is ill-appearing. He is not diaphoretic.   HENT:      Head: Normocephalic and atraumatic.      Nose: Nose normal.      Mouth/Throat:      Mouth: Mucous membranes are moist.      Pharynx: Oropharynx is clear.   Eyes:      Extraocular Movements: Extraocular movements intact.      Conjunctiva/sclera: Conjunctivae normal.      Pupils: Pupils are equal, round, and reactive to light.   Cardiovascular:      Rate and Rhythm: Normal rate and regular rhythm.      Pulses: Normal pulses.      Heart sounds: Normal heart sounds.   Pulmonary:      Effort: Pulmonary effort is normal. No respiratory distress.      Breath sounds: Normal breath sounds. No wheezing or rales.   Abdominal:      General: Bowel sounds are normal. There is no distension.      Palpations: Abdomen is soft. There is no mass.      Tenderness: There is no abdominal tenderness. There is no right CVA tenderness or left CVA tenderness.   Musculoskeletal:      Cervical back: Normal range of motion and neck supple.      Right lower leg: No edema.      Left lower leg: No edema.   Skin:     General: Skin is warm.      Coloration: Skin is not pale.      Findings: No erythema or rash.   Neurological:      General: No focal deficit present.      Mental Status: He is alert and oriented to person, place,  and time.      Cranial Nerves: No cranial nerve deficit.      Coordination: Coordination normal.   Psychiatric:         Mood and Affect: Mood normal.         Behavior: Behavior normal.         Thought Content: Thought content normal.         Judgment: Judgment normal.          Laboratory /imaging results reviewed  D/w Pt and his wife  Lab Results   Component Value Date/Time    CREATININE 2.65 (H) 11/03/2022 01:00 PM    POTASSIUM 5.3 11/03/2022 01:00 PM           Assessment & Plan        1. TASIA/Stage 3b chronic kidney disease (HCC)      Reduced lasix dose, stopped spironolactone  -   to repeat renal panel in 1 week        2. History of prostate cancer      stable    3. Anemia, unspecified type      Hb improved -to monitor    4. Microalbuminuria due to type 2 diabetes mellitus (HCC)      mild  - MICROALBUMIN CREAT RATIO URINE; Future    5. Vitamin D deficiency       Continue vit D     Recs:  Stop Spironolactone, furosemide  evening dose  Low salt diet  Repeat renal panel in 1 week

## 2022-11-16 PROBLEM — G31.9 DEGENERATIVE DISEASE OF NERVOUS SYSTEM, UNSPECIFIED (HCC): Status: ACTIVE | Noted: 2022-01-01

## 2022-11-16 NOTE — PROGRESS NOTES
CC:   Chief Complaint   Patient presents with    Follow-Up     2 months          HPI:   Sammy presents today with his wife for a f/u visit..  he had labs drawn two weeks ago and had a glucose level over 400 and TASIA. He was called and asked to start glipizide, hold the spironolactone and reduce his lasix to once a day.  Since then he had a f/u appt with nephrology, Dr Pierson, last week on 11/9. Her recommendations were also the above and to repeat the BMP in one week. Patient's wife states that hospice was also brought up at that time.  Since then, she and  have discussed hospice and would like a referral. He doesn't want more tests, more lab draws or f//u with other doctors.    .  CKD stage 3 due to type 2 diabetes mellitus (HCC)  GFR had decreased to 23 with previous labs drawn on 11/3.     Type 2 diabetes mellitus with chronic kidney disease, without long-term current use of insulin (HCC)  Chronic. Not well controlled.  Recent labs with glucose at 444.  Glipizide prescribed but not taking it.  Recent A1c 8.4%      Current Outpatient Medications Ordered in Epic   Medication Sig Dispense Refill    furosemide (LASIX) 40 MG Tab Take 1 Tablet by mouth every day. 100 Tablet 0    glipiZIDE (GLUCOTROL) 5 MG Tab Take 1 Tablet by mouth every day. 100 Tablet 1    apixaban (ELIQUIS) 2.5mg Tab Take 1 Tablet by mouth 2 times a day. 180 Tablet 3    Blood Glucose Meter Kit Test blood sugar as recommended by provider. Brand as covered by insurance - blood glucose monitoring kit. 1 Kit 0    Blood Glucose Test Strips Use one Brand as covered by insurance  strip to test blood sugar once daily early morning before first meal and on occasion 2 hr post meal. 100 Strip 0    Lancets Use one Brand as covered by [employer]  lancet to test blood sugar once daily early morning before first meal. 100 Each 0    Alcohol Swabs Wipe site with prep pad prior to injection. 100 Each 0     No current [facility]-ordered facility-administered medications  on file.       Past Medical History:   Diagnosis Date    HTN     Type II or unspecified type diabetes mellitus without mention of complication, not stated as uncontrolled        Social History     Tobacco Use    Smoking status: Never    Smokeless tobacco: Never   Vaping Use    Vaping Use: Never used   Substance Use Topics    Alcohol use: Not Currently     Comment: quit 4-5 yr ago    Drug use: No       Allergies:  Patient has no known allergies.      Objective:       Exam:  /66   Pulse (!) 42   Temp 36.6 °C (97.9 °F) (Temporal)   Resp 12   Ht 1.829 m (6')   Wt 66.9 kg (147 lb 6.4 oz)   SpO2 100%   BMI 19.99 kg/m²   Body mass index is 19.99 kg/m².  Wt Readings from Last 4 Encounters:   11/16/22 66.9 kg (147 lb 6.4 oz)   11/09/22 64.6 kg (142 lb 6.4 oz)   10/24/22 62.1 kg (137 lb)   10/12/22 64.9 kg (143 lb)       Gen: Alert and oriented, No apparent distress. Appropriately groomed.  Neck: Neck is supple without lymphadenopathy.No thyromegaly.   Lungs: Normal effort, CTA bilaterally, no wheezes, rhonchi, or rales  CV: Regular rate and rhythm. No Lower extremity edema  Skin: No rash noted.      Assessment & Plan:     82 y.o. male with the following -     1. CKD stage 3 due to type 2 diabetes mellitus (HCC)  2. Heart failure with reduced ejection fraction and diastolic dysfunction (HCC)  3. 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 Not improving. Lasix decreased to once a day and spironolactone discontinued two weeks ago due to worsening renal function. Had visit with nephrologist last week. Patient and wife have had discussions about hospice. States that he's tired of getting labs/tests done. Going to doctor's offices. Wants to be comfortable. Questions about hospice answered to the best of my knowledge.  New referral placed. Encouraged to update family on his wishes   Referral to Hospice  - furosemide (LASIX) 40 MG Tab; Take 1 Tablet by mouth every day.  Dispense: 100 Tablet;  Refill: 0    4. Type 2 diabetes mellitus with stage 3b chronic kidney disease, without long-term current use of insulin (HCC)  -chronic. Not well controlled. Hasn't been taking the glipizide. This has been prescribed. D/w both that elevated blood sugars can lead to diabetic coma, DKA or even death.  Instructed to start glipizide 5 mg daily with a meal for 1 week and then increase to twice a day breakfast and dinner.    Referral to Hospice    5. Degenerative disease of nervous system, unspecified (HCC)  HCC Gap Form    Diagnosis to address: G31.9 - Degenerative disease of nervous system, unspecified (HCC)  Assessment and plan: Chronic. Noted on Ct head November 2021.   Also noted to have memory issues.  Last edited 11/17/22 10:26 PST by Monica Layton M.D.          I spent a total of 39 minutes with record review, exam, communication with the patient, communication with other providers, and documentation of this encounter.      Return in about 2 months (around 1/16/2023).    Please note that this dictation was created using voice recognition software. I have made every reasonable attempt to correct obvious errors, but I expect that there are errors of grammar and possibly content that I did not discover before finalizing the note.

## 2022-11-17 NOTE — ASSESSMENT & PLAN NOTE
Chronic. Not well controlled.  Recent labs with glucose at 444.  Glipizide prescribed but not taking it.  Recent A1c 8.4%

## 2022-12-08 ENCOUNTER — APPOINTMENT (RX ONLY)
Dept: URBAN - METROPOLITAN AREA CLINIC 4 | Facility: CLINIC | Age: 82
Setting detail: DERMATOLOGY
End: 2022-12-08

## 2022-12-08 DIAGNOSIS — L81.4 OTHER MELANIN HYPERPIGMENTATION: ICD-10-CM

## 2022-12-08 DIAGNOSIS — Z71.89 OTHER SPECIFIED COUNSELING: ICD-10-CM

## 2022-12-08 DIAGNOSIS — L82.1 OTHER SEBORRHEIC KERATOSIS: ICD-10-CM

## 2022-12-08 DIAGNOSIS — Z85.828 PERSONAL HISTORY OF OTHER MALIGNANT NEOPLASM OF SKIN: ICD-10-CM

## 2022-12-08 DIAGNOSIS — D22 MELANOCYTIC NEVI: ICD-10-CM

## 2022-12-08 DIAGNOSIS — D18.0 HEMANGIOMA: ICD-10-CM

## 2022-12-08 DIAGNOSIS — L11.1 TRANSIENT ACANTHOLYTIC DERMATOSIS [GROVER]: ICD-10-CM

## 2022-12-08 PROBLEM — D22.39 MELANOCYTIC NEVI OF OTHER PARTS OF FACE: Status: ACTIVE | Noted: 2022-12-08

## 2022-12-08 PROBLEM — D18.01 HEMANGIOMA OF SKIN AND SUBCUTANEOUS TISSUE: Status: ACTIVE | Noted: 2022-12-08

## 2022-12-08 PROBLEM — D22.62 MELANOCYTIC NEVI OF LEFT UPPER LIMB, INCLUDING SHOULDER: Status: ACTIVE | Noted: 2022-12-08

## 2022-12-08 PROBLEM — D22.61 MELANOCYTIC NEVI OF RIGHT UPPER LIMB, INCLUDING SHOULDER: Status: ACTIVE | Noted: 2022-12-08

## 2022-12-08 PROBLEM — D22.5 MELANOCYTIC NEVI OF TRUNK: Status: ACTIVE | Noted: 2022-12-08

## 2022-12-08 PROCEDURE — ? DIAGNOSIS COMMENT

## 2022-12-08 PROCEDURE — 99213 OFFICE O/P EST LOW 20 MIN: CPT

## 2022-12-08 PROCEDURE — ? SUNSCREEN TREATMENT REGIMEN

## 2022-12-08 PROCEDURE — ? COUNSELING

## 2022-12-08 ASSESSMENT — LOCATION DETAILED DESCRIPTION DERM
LOCATION DETAILED: LEFT CENTRAL MALAR CHEEK
LOCATION DETAILED: RIGHT ANTERIOR PROXIMAL UPPER ARM
LOCATION DETAILED: LEFT CENTRAL MANDIBULAR CHEEK
LOCATION DETAILED: LEFT INFERIOR MEDIAL FOREHEAD
LOCATION DETAILED: RIGHT MID-UPPER BACK
LOCATION DETAILED: SUPERIOR THORACIC SPINE
LOCATION DETAILED: LEFT ANTERIOR PROXIMAL UPPER ARM
LOCATION DETAILED: EPIGASTRIC SKIN
LOCATION DETAILED: LEFT MEDIAL UPPER BACK
LOCATION DETAILED: RIGHT MEDIAL UPPER BACK
LOCATION DETAILED: MID-FRONTAL SCALP
LOCATION DETAILED: LEFT ANTERIOR DISTAL UPPER ARM
LOCATION DETAILED: RIGHT PROXIMAL PRETIBIAL REGION
LOCATION DETAILED: RIGHT SUPERIOR HELIX
LOCATION DETAILED: LOWER STERNUM
LOCATION DETAILED: RIGHT INFERIOR CENTRAL MALAR CHEEK
LOCATION DETAILED: RIGHT MEDIAL MALAR CHEEK
LOCATION DETAILED: LEFT INFERIOR MEDIAL MIDBACK
LOCATION DETAILED: RIGHT CENTRAL MALAR CHEEK

## 2022-12-08 ASSESSMENT — LOCATION ZONE DERM
LOCATION ZONE: TRUNK
LOCATION ZONE: ARM
LOCATION ZONE: LEG
LOCATION ZONE: EAR
LOCATION ZONE: SCALP
LOCATION ZONE: FACE

## 2022-12-08 ASSESSMENT — LOCATION SIMPLE DESCRIPTION DERM
LOCATION SIMPLE: LEFT FOREHEAD
LOCATION SIMPLE: LEFT LOWER BACK
LOCATION SIMPLE: LEFT UPPER ARM
LOCATION SIMPLE: ANTERIOR SCALP
LOCATION SIMPLE: UPPER BACK
LOCATION SIMPLE: RIGHT UPPER BACK
LOCATION SIMPLE: LEFT UPPER BACK
LOCATION SIMPLE: RIGHT PRETIBIAL REGION
LOCATION SIMPLE: RIGHT CHEEK
LOCATION SIMPLE: ABDOMEN
LOCATION SIMPLE: CHEST
LOCATION SIMPLE: RIGHT EAR
LOCATION SIMPLE: RIGHT UPPER ARM
LOCATION SIMPLE: LEFT CHEEK

## 2022-12-08 NOTE — PROCEDURE: DIAGNOSIS COMMENT
Render Risk Assessment In Note?: no
Detail Level: Detailed
Comment: Biopsy proven x2 solar lentigo. Recurrent lentigo appears today, will continue to monitor.

## 2023-01-01 ENCOUNTER — TELEPHONE (OUTPATIENT)
Dept: HEALTH INFORMATION MANAGEMENT | Facility: OTHER | Age: 83
End: 2023-01-01

## 2023-01-01 ENCOUNTER — HOME CARE VISIT (OUTPATIENT)
Dept: HOSPICE | Facility: HOSPICE | Age: 83
End: 2023-01-01
Payer: MEDICARE

## 2023-01-01 ENCOUNTER — HOSPITAL ENCOUNTER (INPATIENT)
Facility: MEDICAL CENTER | Age: 83
LOS: 5 days | DRG: 536 | End: 2023-08-21
Attending: EMERGENCY MEDICINE | Admitting: HOSPITALIST
Payer: MEDICARE

## 2023-01-01 ENCOUNTER — APPOINTMENT (OUTPATIENT)
Dept: RADIOLOGY | Facility: MEDICAL CENTER | Age: 83
DRG: 536 | End: 2023-01-01
Attending: EMERGENCY MEDICINE
Payer: MEDICARE

## 2023-01-01 ENCOUNTER — APPOINTMENT (OUTPATIENT)
Dept: SLEEP MEDICINE | Facility: MEDICAL CENTER | Age: 83
End: 2023-01-01
Payer: MEDICARE

## 2023-01-01 ENCOUNTER — DOCUMENTATION (OUTPATIENT)
Dept: HOSPICE | Facility: HOSPICE | Age: 83
End: 2023-01-01
Payer: MEDICARE

## 2023-01-01 ENCOUNTER — HOSPICE ADMISSION (OUTPATIENT)
Dept: HOSPICE | Facility: HOSPICE | Age: 83
End: 2023-01-01
Payer: MEDICARE

## 2023-01-01 ENCOUNTER — PHARMACY VISIT (OUTPATIENT)
Dept: PHARMACY | Facility: MEDICAL CENTER | Age: 83
End: 2023-01-01
Payer: COMMERCIAL

## 2023-01-01 VITALS — RESPIRATION RATE: 22 BRPM | DIASTOLIC BLOOD PRESSURE: 60 MMHG | HEART RATE: 48 BPM | SYSTOLIC BLOOD PRESSURE: 110 MMHG

## 2023-01-01 VITALS — SYSTOLIC BLOOD PRESSURE: 147 MMHG | HEART RATE: 55 BPM | DIASTOLIC BLOOD PRESSURE: 75 MMHG | RESPIRATION RATE: 20 BRPM

## 2023-01-01 VITALS — HEART RATE: 44 BPM | RESPIRATION RATE: 20 BRPM | DIASTOLIC BLOOD PRESSURE: 52 MMHG | SYSTOLIC BLOOD PRESSURE: 118 MMHG

## 2023-01-01 VITALS — HEART RATE: 40 BPM | DIASTOLIC BLOOD PRESSURE: 50 MMHG | SYSTOLIC BLOOD PRESSURE: 110 MMHG | RESPIRATION RATE: 12 BRPM

## 2023-01-01 VITALS — RESPIRATION RATE: 12 BRPM | DIASTOLIC BLOOD PRESSURE: 58 MMHG | HEART RATE: 40 BPM | SYSTOLIC BLOOD PRESSURE: 122 MMHG

## 2023-01-01 VITALS
BODY MASS INDEX: 20.34 KG/M2 | HEART RATE: 56 BPM | DIASTOLIC BLOOD PRESSURE: 64 MMHG | SYSTOLIC BLOOD PRESSURE: 130 MMHG | WEIGHT: 150 LBS | RESPIRATION RATE: 20 BRPM

## 2023-01-01 VITALS — HEART RATE: 40 BPM | RESPIRATION RATE: 20 BRPM | SYSTOLIC BLOOD PRESSURE: 122 MMHG | DIASTOLIC BLOOD PRESSURE: 62 MMHG

## 2023-01-01 VITALS
WEIGHT: 151 LBS | SYSTOLIC BLOOD PRESSURE: 112 MMHG | RESPIRATION RATE: 12 BRPM | DIASTOLIC BLOOD PRESSURE: 60 MMHG | HEART RATE: 44 BPM | BODY MASS INDEX: 20.48 KG/M2

## 2023-01-01 VITALS — SYSTOLIC BLOOD PRESSURE: 110 MMHG | HEART RATE: 80 BPM | RESPIRATION RATE: 16 BRPM | DIASTOLIC BLOOD PRESSURE: 44 MMHG

## 2023-01-01 VITALS
DIASTOLIC BLOOD PRESSURE: 62 MMHG | HEART RATE: 61 BPM | BODY MASS INDEX: 21.38 KG/M2 | SYSTOLIC BLOOD PRESSURE: 146 MMHG | HEIGHT: 72 IN | TEMPERATURE: 98.9 F | OXYGEN SATURATION: 97 % | WEIGHT: 157.85 LBS | RESPIRATION RATE: 16 BRPM

## 2023-01-01 VITALS — HEART RATE: 44 BPM | SYSTOLIC BLOOD PRESSURE: 112 MMHG | DIASTOLIC BLOOD PRESSURE: 52 MMHG | RESPIRATION RATE: 20 BRPM

## 2023-01-01 VITALS
HEART RATE: 44 BPM | DIASTOLIC BLOOD PRESSURE: 60 MMHG | SYSTOLIC BLOOD PRESSURE: 130 MMHG | WEIGHT: 153.13 LBS | BODY MASS INDEX: 20.77 KG/M2 | RESPIRATION RATE: 16 BRPM

## 2023-01-01 VITALS — DIASTOLIC BLOOD PRESSURE: 60 MMHG | RESPIRATION RATE: 16 BRPM | SYSTOLIC BLOOD PRESSURE: 120 MMHG | HEART RATE: 44 BPM

## 2023-01-01 VITALS
DIASTOLIC BLOOD PRESSURE: 68 MMHG | RESPIRATION RATE: 16 BRPM | SYSTOLIC BLOOD PRESSURE: 130 MMHG | BODY MASS INDEX: 20.61 KG/M2 | HEART RATE: 40 BPM | WEIGHT: 152 LBS

## 2023-01-01 VITALS
DIASTOLIC BLOOD PRESSURE: 60 MMHG | BODY MASS INDEX: 20.67 KG/M2 | HEART RATE: 44 BPM | WEIGHT: 152.38 LBS | RESPIRATION RATE: 12 BRPM | SYSTOLIC BLOOD PRESSURE: 130 MMHG

## 2023-01-01 VITALS
WEIGHT: 150 LBS | BODY MASS INDEX: 20.34 KG/M2 | HEART RATE: 48 BPM | RESPIRATION RATE: 12 BRPM | DIASTOLIC BLOOD PRESSURE: 50 MMHG | SYSTOLIC BLOOD PRESSURE: 116 MMHG

## 2023-01-01 VITALS — DIASTOLIC BLOOD PRESSURE: 50 MMHG | SYSTOLIC BLOOD PRESSURE: 116 MMHG | HEART RATE: 48 BPM | RESPIRATION RATE: 16 BRPM

## 2023-01-01 VITALS — HEART RATE: 48 BPM | SYSTOLIC BLOOD PRESSURE: 110 MMHG | RESPIRATION RATE: 16 BRPM | DIASTOLIC BLOOD PRESSURE: 50 MMHG

## 2023-01-01 VITALS — DIASTOLIC BLOOD PRESSURE: 62 MMHG | RESPIRATION RATE: 16 BRPM | HEART RATE: 40 BPM | SYSTOLIC BLOOD PRESSURE: 130 MMHG

## 2023-01-01 VITALS — HEART RATE: 48 BPM | SYSTOLIC BLOOD PRESSURE: 128 MMHG | DIASTOLIC BLOOD PRESSURE: 60 MMHG | RESPIRATION RATE: 12 BRPM

## 2023-01-01 VITALS — DIASTOLIC BLOOD PRESSURE: 50 MMHG | RESPIRATION RATE: 12 BRPM | SYSTOLIC BLOOD PRESSURE: 110 MMHG | HEART RATE: 44 BPM

## 2023-01-01 VITALS — RESPIRATION RATE: 16 BRPM | DIASTOLIC BLOOD PRESSURE: 50 MMHG | HEART RATE: 44 BPM | SYSTOLIC BLOOD PRESSURE: 100 MMHG

## 2023-01-01 VITALS — RESPIRATION RATE: 20 BRPM | HEART RATE: 44 BPM | SYSTOLIC BLOOD PRESSURE: 98 MMHG | DIASTOLIC BLOOD PRESSURE: 42 MMHG

## 2023-01-01 VITALS — SYSTOLIC BLOOD PRESSURE: 110 MMHG | RESPIRATION RATE: 16 BRPM | HEART RATE: 44 BPM | DIASTOLIC BLOOD PRESSURE: 70 MMHG

## 2023-01-01 VITALS — SYSTOLIC BLOOD PRESSURE: 113 MMHG | DIASTOLIC BLOOD PRESSURE: 65 MMHG | RESPIRATION RATE: 23 BRPM | HEART RATE: 88 BPM

## 2023-01-01 VITALS
WEIGHT: 147.13 LBS | SYSTOLIC BLOOD PRESSURE: 112 MMHG | BODY MASS INDEX: 19.95 KG/M2 | HEART RATE: 44 BPM | DIASTOLIC BLOOD PRESSURE: 52 MMHG | RESPIRATION RATE: 16 BRPM

## 2023-01-01 VITALS
RESPIRATION RATE: 16 BRPM | DIASTOLIC BLOOD PRESSURE: 60 MMHG | SYSTOLIC BLOOD PRESSURE: 120 MMHG | HEART RATE: 40 BPM | BODY MASS INDEX: 20.48 KG/M2 | WEIGHT: 151 LBS

## 2023-01-01 VITALS — RESPIRATION RATE: 16 BRPM | DIASTOLIC BLOOD PRESSURE: 66 MMHG | HEART RATE: 44 BPM | SYSTOLIC BLOOD PRESSURE: 138 MMHG

## 2023-01-01 DIAGNOSIS — F41.9 ANXIETY: ICD-10-CM

## 2023-01-01 DIAGNOSIS — I50.30 DIASTOLIC HEART FAILURE, UNSPECIFIED HF CHRONICITY (HCC): ICD-10-CM

## 2023-01-01 DIAGNOSIS — Z51.5 HOSPICE CARE: ICD-10-CM

## 2023-01-01 DIAGNOSIS — T14.8XXA FRACTURE: ICD-10-CM

## 2023-01-01 LAB
ALBUMIN SERPL BCP-MCNC: 3.7 G/DL (ref 3.2–4.9)
ALBUMIN/GLOB SERPL: 1 G/DL
ALP SERPL-CCNC: 86 U/L (ref 30–99)
ALT SERPL-CCNC: 8 U/L (ref 2–50)
ANION GAP SERPL CALC-SCNC: 13 MMOL/L (ref 7–16)
ANION GAP SERPL CALC-SCNC: 16 MMOL/L (ref 7–16)
APPEARANCE UR: CLEAR
AST SERPL-CCNC: 8 U/L (ref 12–45)
BACTERIA #/AREA URNS HPF: ABNORMAL /HPF
BASOPHILS # BLD AUTO: 0.2 % (ref 0–1.8)
BASOPHILS # BLD: 0.03 K/UL (ref 0–0.12)
BILIRUB SERPL-MCNC: 1.6 MG/DL (ref 0.1–1.5)
BILIRUB UR QL STRIP.AUTO: NEGATIVE
BUN SERPL-MCNC: 43 MG/DL (ref 8–22)
BUN SERPL-MCNC: 48 MG/DL (ref 8–22)
CALCIUM ALBUM COR SERPL-MCNC: 8.8 MG/DL (ref 8.5–10.5)
CALCIUM SERPL-MCNC: 8.6 MG/DL (ref 8.4–10.2)
CALCIUM SERPL-MCNC: 8.7 MG/DL (ref 8.4–10.2)
CHLORIDE SERPL-SCNC: 100 MMOL/L (ref 96–112)
CHLORIDE SERPL-SCNC: 104 MMOL/L (ref 96–112)
CO2 SERPL-SCNC: 18 MMOL/L (ref 20–33)
CO2 SERPL-SCNC: 20 MMOL/L (ref 20–33)
COLOR UR: YELLOW
CREAT SERPL-MCNC: 2.36 MG/DL (ref 0.5–1.4)
CREAT SERPL-MCNC: 2.62 MG/DL (ref 0.5–1.4)
EOSINOPHIL # BLD AUTO: 0.02 K/UL (ref 0–0.51)
EOSINOPHIL NFR BLD: 0.2 % (ref 0–6.9)
EPI CELLS #/AREA URNS HPF: ABNORMAL /HPF
ERYTHROCYTE [DISTWIDTH] IN BLOOD BY AUTOMATED COUNT: 54.9 FL (ref 35.9–50)
ERYTHROCYTE [DISTWIDTH] IN BLOOD BY AUTOMATED COUNT: 55.3 FL (ref 35.9–50)
GFR SERPLBLD CREATININE-BSD FMLA CKD-EPI: 23 ML/MIN/1.73 M 2
GFR SERPLBLD CREATININE-BSD FMLA CKD-EPI: 27 ML/MIN/1.73 M 2
GLOBULIN SER CALC-MCNC: 3.8 G/DL (ref 1.9–3.5)
GLUCOSE BLD STRIP.AUTO-MCNC: 126 MG/DL (ref 65–99)
GLUCOSE BLD STRIP.AUTO-MCNC: 137 MG/DL (ref 65–99)
GLUCOSE BLD STRIP.AUTO-MCNC: 143 MG/DL (ref 65–99)
GLUCOSE BLD STRIP.AUTO-MCNC: 154 MG/DL (ref 65–99)
GLUCOSE BLD STRIP.AUTO-MCNC: 155 MG/DL (ref 65–99)
GLUCOSE BLD STRIP.AUTO-MCNC: 157 MG/DL (ref 65–99)
GLUCOSE BLD STRIP.AUTO-MCNC: 159 MG/DL (ref 65–99)
GLUCOSE BLD STRIP.AUTO-MCNC: 167 MG/DL (ref 65–99)
GLUCOSE BLD STRIP.AUTO-MCNC: 170 MG/DL (ref 65–99)
GLUCOSE BLD STRIP.AUTO-MCNC: 174 MG/DL (ref 65–99)
GLUCOSE BLD STRIP.AUTO-MCNC: 177 MG/DL (ref 65–99)
GLUCOSE SERPL-MCNC: 181 MG/DL (ref 65–99)
GLUCOSE SERPL-MCNC: 194 MG/DL (ref 65–99)
GLUCOSE UR STRIP.AUTO-MCNC: NEGATIVE MG/DL
HCT VFR BLD AUTO: 26.5 % (ref 42–52)
HCT VFR BLD AUTO: 31.6 % (ref 42–52)
HGB BLD-MCNC: 10.1 G/DL (ref 14–18)
HGB BLD-MCNC: 8.5 G/DL (ref 14–18)
IMM GRANULOCYTES # BLD AUTO: 0.06 K/UL (ref 0–0.11)
IMM GRANULOCYTES NFR BLD AUTO: 0.5 % (ref 0–0.9)
KETONES UR STRIP.AUTO-MCNC: NEGATIVE MG/DL
LEUKOCYTE ESTERASE UR QL STRIP.AUTO: NEGATIVE
LYMPHOCYTES # BLD AUTO: 0.68 K/UL (ref 1–4.8)
LYMPHOCYTES NFR BLD: 5.6 % (ref 22–41)
MAGNESIUM SERPL-MCNC: 2.1 MG/DL (ref 1.5–2.5)
MCH RBC QN AUTO: 33.8 PG (ref 27–33)
MCH RBC QN AUTO: 34.1 PG (ref 27–33)
MCHC RBC AUTO-ENTMCNC: 32 G/DL (ref 32.3–36.5)
MCHC RBC AUTO-ENTMCNC: 32.1 G/DL (ref 32.3–36.5)
MCV RBC AUTO: 105.7 FL (ref 81.4–97.8)
MCV RBC AUTO: 106.4 FL (ref 81.4–97.8)
MICRO URNS: ABNORMAL
MONOCYTES # BLD AUTO: 0.73 K/UL (ref 0–0.85)
MONOCYTES NFR BLD AUTO: 6 % (ref 0–13.4)
NEUTROPHILS # BLD AUTO: 10.63 K/UL (ref 1.82–7.42)
NEUTROPHILS NFR BLD: 87.5 % (ref 44–72)
NITRITE UR QL STRIP.AUTO: NEGATIVE
NRBC # BLD AUTO: 0 K/UL
NRBC BLD-RTO: 0 /100 WBC (ref 0–0.2)
PH UR STRIP.AUTO: 5.5 [PH] (ref 5–8)
PLATELET # BLD AUTO: 123 K/UL (ref 164–446)
PLATELET # BLD AUTO: 165 K/UL (ref 164–446)
PMV BLD AUTO: 10.2 FL (ref 9–12.9)
PMV BLD AUTO: 10.3 FL (ref 9–12.9)
POTASSIUM SERPL-SCNC: 4.5 MMOL/L (ref 3.6–5.5)
POTASSIUM SERPL-SCNC: 5.6 MMOL/L (ref 3.6–5.5)
PROT SERPL-MCNC: 7.5 G/DL (ref 6–8.2)
PROT UR QL STRIP: NEGATIVE MG/DL
RBC # BLD AUTO: 2.49 M/UL (ref 4.7–6.1)
RBC # BLD AUTO: 2.99 M/UL (ref 4.7–6.1)
RBC # URNS HPF: ABNORMAL /HPF
RBC UR QL AUTO: ABNORMAL
SODIUM SERPL-SCNC: 134 MMOL/L (ref 135–145)
SODIUM SERPL-SCNC: 137 MMOL/L (ref 135–145)
SP GR UR STRIP.AUTO: 1.02
WBC # BLD AUTO: 12.2 K/UL (ref 4.8–10.8)
WBC # BLD AUTO: 9 K/UL (ref 4.8–10.8)
WBC #/AREA URNS HPF: ABNORMAL /HPF

## 2023-01-01 PROCEDURE — S9126 HOSPICE CARE, IN THE HOME, P: HCPCS

## 2023-01-01 PROCEDURE — 770006 HCHG ROOM/CARE - MED/SURG/GYN SEMI*

## 2023-01-01 PROCEDURE — 700111 HCHG RX REV CODE 636 W/ 250 OVERRIDE (IP): Mod: JZ | Performed by: HOSPITALIST

## 2023-01-01 PROCEDURE — 700105 HCHG RX REV CODE 258: Performed by: HOSPITALIST

## 2023-01-01 PROCEDURE — 99239 HOSP IP/OBS DSCHRG MGMT >30: CPT | Mod: GW | Performed by: FAMILY MEDICINE

## 2023-01-01 PROCEDURE — 700102 HCHG RX REV CODE 250 W/ 637 OVERRIDE(OP): Performed by: HOSPITALIST

## 2023-01-01 PROCEDURE — RXMED WILLOW AMBULATORY MEDICATION CHARGE: Performed by: STUDENT IN AN ORGANIZED HEALTH CARE EDUCATION/TRAINING PROGRAM

## 2023-01-01 PROCEDURE — 71045 X-RAY EXAM CHEST 1 VIEW: CPT

## 2023-01-01 PROCEDURE — 85027 COMPLETE CBC AUTOMATED: CPT

## 2023-01-01 PROCEDURE — G0299 HHS/HOSPICE OF RN EA 15 MIN: HCPCS

## 2023-01-01 PROCEDURE — A9270 NON-COVERED ITEM OR SERVICE: HCPCS | Performed by: HOSPITALIST

## 2023-01-01 PROCEDURE — 770001 HCHG ROOM/CARE - MED/SURG/GYN PRIV*

## 2023-01-01 PROCEDURE — 99231 SBSQ HOSP IP/OBS SF/LOW 25: CPT | Mod: GW | Performed by: FAMILY MEDICINE

## 2023-01-01 PROCEDURE — 80048 BASIC METABOLIC PNL TOTAL CA: CPT

## 2023-01-01 PROCEDURE — 700102 HCHG RX REV CODE 250 W/ 637 OVERRIDE(OP): Performed by: FAMILY MEDICINE

## 2023-01-01 PROCEDURE — 82962 GLUCOSE BLOOD TEST: CPT

## 2023-01-01 PROCEDURE — A9270 NON-COVERED ITEM OR SERVICE: HCPCS | Performed by: FAMILY MEDICINE

## 2023-01-01 PROCEDURE — 3E0234Z INTRODUCTION OF SERUM, TOXOID AND VACCINE INTO MUSCLE, PERCUTANEOUS APPROACH: ICD-10-PCS | Performed by: FAMILY MEDICINE

## 2023-01-01 PROCEDURE — 90715 TDAP VACCINE 7 YRS/> IM: CPT | Performed by: EMERGENCY MEDICINE

## 2023-01-01 PROCEDURE — 96375 TX/PRO/DX INJ NEW DRUG ADDON: CPT

## 2023-01-01 PROCEDURE — 99497 ADVNCD CARE PLAN 30 MIN: CPT | Mod: GW | Performed by: HOSPITALIST

## 2023-01-01 PROCEDURE — 700111 HCHG RX REV CODE 636 W/ 250 OVERRIDE (IP): Performed by: HOSPITALIST

## 2023-01-01 PROCEDURE — 94760 N-INVAS EAR/PLS OXIMETRY 1: CPT

## 2023-01-01 PROCEDURE — 36415 COLL VENOUS BLD VENIPUNCTURE: CPT

## 2023-01-01 PROCEDURE — 82962 GLUCOSE BLOOD TEST: CPT | Mod: 91

## 2023-01-01 PROCEDURE — 85025 COMPLETE CBC W/AUTO DIFF WBC: CPT

## 2023-01-01 PROCEDURE — 96374 THER/PROPH/DIAG INJ IV PUSH: CPT

## 2023-01-01 PROCEDURE — 73502 X-RAY EXAM HIP UNI 2-3 VIEWS: CPT | Mod: RT

## 2023-01-01 PROCEDURE — 90471 IMMUNIZATION ADMIN: CPT

## 2023-01-01 PROCEDURE — 99285 EMERGENCY DEPT VISIT HI MDM: CPT

## 2023-01-01 PROCEDURE — 700111 HCHG RX REV CODE 636 W/ 250 OVERRIDE (IP): Mod: JZ | Performed by: EMERGENCY MEDICINE

## 2023-01-01 PROCEDURE — 665036 HSPC NOTICE OF ELECTION NOE

## 2023-01-01 PROCEDURE — 99232 SBSQ HOSP IP/OBS MODERATE 35: CPT | Mod: GW | Performed by: FAMILY MEDICINE

## 2023-01-01 PROCEDURE — 80053 COMPREHEN METABOLIC PANEL: CPT

## 2023-01-01 PROCEDURE — 99223 1ST HOSP IP/OBS HIGH 75: CPT | Mod: 25,AI,GW | Performed by: HOSPITALIST

## 2023-01-01 PROCEDURE — 83735 ASSAY OF MAGNESIUM: CPT

## 2023-01-01 PROCEDURE — 97162 PT EVAL MOD COMPLEX 30 MIN: CPT

## 2023-01-01 PROCEDURE — 81001 URINALYSIS AUTO W/SCOPE: CPT

## 2023-01-01 PROCEDURE — 73552 X-RAY EXAM OF FEMUR 2/>: CPT | Mod: RT

## 2023-01-01 PROCEDURE — 700101 HCHG RX REV CODE 250: Performed by: FAMILY MEDICINE

## 2023-01-01 RX ORDER — ACETAMINOPHEN 500 MG
1000 TABLET ORAL 3 TIMES DAILY
Status: DISCONTINUED | OUTPATIENT
Start: 2023-01-01 | End: 2023-01-01 | Stop reason: HOSPADM

## 2023-01-01 RX ORDER — LIDOCAINE 50 MG/G
1 PATCH TOPICAL EVERY 24 HOURS
Qty: 10 PATCH | Refills: 0 | Status: CANCELLED
Start: 2023-01-01

## 2023-01-01 RX ORDER — LORAZEPAM 2 MG/ML
0.5 CONCENTRATE ORAL EVERY 4 HOURS PRN
Status: DISCONTINUED | OUTPATIENT
Start: 2023-01-01 | End: 2023-01-01 | Stop reason: HOSPADM

## 2023-01-01 RX ORDER — MORPHINE SULFATE 100 MG/5ML
10-20 SOLUTION ORAL
Qty: 240 ML | Refills: 0 | Status: SHIPPED | OUTPATIENT
Start: 2023-01-01 | End: 2024-08-20

## 2023-01-01 RX ORDER — AMOXICILLIN 250 MG
2 CAPSULE ORAL 2 TIMES DAILY
Status: DISCONTINUED | OUTPATIENT
Start: 2023-01-01 | End: 2023-01-01 | Stop reason: HOSPADM

## 2023-01-01 RX ORDER — HYDRALAZINE HYDROCHLORIDE 20 MG/ML
10 INJECTION INTRAMUSCULAR; INTRAVENOUS EVERY 4 HOURS PRN
Status: DISCONTINUED | OUTPATIENT
Start: 2023-01-01 | End: 2023-01-01 | Stop reason: HOSPADM

## 2023-01-01 RX ORDER — MORPHINE SULFATE 100 MG/5ML
5-20 SOLUTION ORAL
Qty: 240 ML | Refills: 0 | Status: ON HOLD | OUTPATIENT
Start: 2023-01-01 | End: 2023-01-01 | Stop reason: SDUPTHER

## 2023-01-01 RX ORDER — POLYETHYLENE GLYCOL 3350 17 G/17G
1 POWDER, FOR SOLUTION ORAL
Status: DISCONTINUED | OUTPATIENT
Start: 2023-01-01 | End: 2023-01-01 | Stop reason: HOSPADM

## 2023-01-01 RX ORDER — OXYCODONE HYDROCHLORIDE 10 MG/1
10 TABLET ORAL
Status: DISCONTINUED | OUTPATIENT
Start: 2023-01-01 | End: 2023-01-01 | Stop reason: HOSPADM

## 2023-01-01 RX ORDER — ACETAMINOPHEN 325 MG/1
650 TABLET ORAL EVERY 6 HOURS PRN
Status: DISCONTINUED | OUTPATIENT
Start: 2023-01-01 | End: 2023-01-01

## 2023-01-01 RX ORDER — BISACODYL 10 MG
10 SUPPOSITORY, RECTAL RECTAL
Status: DISCONTINUED | OUTPATIENT
Start: 2023-01-01 | End: 2023-01-01 | Stop reason: HOSPADM

## 2023-01-01 RX ORDER — HEPARIN SODIUM 5000 [USP'U]/ML
5000 INJECTION, SOLUTION INTRAVENOUS; SUBCUTANEOUS EVERY 8 HOURS
Status: DISCONTINUED | OUTPATIENT
Start: 2023-01-01 | End: 2023-01-01 | Stop reason: HOSPADM

## 2023-01-01 RX ORDER — ONDANSETRON 4 MG/1
4 TABLET, ORALLY DISINTEGRATING ORAL EVERY 4 HOURS PRN
Status: DISCONTINUED | OUTPATIENT
Start: 2023-01-01 | End: 2023-01-01 | Stop reason: HOSPADM

## 2023-01-01 RX ORDER — AMOXICILLIN 250 MG
2 CAPSULE ORAL NIGHTLY
Qty: 28 TABLET | Refills: 10 | Status: SHIPPED | OUTPATIENT
Start: 2023-01-01

## 2023-01-01 RX ORDER — FUROSEMIDE 40 MG/1
40 TABLET ORAL
Qty: 14 TABLET | Refills: 10 | Status: SHIPPED | OUTPATIENT
Start: 2023-01-01 | End: 2023-01-01

## 2023-01-01 RX ORDER — DEXTROSE MONOHYDRATE 25 G/50ML
25 INJECTION, SOLUTION INTRAVENOUS
Status: DISCONTINUED | OUTPATIENT
Start: 2023-01-01 | End: 2023-01-01 | Stop reason: HOSPADM

## 2023-01-01 RX ORDER — SODIUM CHLORIDE, SODIUM LACTATE, POTASSIUM CHLORIDE, CALCIUM CHLORIDE 600; 310; 30; 20 MG/100ML; MG/100ML; MG/100ML; MG/100ML
1000 INJECTION, SOLUTION INTRAVENOUS CONTINUOUS
Status: DISCONTINUED | OUTPATIENT
Start: 2023-01-01 | End: 2023-01-01

## 2023-01-01 RX ORDER — MORPHINE SULFATE 4 MG/ML
2 INJECTION INTRAVENOUS ONCE
Status: COMPLETED | OUTPATIENT
Start: 2023-01-01 | End: 2023-01-01

## 2023-01-01 RX ORDER — HYDROMORPHONE HYDROCHLORIDE 1 MG/ML
0.5 INJECTION, SOLUTION INTRAMUSCULAR; INTRAVENOUS; SUBCUTANEOUS
Status: DISCONTINUED | OUTPATIENT
Start: 2023-01-01 | End: 2023-01-01 | Stop reason: HOSPADM

## 2023-01-01 RX ORDER — LORAZEPAM 2 MG/ML
0.5 CONCENTRATE ORAL
Qty: 360 ML | Refills: 0 | Status: SHIPPED | OUTPATIENT
Start: 2023-01-01 | End: 2024-08-20

## 2023-01-01 RX ORDER — LIDOCAINE 50 MG/G
1 PATCH TOPICAL EVERY 24 HOURS
Status: DISCONTINUED | OUTPATIENT
Start: 2023-01-01 | End: 2023-01-01 | Stop reason: HOSPADM

## 2023-01-01 RX ORDER — MORPHINE SULFATE 100 MG/5ML
5-20 SOLUTION ORAL
Status: DISCONTINUED | OUTPATIENT
Start: 2023-01-01 | End: 2023-01-01 | Stop reason: HOSPADM

## 2023-01-01 RX ORDER — SODIUM CHLORIDE 9 MG/ML
2000 INJECTION, SOLUTION INTRAVENOUS CONTINUOUS
Status: ACTIVE | OUTPATIENT
Start: 2023-01-01 | End: 2023-01-01

## 2023-01-01 RX ORDER — FUROSEMIDE 40 MG/1
40 TABLET ORAL
Status: DISCONTINUED | OUTPATIENT
Start: 2023-01-01 | End: 2023-01-01 | Stop reason: HOSPADM

## 2023-01-01 RX ORDER — LIDOCAINE 50 MG/G
1 PATCH TOPICAL EVERY 24 HOURS
Qty: 10 PATCH | Refills: 0
Start: 2023-01-01 | End: 2023-08-23

## 2023-01-01 RX ORDER — OXYCODONE HYDROCHLORIDE 5 MG/1
5 TABLET ORAL
Status: DISCONTINUED | OUTPATIENT
Start: 2023-01-01 | End: 2023-01-01 | Stop reason: HOSPADM

## 2023-01-01 RX ORDER — POTASSIUM CHLORIDE 20 MEQ/1
20 TABLET, EXTENDED RELEASE ORAL DAILY
Qty: 14 TABLET | Refills: 10 | Status: ON HOLD | OUTPATIENT
Start: 2023-01-01 | End: 2023-01-01

## 2023-01-01 RX ORDER — ONDANSETRON 2 MG/ML
4 INJECTION INTRAMUSCULAR; INTRAVENOUS EVERY 4 HOURS PRN
Status: DISCONTINUED | OUTPATIENT
Start: 2023-01-01 | End: 2023-01-01 | Stop reason: HOSPADM

## 2023-01-01 RX ADMIN — HEPARIN SODIUM 5000 UNITS: 5000 INJECTION, SOLUTION INTRAVENOUS; SUBCUTANEOUS at 05:44

## 2023-01-01 RX ADMIN — OXYCODONE HYDROCHLORIDE 5 MG: 5 TABLET ORAL at 17:32

## 2023-01-01 RX ADMIN — HEPARIN SODIUM 5000 UNITS: 5000 INJECTION, SOLUTION INTRAVENOUS; SUBCUTANEOUS at 18:14

## 2023-01-01 RX ADMIN — LORAZEPAM 0.5 MG: 2 LIQUID ORAL at 10:17

## 2023-01-01 RX ADMIN — SENNOSIDES AND DOCUSATE SODIUM 2 TABLET: 50; 8.6 TABLET ORAL at 04:47

## 2023-01-01 RX ADMIN — FENTANYL CITRATE 50 MCG: 50 INJECTION, SOLUTION INTRAMUSCULAR; INTRAVENOUS at 11:18

## 2023-01-01 RX ADMIN — OXYCODONE HYDROCHLORIDE 5 MG: 5 TABLET ORAL at 23:52

## 2023-01-01 RX ADMIN — HEPARIN SODIUM 5000 UNITS: 5000 INJECTION, SOLUTION INTRAVENOUS; SUBCUTANEOUS at 21:14

## 2023-01-01 RX ADMIN — OXYCODONE HYDROCHLORIDE 10 MG: 10 TABLET ORAL at 08:09

## 2023-01-01 RX ADMIN — INSULIN HUMAN 1 UNITS: 100 INJECTION, SOLUTION PARENTERAL at 12:01

## 2023-01-01 RX ADMIN — MORPHINE SULFATE 10 MG: 10 SOLUTION ORAL at 11:43

## 2023-01-01 RX ADMIN — INSULIN HUMAN 1 UNITS: 100 INJECTION, SOLUTION PARENTERAL at 18:10

## 2023-01-01 RX ADMIN — ACETAMINOPHEN 1000 MG: 500 TABLET ORAL at 10:29

## 2023-01-01 RX ADMIN — HEPARIN SODIUM 5000 UNITS: 5000 INJECTION, SOLUTION INTRAVENOUS; SUBCUTANEOUS at 04:46

## 2023-01-01 RX ADMIN — MORPHINE SULFATE 10 MG: 10 SOLUTION ORAL at 10:18

## 2023-01-01 RX ADMIN — HEPARIN SODIUM 5000 UNITS: 5000 INJECTION, SOLUTION INTRAVENOUS; SUBCUTANEOUS at 15:56

## 2023-01-01 RX ADMIN — OXYCODONE HYDROCHLORIDE 10 MG: 10 TABLET ORAL at 01:53

## 2023-01-01 RX ADMIN — HEPARIN SODIUM 5000 UNITS: 5000 INJECTION, SOLUTION INTRAVENOUS; SUBCUTANEOUS at 00:36

## 2023-01-01 RX ADMIN — HYDROMORPHONE HYDROCHLORIDE 0.5 MG: 1 INJECTION, SOLUTION INTRAMUSCULAR; INTRAVENOUS; SUBCUTANEOUS at 15:54

## 2023-01-01 RX ADMIN — ACETAMINOPHEN 1000 MG: 500 TABLET ORAL at 15:24

## 2023-01-01 RX ADMIN — OXYCODONE HYDROCHLORIDE 10 MG: 10 TABLET ORAL at 10:29

## 2023-01-01 RX ADMIN — HYDROMORPHONE HYDROCHLORIDE 0.5 MG: 1 INJECTION, SOLUTION INTRAMUSCULAR; INTRAVENOUS; SUBCUTANEOUS at 12:22

## 2023-01-01 RX ADMIN — LIDOCAINE 1 PATCH: 50 PATCH TOPICAL at 15:25

## 2023-01-01 RX ADMIN — OXYCODONE HYDROCHLORIDE 10 MG: 10 TABLET ORAL at 11:59

## 2023-01-01 RX ADMIN — LORAZEPAM 0.5 MG: 2 LIQUID ORAL at 07:39

## 2023-01-01 RX ADMIN — OXYCODONE HYDROCHLORIDE 5 MG: 5 TABLET ORAL at 10:05

## 2023-01-01 RX ADMIN — MORPHINE SULFATE 5 MG: 10 SOLUTION ORAL at 07:20

## 2023-01-01 RX ADMIN — SENNOSIDES AND DOCUSATE SODIUM 2 TABLET: 50; 8.6 TABLET ORAL at 05:43

## 2023-01-01 RX ADMIN — SENNOSIDES AND DOCUSATE SODIUM 2 TABLET: 50; 8.6 TABLET ORAL at 18:04

## 2023-01-01 RX ADMIN — OXYCODONE HYDROCHLORIDE 5 MG: 5 TABLET ORAL at 16:33

## 2023-01-01 RX ADMIN — SODIUM CHLORIDE 2000 ML: 9 INJECTION, SOLUTION INTRAVENOUS at 15:48

## 2023-01-01 RX ADMIN — INSULIN HUMAN 1 UNITS: 100 INJECTION, SOLUTION PARENTERAL at 21:24

## 2023-01-01 RX ADMIN — ACETAMINOPHEN 1000 MG: 500 TABLET ORAL at 20:31

## 2023-01-01 RX ADMIN — LORAZEPAM 0.5 MG: 2 LIQUID ORAL at 11:41

## 2023-01-01 RX ADMIN — OXYCODONE HYDROCHLORIDE 5 MG: 5 TABLET ORAL at 05:11

## 2023-01-01 RX ADMIN — SODIUM CHLORIDE 2000 ML: 9 INJECTION, SOLUTION INTRAVENOUS at 04:59

## 2023-01-01 RX ADMIN — CLOSTRIDIUM TETANI TOXOID ANTIGEN (FORMALDEHYDE INACTIVATED), CORYNEBACTERIUM DIPHTHERIAE TOXOID ANTIGEN (FORMALDEHYDE INACTIVATED), BORDETELLA PERTUSSIS TOXOID ANTIGEN (GLUTARALDEHYDE INACTIVATED), BORDETELLA PERTUSSIS FILAMENTOUS HEMAGGLUTININ ANTIGEN (FORMALDEHYDE INACTIVATED), BORDETELLA PERTUSSIS PERTACTIN ANTIGEN, AND BORDETELLA PERTUSSIS FIMBRIAE 2/3 ANTIGEN 0.5 ML: 5; 2; 2.5; 5; 3; 5 INJECTION, SUSPENSION INTRAMUSCULAR at 14:00

## 2023-01-01 RX ADMIN — OXYCODONE HYDROCHLORIDE 10 MG: 10 TABLET ORAL at 22:06

## 2023-01-01 RX ADMIN — OXYCODONE HYDROCHLORIDE 5 MG: 5 TABLET ORAL at 13:46

## 2023-01-01 RX ADMIN — INSULIN HUMAN 1 UNITS: 100 INJECTION, SOLUTION PARENTERAL at 20:34

## 2023-01-01 RX ADMIN — OXYCODONE HYDROCHLORIDE 10 MG: 10 TABLET ORAL at 15:24

## 2023-01-01 RX ADMIN — INSULIN HUMAN 1 UNITS: 100 INJECTION, SOLUTION PARENTERAL at 20:08

## 2023-01-01 RX ADMIN — SENNOSIDES AND DOCUSATE SODIUM 2 TABLET: 50; 8.6 TABLET ORAL at 17:32

## 2023-01-01 RX ADMIN — MORPHINE SULFATE 2 MG: 4 INJECTION INTRAVENOUS at 14:00

## 2023-01-01 RX ADMIN — INSULIN HUMAN 1 UNITS: 100 INJECTION, SOLUTION PARENTERAL at 11:40

## 2023-01-01 SDOH — ECONOMIC STABILITY: HOUSING INSECURITY

## 2023-01-01 ASSESSMENT — ENCOUNTER SYMPTOMS
DESCRIPTION OF MEMORY LOSS: SHORT TERM
DESCRIPTION OF MEMORY LOSS: IMMEDIATE
STOOL FREQUENCY: DAILY
DENIES PAIN: 1
PERSON REPORTING PAIN: PATIENT
DYSPNEA ACTIVITY LEVEL: AT REST
PERSON REPORTING PAIN: PATIENT
DENIES PAIN: 1
PAIN: 1
COUGH: 1
LOWER EXTREMITY EDEMA: 1
FATIGUES EASILY: 1
COUGH CHARACTERISTICS: NON-PRODUCTIVE
DESCRIPTION OF MEMORY LOSS: IMMEDIATE
FALLS: 1
FATIGUES EASILY: 1
SLEEP QUALITY: ADEQUATE
DESCRIPTION OF MEMORY LOSS: SHORT TERM
DENIES PAIN: 1
COUGH CHARACTERISTICS: NON-PRODUCTIVE
BOWEL PATTERN NORMAL: 1
DRY SKIN: 1
SLEEP QUALITY: ADEQUATE
COUGH CHARACTERISTICS: NON-PRODUCTIVE
BOWEL PATTERN NORMAL: 1
BOWEL PATTERN NORMAL: 1
FATIGUES EASILY: 1
BOWEL PATTERN NORMAL: 1
SHORTNESS OF BREATH: 1
SHORTNESS OF BREATH: 1
HIGHEST PAIN SEVERITY IN PAST 24 HOURS: 0/10
DESCRIPTION OF MEMORY LOSS: SHORT TERM
LAST BOWEL MOVEMENT: 66564
LAST BOWEL MOVEMENT: 66626
BOWEL PATTERN NORMAL: 1
SLEEP QUALITY: ADEQUATE
STOOL FREQUENCY: DAILY
BOWEL PATTERN NORMAL: 1
FORGETFULNESS: 1
SLEEP QUALITY: ADEQUATE
PERSON REPORTING PAIN: PATIENT
STOOL FREQUENCY: DAILY
SLEEP QUALITY: ADEQUATE
FATIGUES EASILY: 1
HIGHEST PAIN SEVERITY IN PAST 24 HOURS: 4/10
FORGETFULNESS: 1
MEMORY LOSS: 1
STOOL FREQUENCY: DAILY
SLEEP QUALITY: ADEQUATE
PERSON REPORTING PAIN: PATIENT
LOWER EXTREMITY EDEMA: 1
UNABLE TO COMMUNICATE PAIN: 1
ABDOMINAL PAIN: 1
FORGETFULNESS: 1
COUGH: 1
DRY SKIN: 1
DESCRIPTION OF MEMORY LOSS: IMMEDIATE
COUGH CHARACTERISTICS: DRY
FATIGUES EASILY: 1
DYSPNEA ON EXERTION: 1
FATIGUES EASILY: 1
DENIES PAIN: 1
LOWER EXTREMITY EDEMA: 1
PERSON REPORTING PAIN: PATIENT
DESCRIPTION OF MEMORY LOSS: SHORT TERM
SLEEP QUALITY: ADEQUATE
COUGH CHARACTERISTICS: NON-PRODUCTIVE
PERSON REPORTING PAIN: PATIENT
DRY SKIN: 1
FATIGUE: 1
COUGH: 1
BOWEL PATTERN NORMAL: 1
DESCRIPTION OF MEMORY LOSS: IMMEDIATE
BOWEL PATTERN NORMAL: 1
LOWER EXTREMITY EDEMA: 1
PAIN SEVERITY GOAL: 0/10
FORGETFULNESS: 1
FATIGUES EASILY: 1
PAIN: 1
PERSON REPORTING PAIN: PATIENT
FORGETFULNESS: 1
PERSON REPORTING PAIN: PATIENT
FATIGUES EASILY: 1
COUGH: 1
DESCRIPTION OF MEMORY LOSS: SHORT TERM
DESCRIPTION OF MEMORY LOSS: SHORT TERM
SLEEP QUALITY: ADEQUATE
SLEEP QUALITY: ADEQUATE
STOOL FREQUENCY: DAILY
FATIGUE: 1
SHORTNESS OF BREATH: 0
FORGETFULNESS: 1
FATIGUE: 1
DENIES PAIN: 1
STOOL FREQUENCY: LESS THAN DAILY
DENIES PAIN: 1
LAST BOWEL MOVEMENT: 66626
FATIGUE: 1
COUGH: 1
PERSON REPORTING PAIN: PATIENT
FATIGUE: 1
BOWEL PATTERN NORMAL: 1
DYSPNEA ON EXERTION: 1
DESCRIPTION OF MEMORY LOSS: SHORT TERM
LAST BOWEL MOVEMENT: 66542
BOWEL PATTERN NORMAL: 1
FLANK PAIN: 0
STOOL FREQUENCY: LESS THAN DAILY
LAST BOWEL MOVEMENT: 66681
STOOL FREQUENCY: DAILY
LAST BOWEL MOVEMENT: 66619
SLEEP QUALITY: ADEQUATE
LOWER EXTREMITY EDEMA: 1
LAST BOWEL MOVEMENT: 66556
DESCRIPTION OF MEMORY LOSS: SHORT TERM
MUSCLE WEAKNESS: 1
LOWEST PAIN SEVERITY IN PAST 24 HOURS: 0/10
LOWER EXTREMITY EDEMA: 1
STOOL FREQUENCY: DAILY
LAST BOWEL MOVEMENT: 66661
BOWEL PATTERN NORMAL: 1
FATIGUES EASILY: 1
DENIES PAIN: 1
FORGETFULNESS: 1
STOOL FREQUENCY: DAILY
STOOL FREQUENCY: LESS THAN DAILY
SHORTNESS OF BREATH: 1
DESCRIPTION OF MEMORY LOSS: SHORT TERM
COUGH: 1
SLEEP QUALITY: ADEQUATE
LAST BOWEL MOVEMENT: 66598
STOOL FREQUENCY: DAILY
COUGH CHARACTERISTICS: DRY
DESCRIPTION OF MEMORY LOSS: IMMEDIATE
LAST BOWEL MOVEMENT: 66647
BOWEL PATTERN NORMAL: 1
DESCRIPTION OF MEMORY LOSS: SHORT TERM
SLEEP QUALITY: ADEQUATE
DENIES PAIN: 1
COUGH CHARACTERISTICS: NON-PRODUCTIVE
HIGHEST PAIN SEVERITY IN PAST 24 HOURS: 7/10
PERSON REPORTING PAIN: PATIENT
DYSPNEA ACTIVITY LEVEL: AFTER AMBULATING 10 - 20 FT
FATIGUES EASILY: 1
BOWEL PATTERN NORMAL: 1
DENIES PAIN: 1
BOWEL PATTERN NORMAL: 1
STOOL FREQUENCY: DAILY
LAST BOWEL MOVEMENT: 66667
PAIN: 1
DYSPNEA ACTIVITY LEVEL: AT REST
PERSON REPORTING PAIN: PATIENT
COUGH: 1
DESCRIPTION OF MEMORY LOSS: SHORT TERM
LAST BOWEL MOVEMENT: 66486
SORE THROAT: 0
DENIES PAIN: 1
FORGETFULNESS: 1
PAIN: 1
LOWER EXTREMITY EDEMA: 1
STOOL FREQUENCY: DAILY
VOMITING: 0
DENIES PAIN: 1
DESCRIPTION OF MEMORY LOSS: IMMEDIATE
PERSON REPORTING PAIN: PATIENT
LOWER EXTREMITY EDEMA: 1
FATIGUES EASILY: 1
DESCRIPTION OF MEMORY LOSS: SHORT TERM
DYSPNEA ON EXERTION: 1
SLEEP QUALITY: ADEQUATE
COUGH CHARACTERISTICS: DRY
SPEECH CHANGE: 0
BOWEL PATTERN NORMAL: 1
LOWER EXTREMITY EDEMA: 1
FATIGUES EASILY: 1
STOOL FREQUENCY: DAILY
FATIGUE: 1
DRY SKIN: 1
PAIN SEVERITY GOAL: 0/10
PERSON REPORTING PAIN: PATIENT
FATIGUES EASILY: 1
PERSON REPORTING PAIN: PATIENT
LAST BOWEL MOVEMENT: 66606
PERSON REPORTING PAIN: PATIENT
STOOL FREQUENCY: DAILY
FORGETFULNESS: 1
FORGETFULNESS: 1
FATIGUES EASILY: 1
FORGETFULNESS: 1
COUGH CHARACTERISTICS: DRY
STOOL FREQUENCY: DAILY
FATIGUES EASILY: 1
FATIGUE: 1
LOWEST PAIN SEVERITY IN PAST 24 HOURS: 0/10
DENIES PAIN: 1
FORGETFULNESS: 1
LAST BOWEL MOVEMENT: 66570
COUGH CHARACTERISTICS: DRY
DESCRIPTION OF MEMORY LOSS: SHORT TERM
PERSON REPORTING PAIN: PATIENT
FATIGUES EASILY: 1
PERSON REPORTING PAIN: PATIENT
STOOL FREQUENCY: DAILY
DESCRIPTION OF MEMORY LOSS: SHORT TERM
PERSON REPORTING PAIN: PATIENT
FORGETFULNESS: 1
LOWER EXTREMITY EDEMA: 1
FORGETFULNESS: 1
FATIGUE: 1
FORGETFULNESS: 1
BOWEL PATTERN NORMAL: 1
COUGH CHARACTERISTICS: DRY
BOWEL PATTERN NORMAL: 1
SLEEP QUALITY: ADEQUATE
PERSON REPORTING PAIN: PATIENT
DESCRIPTION OF MEMORY LOSS: SHORT TERM
DYSPNEA ON EXERTION: 1
LAST BOWEL MOVEMENT: 66612
FATIGUE: 1
STOOL FREQUENCY: LESS THAN DAILY
FATIGUES EASILY: 1
STOOL FREQUENCY: DAILY
FATIGUES EASILY: 1
FORGETFULNESS: 1
LAST BOWEL MOVEMENT: 66603
BOWEL PATTERN NORMAL: 1
COUGH: 1
COUGH CHARACTERISTICS: NON-PRODUCTIVE
FORGETFULNESS: 1
FORGETFULNESS: 1
DESCRIPTION OF MEMORY LOSS: IMMEDIATE
FATIGUE: 1
LAST BOWEL MOVEMENT: 66535
FATIGUES EASILY: 1
MUSCLE WEAKNESS: 1
LAST BOWEL MOVEMENT: 66696
BOWEL PATTERN NORMAL: 1
SLEEP QUALITY: ADEQUATE
LIMITED RANGE OF MOTION: 1
DESCRIPTION OF MEMORY LOSS: IMMEDIATE
BOWEL PATTERN NORMAL: 1
PERSON REPORTING PAIN: PATIENT
LAST BOWEL MOVEMENT: 66584
DYSPNEA ON EXERTION: 1
STOOL FREQUENCY: DAILY
FATIGUES EASILY: 1
COUGH: 1
LAST BOWEL MOVEMENT: 66501
FATIGUE: 1
COUGH: 1
SLEEP QUALITY: ADEQUATE
DENIES PAIN: 1
PAIN: 1
LOWEST PAIN SEVERITY IN PAST 24 HOURS: 7/10
STOOL FREQUENCY: DAILY
DRY SKIN: 1
LAST BOWEL MOVEMENT: 66689
STOOL FREQUENCY: DAILY
FATIGUES EASILY: 1
BOWEL PATTERN NORMAL: 1
PERSON REPORTING PAIN: PATIENT
PAIN SEVERITY GOAL: 4/10
MUSCLE WEAKNESS: 1
COUGH CHARACTERISTICS: DRY
DENIES PAIN: 1
COUGH CHARACTERISTICS: NON-PRODUCTIVE
ABDOMINAL PAIN: 1
LAST BOWEL MOVEMENT: 66676
DESCRIPTION OF MEMORY LOSS: SHORT TERM
SLEEP QUALITY: ADEQUATE
DESCRIPTION OF MEMORY LOSS: SHORT TERM
DESCRIPTION OF MEMORY LOSS: SHORT TERM
COUGH CHARACTERISTICS: NON-PRODUCTIVE
PERSON REPORTING PAIN: PATIENT
SHORTNESS OF BREATH: 1
LAST BOWEL MOVEMENT: 66508
DYSPNEA ON EXERTION: 1
DESCRIPTION OF MEMORY LOSS: SHORT TERM
BOWEL PATTERN NORMAL: 1
FATIGUES EASILY: 1
FATIGUES EASILY: 1
PAIN: 1
DESCRIPTION OF MEMORY LOSS: IMMEDIATE
LOWER EXTREMITY EDEMA: 1
DYSPNEA ON EXERTION: 1
FORGETFULNESS: 1
STOOL FREQUENCY: LESS THAN DAILY
FATIGUES EASILY: 1
DENIES PAIN: 1
PERSON REPORTING PAIN: PATIENT
COUGH CHARACTERISTICS: NON-PRODUCTIVE
FORGETFULNESS: 1
STOOL FREQUENCY: DAILY
PERSON REPORTING PAIN: PATIENT
DESCRIPTION OF MEMORY LOSS: SHORT TERM
FATIGUE: 1
DESCRIPTION OF MEMORY LOSS: SHORT TERM
FATIGUE: 1
LOWER EXTREMITY EDEMA: 1
BOWEL PATTERN NORMAL: 1
COUGH CHARACTERISTICS: DRY
STOOL FREQUENCY: DAILY
BOWEL PATTERN NORMAL: 1
FATIGUE: 1
LAST BOWEL MOVEMENT: 66511
DENIES PAIN: 1
SLEEP QUALITY: ADEQUATE
PAIN: 1
DESCRIPTION OF MEMORY LOSS: SHORT TERM
FORGETFULNESS: 1
PERSON REPORTING PAIN: PATIENT
SLEEP QUALITY: ADEQUATE
MUSCLE WEAKNESS: 1
FATIGUE: 1
DENIES PAIN: 1
SLEEP QUALITY: ADEQUATE
FATIGUE: 1
SLEEP QUALITY: ADEQUATE
FATIGUE: 1
FORGETFULNESS: 1
SLEEP QUALITY: ADEQUATE
PERSON REPORTING PAIN: PATIENT
EYE PAIN: 0
LOWER EXTREMITY EDEMA: 1
COUGH: 1
STOOL FREQUENCY: LESS THAN DAILY
COUGH CHARACTERISTICS: NON-PRODUCTIVE
FATIGUE: 1
STOOL FREQUENCY: DAILY
SLEEP QUALITY: ADEQUATE
FATIGUES EASILY: 1
MUSCLE WEAKNESS: 1
LAST BOWEL MOVEMENT: 66655
PAIN: 1
SUBJECTIVE PAIN PROGRESSION: WAXING AND WANING
SLEEP QUALITY: ADEQUATE
FATIGUE: 1
DENIES PAIN: 1
FATIGUES EASILY: 1
FATIGUES EASILY: 1
DESCRIPTION OF MEMORY LOSS: SHORT TERM
FATIGUE: 1
BOWEL PATTERN NORMAL: 1
DESCRIPTION OF MEMORY LOSS: SHORT TERM
DESCRIPTION OF MEMORY LOSS: IMMEDIATE
LAST BOWEL MOVEMENT: 66640
PERSON REPORTING PAIN: PATIENT
HIGHEST PAIN SEVERITY IN PAST 24 HOURS: 0/10
FORGETFULNESS: 1
LAST BOWEL MOVEMENT: 66591
FATIGUES EASILY: 1
CONTUSION: 1
FORGETFULNESS: 1
FATIGUE: 1
BOWEL PATTERN NORMAL: 1
COUGH CHARACTERISTICS: DRY
DENIES PAIN: 1

## 2023-01-01 ASSESSMENT — SOCIAL DETERMINANTS OF HEALTH (SDOH)
ACTIVE STRESSOR - HEALTH CHANGES: 1
ACTIVE STRESSOR - NO STRESS FACTORS: 1
ACTIVE STRESSOR - HEALTH CHANGES: 1
ACTIVE STRESSOR - NO STRESS FACTORS: 1
ACTIVE STRESSOR - HEALTH CHANGES: 1

## 2023-01-01 ASSESSMENT — PAIN DESCRIPTION - PAIN TYPE
TYPE: ACUTE PAIN
TYPE: ACUTE PAIN;CHRONIC PAIN
TYPE: ACUTE PAIN
TYPE: CHRONIC PAIN;ACUTE PAIN
TYPE: ACUTE PAIN
TYPE: ACUTE PAIN;CHRONIC PAIN
TYPE: ACUTE PAIN;CHRONIC PAIN
TYPE: CHRONIC PAIN;ACUTE PAIN
TYPE: ACUTE PAIN;CHRONIC PAIN
TYPE: ACUTE PAIN
TYPE: ACUTE PAIN;CHRONIC PAIN
TYPE: ACUTE PAIN
TYPE: ACUTE PAIN;CHRONIC PAIN
TYPE: ACUTE PAIN
TYPE: ACUTE PAIN;CHRONIC PAIN
TYPE: ACUTE PAIN;CHRONIC PAIN
TYPE: ACUTE PAIN
TYPE: ACUTE PAIN

## 2023-01-01 ASSESSMENT — ACTIVITIES OF DAILY LIVING (ADL)
MONEY MANAGEMENT (EXPENSES/BILLS): NEEDS ASSISTANCE
MONEY MANAGEMENT (EXPENSES/BILLS): TOTALLY DEPENDENT
MONEY MANAGEMENT (EXPENSES/BILLS): NEEDS ASSISTANCE

## 2023-01-01 ASSESSMENT — COGNITIVE AND FUNCTIONAL STATUS - GENERAL
STANDING UP FROM CHAIR USING ARMS: TOTAL
MOBILITY SCORE: 6
HELP NEEDED FOR BATHING: TOTAL
DAILY ACTIVITIY SCORE: 6
CLIMB 3 TO 5 STEPS WITH RAILING: TOTAL
MOBILITY SCORE: 6
EATING MEALS: TOTAL
DRESSING REGULAR LOWER BODY CLOTHING: A LITTLE
MOVING FROM LYING ON BACK TO SITTING ON SIDE OF FLAT BED: A LOT
SUGGESTED CMS G CODE MODIFIER MOBILITY: CN
WALKING IN HOSPITAL ROOM: TOTAL
SUGGESTED CMS G CODE MODIFIER DAILY ACTIVITY: CN
TOILETING: TOTAL
SUGGESTED CMS G CODE MODIFIER MOBILITY: CM
STANDING UP FROM CHAIR USING ARMS: TOTAL
STANDING UP FROM CHAIR USING ARMS: TOTAL
WALKING IN HOSPITAL ROOM: TOTAL
TURNING FROM BACK TO SIDE WHILE IN FLAT BAD: A LOT
HELP NEEDED FOR BATHING: A LITTLE
SUGGESTED CMS G CODE MODIFIER MOBILITY: CN
PERSONAL GROOMING: TOTAL
MOVING TO AND FROM BED TO CHAIR: A LOT
CLIMB 3 TO 5 STEPS WITH RAILING: TOTAL
MOBILITY SCORE: 9
TURNING FROM BACK TO SIDE WHILE IN FLAT BAD: UNABLE
DRESSING REGULAR UPPER BODY CLOTHING: TOTAL
MOVING FROM LYING ON BACK TO SITTING ON SIDE OF FLAT BED: UNABLE
MOVING TO AND FROM BED TO CHAIR: UNABLE
DRESSING REGULAR UPPER BODY CLOTHING: A LOT
MOVING FROM LYING ON BACK TO SITTING ON SIDE OF FLAT BED: UNABLE
WALKING IN HOSPITAL ROOM: TOTAL
TOILETING: TOTAL
DAILY ACTIVITIY SCORE: 16
TURNING FROM BACK TO SIDE WHILE IN FLAT BAD: UNABLE
CLIMB 3 TO 5 STEPS WITH RAILING: TOTAL
EATING MEALS: A LITTLE
SUGGESTED CMS G CODE MODIFIER DAILY ACTIVITY: CK
MOVING TO AND FROM BED TO CHAIR: UNABLE
DRESSING REGULAR LOWER BODY CLOTHING: TOTAL

## 2023-01-01 ASSESSMENT — PAIN SCALES - PAIN ASSESSMENT IN ADVANCED DEMENTIA (PAINAD)
TOTALSCORE: 2
TOTALSCORE: 1
CONSOLABILITY: 0 - NO NEED TO CONSOLE.
FACIALEXPRESSION: 0 - SMILING OR INEXPRESSIVE.
BODYLANGUAGE: 0 - RELAXED.
FACIALEXPRESSION: 0 - SMILING OR INEXPRESSIVE.
NEGVOCALIZATION: 0 - NONE.
NEGVOCALIZATION: 1 - OCCASIONAL MOAN OR GROAN. LOW-LEVEL SPEECH WITH A NEGATIVE OR DISAPPROVING QUALITY.
CONSOLABILITY: 0 - NO NEED TO CONSOLE.
BODYLANGUAGE: 0 - RELAXED.

## 2023-01-01 ASSESSMENT — PATIENT HEALTH QUESTIONNAIRE - PHQ9
1. LITTLE INTEREST OR PLEASURE IN DOING THINGS: NOT AT ALL
SUM OF ALL RESPONSES TO PHQ9 QUESTIONS 1 AND 2: 0
SUM OF ALL RESPONSES TO PHQ9 QUESTIONS 1 AND 2: 0
2. FEELING DOWN, DEPRESSED, IRRITABLE, OR HOPELESS: NOT AT ALL
CLINICAL INTERPRETATION OF PHQ2 SCORE: 0
1. LITTLE INTEREST OR PLEASURE IN DOING THINGS: NOT AT ALL

## 2023-01-01 ASSESSMENT — LIFESTYLE VARIABLES
ALCOHOL_USE: NO
TOTAL SCORE: 0
ON A TYPICAL DAY WHEN YOU DRINK ALCOHOL HOW MANY DRINKS DO YOU HAVE: 0
HOW MANY TIMES IN THE PAST YEAR HAVE YOU HAD 5 OR MORE DRINKS IN A DAY: 0
TOTAL SCORE: 0
HAVE YOU EVER FELT YOU SHOULD CUT DOWN ON YOUR DRINKING: NO
CONSUMPTION TOTAL: NEGATIVE
EVER FELT BAD OR GUILTY ABOUT YOUR DRINKING: NO
TOTAL SCORE: 0
EVER HAD A DRINK FIRST THING IN THE MORNING TO STEADY YOUR NERVES TO GET RID OF A HANGOVER: NO
AVERAGE NUMBER OF DAYS PER WEEK YOU HAVE A DRINK CONTAINING ALCOHOL: 0
HAVE PEOPLE ANNOYED YOU BY CRITICIZING YOUR DRINKING: NO

## 2023-01-01 ASSESSMENT — FIBROSIS 4 INDEX
FIB4 SCORE: 2.03
FIB4 SCORE: 1.91
FIB4 SCORE: 2.01
FIB4 SCORE: 2.01
FIB4 SCORE: 1.91
FIB4 SCORE: 1.91
FIB4 SCORE: 2.03
FIB4 SCORE: 2.33
FIB4 SCORE: 2.03
FIB4 SCORE: 2.03

## 2023-01-01 ASSESSMENT — GAIT ASSESSMENTS: GAIT LEVEL OF ASSIST: UNABLE TO PARTICIPATE

## 2023-01-01 ASSESSMENT — PAIN SCALES - WONG BAKER: WONGBAKER_NUMERICALRESPONSE: HURTS AS MUCH AS POSSIBLE

## 2023-05-12 NOTE — PROGRESS NOTES
Patient having an increase in constipation, hernia related pain with bowel movements. New order for senna-s two tabs at night. Refill of furosemide.

## 2023-06-15 NOTE — ED PROVIDER NOTES
ED Provider Note    CHIEF COMPLAINT  Chief Complaint   Patient presents with    Shortness of Breath     X several days No CP  Pt vaccinated for Covid x 3 shots    Cough     Dry No fever    Leg Swelling     Mild Bilat ankle swelling       HPI  Sammy Oseguera is a 82 y.o. male with history of atrial fibrillation on Eliquis, alcoholic cirrhosis, hypertension who presents to the emergency department with shortness of breath and leg swelling.  The patient was seen here last week and diagnosed with a large right pleural effusion and had thoracentesis performed by interventional radiology.  He followed up with his primary care doctor today who repeated a chest x-ray which showed a stable moderate effusion therefore he was sent here for further evaluation.  The patient reports weight loss and fatigue that has been going on for the last 6 months however states that shortness of breath and cough have increased over the last few weeks.  He has had increasing lower extremity swelling over the last few days.  No chest pain.  No fevers.  The patient has been prescribed furosemide in the past however has not started taking it.  He does state that he has gained a few pounds from his baseline of approximately 160    REVIEW OF SYSTEMS  See HPI for further details.   Review of Systems   Constitutional:  Positive for malaise/fatigue. Negative for chills and fever.   HENT:  Negative for sore throat.    Eyes:  Negative for blurred vision and redness.   Respiratory:  Positive for cough and shortness of breath.    Cardiovascular:  Positive for leg swelling. Negative for chest pain.   Gastrointestinal:  Negative for abdominal pain and vomiting.   Genitourinary:  Negative for dysuria and urgency.   Musculoskeletal:  Negative for back pain and neck pain.   Skin:  Negative for rash.   Neurological:  Negative for focal weakness, seizures and headaches.   Psychiatric/Behavioral:  Negative for suicidal ideas.        PAST MEDICAL HISTORY   has  Outreach attempt was made to schedule a Medicare Wellness Visit. This was the first attempt. Contact was not made, Nursing Home patient.   a past medical history of HTN and Type II or unspecified type diabetes mellitus without mention of complication, not stated as uncontrolled.    SOCIAL HISTORY  Social History     Tobacco Use    Smoking status: Never    Smokeless tobacco: Never   Vaping Use    Vaping Use: Never used   Substance and Sexual Activity    Alcohol use: Not Currently     Comment: quit 4-5 yr ago    Drug use: No    Sexual activity: Yes     Comment:        SURGICAL HISTORY   has a past surgical history that includes laparoscopic gastric ulcer over sew and prostatectomy, radical retro.    CURRENT MEDICATIONS  Home Medications    **Home medications have not yet been reviewed for this encounter**         ALLERGIES  No Known Allergies    PHYSICAL EXAM   VITAL SIGNS: BP (!) 159/70   Pulse (!) 56   Temp 36.7 °C (98 °F) (Temporal)   Resp 18   Ht 1.829 m (6')   Wt 76 kg (167 lb 8.8 oz)   SpO2 99%   BMI 22.72 kg/m²      Physical Exam  Constitutional:       General: He is not in acute distress.     Comments: Chronically ill-appearing   HENT:      Head: Normocephalic and atraumatic.   Eyes:      Conjunctiva/sclera: Conjunctivae normal.      Pupils: Pupils are equal, round, and reactive to light.   Cardiovascular:      Rate and Rhythm: Normal rate and regular rhythm.      Heart sounds: Normal heart sounds.   Pulmonary:      Effort: Pulmonary effort is normal. No respiratory distress.      Comments: Diminished breath sounds in the right lower lobe  Abdominal:      General: There is no distension.      Palpations: Abdomen is soft.      Tenderness: There is no abdominal tenderness.   Musculoskeletal:         General: No tenderness. Normal range of motion.      Cervical back: Normal range of motion and neck supple.      Right lower leg: Edema present.      Left lower leg: Edema present.      Comments: 2+ pitting edema symmetric bilateral lower extremities   Skin:     General: Skin is warm and dry.   Neurological:      Mental Status: He is alert  and oriented to person, place, and time.      Comments: Moving all extremities spontaneously   Psychiatric:         Mood and Affect: Affect normal.         DIAGNOSTIC STUDIES    EKG  Results for orders placed or performed during the hospital encounter of 22   EKG   Result Value Ref Range    Report       [facility] Emergency Dept.    Test Date:  2022  Pt Name:    CHUCHO CARMONA                Department: [facility]  MRN:        [id number]                      Room:  Gender:     Male                         Technician: BARRY  :        [id number]                   Requested By:ER TRIAGE PROTOCOL  Order #:    [id number]                    Reading MD: Indira Hou MD    Measurements  Intervals                                Axis  Rate:       67                           P:  CA:                                      QRS:        117  QRSD:       102                          T:          238  QT:         575  QTc:        607    Interpretive Statements  Atrial fibrillation  Paired ventricular premature complexes  Probable lateral infarct, age indeterminate  Probable anteroseptal infarct, old  Prolonged QT interval  Compared to ECG 2022 14:31:01  Increased PVCs, no other change  Electronically Signed On 2022 18:34:37 PDT by Indira Hou MD             LABS  Personally reviewed by me  Labs Reviewed   CBC WITH DIFFERENTIAL - Abnormal; Notable for the following components:       Result Value    RBC 3.62 (*)     Hemoglobin 12.6 (*)     Hematocrit 38.3 (*)     .8 (*)     MCH 34.8 (*)     MCHC 32.9 (*)     RDW 55.5 (*)     Platelet Count 160 (*)     Neutrophils-Polys 77.20 (*)     Lymphocytes 11.40 (*)     Lymphs (Absolute) 0.91 (*)     All other components within normal limits    Narrative:     Biotin intake of greater than 5 mg per day may interfere with  troponin levels, causing false low values.   COMP METABOLIC PANEL - Abnormal; Notable for the following components:    Glucose 158  (*)     Bun 25 (*)     Creatinine 1.58 (*)     Alkaline Phosphatase 119 (*)     Total Bilirubin 1.9 (*)     Globulin 3.7 (*)     All other components within normal limits    Narrative:     Biotin intake of greater than 5 mg per day may interfere with  troponin levels, causing false low values.   TROPONIN - Abnormal; Notable for the following components:    Troponin T 135 (*)     All other components within normal limits    Narrative:     Biotin intake of greater than 5 mg per day may interfere with  troponin levels, causing false low values.   PROBRAIN NATRIURETIC PEPTIDE, NT - Abnormal; Notable for the following components:    NT-proBNP 68934 (*)     All other components within normal limits    Narrative:     Biotin intake of greater than 5 mg per day may interfere with  troponin levels, causing false low values.   ESTIMATED GFR - Abnormal; Notable for the following components:    GFR (CKD-EPI) 43 (*)     All other components within normal limits    Narrative:     Biotin intake of greater than 5 mg per day may interfere with  troponin levels, causing false low values.           RADIOLOGY  Personally reviewed by me  DX-CHEST-PORTABLE (1 VIEW)   Final Result      Stable at least moderate right pleural effusion.            ED COURSE  Vitals:    09/23/22 1646 09/23/22 1818 09/23/22 1840   BP: 133/58 (!) 159/70    Pulse: (!) 53  (!) 56   Resp: 18     Temp: 36.7 °C (98 °F)     TempSrc: Temporal     SpO2: 99%  99%   Weight: 76 kg (167 lb 8.8 oz)     Height: 1.829 m (6')           Medications administered:  Medications   furosemide (LASIX) injection 40 mg (has no administration in time range)         Old records personally reviewed:  Patient was seen in the emergency department on September 14 and had thoracentesis performed by interventional radiology removing 2 L of fluid.  Last echocardiogram in our system was 1 year ago showing EF of 35 to 40%, diastolic dysfunction.        MEDICAL DECISION MAKING  Patient with history  of atrial fibrillation, heart failure, alcoholic cirrhosis who presents with concern for recurrent pleural effusion.  He is afebrile, bradycardic on arrival with otherwise reassuring vital signs.  He has no hypoxia or respiratory distress.  Chest x-ray shows a stable moderate right-sided pleural effusion without evidence of pneumonia or pneumothorax.  This was thought to be due to his chronic kidney and liver failure however patient's BNP and troponin are significantly elevated and concerning for worsening heart failure which could be contributing to this as well.  His EKG shows multiple PVCs and atrial fibrillation however no ischemic changes and no chest pain to suggest ACS.  Labs otherwise show renal function at baseline to improved and no significant electrolyte abnormalities.  We will plan to initiate diuresis in the emergency department and admit for heart failure exacerbation and possible repeat thoracentesis tomorrow.    I discussed plan of care with the patient and his wife who are agreeable with admission.  I discussed the patient with Dr. Sesay, hospitalist on-call, accepts admission of the patient.    CRITICAL CARE TIME  Upon my evaluation, this patient had a high probability of imminent or life-threatening deterioration due to  decompensated heart failure with significantly elevated troponin requiring IV diuresis which required my direct attention, intervention, and personal management.     I personally provided 35 minutes of total critical care time outside of time spent on separately billable/documented procedures. Time includes: review of laboratory data, review of radiology studies, discussion with consultants, discussion with family/patient, monitoring for potential decompensation.  Interventions were performed as documented above.         IMPRESSION  (I50.9) Acute on chronic congestive heart failure, unspecified heart failure type (HCC)  (R77.8) Elevated troponin  (N18.9) Chronic kidney disease,  unspecified CKD stage  (J90) Pleural effusion on right    Disposition: Admit medicine, guarded condition    The patient is referred to a primary physician for blood pressure management, diabetic screening, and for all other preventative health concerns.    New Prescriptions    No medications on file           Electronically signed by: Indira Hou M.D., 9/23/2022 6:44 PM

## 2023-08-16 PROBLEM — E86.0 DEHYDRATION: Status: ACTIVE | Noted: 2023-01-01

## 2023-08-16 PROBLEM — Z71.89 ACP (ADVANCE CARE PLANNING): Status: ACTIVE | Noted: 2023-01-01

## 2023-08-16 PROBLEM — T14.8XXA FRACTURE: Status: ACTIVE | Noted: 2023-01-01

## 2023-08-16 NOTE — CONSULTS
CC: R hip intertrochanteric hip fracture    HPI:   Lucio Oseguera is an 82 yo M with CHF on hospice who suffered a ground level fall resulting in R displaced IT fracture today. He slipped in the bathroom. Wife is primary caretaker and decision maker. Minimally ambulatory at baseline.     ROS: Positive for musculoskeletal pain and what is stated in the HPI and PMH, otherwise negative review of systems    PMH:   Past Medical History:   Diagnosis Date    HTN     Type II or unspecified type diabetes mellitus without mention of complication, not stated as uncontrolled        PSH:   Past Surgical History:   Procedure Laterality Date    LAPAROSCOPIC GASTRIC ULCER OVER SEW      PROSTATECTOMY, RADICAL RETRO         Meds:   (Not in a hospital admission)      Allergies:   No Known Allergies    SH:   Social History     Socioeconomic History    Marital status:      Spouse name: Not on file    Number of children: Not on file    Years of education: Not on file    Highest education level: Not on file   Occupational History    Not on file   Tobacco Use    Smoking status: Never    Smokeless tobacco: Never   Vaping Use    Vaping Use: Never used   Substance and Sexual Activity    Alcohol use: Not Currently     Comment: quit 4-5 yr ago    Drug use: No    Sexual activity: Yes     Comment:    Other Topics Concern    Not on file   Social History Narrative     in '[birth year]. They have 2 children. 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 He quit drinking alcohol when prior PCP told him about his liver damage around age 76. He was drinking at most 2oz of alcohol daily. Wife doesn't drink etoh, no h/o drug use.      Social Determinants of Health     Financial Resource Strain: Not on file   Food Insecurity: No Food Insecurity (1/9/2020)    Hunger Vital Sign     Worried About Running Out of Food in the Last Year: Never true     Ran Out of  Food in the Last Year: Never true   Transportation Needs: No Transportation Needs (1/9/2020)    PRAPARE - Transportation     Lack of Transportation (Medical): No     Lack of Transportation (Non-Medical): No   Physical Activity: Not on file   Stress: Not on file   Social Connections: Not on file   Intimate Partner Violence: Not on file   Housing Stability: Not on file       FH:   Family History   Problem Relation Age of Onset    Stroke Mother     Heart Disease Father     Depression Sister     Heart Disease Sister     Depression Sister     Depression Sister     No Known Problems Son        Physical Exam:   General: AO x4  Eyes: non-icteric  Breathing: nonlabored  MSK:   R hip skin is intact with no wounds, rashes or abrasions. Fires quads, hamstrings, EHL, FHL, TA and GSC. Grossly intact sensation in dorsal and plantar foot.     Imaging:   Xrays demonstrate displaced IT fracture, right hip    Assessment/Plan:   84 yo M with R IT fracture. Family is reluctant to undergo surgery Discussed operative and nonoperative management with son over the phone at length. We discussed both options at length. They would like time to think about options.     Son would like to plan to stay in hospital for 24-48 more hours prior to d/c. If pain remains well controlled, then we will plan nonop. If pain is difficult to control, then we will revisit surgical discussion.      Betty Leslie M.D.

## 2023-08-16 NOTE — ED NOTES
Patient repositioned in bed. Warm blanket provided.  Son at bedside.  RIght hand cleaned up noted to have a skin tear between middle and ring finger.

## 2023-08-16 NOTE — ED PROVIDER NOTES
ED Provider Note    CHIEF COMPLAINT  Chief Complaint   Patient presents with    GLF     Pt slipped on tile and fell in bathroom this AM; denies any head strike/LOC or blood thinner use    Hip Pain     R side hip; unable to bear weight; CMS intact        EXTERNAL RECORDS REVIEWED  Outpatient Notes -Home care visit from 8/11/2023 reveals that the patient was bradycardic at that time with pulse in the 40s.    HPI/ROS  LIMITATION TO HISTORY   Select: : None  OUTSIDE HISTORIAN(S):  Family -son at the bedside states that the patient is currently on hospice care for CHF.  He notes that after the patient fell he was unable to get up.  Family did not move him and EMS was contacted.  The patient's wife, who frequently is a decision maker on the patient's behalf is currently on an airplane.    CHUCHO CARMONA is a 83 y.o. male who presents with right hip pain after mechanical ground-level fall earlier today.  Patient slipped on the tile floor in his bathroom, falling on his right hip.  He did not hit his head or lose consciousness.  He has pain in the right hip and right thigh.  He was unable to get up and EMS was contacted.  He is not anticoagulated.  He has no numbness in the right leg.  He is currently on hospice care for CHF.  He has not had anything to eat or drink today.    PAST MEDICAL HISTORY   has a past medical history of HTN and Type II or unspecified type diabetes mellitus without mention of complication, not stated as uncontrolled.    SURGICAL HISTORY   has a past surgical history that includes laparoscopic gastric ulcer over sew and prostatectomy, radical retro.    FAMILY HISTORY  Family History   Problem Relation Age of Onset    Stroke Mother     Heart Disease Father     Depression Sister     Heart Disease Sister     Depression Sister     Depression Sister     No Known Problems Son        SOCIAL HISTORY  Social History     Tobacco Use    Smoking status: Never    Smokeless tobacco: Never   Vaping Use    Vaping  Use: Never used   Substance and Sexual Activity    Alcohol use: Not Currently     Comment: quit 4-5 yr ago    Drug use: No    Sexual activity: Yes     Comment:        CURRENT MEDICATIONS  Home Medications       Reviewed by Jemma Kenny R.N. (Registered Nurse) on 08/16/23 at 1006  Med List Status: Not Addressed     Medication Last Dose Status   acetaminophen (TYLENOL) 500 MG Tab  Active   bisacodyl (DULCOLAX) 10 MG Suppos  Active   furosemide (LASIX) 40 MG Tab  Active   LORazepam (ATIVAN) 2 MG/ML Conc  Active   morphine (ROXANOL) 20 MG/ML Solution  Active   ondansetron (ZOFRAN ODT) 4 MG TABLET DISPERSIBLE  Active   potassium chloride SA (KDUR) 20 MEQ Tab CR  Active   senna-docusate (PERICOLACE OR SENOKOT S) 8.6-50 MG Tab  Active                    ALLERGIES  No Known Allergies    PHYSICAL EXAM  VITAL SIGNS: /67   Pulse (!) 41 Comment: ranging from HR 40-50s per EMS  Temp 36.3 °C (97.3 °F) (Temporal)   Resp 16   Ht 1.829 m (6')   Wt 66.7 kg (147 lb 0.8 oz)   SpO2 100%   BMI 19.94 kg/m²    Physical Exam  Vitals and nursing note reviewed.   Constitutional:       Appearance: Normal appearance.   HENT:      Head: Normocephalic and atraumatic.      Right Ear: External ear normal.      Left Ear: External ear normal.      Nose: Nose normal.      Mouth/Throat:      Mouth: Mucous membranes are dry.   Eyes:      Extraocular Movements: Extraocular movements intact.      Conjunctiva/sclera: Conjunctivae normal.      Pupils: Pupils are equal, round, and reactive to light.   Cardiovascular:      Rate and Rhythm: Regular rhythm. Bradycardia present.      Pulses: Normal pulses.      Heart sounds: Normal heart sounds.   Pulmonary:      Effort: Pulmonary effort is normal.   Abdominal:      Palpations: Abdomen is soft.      Tenderness: There is no abdominal tenderness.   Musculoskeletal:      Cervical back: Normal range of motion and neck supple. No tenderness.      Comments: Tenderness over the right hip with  what appears to be deformity at the proximal lateral aspect of the right femur.   Skin:     General: Skin is warm and dry.   Neurological:      General: No focal deficit present.      Mental Status: He is alert and oriented to person, place, and time.   Psychiatric:         Mood and Affect: Mood normal.         Behavior: Behavior normal.       DIAGNOSTIC STUDIES / PROCEDURES  LABS  Results for orders placed or performed during the hospital encounter of 08/16/23   CBC WITH DIFFERENTIAL   Result Value Ref Range    WBC 12.2 (H) 4.8 - 10.8 K/uL    RBC 2.99 (L) 4.70 - 6.10 M/uL    Hemoglobin 10.1 (L) 14.0 - 18.0 g/dL    Hematocrit 31.6 (L) 42.0 - 52.0 %    .7 (H) 81.4 - 97.8 fL    MCH 33.8 (H) 27.0 - 33.0 pg    MCHC 32.0 (L) 32.3 - 36.5 g/dL    RDW 54.9 (H) 35.9 - 50.0 fL    Platelet Count 165 164 - 446 K/uL    MPV 10.2 9.0 - 12.9 fL    Neutrophils-Polys 87.50 (H) 44.00 - 72.00 %    Lymphocytes 5.60 (L) 22.00 - 41.00 %    Monocytes 6.00 0.00 - 13.40 %    Eosinophils 0.20 0.00 - 6.90 %    Basophils 0.20 0.00 - 1.80 %    Immature Granulocytes 0.50 0.00 - 0.90 %    Nucleated RBC 0.00 0.00 - 0.20 /100 WBC    Neutrophils (Absolute) 10.63 (H) 1.82 - 7.42 K/uL    Lymphs (Absolute) 0.68 (L) 1.00 - 4.80 K/uL    Monos (Absolute) 0.73 0.00 - 0.85 K/uL    Eos (Absolute) 0.02 0.00 - 0.51 K/uL    Baso (Absolute) 0.03 0.00 - 0.12 K/uL    Immature Granulocytes (abs) 0.06 0.00 - 0.11 K/uL    NRBC (Absolute) 0.00 K/uL   Basic Metabolic Panel   Result Value Ref Range    Sodium 134 (L) 135 - 145 mmol/L    Potassium 4.5 3.6 - 5.5 mmol/L    Chloride 100 96 - 112 mmol/L    Co2 18 (L) 20 - 33 mmol/L    Glucose 181 (H) 65 - 99 mg/dL    Bun 43 (H) 8 - 22 mg/dL    Creatinine 2.36 (H) 0.50 - 1.40 mg/dL    Calcium 8.7 8.4 - 10.2 mg/dL    Anion Gap 16.0 7.0 - 16.0   ESTIMATED GFR   Result Value Ref Range    GFR (CKD-EPI) 27 (A) >60 mL/min/1.73 m 2     RADIOLOGY  I have independently interpreted the diagnostic imaging associated with this visit  and am waiting the final reading from the radiologist.   My preliminary interpretation is as follows: Right intertrochanteric fracture    Radiologist interpretation:   DX-FEMUR-2+ RIGHT   Final Result         Acute displaced right intertrochanteric fracture.      DX-CHEST-LIMITED (1 VIEW)   Final Result         Small right pleural effusion with patchy right basilar opacity.      DX-HIP-COMPLETE - UNILATERAL 2+ RIGHT   Final Result         Acute displaced right intertrochanteric fracture.        COURSE & MEDICAL DECISION MAKING    ED Observation Status? No; Patient does not meet criteria for ED Observation.     INITIAL ASSESSMENT, COURSE AND PLAN  Care Narrative: This is an 83-year-old male on hospice for CHF who is here with right hip pain after mechanical ground-level fall.  He is neurovascularly intact in the right lower extremity but has what appears to be an obvious deformity at the right hip with associated tenderness.  He has no other pain or tenderness and denies any head injury in the fall.  He does have a small skin tear on his right hand and a very superficial abrasion on the right knee for which he will receive a tetanus booster update.  X-rays reveal an acute displaced right intertrochanteric fracture.  The patient's family is extremely reluctant to allow him to undergo any surgery.  I had multiple extensive phone calls with the patient and his son at bedside.  The patient's wife unfortunately just left this morning on a trip and has been involved in the patient's care through telephone calls with her son.  I have provided both the patient and his son with surgical option versus nonsurgical options and they are having difficulty making the decision to move forward with either.  Basic labs reveal mild leukocytosis to 12.2 with an anemia of H/H of 10.1/31.6.  Metabolic panel demonstrates creatinine actually improved from baseline.  Glucose is elevated 181.  At this point the best choice is to admit the  patient overnight for continued pain management, he has already received morphine and fentanyl from me with modest relief.  I spoke with Dr. Leslie, orthopedic surgeon on-call, who will see the patient after hospitalization.  Patient and family can have discussion with case management, orthopedic surgery, and we can get a good oral pain management regimen going prior to discharge.  He does already have hospice care at home with prescription pain medication available to him.    Patient was discussed with the hospitalist and he was admitted in guarded condition.    ADDITIONAL PROBLEM LIST  None  DISPOSITION AND DISCUSSIONS  I have discussed management of the patient with the following physicians and DIOGO's: Dr. Leslie, orthopedic surgery.  Dr. Paul, hospitalist.    Discussion of management with other QHP or appropriate source(s): None     Escalation of care considered, and ultimately not performed: N/A.    Barriers to care at this time, including but not limited to:  None .     Decision tools and prescription drugs considered including, but not limited to: N/A.    FINAL DIAGNOSIS  1.  Acute displaced right intertrochanteric fracture     Electronically signed by: Kiran Parsons M.D., 8/16/2023 10:15 AM

## 2023-08-16 NOTE — H&P
Hospital Medicine History & Physical Note    Date of Service  8/16/2023    Primary Care Physician  Monica Layton M.D.    Consultants  [place], Dr Leslie      Code Status  DNAR/DNI    Chief Complaint  Chief Complaint   Patient presents with    GLF     Pt slipped on tile and fell in bathroom this AM; denies any head strike/LOC or blood thinner use    Hip Pain     R side hip; unable to bear weight; CMS intact      History of Presenting Illness  CHUCHO CARMONA is a 83 y.o. male with a past medical history of heart failure, chronic kidney disease, and dementia who is on hospice presented 8/16/2023 with right hip pain after a fall. He was not able to move / walk since the fall hence EMS were called. The pain is sever, worse with attempting to move the right lower extremity. The patient had a mechanical fall and did not hit his head or lose consciousness.      I discussed the plan of care with emergency department physician, the patient and patient son present at bedside in the emergency room.     Review of Systems  Review of Systems   Unable to perform ROS: Dementia (limitted)   Constitutional:  Positive for malaise/fatigue.   HENT:  Negative for sore throat.    Eyes:  Negative for pain.   Respiratory:  Negative for shortness of breath.    Cardiovascular:  Negative for chest pain.   Gastrointestinal:  Negative for vomiting.   Genitourinary:  Negative for flank pain.   Musculoskeletal:  Positive for falls and joint pain.   Skin:  Negative for itching.   Neurological:  Negative for speech change.   Psychiatric/Behavioral:  Positive for memory loss.      Past Medical History   has a past medical history of HTN and Type II or unspecified type diabetes mellitus without mention of complication, not stated as uncontrolled.    Surgical History   has a past surgical history that includes laparoscopic gastric ulcer over sew and prostatectomy, radical retro.     Family History  family history includes Depression in his sister,  sister, and sister; Heart Disease in his father and sister; No Known Problems in his son; Stroke in his mother.      Social History   reports that he has never smoked. He has never used smokeless tobacco. He reports that he does not currently use alcohol. He reports that he does not use drugs.    Allergies  No Known Allergies    Medications  Prior to Admission Medications   Prescriptions Last Dose Informant Patient Reported? Taking?   LORazepam (ATIVAN) 2 MG/ML Conc unk at unk Family Member No No   Sig: Take 0.25 mL by mouth every four hours as needed (Anxiety/Agitation/Nausea despite zofran or compazine).   acetaminophen (TYLENOL) 500 MG Tab unk at unk Family Member No No   Sig: Take 2 Tablets by mouth every 6 hours as needed for mild pain or fever.   bisacodyl (DULCOLAX) 10 MG Suppos new Family Member No No   Sig: Unwrap and insert 1 Suppository into the rectum every day as needed if senna ineffective.   furosemide (LASIX) 40 MG Tab 8/15/2023 at am Family Member No No   Sig: Take 1 Tablet by mouth 1 time a day as needed for edema for 14 days.   morphine (ROXANOL) 20 MG/ML Solution unk at unk Family Member No No   Sig: Take 0.25 - 1 mL by mouth every 1 hour as needed (Moderate to Severe Pain or Shortness of Breath).   ondansetron (ZOFRAN ODT) 4 MG TABLET DISPERSIBLE unk at unk Family Member No No   Sig: Dissolve 1 Tablet under the tongue every 6 hours as needed for nausea.   potassium chloride SA (KDUR) 20 MEQ Tab CR 8/15/2023 at am Family Member No No   Sig: Take 1 Tablet by mouth every day. Take with Furosemide  Indications: Low Amount of Potassium in the Blood   senna-docusate (PERICOLACE OR SENOKOT S) 8.6-50 MG Tab new Family Member No No   Sig: Take 2 Tablets by mouth every evening. Indications: Constipation      Facility-Administered Medications: None     Physical Exam  Temp:  [36.3 °C (97.3 °F)-36.9 °C (98.4 °F)] 36.6 °C (97.9 °F)  Pulse:  [41-57] 56  Resp:  [16-18] 18  BP: (107-132)/(49-67) 132/61  SpO2:   "[94 %-100 %] 100 %  Blood Pressure : 126/53   Temperature: 36.9 °C (98.4 °F)   Pulse: (!) 45   Respiration: 16   Pulse Oximetry: 100 %     Physical Exam  Constitutional:       General: He is not in acute distress.     Appearance: He is not ill-appearing or diaphoretic.   HENT:      Head: Atraumatic.      Right Ear: External ear normal.      Left Ear: External ear normal.      Nose: No congestion or rhinorrhea.      Mouth/Throat:      Mouth: Mucous membranes are dry.   Eyes:      General: No scleral icterus.        Right eye: No discharge.         Left eye: No discharge.      Pupils: Pupils are equal, round, and reactive to light.   Cardiovascular:      Rate and Rhythm: Regular rhythm. Bradycardia present.   Pulmonary:      Effort: Pulmonary effort is normal.   Abdominal:      General: There is no distension.   Musculoskeletal:      Cervical back: Neck supple. No rigidity. No muscular tenderness.      Right lower leg: No edema.      Left lower leg: No edema.      Comments: Reduced range of motion of the right hip secondary to pain    Skin:     General: Skin is dry.      Capillary Refill: Capillary refill takes 2 to 3 seconds.      Coloration: Skin is pale. Skin is not jaundiced.   Neurological:      Mental Status: He is alert. He is disoriented.      Coordination: Coordination normal.   Psychiatric:         Mood and Affect: Mood normal.       Laboratory:  Recent Labs     08/16/23  1125   WBC 12.2*   RBC 2.99*   HEMOGLOBIN 10.1*   HEMATOCRIT 31.6*   .7*   MCH 33.8*   MCHC 32.0*   RDW 54.9*   PLATELETCT 165   MPV 10.2     Recent Labs     08/16/23  1125   SODIUM 134*   POTASSIUM 4.5   CHLORIDE 100   CO2 18*   GLUCOSE 181*   BUN 43*   CREATININE 2.36*   CALCIUM 8.7     Recent Labs     08/16/23  1125   GLUCOSE 181*         No results for input(s): \"NTPROBNP\" in the last 72 hours.      No results for input(s): \"TROPONINT\" in the last 72 hours.    Imaging:  DX-FEMUR-2+ RIGHT   Final Result         Acute displaced " right intertrochanteric fracture.      DX-CHEST-LIMITED (1 VIEW)   Final Result         Small right pleural effusion with patchy right basilar opacity.      DX-HIP-COMPLETE - UNILATERAL 2+ RIGHT   Final Result         Acute displaced right intertrochanteric fracture.        Assessment/Plan:  Justification for Admission Status  I anticipate this patient will require at least two midnights for appropriate medical management, necessitating inpatient admission because the patient has a hip fracture will require Ortho consulted and possible surgical intervention.     * Right intertrochanteric fracture - (present on admission)  Assessment & Plan  Orthopedics [Dr Leslie] consulted,  The patient will benefit from an operative treatment, however patient and family are hesitant   Revised Cardiac Risk Index for Pre-Operative Risk score of:  3 points Class IV Risk  The patient has 15.0 % 30-day risk of death, MI, or cardiac arrest   Multimodal pain control   Consider physical and Occupational Therapy, pharmacologic prophylaxis when okay with orthopedics      ACP (advance care planning)- (present on admission)  Assessment & Plan  I had a prolonged discussion with the patient and patient's son regarding goals of care, diagnoses, prognosis, and CODE STATUS. We discussed his  prognosis and comorbidities.  The patient has advanced age, multiple comorbid conditions including liver cirrhosis, chronic kidney disease, heart failure with a poor functional status.  Unfortunately he also got a intertrochanteric fracture.  Patient and patient's son are hesitant about proceeding with the surgery.  They want to discuss with his decision with other family members including patient wife [power of ] who is currently on travel.  However patient and patient's son want to maintain DNAR/DNI status.     Dehydration- (present on admission)  Assessment & Plan  Likely secondary to reduced oral intake.  Encourage oral intake as tolerated,  antiemetics as needed.  Cautious intravenous hydration until adequate oral intake is achieved as he is at high risk for heart failure exacerbation      Type 2 diabetes mellitus with chronic kidney disease, without long-term current use of insulin (HCC)- (present on admission)  Assessment & Plan  With hyperglycemia  Last glycated hemoglobin was 8.4%  I will start short acting insulin for now  I will order Accu-Checks, hypoglycemia protocol  Adjust according to blood sugars trend      Heart failure with reduced ejection fraction and diastolic dysfunction (HCC)- (present on admission)  Assessment & Plan  Does not appear to be in exacerbation, in fact appears very dry   Cautious intravenous hydration until adequate oral intake is achieved as he is at high risk for exacerbation   Revised Cardiac Risk Index for Pre-Operative Risk score of:  3 points Class IV Risk  The patient has 15.0 % 30-day risk of death, MI, or cardiac arrest     Atrial fibrillation, chronic (HCC)- (present on admission)  Assessment & Plan  Not on A/C or on blocking medications, as he is currently on hospice with [facility]      CKD stage 3 due to type 2 diabetes mellitus (HCC)- (present on admission)  Assessment & Plan  Avoid/minimize nephrotoxins as much as possible, renally dose medications, monitor inputs and outputs     Alcoholic cirrhosis of liver with ascites (HCC)- (present on admission)  Assessment & Plan  Currently on hospice with [facility]     Anemia- (present on admission)  Assessment & Plan  No evidence of gross bleeding   Likely secondary to CKD and Cirrhosis    Monitor hemoglobin.    Transfuse for a hemoglobin of less than or equal to 7     Hypertension- (present on admission)  Assessment & Plan  Not currently on scheduled antihypertensive medications    I will start as needed hydralazine for extreme hypertension      VTE prophylaxis: SCDs/TEDs and heparin ppx    I had a prolonged discussion with the patient and patient's son  regarding goals of care, diagnoses, prognosis, and CODE STATUS. We discussed his  prognosis and comorbidities.  The patient has advanced age, multiple comorbid conditions including liver cirrhosis, chronic kidney disease, heart failure with a poor functional status.  Unfortunately he also got a intertrochanteric fracture.  Patient and patient's son are hesitant about proceeding with the surgery.  They want to discuss with his decision with other family members including patient wife [power of ] who is currently on travel.  However patient and patient's son want to maintain DNAR/DNI status. I spent 26 minutes on advanced care planning in addition to the time spent managing the other medical problems.  Advance care planning start time 2:59 PM   Advance care planning end time  3:25 PM

## 2023-08-16 NOTE — ED NOTES
Med rec complete per pt and son.  Per son, medications were given to them for pt care. Most of medications, including Morphine and Lorazepam are rarely used as Pt does not like taking them.   Allergies reviewed and updated.

## 2023-08-16 NOTE — ED TRIAGE NOTES
Chief Complaint   Patient presents with    GLF     Pt slipped on tile and fell in bathroom this AM; denies any head strike/LOC or blood thinner use    Hip Pain     R side hip; unable to bear weight; CMS intact      /67   Pulse (!) 41 Comment: ranging from HR 40-50s per EMS  Temp 36.3 °C (97.3 °F) (Temporal)   Resp 16   Ht 1.829 m (6')   Wt 66.7 kg (147 lb 0.8 oz)   SpO2 100%   BMI 19.94 kg/m²     Pt BIB [facility] from home for above concern; PIV placed and pt given 50 mcg fentanyl and 4 mg zofran    Per EMS pt is on HOSPICE care for CHF

## 2023-08-17 PROBLEM — E87.5 HYPERKALEMIA: Status: ACTIVE | Noted: 2023-01-01

## 2023-08-17 NOTE — ASSESSMENT & PLAN NOTE
Not currently on scheduled antihypertensive medications    I will start as needed hydralazine for extreme hypertension

## 2023-08-17 NOTE — CARE PLAN
The patient is Stable - Low risk of patient condition declining or worsening    Shift Goals  Clinical Goals: pt will remain free from injury for duration of shift  Patient Goals: pain mgt, rest  Family Goals: jessica    Progress made toward(s) clinical / shift goals:  PT remained free from injury for duration of shift,pt had waffle overlay in place and bed alarm in place for safety measures     Patient is not progressing towards the following goals:

## 2023-08-17 NOTE — PROGRESS NOTES
Hospital Medicine Daily Progress Note    Date of Service  8/17/2023    Chief Complaint  CHUCHO CARMONA is a 83 y.o. male admitted 8/16/2023 with GLF and R hip pain    Hospital Course  This is a 82yo gentleman with a history of HTN, afib, cirrhosis,  DM II, CKD III, dementia, currently on hospice for CHF who presented to hospital with GLF and R hip pain. In the ER, he was found to have a R intertrochanteric fracture. Pt's family was reluctant to undergo surgery given his hospice status and they have requested time to make a decision. If his pain remains well controlled, non operative intervention will be pursued.     Interval Problem Update  8/17 - Pt with drop in hemoglobin overnight, mild hyperkalemia, CXR with question of PNA, leukocytosis resolved, not on O2 or febrile. Discussed with pt, pt's son and referred to POLST - pt deferred to his son for decisions, son related to me that pt is currently to be treated as comfort care, which POLST confirms.  No further workup or treatment will be done at this time. Awaiting decision from the family regarding surgical intervention vs pain management for the hip.    I have discussed this patient's plan of care and discharge plan at IDT rounds today with Case Management, Nursing, Nursing leadership, and other members of the IDT team.    Consultants/Specialty  orthopedics    Code Status  DNAR/DNI    Disposition    Anticipate discharge to: hospice    I have placed the appropriate orders for post-discharge needs.    Review of Systems  ROS     Physical Exam  Temp:  [36.3 °C (97.3 °F)-37.1 °C (98.7 °F)] 36.9 °C (98.5 °F)  Pulse:  [41-65] 65  Resp:  [16-18] 18  BP: (107-132)/() 127/52  SpO2:  [94 %-100 %] 97 %    Physical Exam  Vitals and nursing note reviewed.   Constitutional:       Appearance: Normal appearance.      Comments: Frail     HENT:      Head: Normocephalic and atraumatic.      Mouth/Throat:      Mouth: Mucous membranes are moist.   Cardiovascular:      Rate  and Rhythm: Normal rate.   Pulmonary:      Effort: Pulmonary effort is normal.      Breath sounds: Normal breath sounds.   Abdominal:      General: Abdomen is flat.      Palpations: Abdomen is soft.   Skin:     Coloration: Skin is pale. Skin is not jaundiced.   Neurological:      General: No focal deficit present.      Mental Status: He is alert.      Comments: Oriented to self and place, defers decisions to his family   Psychiatric:         Mood and Affect: Mood normal.         Behavior: Behavior normal.         Fluids    Intake/Output Summary (Last 24 hours) at 8/17/2023 0804  Last data filed at 8/17/2023 0500  Gross per 24 hour   Intake --   Output 350 ml   Net -350 ml       Laboratory  Recent Labs     08/16/23  1125 08/17/23  0128   WBC 12.2* 9.0   RBC 2.99* 2.49*   HEMOGLOBIN 10.1* 8.5*   HEMATOCRIT 31.6* 26.5*   .7* 106.4*   MCH 33.8* 34.1*   MCHC 32.0* 32.1*   RDW 54.9* 55.3*   PLATELETCT 165 123*   MPV 10.2 10.3     Recent Labs     08/16/23  1125 08/17/23  0128   SODIUM 134* 137   POTASSIUM 4.5 5.6*   CHLORIDE 100 104   CO2 18* 20   GLUCOSE 181* 194*   BUN 43* 48*   CREATININE 2.36* 2.62*   CALCIUM 8.7 8.6                   Imaging  DX-FEMUR-2+ RIGHT   Final Result         Acute displaced right intertrochanteric fracture.      DX-CHEST-LIMITED (1 VIEW)   Final Result         Small right pleural effusion with patchy right basilar opacity.      DX-HIP-COMPLETE - UNILATERAL 2+ RIGHT   Final Result         Acute displaced right intertrochanteric fracture.           Assessment/Plan  * Right intertrochanteric fracture - (present on admission)  Assessment & Plan  Orthopedics [Dr Leslie] consulted,  The patient will benefit from an operative treatment, however patient and family are hesitant   Revised Cardiac Risk Index for Pre-Operative Risk score of:  3 points Class IV Risk  The patient has 15.0 % 30-day risk of death, MI, or cardiac arrest   Multimodal pain control   Consider physical and Occupational  Therapy, pharmacologic prophylaxis when okay with orthopedics      Hyperkalemia  Assessment & Plan  - Hold home K+, pt comfort care only, no intervention for K+ per family    ACP (advance care planning)- (present on admission)  Assessment & Plan  I had a prolonged discussion with the patient and patient's son regarding goals of care, diagnoses, prognosis, and CODE STATUS. We discussed his  prognosis and comorbidities.  The patient has advanced age, multiple comorbid conditions including liver cirrhosis, chronic kidney disease, heart failure with a poor functional status.  Unfortunately he also got a intertrochanteric fracture.  Patient and patient's son are hesitant about proceeding with the surgery.  They want to discuss with his decision with other family members including patient wife [power of ] who is currently on travel.  However patient and patient's son want to maintain DNAR/DNI status.     Dehydration- (present on admission)  Assessment & Plan  Likely secondary to reduced oral intake.  Encourage oral intake as tolerated, antiemetics as needed.  Cautious intravenous hydration until adequate oral intake is achieved as he is at high risk for heart failure exacerbation      Type 2 diabetes mellitus with chronic kidney disease, without long-term current use of insulin (HCC)- (present on admission)  Assessment & Plan  With hyperglycemia  Last glycated hemoglobin was 8.4%  I will start short acting insulin for now  I will order Accu-Checks, hypoglycemia protocol  Adjust according to blood sugars trend      Heart failure with reduced ejection fraction and diastolic dysfunction (HCC)- (present on admission)  Assessment & Plan  No exacerbation    Atrial fibrillation, chronic (HCC)- (present on admission)  Assessment & Plan  Not on A/C or on blocking medications, as he is currently on hospice with [facility]      CKD stage 3 due to type 2 diabetes mellitus (HCC)- (present on admission)  Assessment &  Plan  Avoid/minimize nephrotoxins as much as possible, renally dose medications, monitor inputs and outputs     Alcoholic cirrhosis of liver with ascites (HCC)- (present on admission)  Assessment & Plan  Currently on hospice with [facility]     Anemia- (present on admission)  Assessment & Plan  No evidence of gross bleeding   Likely secondary to CKD and Cirrhosis        Hypertension- (present on admission)  Assessment & Plan  Not currently on scheduled antihypertensive medications    I will start as needed hydralazine for extreme hypertension           VTE prophylaxis:   SCDs/TEDs      I have performed a physical exam and reviewed and updated ROS and Plan today (8/17/2023). In review of yesterday's note (8/16/2023), there are no changes except as documented above.

## 2023-08-17 NOTE — THERAPY
Physical Therapy Contact Note    Patient Name: CHUCHO CARMONA  Age:  83 y.o., Sex:  male  Medical Record #: [id number]  Today's Date: 8/17/2023    Pt with acute displaced intertrochanteric fracture, pt's is on hospice srevices and family is still deciding what plan of care they would like to pursue. Pt may not be appropriate for skilled PT services. Will hold this date and follow up for eval as appropriate.    Jennifer Gallardo, PT, DPT  [phone]

## 2023-08-17 NOTE — CARE PLAN
The patient is Stable - Low risk of patient condition declining or worsening         Progress made toward(s) clinical / shift goals:    Problem: Pain - Standard  Goal: Alleviation of pain or a reduction in pain to the patient’s comfort goal  Outcome: Progressing  Note: Pt reports pain relief with current medication.     Problem: Skin Integrity  Goal: Skin integrity is maintained or improved  Outcome: Progressing  Note: Pt placed on waffle,catheter in place,patient turned every 2 hours       Patient is not progressing towards the following goals:

## 2023-08-17 NOTE — ASSESSMENT & PLAN NOTE
Likely secondary to reduced oral intake.  Encourage oral intake as tolerated, antiemetics as needed.  Cautious intravenous hydration until adequate oral intake is achieved as he is at high risk for heart failure exacerbation

## 2023-08-17 NOTE — THERAPY
Occupational Therapy Contact Note    Patient Name: CHUCHO CARMONA  Age:  83 y.o., Sex:  male  Medical Record #: [id number]  Today's Date: 8/17/2023    Discussed missed therapy with RN       08/17/23 7704   Interdisciplinary Plan of Care Collaboration   IDT Collaboration with  Nursing   Collaboration Comments OT consult w/ RN - No WB precautions noted w/ R intertrochanteric fx + pt on hospice services.  Will follow up for OT services as appropriate.

## 2023-08-17 NOTE — ASSESSMENT & PLAN NOTE
I had a prolonged discussion with the patient and patient's son regarding goals of care, diagnoses, prognosis, and CODE STATUS. We discussed his  prognosis and comorbidities.  The patient has advanced age, multiple comorbid conditions including liver cirrhosis, chronic kidney disease, heart failure with a poor functional status.  Unfortunately he also got a intertrochanteric fracture.  Patient and patient's son are hesitant about proceeding with the surgery.  They want to discuss with his decision with other family members including patient wife [power of ] who is currently on travel.  However patient and patient's son want to maintain DNAR/DNI status.

## 2023-08-17 NOTE — ASSESSMENT & PLAN NOTE
Pts family have to decided to forego surgical intervention and pursue symptomatic control for the pt given his hospice status  Multimodal pain control

## 2023-08-17 NOTE — PROGRESS NOTES
4 Eyes Skin Assessment Completed by abby RN and TWIN reyes.    Head wdl  Ears WDL  Nose WDL  Mouth WDL  Neck WDL  Breast/Chest WDL  Shoulder Blades WDL  Spine WDL  (R) Arm/Elbow/Hand Bruisingskin tear  (L) Arm/Elbow/Hand Bruising  Abdomen umbilical hernia  Groin WDL  Scrotum/Coccyx/Buttocks WDL  (R) Leg Bruising  (L) Leg WDL  (R) Heel/Foot/Toe dryness  (L) Heel/Foot/Toe dryness          Devices In Places [place]      Interventions In Place Waffle Overlay, TAP System, Pillows, Q2 Turns, and Barrier Cream    Possible Skin Injury No    Pictures Uploaded Into Epic Yes  Wound Consult Placed N/A  RN Wound Prevention Protocol Ordered No

## 2023-08-17 NOTE — PROGRESS NOTES
Report received from NOC RN, assumed care of pt.   POC and medications reviewed with pt. Pt verbalized understanding.   Safety measures in place.  Hourly rounding in place.   Pt on L side r/t pain from R hip, pt encouraged to shift weight, pt verbalized understanding.

## 2023-08-17 NOTE — DISCHARGE PLANNING
HTH/SCP TCN chart review completed. Collaborated with BYRON Alicea prior to meeting with the pt.  Per BYRON, POA is in [place] and won't be back until Sunday.  POC and d/c planning unclear at this time secondary to this factor.  TCN to hold until can discuss with POA.  No further needs at this time.

## 2023-08-17 NOTE — DISCHARGE PLANNING
Case Management Discharge Planning    Admission Date: 8/16/2023  GMLOS: 1.8  ALOS: 1    6-Clicks ADL Score: 16  6-Clicks Mobility Score: 9    Anticipated Discharge Dispo: Discharge Disposition: Discharged to home/self care (01)    DME Needed: No    Action(s) Taken: Updated Provider/Nurse on Discharge Plan  Patient discussed with team during IDT rounds. Patient is not medically cleared for discharge at this timeSW attempted to reach patient's spouse Mercedes [phone] no answer. SW reached patient's son Robles [phone]. Robles informed that patient's spouse is on a trip in [place] and will not be back until Sunday. The family wanted to see patient's progression in order to make a decision regarding pursuing treatment or going back home on hospice.     Patient was on service with [facility] prior to admission. Per Robles, no hospital bed at home, but if plan is home with hospice, [facility] hospice is aware patient will need hospital bed at home.     Escalations Completed: None    Medically Clear: No    Next Steps: Family decision on plan of care     Barriers to Discharge: Medical clearance

## 2023-08-17 NOTE — ASSESSMENT & PLAN NOTE
With hyperglycemia  Last glycated hemoglobin was 8.4%  I will start short acting insulin for now  I will order Accu-Checks, hypoglycemia protocol  Adjust according to blood sugars trend

## 2023-08-17 NOTE — PROGRESS NOTES
Pt care assumed. VSS, pt assessment complete. Pt A&Ox4, no c/o pain at this time. POC discussed with pt and verbalizes no questions. Pt denies any additional needs at this time. Bed locked and in lowest position, bed alarm active. Pt educated on fall risk and verbalized understanding, call light within reach, hourly rounding initiated.

## 2023-08-18 NOTE — PROGRESS NOTES
Patient urine output 75 mL for the shift. Bladder scanned patient 198 mL. Flushed jhaveri with no resistance, blood or clots. Notified MD. No new orders received.

## 2023-08-18 NOTE — PROGRESS NOTES
Case management informed RN that she spoke with son and still has not made a decision regarding surgery. This RN paged Dr. Leslie. Awaiting response.     1520: Per Dr. Leslie have medicine doctor contact her when family has had a decision as she will be out of town to call family but will try to be available. Updated Dr. Cole.

## 2023-08-18 NOTE — THERAPY
Physical Therapy   Initial Evaluation     Patient Name: CHUCHO CARMONA  Age:  83 y.o., Sex:  male  Medical Record #: [id number]  Today's Date: 8/18/2023     Precautions  Precautions: (P) Toe Touch Weight Bearing Right Lower Extremity  Comments: left hip fracture, confused, Native    Assessment  Patient is a 83 y.o. male with a diagnosis of right intertrochanteric fracture. Pt seen for mobility assessment following medication for pain.   Pt able to mobilize to EOB and perform sit to stand to FWW with Mod A. Pt unable to ambulate or maintain TTWB in standing.  Post session, RN informed therapist that pt's family has chosen hospice services. PT to no longer follow.     Plan    Physical Therapy Initial Treatment Plan   Duration: (P) Discharge Needs Only    DC Equipment Recommendations: (P) None  Discharge Recommendations: (P)  (Per RN family has chosen Hospice)       Initial Contact Note    Initial Contact Note Order Received and Verified, Physical Therapy Evaluation in Progress with Full Report to Follow.   Precautions   Precautions Toe Touch Weight Bearing Right Lower Extremity   Pain 0 - 10 Group   Location Hip   Location Orientation Right   Pain Rating Scale (NPRS) 8   Therapist Pain Assessment Post Activity Pain Same as Prior to Activity;8   Prior Living Situation   Prior Services Continuous (24 Hour) Care Giving Family   Housing / Facility 1 Story House   Steps Into Home 2   Rail Both Rail (Steps into Home)   Equipment Owned 4-Wheel Walker   Lives with - Patient's Self Care Capacity Spouse   Prior Level of Functional Mobility   Bed Mobility Unable To Determine At This Time   Transfer Status Unable To Determine At This Time   Ambulation Unable To Determine At This Time   Cognition    Speech/ Communication Hard of Hearing   Level of Consciousness Alert   Comments pleasantly confused but cooperative.   Active ROM Lower Body    Comments WFL LLE, limited RLE due to pain and edema.   Strength Lower Body   Comments LLE  grossly 4/5, RLE deferred due to fx and pain   Balance Assessment   Sitting Balance (Static) Fair +   Sitting Balance (Dynamic) Fair   Standing Balance (Static) Fair -   Standing Balance (Dynamic) Poor +   Weight Shift Sitting Fair   Weight Shift Standing Poor   Comments w/FWW   Bed Mobility    Supine to Sit Moderate Assist   Sit to Supine Moderate Assist   Scooting Moderate Assist   Gait Analysis   Gait Level Of Assist Unable to Participate   Functional Mobility   Sit to Stand Moderate Assist   Bed, Chair, Wheelchair Transfer Unable to Participate   Toilet Transfers Unable to Participate   Comments pt unable to maintain TTWB for ambulation   How much difficulty does the patient currently have...   Turning over in bed (including adjusting bedclothes, sheets and blankets)? 1   Sitting down on and standing up from a chair with arms (e.g., wheelchair, bedside commode, etc.) 1   Moving from lying on back to sitting on the side of the bed? 1   How much help from another person does the patient currently need...   Moving to and from a bed to a chair (including a wheelchair)? 1   Need to walk in a hospital room? 1   Climbing 3-5 steps with a railing? 1   6 clicks Mobility Score 6   Activity Tolerance   Sitting Edge of Bed 10 min   Standing 1 min   Education Group   Education Provided Role of Physical Therapist;Weight Bearing Precautions   Role of Physical Therapist Patient Response Patient;Acceptance;Explanation;No Learning Evidence   Weight Bearing Precautions Patient Response Patient;Acceptance;Explanation;No Learning Evidence   Physical Therapy Initial Treatment Plan    Duration Discharge Needs Only   Problem List    Problems Pain;Impaired Bed Mobility;Impaired Transfers;Impaired Ambulation;Functional Strength Deficit;Decreased Activity Tolerance;Safety Awareness Deficits / Cognition   Anticipated Discharge Equipment and Recommendations   DC Equipment Recommendations None   Discharge Recommendations   (Per RN family has  chosen Hospice)   Interdisciplinary Plan of Care Collaboration   IDT Collaboration with  Nursing   Collaboration Comments PT evaluation completed. Post session, RN informed PT that pt is now on Hospice status again, No skilled inpatient or post acute PT is indicated, Hospice to assess DME needs.   Session Information   Date / Session Number  8/18 ( Eval only) No needs

## 2023-08-18 NOTE — CARE PLAN
The patient is Stable - Low risk of patient condition declining or worsening    Shift Goals  Clinical Goals: patient will remain free from falls during shift  Patient Goals: Pt rest comfortably  Family Goals: jessica    Progress made toward(s) clinical / shift goals:  Fall precautions in place. Bed alarm on. Bed in low and locked position call light within reacch.     Patient is not progressing towards the following goals:      Problem: Pain - Standard  Goal: Alleviation of pain or a reduction in pain to the patient’s comfort goal  Outcome: Not Progressing

## 2023-08-18 NOTE — THERAPY
Physical Therapy Contact Note    Patient Name: CHUCHO CARMONA  Age:  83 y.o., Sex:  male  Medical Record #: [id number]  Today's Date: 8/18/2023 08/18/23 1155   Initial Contact Note    Initial Contact Note Order Received and Verified, Physical Therapy Evaluation in Progress with Full Report to Follow.   Precautions   Precautions Toe Touch Weight Bearing Right Lower Extremity   Comments left hip fracture, confused, [place]   Pain   Pain Scales Love Baker Scale   Intervention Rest;Repositioned;Nurse Notified   Pain 0 - 10 Group   Location Hip;Leg   Location Orientation Right   Therapist Pain Assessment Post Activity;10  (yelling out with slight movement.)   Non Verbal Descriptors   Non Verbal Scale  Tense Body Language;Grimacing   Pain- Love Baker Scale Group   Love-Fu Scale  10   Prior Living Situation   Prior Services Continuous (24 Hour) Care Giving Family   Housing / Facility 1 Story House   Steps Into Home 2   Rail Both Rail (Steps into Home)   Equipment Owned 4-Wheel Walker   Lives with - Patient's Self Care Capacity Spouse   Comments per family pt was IND with ADL's and ambulation.   Prior Level of Functional Mobility   Bed Mobility Unable To Determine At This Time   Transfer Status Unable To Determine At This Time   Ambulation Unable To Determine At This Time   Cognition    Speech/ Communication Hard of Hearing   Level of Consciousness Alert   Comments poor historian.   Interdisciplinary Plan of Care Collaboration   IDT Collaboration with  Nursing;Occupational Therapist   Patient Position at End of Therapy In Bed;Bed Alarm On;Tray Table within Reach;Phone within Reach;Call Light within Reach   Collaboration Comments PT evaluation attempted. Pt unable to participate due to sever RLE pain with any slight movement at bed level. RN notified and medicating pt for pain. PT to follow. Pt has order for TTWB, given cognitive deficits anticipate with will have difficulty with compliance.   Session Information    Date / Session Number  8/18-1 ( attempt x 1)

## 2023-08-18 NOTE — DISCHARGE PLANNING
Case Management Discharge Planning    Admission Date: 8/16/2023  GMLOS: 2.7  ALOS: 2      Anticipated Discharge Dispo: Discharge Disposition: Discharged to home/self care (01)    DME Needed: Yes    DME Ordered: No    Action(s) Taken:     Notified by RN MD is clearing pt for d/c.     Called pt's son, Robles to discuss d/c planning. Robles states family has not made a decision whether that want surgery or not. He states plan was to observe pt for 48 hours (48 hours will be tomorrow at 12, per Robles)  and then surgeon was going to touch base with the family after 48 hours to discuss surgery vs no surgery. Robles states family wants to make an informed decision and is waiting to have discussion with surgeon tomorrow. If family decides on hospice d/c Robles states they will need a hospital bed prior to discharge. Pt was on service with [facility].     Updated RN and MD    Escalations Completed: Provider    Medically Clear: No    Next Steps: Care coordination will continue to follow     Barriers to Discharge: None    Is the patient up for discharge tomorrow: No

## 2023-08-18 NOTE — PROGRESS NOTES
On call provider made aware of pt with jhaveri placed in ER without an order. Provider would like to follow up with day team at this time.

## 2023-08-18 NOTE — THERAPY
Occupational Therapy Contact Note    Patient Name: CHUCHO CARMONA  Age:  83 y.o., Sex:  male  Medical Record #: [id number]  Today's Date: 8/18/2023    Discussed missed therapy with RN, PT       08/18/23 8533   Initial Contact Note    Initial Contact Note Order Received and Verified, Occupational Therapy Evaluation in Progress with Full Report to Follow.   Pain   Pain Scales Love Baker Scale   Intervention Rest;Repositioned;Nurse Notified   Pain 0 - 10 Group   Location Hip   Location Orientation Right   Comfort Goal Comfort with Movement;Perform Activity   Therapist Pain Assessment Prior to Activity;During Activity;Post Activity;10;Nurse Notified   Non Verbal Descriptors   Non Verbal Scale  Grimacing;Tense Body Language   Cognition    Level of Consciousness Alert   Comments [facility]   Interdisciplinary Plan of Care Collaboration   IDT Collaboration with  Nursing;Physical Therapist   Patient Position at End of Therapy In Bed;Bed Alarm On;Call Light within Reach;Tray Table within Reach;Phone within Reach   Collaboration Comments Attempted OT Eval - Pt unable to participate secondary R hip pain.  Consult w/ RN.  Will re-attempt OT eval once pain level managed to tolerable levels.

## 2023-08-18 NOTE — CARE PLAN
The patient is Stable - Low risk of patient condition declining or worsening    Shift Goals  Clinical Goals: pt will remain free from injury for duration of shift  Patient Goals: pain mgt, rest  Family Goals: jessica    Progress made toward(s) clinical / shift goals:  Patient free from falls,comfortably   Problem: Pain - Standard  Goal: Alleviation of pain or a reduction in pain to the patient’s comfort goal  Outcome: Progressing  Flowsheets (Taken 8/17/2023 1930)  Non Verbal Scale: Calm  OB Pain Intervention: Declines  Pain Rating Scale (NPRS): 0  Note: Pt comfortably sleeping in bed     Problem: Knowledge Deficit - Standard  Goal: Patient and family/care givers will demonstrate understanding of plan of care, disease process/condition, diagnostic tests and medications  Outcome: Progressing  Note: Educated patient about medications.     Problem: Skin Integrity  Goal: Skin integrity is maintained or improved  Outcome: Progressing  Note: Patient turned every 2 hrs     Problem: Fall Risk  Goal: Patient will remain free from falls  Outcome: Progressing  Note: Patient educated about calling for assistance to prevent falls.   rest    Patient is not progressing towards the following goals:

## 2023-08-19 NOTE — PROGRESS NOTES
4 Eyes Skin Assessment Completed by Ángela RN and TWIN Fernandez.    Head WDL  Ears WDL  Nose WDL  Mouth WDL  Neck WDL  Breast/Chest WDL  Shoulder Blades WDL  Spine WDL  (R) Arm/Elbow/Hand Bruising  (L) Arm/Elbow/Hand Bruising  Abdomen WDL  Groin WDL  Scrotum/Coccyx/Buttocks Redness and Blanching  (R) Leg Bruising  (L) Leg Bruising  (R) Heel/Foot/Toe WDL  (L) Heel/Foot/Toe WDL          Devices In Places Pulse Ox      Interventions In Place Waffle Overlay, Pillows, Q2 Turns, and Heels Loaded W/Pillows    Possible Skin Injury No    Pictures Uploaded Into Epic N/A  Wound Consult Placed No  RN Wound Prevention Protocol Ordered Yes

## 2023-08-19 NOTE — PROGRESS NOTES
Report from TWIN Motta. Patient currently asleep, equal rise and fall of chest noted. Bed alarm on, call light within reach.

## 2023-08-19 NOTE — DISCHARGE PLANNING
Case Management Discharge Planning    Admission Date: 8/16/2023  GMLOS: 2.7  ALOS: 3    6-Clicks ADL Score: 16  6-Clicks Mobility Score: 6  PT and/or OT Eval ordered: Yes  Post-acute Referrals Ordered: NA  Post-acute Choice Obtained: NA  Has referral(s) been sent to post-acute provider:  ANKUR      Anticipated Discharge Dispo: Discharge Disposition: D/T to hospice home (50)    DME Needed: No    Action(s) Taken: Spoke to Santana from [facility]. RNCM informed Santana pt will be discharging home with hospice and will need hospital bed. Per Santana, hospital bed will be delivered tomorrow 08/20 to pt's home but they will admit pt on Monday 08/21 due to staffing.    Escalations Completed: Pending Discharge Destination    Medically Clear: Yes    Next Steps:  f/u with medical team to discuss DC needs and barriers    Barriers to Discharge: DME, Hospice staffing

## 2023-08-19 NOTE — PROGRESS NOTES
Hospital Medicine Daily Progress Note    Date of Service  8/19/2023    Chief Complaint  CHUCHO OSEGUERA is a 83 y.o. male admitted 8/16/2023 with GLF and R hip pain    Hospital Course  This is a 84yo gentleman with a history of HTN, afib, cirrhosis,  DM II, CKD III, dementia, currently on hospice for CHF who presented to hospital with GLF and R hip pain. In the ER, he was found to have a R intertrochanteric fracture. Pt's family was reluctant to undergo surgery given his hospice status and they have requested time to make a decision. If his pain remains well controlled, non operative intervention will be pursued.     Interval Problem Update  8/17 - Pt with drop in hemoglobin overnight, mild hyperkalemia, CXR with question of PNA, leukocytosis resolved, not on O2 or febrile. Discussed with pt, pt's son and referred to POLST - pt deferred to his son for decisions, son related to me that pt is currently to be treated as comfort care, which POLST confirms.  No further workup or treatment will be done at this time. Awaiting decision from the family regarding surgical intervention vs pain management for the hip.    8/18 - No acute changes overnight, per Ortho, no plans for surgery unless the family changes their mind. POLST suggests no surgical interventions desired. Will have case management coordinate plans for discharge with his hospice company, pending pt's family's official decision.     8/19 - Discussed plans for pt's hip fracture with his sons Robles and Ethan, as well as his wife.  All family members indicate that they would prefer to avoid surgery and to manage pt's pain medically.  Mr Oseguera will likely need to utilize a wheelchair to get around given his pain and lack of contralateral strength with being toe touch weight bearing, and his family state they have one at home for him.  He will discharge home with hospice once his hospice company provides him with a hospital bed. I also discussed catheter management  with Ethan and pt's wife, they would prefer him to discharge with the catheter and aware of the associated infection risk.    I have discussed this patient's plan of care and discharge plan at IDT rounds today with Case Management, Nursing, Nursing leadership, and other members of the IDT team.    Consultants/Specialty  orthopedics    Code Status  DNAR/DNI    Disposition    Anticipate discharge to: hospice    I have placed the appropriate orders for post-discharge needs.    Review of Systems  ROS     Physical Exam  Temp:  [36.1 °C (96.9 °F)-37.3 °C (99.2 °F)] 37.2 °C (98.9 °F)  Pulse:  [46-64] 60  Resp:  [18-20] 18  BP: (104-155)/(60-75) 113/71  SpO2:  [94 %-100 %] 95 %    Physical Exam  Vitals and nursing note reviewed.   Constitutional:       Appearance: Normal appearance.      Comments: Frail     HENT:      Head: Normocephalic and atraumatic.      Mouth/Throat:      Mouth: Mucous membranes are moist.   Cardiovascular:      Rate and Rhythm: Normal rate.   Pulmonary:      Effort: Pulmonary effort is normal.      Breath sounds: Normal breath sounds.   Abdominal:      General: Abdomen is flat.      Palpations: Abdomen is soft.   Skin:     Coloration: Skin is pale. Skin is not jaundiced.   Neurological:      General: No focal deficit present.      Mental Status: He is alert.      Comments: Oriented to self and place, defers decisions to his family   Psychiatric:         Mood and Affect: Mood normal.         Behavior: Behavior normal.         Fluids    Intake/Output Summary (Last 24 hours) at 8/19/2023 1501  Last data filed at 8/19/2023 0840  Gross per 24 hour   Intake 100 ml   Output 260 ml   Net -160 ml         Laboratory  Recent Labs     08/17/23  0128   WBC 9.0   RBC 2.49*   HEMOGLOBIN 8.5*   HEMATOCRIT 26.5*   .4*   MCH 34.1*   MCHC 32.1*   RDW 55.3*   PLATELETCT 123*   MPV 10.3       Recent Labs     08/17/23  0128   SODIUM 137   POTASSIUM 5.6*   CHLORIDE 104   CO2 20   GLUCOSE 194*   BUN 48*   CREATININE  2.62*   CALCIUM 8.6                     Imaging  DX-FEMUR-2+ RIGHT   Final Result         Acute displaced right intertrochanteric fracture.      DX-CHEST-LIMITED (1 VIEW)   Final Result         Small right pleural effusion with patchy right basilar opacity.      DX-HIP-COMPLETE - UNILATERAL 2+ RIGHT   Final Result         Acute displaced right intertrochanteric fracture.             Assessment/Plan  * Right intertrochanteric fracture - (present on admission)  Assessment & Plan  Pts family have to decided to forego surgical intervention and pursue symptomatic control for the pt given his hospice status  Multimodal pain control       Hyperkalemia  Assessment & Plan  - Hold home K+, pt comfort care only, no intervention for K+ per family    ACP (advance care planning)- (present on admission)  Assessment & Plan  I had a prolonged discussion with the patient and patient's son regarding goals of care, diagnoses, prognosis, and CODE STATUS. We discussed his  prognosis and comorbidities.  The patient has advanced age, multiple comorbid conditions including liver cirrhosis, chronic kidney disease, heart failure with a poor functional status.  Unfortunately he also got a intertrochanteric fracture.  Patient and patient's son are hesitant about proceeding with the surgery.  They want to discuss with his decision with other family members including patient wife [power of ] who is currently on travel.  However patient and patient's son want to maintain DNAR/DNI status.     Dehydration- (present on admission)  Assessment & Plan  Likely secondary to reduced oral intake.  Encourage oral intake as tolerated, antiemetics as needed.  Cautious intravenous hydration until adequate oral intake is achieved as he is at high risk for heart failure exacerbation      Type 2 diabetes mellitus with chronic kidney disease, without long-term current use of insulin (HCC)- (present on admission)  Assessment & Plan  With hyperglycemia  Last  glycated hemoglobin was 8.4%  I will start short acting insulin for now  I will order Accu-Checks, hypoglycemia protocol  Adjust according to blood sugars trend      Heart failure with reduced ejection fraction and diastolic dysfunction (HCC)- (present on admission)  Assessment & Plan  No exacerbation    Atrial fibrillation, chronic (HCC)- (present on admission)  Assessment & Plan  Not on A/C or on blocking medications, as he is currently on hospice with [facility]      CKD stage 3 due to type 2 diabetes mellitus (HCC)- (present on admission)  Assessment & Plan  Avoid/minimize nephrotoxins as much as possible, renally dose medications, monitor inputs and outputs     Alcoholic cirrhosis of liver with ascites (HCC)- (present on admission)  Assessment & Plan  Currently on hospice with [facility]     Anemia- (present on admission)  Assessment & Plan  No evidence of gross bleeding   Likely secondary to CKD and Cirrhosis        Hypertension- (present on admission)  Assessment & Plan  Not currently on scheduled antihypertensive medications    I will start as needed hydralazine for extreme hypertension           VTE prophylaxis:    pharmacologic prophylaxis contraindicated due to hospice      I have performed a physical exam and reviewed and updated ROS and Plan today (8/19/2023). In review of yesterday's note (8/18/2023), there are no changes except as documented above.

## 2023-08-19 NOTE — PROGRESS NOTES
0745 repositioned pt in bed. Precautions in placed    0824 pt having breakfast    1100 checked on pt. No needs at this time.    1240 pt sleeping. Equal rise and fall of chest noted.    1430 pt just finished having lunch.  Assisted pt. on oral care    1641 pt watching TV

## 2023-08-19 NOTE — CARE PLAN
The patient is Stable - Low risk of patient condition declining or worsening    Shift Goals  Clinical Goals: control pain, maintain below a 5  Patient Goals: control pain  Family Goals: n/a    Progress made toward(s) clinical / shift goals:  Pt pain was managed with PRN pain medication and repositioning.     Patient is not progressing towards the following goals:

## 2023-08-19 NOTE — CARE PLAN
The patient is Stable - Low risk of patient condition declining or worsening    Shift Goals  Clinical Goals: Pain controlled at 3/10 or lower, no falls or injuries, rest and sleep at least 4 hours  Patient Goals: Pain controlled at 3/10 or lower, no falls or injuries, rest and sleep at least 4 hours  Family Goals: jessica    Progress made toward(s) clinical / shift goals:  No falls or injuries, rested and slept at least 4 hours, pain controlled at 3/10 or lower    Patient is not progressing towards the following goals:

## 2023-08-20 NOTE — PROGRESS NOTES
2100 Received report from Ángela MURCIA.   Received pt sleeping comfortably on bed; not in any form of distress. Even and unlabored breathing noted. Fall precautions in place.  Hourly rounding in progress.    2300 Pt seen sleeping comfortably; even and unlabored breathing noted. Pulse oximeter on. Pt easily awaken. Skin assessment done with Ángela MURCIA. Needs attended well.   0015 Pt repositioned and made comfortable.    0200 Pulse oximeter placed back and maintained. Pt sleeping comfortably; even and unlabored breathing noted.    0544 Pt medicated as  per MAR.    0557 Pt awake and watching tv at this time.    Report given to Deanne MURCIA.

## 2023-08-20 NOTE — PROGRESS NOTES
4 Eyes Skin Assessment Completed by orse RN and TWIN gonzalez.    Head WDL  Ears WDL  Nose WDL  Mouth WDL  Neck WDL  Breast/Chest WDL  Shoulder Blades WDL  Spine WDL  (R) Arm/Elbow/Hand WDL  (L) Arm/Elbow/Hand WDL  Abdomen WDL  Groin Swelling pubis   Scrotum/Coccyx/Buttocks WDL  (R) Leg WDL   (L) Leg WDL  (R) Heel/Foot/Toe WDL  (L) Heel/Foot/Toe WDL      Devices In Places Pulse Ox      Interventions In Place Q2 Turns    Possible Skin Injury No    Pictures Uploaded Into Epic N/A  Wound Consult Placed N/A  RN Wound Prevention Protocol Ordered No

## 2023-08-20 NOTE — PROGRESS NOTES
Hospital Medicine Daily Progress Note    Date of Service  8/20/2023    Chief Complaint  CHUCHO OSEGUERA is a 83 y.o. male admitted 8/16/2023 with GLF and R hip pain    Hospital Course  This is a 82yo gentleman with a history of HTN, afib, cirrhosis,  DM II, CKD III, dementia, currently on hospice for CHF who presented to hospital with GLF and R hip pain. In the ER, he was found to have a R intertrochanteric fracture. Pt's family was reluctant to undergo surgery given his hospice status and they have requested time to make a decision. If his pain remains well controlled, non operative intervention will be pursued.     Interval Problem Update  8/17 - Pt with drop in hemoglobin overnight, mild hyperkalemia, CXR with question of PNA, leukocytosis resolved, not on O2 or febrile. Discussed with pt, pt's son and referred to POLST - pt deferred to his son for decisions, son related to me that pt is currently to be treated as comfort care, which POLST confirms.  No further workup or treatment will be done at this time. Awaiting decision from the family regarding surgical intervention vs pain management for the hip.    8/18 - No acute changes overnight, per Ortho, no plans for surgery unless the family changes their mind. POLST suggests no surgical interventions desired. Will have case management coordinate plans for discharge with his hospice company, pending pt's family's official decision.     8/19 - Discussed plans for pt's hip fracture with his sons Robles and Ethan, as well as his wife.  All family members indicate that they would prefer to avoid surgery and to manage pt's pain medically.  Mr Oseguera will likely need to utilize a wheelchair to get around given his pain and lack of contralateral strength with being toe touch weight bearing, and his family state they have one at home for him.  He will discharge home with hospice once his hospice company provides him with a hospital bed. I also discussed catheter management  with Ethan and pt's wife, they would prefer him to discharge with the catheter and aware of the associated infection risk.    8/20 - Pt stable, has only needed intermittent pain meds. Family relates not being able to take pt home with hospice today, plan for tomorrow.     I have discussed this patient's plan of care and discharge plan at IDT rounds today with Case Management, Nursing, Nursing leadership, and other members of the IDT team.    Consultants/Specialty  orthopedics    Code Status  DNAR/DNI    Disposition    Anticipate discharge to: hospice    I have placed the appropriate orders for post-discharge needs.    Review of Systems  ROS     Physical Exam  Temp:  [36.4 °C (97.6 °F)-37.2 °C (99 °F)] 37.2 °C (99 °F)  Pulse:  [57-64] 64  Resp:  [18] 18  BP: (132-158)/(57-84) 158/84  SpO2:  [92 %-99 %] 99 %    Physical Exam  Vitals and nursing note reviewed.   Constitutional:       Appearance: Normal appearance.      Comments: Frail     HENT:      Head: Normocephalic and atraumatic.      Mouth/Throat:      Mouth: Mucous membranes are moist.   Cardiovascular:      Rate and Rhythm: Normal rate.   Pulmonary:      Effort: Pulmonary effort is normal.      Breath sounds: Normal breath sounds.   Abdominal:      General: Abdomen is flat.      Palpations: Abdomen is soft.   Skin:     Coloration: Skin is pale. Skin is not jaundiced.   Neurological:      General: No focal deficit present.      Mental Status: He is alert.      Comments: Oriented to self and place, defers decisions to his family   Psychiatric:         Mood and Affect: Mood normal.         Behavior: Behavior normal.         Fluids    Intake/Output Summary (Last 24 hours) at 8/20/2023 1145  Last data filed at 8/20/2023 0600  Gross per 24 hour   Intake 100 ml   Output 425 ml   Net -325 ml         Laboratory                            Imaging  DX-FEMUR-2+ RIGHT   Final Result         Acute displaced right intertrochanteric fracture.      DX-CHEST-LIMITED (1 VIEW)    Final Result         Small right pleural effusion with patchy right basilar opacity.      DX-HIP-COMPLETE - UNILATERAL 2+ RIGHT   Final Result         Acute displaced right intertrochanteric fracture.             Assessment/Plan  * Right intertrochanteric fracture - (present on admission)  Assessment & Plan  Pts family have to decided to forego surgical intervention and pursue symptomatic control for the pt given his hospice status  Multimodal pain control       Hyperkalemia  Assessment & Plan  - Hold home K+, pt comfort care only, no intervention for K+ per family    ACP (advance care planning)- (present on admission)  Assessment & Plan  I had a prolonged discussion with the patient and patient's son regarding goals of care, diagnoses, prognosis, and CODE STATUS. We discussed his  prognosis and comorbidities.  The patient has advanced age, multiple comorbid conditions including liver cirrhosis, chronic kidney disease, heart failure with a poor functional status.  Unfortunately he also got a intertrochanteric fracture.  Patient and patient's son are hesitant about proceeding with the surgery.  They want to discuss with his decision with other family members including patient wife [power of ] who is currently on travel.  However patient and patient's son want to maintain DNAR/DNI status.     Dehydration- (present on admission)  Assessment & Plan  Likely secondary to reduced oral intake.  Encourage oral intake as tolerated, antiemetics as needed.  Cautious intravenous hydration until adequate oral intake is achieved as he is at high risk for heart failure exacerbation      Type 2 diabetes mellitus with chronic kidney disease, without long-term current use of insulin ([facility])- (present on admission)  Assessment & Plan  With hyperglycemia  Last glycated hemoglobin was 8.4%  I will start short acting insulin for now  I will order Accu-Checks, hypoglycemia protocol  Adjust according to blood sugars trend      Heart  failure with reduced ejection fraction and diastolic dysfunction (HCC)- (present on admission)  Assessment & Plan  No exacerbation    Atrial fibrillation, chronic (HCC)- (present on admission)  Assessment & Plan  Not on A/C or on blocking medications, as he is currently on hospice with [facility]      CKD stage 3 due to type 2 diabetes mellitus (HCC)- (present on admission)  Assessment & Plan  Avoid/minimize nephrotoxins as much as possible, renally dose medications, monitor inputs and outputs     Alcoholic cirrhosis of liver with ascites (HCC)- (present on admission)  Assessment & Plan  Currently on hospice with [facility]     Anemia- (present on admission)  Assessment & Plan  No evidence of gross bleeding   Likely secondary to CKD and Cirrhosis        Hypertension- (present on admission)  Assessment & Plan  Not currently on scheduled antihypertensive medications    I will start as needed hydralazine for extreme hypertension           VTE prophylaxis:   SCDs/TEDs   heparin ppx      I have performed a physical exam and reviewed and updated ROS and Plan today (8/20/2023). In review of yesterday's note (8/19/2023), there are no changes except as documented above.

## 2023-08-20 NOTE — DISCHARGE PLANNING
Case Management Discharge Planning    Admission Date: 8/16/2023  GMLOS: 2.7  ALOS: 4    6-Clicks ADL Score: 16  6-Clicks Mobility Score: 6  PT and/or OT Eval ordered: Yes  Post-acute Referrals Ordered: No  Post-acute Choice Obtained: No  Has referral(s) been sent to post-acute provider:  No    Anticipated Discharge Dispo: Discharge Disposition: D/T to hospice home (50)    DME Needed: No    Action(s) Taken: Discussed pt during morning rounds. Per Kelsey TSE, pt could potentially d/c today. LSW to call [facility] to see if d/c today would be appropriate as pt will be seen tomorrow.    1014 LSW called [facility]. Per Tawana, on call RN Anselmo to call back this LSW.    1245 Per M.D. progress note, pt is to be d/c tomorrow home with hospice.    Escalations Completed: None    Medically Clear: Yes    Next Steps: F/U with medical team to discuss discharge needs and barriers.    Barriers to Discharge: None

## 2023-08-20 NOTE — PROGRESS NOTES
Received report from night shift RN. Assumed pt care 0700  Pt is resting comfortably in bed. No distress noted non labored breathing.   Patients needs attended well. Fall precautions in place. Bed at lowest position. Call light and personal belongings within reach.   Hourly rounding in progress.    This shift Family in room, plan reviewed for home are tomorrow.   Medications administered for pain PRN. Pt tolerated small amount of applesauce for lunch with meds.     Pt repositioning causes increased pain. Will take comfort measure into account when repositioning. Warm blankets provided.   Pt able to rest after repositioning.     [id number] At this moment pt is resting eyes closed no distress noted.   Family at bedside. Calm quiet environment provided.   1330- Pt has been resting in bed no distress noted. No changes in condition. Family at bedside.   1430 - Spouse at bedside medications reviewed. Pt resting no distress noted   1600- Pt resting in bed. Opens eyes to voice.   Offered oral fluids, pt took small sips.   1650- Oral fluids and Mediations administered  for pain - repositioning and linens changed.   Extra pillows for comfort.   Pt resting eyes closed no s/s of pain after repositioning.

## 2023-08-20 NOTE — CARE PLAN
The patient is Stable - Low risk of patient condition declining or worsening    Shift Goals  Clinical Goals: Pt will be able to control pain with pain scale less than 5/10. Pt will remain free from fall or injury throughout the shift.  Patient Goals: sleep and rest  Family Goals: n/a    Progress made toward(s) clinical / shift goals:  Pt did not require prn pain medication throughout the shift. Pt seen sleeping comfortably in between rounds. Pt repositioned every 2 hours and only tolerating left and supine position. Pt is free from falls and injury throughout the shift. Hourly rounding in progress.    Patient is not progressing towards the following goals: n/a

## 2023-08-20 NOTE — PROGRESS NOTES
1930 greeted pt, pt closed eyes sitting in bed, relaxing , family at bedside  2130 Pt was sleeping , sitting position in bed, unlabored breathing, v/s taken  0130  Pt was sleeping, chest moving observed, Pt was repositioned.  0330 Pt was sleeping, on his left side, unlabored breathing  0545 Pt was awake,closed eyes, calm and relaxing

## 2023-08-20 NOTE — CARE PLAN
The patient is Watcher - Medium risk of patient condition declining or worsening    Shift Goals  Clinical Goals: rest, repositioning with comfort  Patient Goals: rest  Family Goals: n/a    Progress made toward(s) clinical / shift goals:  met pt able to rest     Patient is not progressing towards the following goals:

## 2023-08-20 NOTE — PROGRESS NOTES
Received bedside patient report from TWIN Molina. Patient resting comfortably in bed, no complaints at this time. Safety precautions in place. Will continue to monitor.

## 2023-08-20 NOTE — PROGRESS NOTES
Hourly rounding    0828- Pt sleeping at this time    1018- Vitals taken    1248-Pt resting. Lunch at bedside.    1400- Pt sleeping     1650- Vitals taken    1800- Pt did not eat dinner. Currently sleeping

## 2023-08-20 NOTE — PROGRESS NOTES
4 Eyes Skin Assessment Completed by TWIN Riley and TWIN Motta.    Head WDL  Ears WDL  Nose WDL  Mouth WDL  Neck WDL  Breast/Chest WDL  Shoulder Blades WDL  Spine WDL  (R) Arm/Elbow/Hand Bruising  (L) Arm/Elbow/Hand Bruising  Abdomen WDL  Groin WDL  Scrotum/Coccyx/Buttocks Redness and Blanching  (R) Leg Bruising and Swelling  (L) Leg Bruising  (R) Heel/Foot/Toe WDL  (L) Heel/Foot/Toe WDL          Devices In Places Pulse Ox      Interventions In Place Waffle Overlay, Pillows, Q2 Turns, and Heels Loaded W/Pillows    Possible Skin Injury No    Pictures Uploaded Into Epic N/A  Wound Consult Placed N/A  RN Wound Prevention Protocol Ordered Yes

## 2023-08-21 NOTE — DISCHARGE PLANNING
DC Transport Scheduled    Received request at: 8/21/2023 at 0952    Transport Company Scheduled:  [employer]  Spoke with Edgar at [facility] to schedule transport.    Scheduled Date: 8/21/2023  Scheduled Time: 1200    Destination: Home at [address]     Notified care team of scheduled transport via [employer].     If there are any changes needed to the DC transportation scheduled, please contact [facility] at ext. [phone] between the hours of 6555-3090 Mon-Fri. If outside those hours, contact the ED Case Manager at ext. [phone].

## 2023-08-21 NOTE — CARE PLAN
The patient is Watcher - Medium risk of patient condition declining or worsening    Shift Goals  Clinical Goals: Patient will remain resting comfortably throughout the shift  Patient Goals: Sleep/rest overnight  Family Goals: n/a    Progress made toward(s) clinical / shift goals:  Patient appears to have been resting comfortably overnight    Patient is not progressing towards the following goals:      Problem: Pain - Standard  Goal: Alleviation of pain or a reduction in pain to the patient’s comfort goal  Outcome: Not Progressing     Problem: Knowledge Deficit - Standard  Goal: Patient and family/care givers will demonstrate understanding of plan of care, disease process/condition, diagnostic tests and medications  Outcome: Not Progressing     Problem: Skin Integrity  Goal: Skin integrity is maintained or improved  Outcome: Not Progressing     Problem: Fall Risk  Goal: Patient will remain free from falls  Outcome: Not Progressing     Problem: Diabetes Management  Goal: Patient will achieve and maintain glucose in satisfactory range  Outcome: Not Progressing     Problem: Knowledge Deficit - Diabetes  Goal: Patient will demonstrate knowledge of insulin injection, symptoms, and treatment of hypoglycemia and diet prior to discharge  Outcome: Not Progressing     Problem: Discharge Planning - Diabetes  Goal: Patient's continuum of care needs will be met  Outcome: Not Progressing     Problem: Skin Integrity - Diabetes  Goal: Patient's skin on legs and feet will remain intact while hospitalized  Outcome: Not Progressing     Problem: Infection - Diabetes  Goal: Patient will remain free from signs and symptoms of infection  Outcome: Not Progressing  Goal: Promotion wound healing, line and drain management  Outcome: Not Progressing     Problem: Nutrition Deficit - Diabetes  Goal: Patient will demonstrate adequate hydration and vital signs  Outcome: Not Progressing     Problem: Diabetic Ulcer  Goal: Early identification of  diabetic foot wound and initiation of appropriate interventions  Outcome: Not Progressing

## 2023-08-21 NOTE — PROGRESS NOTES
Morphine and Ativan ordered. Otherwise, patient already has other hospice medications. Plan for DC back home today.       Winston Gracia DO   Hospice and Palliative Care  Hospice Medical Director  [address] [place]  [address]  P: [phone]  F: [phone]  C: [phone]

## 2023-08-21 NOTE — DISCHARGE SUMMARY
Discharge Summary    CHIEF COMPLAINT ON ADMISSION  Chief Complaint   Patient presents with    GLF     Pt slipped on tile and fell in bathroom this AM; denies any head strike/LOC or blood thinner use    Hip Pain     R side hip; unable to bear weight; CMS intact        Reason for Admission  Fall    Admission Date  8/16/2023    CODE STATUS  Prior    HPI & HOSPITAL COURSE  This is a 82yo gentleman with a history of HTN, afib, cirrhosis,  DM II, CKD III, dementia, currently on hospice for CHF who presented to hospital with GLF and R hip pain. In the ER, he was found to have a R intertrochanteric fracture.  The patient's family requested time to decide on whether to proceed with surgery for pain management reason or to manage with oral pain killers - after discussion amongst family members, they have agreed to have pt's pain managed with medications and not to proceed with surgery.  Family is aware of the need for toe touch weight bearing only and tentatively plan for wheelchair use at home - they state he already has one.The patient's home potassium was stopped at discharge as he appears to have pretty consistently elevated potassium levels on supplementation at home and upon presentation to hospital. He is discharging home on hospice.       Therefore, he is discharged in fair and stable condition to hospice.    The patient met 2-midnight criteria for an inpatient stay at the time of discharge.    Discharge Date  8/21/2023    FOLLOW UP ITEMS POST DISCHARGE  Hospice    DISCHARGE DIAGNOSES  Principal Problem:    Right intertrochanteric fracture  (POA: Yes)  Active Problems:    Hypertension (POA: Yes)    Anemia (POA: Yes)    Alcoholic cirrhosis of liver with ascites (HCC) (POA: Yes)      Overview: Quit drinking alcohol heavily about 3-5 years ago. Sees GI, Stable,       continue current plan of care with current medications.           CKD stage 3 due to type 2 diabetes mellitus (HCC) (POA: Yes)      Overview: Hasn't been  needing glipizide, has a rx at home for prn elevated sugars.       He checks his sugars but isn't sure how to interpret them.      His A1c on October 12th 2021 was 7.1. 03/17/22 8.1      unStable, continue current plan of care with current medications. Will       refer to [facility] for dm education. rec resume 2.5mg/d of glipizide.                Atrial fibrillation, chronic (HCC) (POA: Yes)      Overview: Stable, continue current plan of care with current medications.           Heart failure with reduced ejection fraction and diastolic dysfunction (HCC) (POA: Yes)      Overview: Stable, continue current plan of care with current medications.           Type 2 diabetes mellitus with chronic kidney disease, without long-term current use of insulin (HCC) (POA: Yes)    Dehydration (POA: Yes)    ACP (advance care planning) (POA: Yes)    Hyperkalemia (POA: Unknown)  Resolved Problems:    * No resolved hospital problems. *      FOLLOW UP  Future Appointments   Date Time Provider Department Center   8/22/2023 To Be Determined Zulma Salazar R.N. [facility] None   8/23/2023 To Be Determined TAN Rondon.N.A. [facility] None   8/25/2023 To Be Determined TAN Rondon.N.A. [facility] None   8/28/2023 To Be Determined TAN Rondon.N.A. [facility] None   8/29/2023 To Be Determined TAN Rondon.N.A. [facility] None   9/4/2023 To Be Determined Neli Arteaga C.N.A. [facility] None   9/5/2023 To Be Determined TAN Rondon.N.A. [facility] None   9/11/2023 To Be Determined TAN Rondon.N.A. [facility] None   9/12/2023 To Be Determined TAN Rondon.N.A. [facility] None   9/18/2023 To Be Determined TAN Rondon.N.A. [facility] None   9/19/2023 To Be Determined Neli Arteaga C.N.A. [facility] None   9/25/2023 To Be Determined Neli Arteaga C.N.A. [facility] None   9/26/2023 To Be Determined Neli Arteaga C.N.A. [facility] None   10/2/2023 To Be Determined Neli GUARDADO  Benjamin-Matias, C.N.A. [facility] None   10/3/2023 To Be Determined Nelijuanpablo Alexander-Matias, C.N.A. [facility] None   10/9/2023 To Be Determined Nelijuanpablo Alexander-Matias, C.N.A. [facility] None   10/10/2023 To Be Determined Nelijuanpablo Alexander-Matias, C.N.A. [facility] None   10/16/2023 To Be Determined Nelijuanpablo Castañedallana-Matias, C.N.A. [facility] None   10/17/2023 To Be Determined Nelijuanpablo Castañedallana-Matias, C.N.A. [facility] None   10/23/2023 To Be Determined Nelijuanpablo Alexander-Matias, C.N.A. [facility] None   10/24/2023 To Be Determined Neli Alexander-Matias, C.N.A. [facility] None   10/30/2023 To Be Determined Neli Alexander-Matias, C.N.A. [facility] None   10/31/2023 To Be Determined Neli Alexander-Matias, C.N.A. [facility] None   11/6/2023 To Be Determined Neli Alexander-Matias, C.N.A. [facility] None   11/7/2023 To Be Determined Neli Alexander-Matias, C.N.A. [facility] None     No follow-up provider specified.    MEDICATIONS ON DISCHARGE     Medication List        START taking these medications        Instructions   lidocaine 5 % Ptch  Commonly known as: Lidoderm   Place 1 Patch on the skin every 24 hours.  Dose: 1 Patch            CHANGE how you take these medications        Instructions   LORazepam Intensol 2 MG/ML Conc  What changed:   when to take this  reasons to take this  Generic drug: LORazepam   Doctor's comments: Please fill 30 ml Q 7 days. Patient is under care with [facility].  Take 0.25 mL by mouth every 2 hours as needed (Anxiety/Agitation, or Nausea despite zofran).  Dose: 0.5 mg     morphine 100 MG/5ML Soln  What changed: how much to take   Doctor's comments: Please dispense 7 day supply (30mL). Patient is under care with [facility].  Take 0.5-1 mL by mouth every 1 hour as needed (Moderate to Severe Pain or Shortness of Breath). Indications: Difficulty Breathing, Pain  Dose: 10-20 mg            CONTINUE taking these medications        Instructions   Acetaminophen Extra Strength 500 MG Tabs   Take 2 Tablets by mouth every 6 hours as needed for mild pain or  fever.  Dose: 1,000 mg     bisacodyl 10 MG Supp  Commonly known as: Dulcolax   Unwrap and insert 1 Suppository into the rectum every day as needed if senna ineffective.  Dose: 10 mg     furosemide 40 MG Tabs  Commonly known as: Lasix   Take 1 Tablet by mouth 1 time a day as needed for edema for 14 days.  Dose: 40 mg     ondansetron 4 MG Tbdp  Commonly known as: Zofran ODT   Dissolve 1 Tablet under the tongue every 6 hours as needed for nausea.  Dose: 4 mg     Stool Softener Plus Laxative 8.6-50 MG Tabs  Generic drug: senna-docusate   Take 2 Tablets by mouth every evening. Indications: Constipation  Dose: 2 Tablet            STOP taking these medications      potassium chloride SA 20 MEQ Tbcr  Commonly known as: Kdur              Allergies  No Known Allergies    DIET  No orders of the defined types were placed in this encounter.      ACTIVITY  As tolerated.  Touch-down weight bearing RIGHT leg    CONSULTATIONS  Ortho    PROCEDURES  None    LABORATORY  Lab Results   Component Value Date    SODIUM 137 08/17/2023    POTASSIUM 5.6 (H) 08/17/2023    CHLORIDE 104 08/17/2023    CO2 20 08/17/2023    GLUCOSE 194 (H) 08/17/2023    BUN 48 (H) 08/17/2023    CREATININE 2.62 (H) 08/17/2023        Lab Results   Component Value Date    WBC 9.0 08/17/2023    HEMOGLOBIN 8.5 (L) 08/17/2023    HEMATOCRIT 26.5 (L) 08/17/2023    PLATELETCT 123 (L) 08/17/2023        Total time of the discharge process exceeds 33 minutes.

## 2023-08-21 NOTE — PROGRESS NOTES
1900- BSSR received from day shift RN. Patient laying in bed in no apparent distress with no complaints. Family at bedside. Alert but slightly confused. Plan of care discussed with family. Bed in low position with bed brakes applied and call light in reach. Patient states no needs at this time.     2257- Patient appears to be sleeping comfortably, easily aroused.     0059- Patient laying in bed awake in no apparent distress. Patient repositioned    0300- Patient appears to be sleeping comfortably, easily aroused. Patient repsitioned

## 2023-08-21 NOTE — DISCHARGE INSTRUCTIONS
Diet: Diet Order Diet: Regular  Activity: As tolerated  Follow Up: Hospice  Disposition:Home with hospice - toe touch weight bearing only on the broken hip, wheelchair use if Mr Oseguera isn't able to ambulate  Diagnosis: Fracture    Follow up with your Primary Care Provider Monica Layton M.D. as scheduled or sooner if your symptoms persist or worsen.  Return to Emergency Room for severe chest pain, shortness of breath, signs of a stroke, or any other emergencies.

## 2023-08-21 NOTE — PROGRESS NOTES
4 Eyes Skin Assessment Completed by rose RN and edwin RN.    Head WDL  Ears WDL  Nose WDL  Mouth WDL  Neck WDL  Breast/Chest WDL  Shoulder Blades WDL  Spine WDL  (R) Arm/Elbow/Hand Scab  (L) Arm/Elbow/Hand Scab  Abdomen WDL  Groin WDL  Scrotum/Coccyx/Buttocks WDL  (R) Leg WDL  (L) Leg WDL  (R) Heel/Foot/Toe WDL  (L) Heel/Foot/Toe WDL    Devices In Places jhaveri catheter     Interventions In Place Pillows and Q2 Turns    Possible Skin Injury No    Pictures Uploaded Into Epic N/A  Wound Consult Placed N/A  RN Wound Prevention Protocol Ordered No

## 2023-08-21 NOTE — DISCHARGE PLANNING
TCN following. HTH/SCP chart review completed. Per chart review, patient discharge plan noted to be home with hospice with family requested hospital bed to be delivered 8/20 with anticipated admission to home with patient choice of [facility] today (8/21/2023) (please see CM note 8/19/2023 at 2:17PM). As current discharge plan is home with [facility] TCN will not follow, but remains available to collaborate should any needs for TCN involvement in discharge planning arise prior to anticipated dc. Thank you.

## 2023-08-21 NOTE — PROGRESS NOTES
Received report from night shift RN. Assumed pt care 0700  Pt is resting in bed. Pt on room air; no signs of SOB/respiratory distress.  Patients needs attended well. Fall precautions in place. Bed at lowest position. Call light and personal belongings within reach.   Hourly rounding in progress.    Plan of care, medication education reviewed with spouse at bedside.   During this am shift pat has appeared uncomfortable, grimacing, pain with touch.    Comfort measures and medications administered.     Pt schedule for  at 1200 by sheryl, discussion with family regarding comfort with transportation home  1215 Pt awake in bed. Level of comfort increased with transfer to [facility]. Family at bedside   Home care and follow up reviewed spouse verbalizes understanding of home care.   Discharge instructions reviewed. Medication education, follow up and home care education provided. Family verbalizes understanding of instructions.   Level of comfort increased at time of discharge.  PIV removed. Belongings gathered to take home.

## 2023-08-21 NOTE — DISCHARGE PLANNING
Case Management Discharge Planning    Admission Date: 8/16/2023  GMLOS: 2.7  ALOS: 5    6-Clicks ADL Score: 6  6-Clicks Mobility Score: 6  PT and/or OT Eval ordered: Yes  Post-acute Referrals Ordered: Yes  Post-acute Choice Obtained: Yes  Has referral(s) been sent to post-acute provider:  Yes      Anticipated Discharge Dispo: Discharge Disposition: D/T to hospice home (50)    DME Needed: No    Action(s) Taken: Updated Provider/Nurse on Discharge Plan    Patient discussed during morning IDT rounds with team. Patient is  medically cleared for discharge at this time.  SWCM following on home hospice.  Patient is accepted with [facility]. Transport requested for 1200.    Left message with son .  Nursing team is updated.    Escalations Completed: None    Medically Clear: Yes    Next Steps: pending transport  at 1200 with home hospice    Barriers to Discharge: None    Is the patient up for discharge tomorrow: No

## 2023-10-22 NOTE — PROCEDURE: ADDITIONAL NOTES
Additional Notes: Lesion of concern on the scalp is clinically consistent with an Seborrheic Keratosis
Render Risk Assessment In Note?: no
Detail Level: Simple
Patient/Caregiver provided printed discharge information.

## 2023-10-26 NOTE — TELEPHONE ENCOUNTER
See encounter note 1/11/22   Keystone Flap Text: The defect edges were debeveled with a #15 scalpel blade.  Given the location of the defect, shape of the defect a keystone flap was deemed most appropriate.  Using a sterile surgical marker, an appropriate keystone flap was drawn incorporating the defect, outlining the appropriate donor tissue and placing the expected incisions within the relaxed skin tension lines where possible. The area thus outlined was incised deep to adipose tissue with a #15 scalpel blade.  The skin margins were undermined to an appropriate distance in all directions around the primary defect and laterally outward around the flap utilizing iris scissors.

## 2023-11-22 NOTE — PROCEDURE: BIOPSY BY SHAVE METHOD
Detail Level: Detailed
Depth Of Biopsy: dermis
Was A Bandage Applied: Yes
Size Of Lesion In Cm: 0
Biopsy Type: H and E
Biopsy Method: Dermablade
Anesthesia Type: 1% lidocaine with 1:100,000 epinephrine and a 1:12 solution of 8.4% sodium bicarbonate
Anesthesia Volume In Cc: 1
Hemostasis: Drysol
Wound Care: Vaseline
Dressing: bandage
Destruction After The Procedure: No
Type Of Destruction Used: Curettage
Curettage Text: The wound bed was treated with curettage after the biopsy was performed.
Cryotherapy Text: The wound bed was treated with cryotherapy after the biopsy was performed.
Electrodesiccation Text: The wound bed was treated with electrodesiccation after the biopsy was performed.
Electrodesiccation And Curettage Text: The wound bed was treated with electrodesiccation and curettage after the biopsy was performed.
Silver Nitrate Text: The wound bed was treated with silver nitrate after the biopsy was performed.
Consent: Written consent obtained, risks reviewed including but not limited to crusting, scabbing, blistering, scarring, darker or lighter pigmentary change, paradoxical hair regrowth, incomplete removal of hair and infection.
Lab: [id number]
Lab Facility: 
Consent: Verbal consent was obtained and risks were reviewed including but not limited to scarring, infection, bleeding, scabbing, incomplete removal, nerve damage and allergy to anesthesia.
Post-Care Instructions: I reviewed with the patient in detail post-care instructions. Patient is to keep the biopsy site dry overnight, and then apply bacitracin twice daily until healed. Patient may apply hydrogen peroxide soaks to remove any crusting.
Notification Instructions: Patient will be notified of biopsy results. However, patient instructed to call the office if not contacted within 2 weeks.
Billing Type: Third-Party Bill
Information: Selecting Yes will display possible errors in your note based on the variables you have selected. This validation is only offered as a suggestion for you. PLEASE NOTE THAT THE VALIDATION TEXT WILL BE REMOVED WHEN YOU FINALIZE YOUR NOTE. IF YOU WANT TO FAX A PRELIMINARY NOTE YOU WILL NEED TO TOGGLE THIS TO 'NO' IF YOU DO NOT WANT IT IN YOUR FAXED NOTE.

## 2024-03-23 NOTE — PROGRESS NOTES
Patient seen in person by me for evaluation of bilateral painful inguinal hernias. Reviewed CT imaging done over the last 5 years, showing fat containing hernias.     Pain is intermittent, improved with lying down.     No overlying skin changes. Not exquisitely tender to touch. Soft. No other associated symptoms or symptoms concerning for strangulation.     Discussion signs/symptoms of hernia strangulation in detail.    Patient appreciated of visit and verbalizes understanding.     Winston Gracia,   Hospice and Palliative Medicine  [facility] Medical Director  [address] [place]  [address]  P: [phone]  F: [phone]  C: [phone]  
[FreeTextEntry2] : Left arm injury Worker's Compensation

## 2025-03-02 NOTE — PROCEDURE: EXCISION
Posterior Auricular Interpolation Flap Text: A decision was made to reconstruct the defect utilizing an interpolation axial flap and a staged reconstruction.  A telfa template was made of the defect.  This telfa template was then used to outline the posterior auricular interpolation flap.  The donor area for the pedicle flap was then injected with anesthesia.  The flap was excised through the skin and subcutaneous tissue down to the layer of the underlying musculature.  The pedicle flap was carefully excised within this deep plane to maintain its blood supply.  The edges of the donor site were undermined.   The donor site was closed in a primary fashion.  The pedicle was then rotated into position and sutured.  Once the tube was sutured into place, adequate blood supply was confirmed with blanching and refill.  The pedicle was then wrapped with xeroform gauze and dressed appropriately with a telfa and gauze bandage to ensure continued blood supply and protect the attached pedicle.
Complex Repair And Split-Thickness Skin Graft Text: The defect edges were debeveled with a #15 scalpel blade.  The primary defect was closed partially with a complex linear closure.  Given the location of the defect, shape of the defect and the proximity to free margins a split thickness skin graft was deemed most appropriate to repair the remaining defect.  The graft was trimmed to fit the size of the remaining defect.  The graft was then placed in the primary defect, oriented appropriately, and sutured into place.
Number Of Epidermal Sutures (Optional): 3
Complex Repair And Dermal Autograft Text: The defect edges were debeveled with a #15 scalpel blade.  The primary defect was closed partially with a complex linear closure.  Given the location of the defect, shape of the defect and the proximity to free margins an dermal autograft was deemed most appropriate to repair the remaining defect.  The graft was trimmed to fit the size of the remaining defect.  The graft was then placed in the primary defect, oriented appropriately, and sutured into place.
Mastoid Interpolation Flap Text: A decision was made to reconstruct the defect utilizing an interpolation axial flap and a staged reconstruction.  A telfa template was made of the defect.  This telfa template was then used to outline the mastoid interpolation flap.  The donor area for the pedicle flap was then injected with anesthesia.  The flap was excised through the skin and subcutaneous tissue down to the layer of the underlying musculature.  The pedicle flap was carefully excised within this deep plane to maintain its blood supply.  The edges of the donor site were undermined.   The donor site was closed in a primary fashion.  The pedicle was then rotated into position and sutured.  Once the tube was sutured into place, adequate blood supply was confirmed with blanching and refill.  The pedicle was then wrapped with xeroform gauze and dressed appropriately with a telfa and gauze bandage to ensure continued blood supply and protect the attached pedicle.
Burow's Advancement Flap Text: The defect edges were debeveled with a #15 scalpel blade.  Given the location of the defect and the proximity to free margins a Burow's advancement flap was deemed most appropriate.  Using a sterile surgical marker, the appropriate advancement flap was drawn incorporating the defect and placing the expected incisions within the relaxed skin tension lines where possible.    The area thus outlined was incised deep to adipose tissue with a #15 scalpel blade.  The skin margins were undermined to an appropriate distance in all directions utilizing iris scissors.
Curettage Prior To Excision?: No
Island Pedicle Flap-Requiring Vessel Identification Text: The defect edges were debeveled with a #15 scalpel blade.  Given the location of the defect, shape of the defect and the proximity to free margins an island pedicle advancement flap was deemed most appropriate.  Using a sterile surgical marker, an appropriate advancement flap was drawn, based on the axial vessel mentioned above, incorporating the defect, outlining the appropriate donor tissue and placing the expected incisions within the relaxed skin tension lines where possible.    The area thus outlined was incised deep to adipose tissue with a #15 scalpel blade.  The skin margins were undermined to an appropriate distance in all directions around the primary defect and laterally outward around the island pedicle utilizing iris scissors.  There was minimal undermining beneath the pedicle flap.
Dorsal Nasal Flap Text: The defect edges were debeveled with a #15 scalpel blade.  Given the location of the defect and the proximity to free margins a dorsal nasal flap was deemed most appropriate.  Using a sterile surgical marker, an appropriate dorsal nasal flap was drawn around the defect.    The area thus outlined was incised deep to adipose tissue with a #15 scalpel blade.  The skin margins were undermined to an appropriate distance in all directions utilizing iris scissors.
Path Notes (To The Dermatopathologist): Please check margins.
Size Of Lesion In Cm: 1
M-Plasty Complex Repair Preamble Text (Leave Blank If You Do Not Want): Extensive wide undermining was performed.
Show Primary And Secondary Defect Sizes Variable (Do Not Hide If You Perform Flaps Or Graft Closures): Yes
Complex Repair And Modified Advancement Flap Text: The defect edges were debeveled with a #15 scalpel blade.  The primary defect was closed partially with a complex linear closure.  Given the location of the remaining defect, shape of the defect and the proximity to free margins a modified advancement flap was deemed most appropriate for complete closure of the defect.  Using a sterile surgical marker, an appropriate advancement flap was drawn incorporating the defect and placing the expected incisions within the relaxed skin tension lines where possible.    The area thus outlined was incised deep to adipose tissue with a #15 scalpel blade.  The skin margins were undermined to an appropriate distance in all directions utilizing iris scissors.
Bilobed Flap Text: The defect edges were debeveled with a #15 scalpel blade.  Given the location of the defect and the proximity to free margins a bilobe flap was deemed most appropriate.  Using a sterile surgical marker, an appropriate bilobe flap drawn around the defect.    The area thus outlined was incised deep to adipose tissue with a #15 scalpel blade.  The skin margins were undermined to an appropriate distance in all directions utilizing iris scissors.
Anesthesia Type: 1% lidocaine with epinephrine
Mercedes Flap Text: The defect edges were debeveled with a #15 scalpel blade.  Given the location of the defect, shape of the defect and the proximity to free margins a Mercedes flap was deemed most appropriate.  Using a sterile surgical marker, an appropriate advancement flap was drawn incorporating the defect and placing the expected incisions within the relaxed skin tension lines where possible. The area thus outlined was incised deep to adipose tissue with a #15 scalpel blade.  The skin margins were undermined to an appropriate distance in all directions utilizing iris scissors.
Secondary Defect Width (In Cm): 0
Yes
Bi-Rhombic Flap Text: The defect edges were debeveled with a #15 scalpel blade.  Given the location of the defect and the proximity to free margins a bi-rhombic flap was deemed most appropriate.  Using a sterile surgical marker, an appropriate rhombic flap was drawn incorporating the defect. The area thus outlined was incised deep to adipose tissue with a #15 scalpel blade.  The skin margins were undermined to an appropriate distance in all directions utilizing iris scissors.
Surgeon (Optional): Destiny Ramsey
Repair Performed By Another Provider Text (Leave Blank If You Do Not Want): After the tissue was excised the defect was repaired by another provider.
Excision Depth: adipose tissue
Cheek-To-Nose Interpolation Flap Text: A decision was made to reconstruct the defect utilizing an interpolation axial flap and a staged reconstruction.  A telfa template was made of the defect.  This telfa template was then used to outline the Cheek-To-Nose Interpolation flap.  The donor area for the pedicle flap was then injected with anesthesia.  The flap was excised through the skin and subcutaneous tissue down to the layer of the underlying musculature.  The interpolation flap was carefully excised within this deep plane to maintain its blood supply.  The edges of the donor site were undermined.   The donor site was closed in a primary fashion.  The pedicle was then rotated into position and sutured.  Once the tube was sutured into place, adequate blood supply was confirmed with blanching and refill.  The pedicle was then wrapped with xeroform gauze and dressed appropriately with a telfa and gauze bandage to ensure continued blood supply and protect the attached pedicle.
Purse String (Simple) Text: Given the location of the defect and the characteristics of the surrounding skin a purse string simple closure was deemed most appropriate.  Undermining was performed circumferentially around the surgical defect.  A purse string suture was then placed and tightened.
Partial Purse String (Intermediate) Text: Given the location of the defect and the characteristics of the surrounding skin an intermediate purse string closure was deemed most appropriate.  Undermining was performed circumferentially around the surgical defect.  A purse string suture was then placed and tightened. Wound tension of the circular defect prevented complete closure of the wound.
Graft Donor Site Bandage (Optional-Leave Blank If You Don't Want In Note): Steri-strips and a pressure bandage were applied to the donor site.
Deep Sutures: 3-0 Polysorb
Star Wedge Flap Text: The defect edges were debeveled with a #15 scalpel blade.  Given the location of the defect, shape of the defect and the proximity to free margins a star wedge flap was deemed most appropriate.  Using a sterile surgical marker, an appropriate rotation flap was drawn incorporating the defect and placing the expected incisions within the relaxed skin tension lines where possible. The area thus outlined was incised deep to adipose tissue with a #15 scalpel blade.  The skin margins were undermined to an appropriate distance in all directions utilizing iris scissors.
Helical Rim Advancement Flap Text: The defect edges were debeveled with a #15 blade scalpel.  Given the location of the defect and the proximity to free margins (helical rim) a double helical rim advancement flap was deemed most appropriate.  Using a sterile surgical marker, the appropriate advancement flaps were drawn incorporating the defect and placing the expected incisions between the helical rim and antihelix where possible.  The area thus outlined was incised through and through with a #15 scalpel blade.  With a skin hook and iris scissors, the flaps were gently and sharply undermined and freed up.
Cartilage Graft Text: The defect edges were debeveled with a #15 scalpel blade.  Given the location of the defect, shape of the defect, the fact the defect involved a full thickness cartilage defect a cartilage graft was deemed most appropriate.  An appropriate donor site was identified, cleansed, and anesthetized. The cartilage graft was then harvested and transferred to the recipient site, oriented appropriately and then sutured into place.  The secondary defect was then repaired using a primary closure.
Xenograft Text: The defect edges were debeveled with a #15 scalpel blade.  Given the location of the defect, shape of the defect and the proximity to free margins a xenograft was deemed most appropriate.  The graft was then trimmed to fit the size of the defect.  The graft was then placed in the primary defect and oriented appropriately.
O-L Flap Text: The defect edges were debeveled with a #15 scalpel blade.  Given the location of the defect, shape of the defect and the proximity to free margins an O-L flap was deemed most appropriate.  Using a sterile surgical marker, an appropriate advancement flap was drawn incorporating the defect and placing the expected incisions within the relaxed skin tension lines where possible.    The area thus outlined was incised deep to adipose tissue with a #15 scalpel blade.  The skin margins were undermined to an appropriate distance in all directions utilizing iris scissors.
Complex Repair And Bilobe Flap Text: The defect edges were debeveled with a #15 scalpel blade.  The primary defect was closed partially with a complex linear closure.  Given the location of the remaining defect, shape of the defect and the proximity to free margins a bilobe flap was deemed most appropriate for complete closure of the defect.  Using a sterile surgical marker, an appropriate advancement flap was drawn incorporating the defect and placing the expected incisions within the relaxed skin tension lines where possible.    The area thus outlined was incised deep to adipose tissue with a #15 scalpel blade.  The skin margins were undermined to an appropriate distance in all directions utilizing iris scissors.
Billing Type: Third-Party Bill
Banner Transposition Flap Text: The defect edges were debeveled with a #15 scalpel blade.  Given the location of the defect and the proximity to free margins a Banner transposition flap was deemed most appropriate.  Using a sterile surgical marker, an appropriate flap drawn around the defect. The area thus outlined was incised deep to adipose tissue with a #15 scalpel blade.  The skin margins were undermined to an appropriate distance in all directions utilizing iris scissors.
Curvilinear Excision Additional Text (Leave Blank If You Do Not Want): The margin was drawn around the clinically apparent lesion.  A curvilinear shape was then drawn on the skin incorporating the lesion and margins.  Incisions were then made along these lines to the appropriate tissue plane and the lesion was extirpated.
Advancement-Rotation Flap Text: The defect edges were debeveled with a #15 scalpel blade.  Given the location of the defect, shape of the defect and the proximity to free margins an advancement-rotation flap was deemed most appropriate.  Using a sterile surgical marker, an appropriate flap was drawn incorporating the defect and placing the expected incisions within the relaxed skin tension lines where possible. The area thus outlined was incised deep to adipose tissue with a #15 scalpel blade.  The skin margins were undermined to an appropriate distance in all directions utilizing iris scissors.
Muscle Hinge Flap Text: The defect edges were debeveled with a #15 scalpel blade.  Given the size, depth and location of the defect and the proximity to free margins a muscle hinge flap was deemed most appropriate.  Using a sterile surgical marker, an appropriate hinge flap was drawn incorporating the defect. The area thus outlined was incised with a #15 scalpel blade.  The skin margins were undermined to an appropriate distance in all directions utilizing iris scissors.
Keystone Flap Text: The defect edges were debeveled with a #15 scalpel blade.  Given the location of the defect, shape of the defect a keystone flap was deemed most appropriate.  Using a sterile surgical marker, an appropriate keystone flap was drawn incorporating the defect, outlining the appropriate donor tissue and placing the expected incisions within the relaxed skin tension lines where possible. The area thus outlined was incised deep to adipose tissue with a #15 scalpel blade.  The skin margins were undermined to an appropriate distance in all directions around the primary defect and laterally outward around the flap utilizing iris scissors.
Complex Repair And M Plasty Text: The defect edges were debeveled with a #15 scalpel blade.  The primary defect was closed partially with a complex linear closure.  Given the location of the remaining defect, shape of the defect and the proximity to free margins an M plasty was deemed most appropriate for complete closure of the defect.  Using a sterile surgical marker, an appropriate advancement flap was drawn incorporating the defect and placing the expected incisions within the relaxed skin tension lines where possible.    The area thus outlined was incised deep to adipose tissue with a #15 scalpel blade.  The skin margins were undermined to an appropriate distance in all directions utilizing iris scissors.
Rhombic Flap Text: The defect edges were debeveled with a #15 scalpel blade.  Given the location of the defect and the proximity to free margins a rhombic flap was deemed most appropriate.  Using a sterile surgical marker, an appropriate rhombic flap was drawn incorporating the defect.    The area thus outlined was incised deep to adipose tissue with a #15 scalpel blade.  The skin margins were undermined to an appropriate distance in all directions utilizing iris scissors.
O-Z Plasty Text: The defect edges were debeveled with a #15 scalpel blade.  Given the location of the defect, shape of the defect and the proximity to free margins an O-Z plasty (double transposition flap) was deemed most appropriate.  Using a sterile surgical marker, the appropriate transposition flaps were drawn incorporating the defect and placing the expected incisions within the relaxed skin tension lines where possible.    The area thus outlined was incised deep to adipose tissue with a #15 scalpel blade.  The skin margins were undermined to an appropriate distance in all directions utilizing iris scissors.  Hemostasis was achieved with electrocautery.  The flaps were then transposed into place, one clockwise and the other counterclockwise, and anchored with interrupted buried subcutaneous sutures.
Partial Purse String (Simple) Text: Given the location of the defect and the characteristics of the surrounding skin a simple purse string closure was deemed most appropriate.  Undermining was performed circumferentially around the surgical defect.  A purse string suture was then placed and tightened. Wound tension of the circular defect prevented complete closure of the wound.
Complex Repair And Epidermal Autograft Text: The defect edges were debeveled with a #15 scalpel blade.  The primary defect was closed partially with a complex linear closure.  Given the location of the defect, shape of the defect and the proximity to free margins an epidermal autograft was deemed most appropriate to repair the remaining defect.  The graft was trimmed to fit the size of the remaining defect.  The graft was then placed in the primary defect, oriented appropriately, and sutured into place.
Complex Repair And Double Advancement Flap Text: The defect edges were debeveled with a #15 scalpel blade.  The primary defect was closed partially with a complex linear closure.  Given the location of the remaining defect, shape of the defect and the proximity to free margins a double advancement flap was deemed most appropriate for complete closure of the defect.  Using a sterile surgical marker, an appropriate advancement flap was drawn incorporating the defect and placing the expected incisions within the relaxed skin tension lines where possible.    The area thus outlined was incised deep to adipose tissue with a #15 scalpel blade.  The skin margins were undermined to an appropriate distance in all directions utilizing iris scissors.
Bilobed Transposition Flap Text: The defect edges were debeveled with a #15 scalpel blade.  Given the location of the defect and the proximity to free margins a bilobed transposition flap was deemed most appropriate.  Using a sterile surgical marker, an appropriate bilobe flap drawn around the defect.    The area thus outlined was incised deep to adipose tissue with a #15 scalpel blade.  The skin margins were undermined to an appropriate distance in all directions utilizing iris scissors.
Fusiform Excision Additional Text (Leave Blank If You Do Not Want): The margin was drawn around the clinically apparent lesion.  A fusiform shape was then drawn on the skin incorporating the lesion and margins.  Incisions were then made along these lines to the appropriate tissue plane and the lesion was extirpated.
Interpolation Flap Text: A decision was made to reconstruct the defect utilizing an interpolation axial flap and a staged reconstruction.  A telfa template was made of the defect.  This telfa template was then used to outline the interpolation flap.  The donor area for the pedicle flap was then injected with anesthesia.  The flap was excised through the skin and subcutaneous tissue down to the layer of the underlying musculature.  The interpolation flap was carefully excised within this deep plane to maintain its blood supply.  The edges of the donor site were undermined.   The donor site was closed in a primary fashion.  The pedicle was then rotated into position and sutured.  Once the tube was sutured into place, adequate blood supply was confirmed with blanching and refill.  The pedicle was then wrapped with xeroform gauze and dressed appropriately with a telfa and gauze bandage to ensure continued blood supply and protect the attached pedicle.
Melolabial Interpolation Flap Text: A decision was made to reconstruct the defect utilizing an interpolation axial flap and a staged reconstruction.  A telfa template was made of the defect.  This telfa template was then used to outline the melolabial interpolation flap.  The donor area for the pedicle flap was then injected with anesthesia.  The flap was excised through the skin and subcutaneous tissue down to the layer of the underlying musculature.  The pedicle flap was carefully excised within this deep plane to maintain its blood supply.  The edges of the donor site were undermined.   The donor site was closed in a primary fashion.  The pedicle was then rotated into position and sutured.  Once the tube was sutured into place, adequate blood supply was confirmed with blanching and refill.  The pedicle was then wrapped with xeroform gauze and dressed appropriately with a telfa and gauze bandage to ensure continued blood supply and protect the attached pedicle.
Undermining Location (Optional): in the superficial subcutaneous fat
Complex Repair And Transposition Flap Text: The defect edges were debeveled with a #15 scalpel blade.  The primary defect was closed partially with a complex linear closure.  Given the location of the remaining defect, shape of the defect and the proximity to free margins a transposition flap was deemed most appropriate for complete closure of the defect.  Using a sterile surgical marker, an appropriate advancement flap was drawn incorporating the defect and placing the expected incisions within the relaxed skin tension lines where possible.    The area thus outlined was incised deep to adipose tissue with a #15 scalpel blade.  The skin margins were undermined to an appropriate distance in all directions utilizing iris scissors.
Dermal Autograft Text: The defect edges were debeveled with a #15 scalpel blade.  Given the location of the defect, shape of the defect and the proximity to free margins a dermal autograft was deemed most appropriate.  Using a sterile surgical marker, the primary defect shape was transferred to the donor site. The area thus outlined was incised deep to adipose tissue with a #15 scalpel blade.  The harvested graft was then trimmed of adipose and epidermal tissue until only dermis was left.  The skin graft was then placed in the primary defect and oriented appropriately.
Dressing: sterile steri-strips and sterile pressure dressing
Excisional Biopsy Additional Text (Leave Blank If You Do Not Want): The margin was drawn around the clinically apparent lesion. An elliptical shape was then drawn on the skin incorporating the lesion and margins.  Incisions were then made along these lines to the appropriate tissue plane and the lesion was extirpated.
Perilesional Excision Additional Text (Leave Blank If You Do Not Want): The margin was drawn around the clinically apparent lesion. Incisions were then made along these lines to the appropriate tissue plane and the lesion was extirpated.
Complex Repair And Melolabial Flap Text: The defect edges were debeveled with a #15 scalpel blade.  The primary defect was closed partially with a complex linear closure.  Given the location of the remaining defect, shape of the defect and the proximity to free margins a melolabial flap was deemed most appropriate for complete closure of the defect.  Using a sterile surgical marker, an appropriate advancement flap was drawn incorporating the defect and placing the expected incisions within the relaxed skin tension lines where possible.    The area thus outlined was incised deep to adipose tissue with a #15 scalpel blade.  The skin margins were undermined to an appropriate distance in all directions utilizing iris scissors.
Split-Thickness Skin Graft Text: The defect edges were debeveled with a #15 scalpel blade.  Given the location of the defect, shape of the defect and the proximity to free margins a split thickness skin graft was deemed most appropriate.  Using a sterile surgical marker, the primary defect shape was transferred to the donor site. The split thickness graft was then harvested.  The skin graft was then placed in the primary defect and oriented appropriately.
Intermediate / Complex Repair - Final Wound Length In Cm: 2.5
Complex Repair And V-Y Plasty Text: The defect edges were debeveled with a #15 scalpel blade.  The primary defect was closed partially with a complex linear closure.  Given the location of the remaining defect, shape of the defect and the proximity to free margins a V-Y plasty was deemed most appropriate for complete closure of the defect.  Using a sterile surgical marker, an appropriate advancement flap was drawn incorporating the defect and placing the expected incisions within the relaxed skin tension lines where possible.    The area thus outlined was incised deep to adipose tissue with a #15 scalpel blade.  The skin margins were undermined to an appropriate distance in all directions utilizing iris scissors.
Additional Epidermal Closure (Optional): simple interrupted
Trilobed Flap Text: The defect edges were debeveled with a #15 scalpel blade.  Given the location of the defect and the proximity to free margins a trilobed flap was deemed most appropriate.  Using a sterile surgical marker, an appropriate trilobed flap drawn around the defect.    The area thus outlined was incised deep to adipose tissue with a #15 scalpel blade.  The skin margins were undermined to an appropriate distance in all directions utilizing iris scissors.
Complex Repair And Single Advancement Flap Text: The defect edges were debeveled with a #15 scalpel blade.  The primary defect was closed partially with a complex linear closure.  Given the location of the remaining defect, shape of the defect and the proximity to free margins a single advancement flap was deemed most appropriate for complete closure of the defect.  Using a sterile surgical marker, an appropriate advancement flap was drawn incorporating the defect and placing the expected incisions within the relaxed skin tension lines where possible.    The area thus outlined was incised deep to adipose tissue with a #15 scalpel blade.  The skin margins were undermined to an appropriate distance in all directions utilizing iris scissors.
Additional Deep Sutures: 4-0 Polysorb
Double Island Pedicle Flap Text: The defect edges were debeveled with a #15 scalpel blade.  Given the location of the defect, shape of the defect and the proximity to free margins a double island pedicle advancement flap was deemed most appropriate.  Using a sterile surgical marker, an appropriate advancement flap was drawn incorporating the defect, outlining the appropriate donor tissue and placing the expected incisions within the relaxed skin tension lines where possible.    The area thus outlined was incised deep to adipose tissue with a #15 scalpel blade.  The skin margins were undermined to an appropriate distance in all directions around the primary defect and laterally outward around the island pedicle utilizing iris scissors.  There was minimal undermining beneath the pedicle flap.
Lab Facility: 
Spiral Flap Text: The defect edges were debeveled with a #15 scalpel blade.  Given the location of the defect, shape of the defect and the proximity to free margins a spiral flap was deemed most appropriate.  Using a sterile surgical marker, an appropriate rotation flap was drawn incorporating the defect and placing the expected incisions within the relaxed skin tension lines where possible. The area thus outlined was incised deep to adipose tissue with a #15 scalpel blade.  The skin margins were undermined to an appropriate distance in all directions utilizing iris scissors.
M-Plasty Intermediate Repair Preamble Text (Leave Blank If You Do Not Want): Undermining was performed with blunt dissection.
Advancement Flap (Double) Text: The defect edges were debeveled with a #15 scalpel blade.  Given the location of the defect and the proximity to free margins a double advancement flap was deemed most appropriate.  Using a sterile surgical marker, the appropriate advancement flaps were drawn incorporating the defect and placing the expected incisions within the relaxed skin tension lines where possible.    The area thus outlined was incised deep to adipose tissue with a #15 scalpel blade.  The skin margins were undermined to an appropriate distance in all directions utilizing iris scissors.
Ftsg Text: The defect edges were debeveled with a #15 scalpel blade.  Given the location of the defect, shape of the defect and the proximity to free margins a full thickness skin graft was deemed most appropriate.  Using a sterile surgical marker, the primary defect shape was transferred to the donor site. The area thus outlined was incised deep to adipose tissue with a #15 scalpel blade.  The harvested graft was then trimmed of adipose tissue until only dermis and epidermis was left.  The skin margins of the secondary defect were undermined to an appropriate distance in all directions utilizing iris scissors.  The secondary defect was closed with interrupted buried subcutaneous sutures.  The skin edges were then re-apposed with running  sutures.  The skin graft was then placed in the primary defect and oriented appropriately.
Elliptical Excision Additional Text (Leave Blank If You Do Not Want): The margin was drawn around the clinically apparent lesion.  An elliptical shape was then drawn on the skin incorporating the lesion and margins.  Incisions were then made along these lines to the appropriate tissue plane and the lesion was extirpated.
Melolabial Transposition Flap Text: The defect edges were debeveled with a #15 scalpel blade.  Given the location of the defect and the proximity to free margins a melolabial flap was deemed most appropriate.  Using a sterile surgical marker, an appropriate melolabial transposition flap was drawn incorporating the defect.    The area thus outlined was incised deep to adipose tissue with a #15 scalpel blade.  The skin margins were undermined to an appropriate distance in all directions utilizing iris scissors.
Purse String (Intermediate) Text: Given the location of the defect and the characteristics of the surrounding skin a purse string intermediate closure was deemed most appropriate.  Undermining was performed circumfirentially around the surgical defect.  A purse string suture was then placed and tightened.
V-Y Flap Text: The defect edges were debeveled with a #15 scalpel blade.  Given the location of the defect, shape of the defect and the proximity to free margins a V-Y flap was deemed most appropriate.  Using a sterile surgical marker, an appropriate advancement flap was drawn incorporating the defect and placing the expected incisions within the relaxed skin tension lines where possible.    The area thus outlined was incised deep to adipose tissue with a #15 scalpel blade.  The skin margins were undermined to an appropriate distance in all directions utilizing iris scissors.
Modified Advancement Flap Text: The defect edges were debeveled with a #15 scalpel blade.  Given the location of the defect, shape of the defect and the proximity to free margins a modified advancement flap was deemed most appropriate.  Using a sterile surgical marker, an appropriate advancement flap was drawn incorporating the defect and placing the expected incisions within the relaxed skin tension lines where possible.    The area thus outlined was incised deep to adipose tissue with a #15 scalpel blade.  The skin margins were undermined to an appropriate distance in all directions utilizing iris scissors.
Mucosal Advancement Flap Text: Given the location of the defect, shape of the defect and the proximity to free margins a mucosal advancement flap was deemed most appropriate. Incisions were made with a 15 blade scalpel in the appropriate fashion along the cutaneous vermilion border and the mucosal lip. The remaining actinically damaged mucosal tissue was excised.  The mucosal advancement flap was then elevated to the gingival sulcus with care taken to preserve the neurovascular structures and advanced into the primary defect. Care was taken to ensure that precise realignment of the vermilion border was achieved.
Slit Excision Additional Text (Leave Blank If You Do Not Want): A linear line was drawn on the skin overlying the lesion. An incision was made slowly until the lesion was visualized.  Once visualized, the lesion was removed with blunt dissection.
Consent was obtained from the patient. The risks and benefits to therapy were discussed in detail. Specifically, the risks of infection, scarring, bleeding, prolonged wound healing, incomplete removal, allergy to anesthesia, nerve injury and recurrence were addressed. Prior to the procedure, the treatment site was clearly identified and confirmed by the patient. All components of Universal Protocol/PAUSE Rule completed.
Complex Repair And O-L Flap Text: The defect edges were debeveled with a #15 scalpel blade.  The primary defect was closed partially with a complex linear closure.  Given the location of the remaining defect, shape of the defect and the proximity to free margins an O-L flap was deemed most appropriate for complete closure of the defect.  Using a sterile surgical marker, an appropriate flap was drawn incorporating the defect and placing the expected incisions within the relaxed skin tension lines where possible.    The area thus outlined was incised deep to adipose tissue with a #15 scalpel blade.  The skin margins were undermined to an appropriate distance in all directions utilizing iris scissors.
Estimated Blood Loss (Cc): minimal
Complex Repair And Z Plasty Text: The defect edges were debeveled with a #15 scalpel blade.  The primary defect was closed partially with a complex linear closure.  Given the location of the remaining defect, shape of the defect and the proximity to free margins a Z plasty was deemed most appropriate for complete closure of the defect.  Using a sterile surgical marker, an appropriate advancement flap was drawn incorporating the defect and placing the expected incisions within the relaxed skin tension lines where possible.    The area thus outlined was incised deep to adipose tissue with a #15 scalpel blade.  The skin margins were undermined to an appropriate distance in all directions utilizing iris scissors.
Anesthesia Volume In Cc: 6
Bilateral Helical Rim Advancement Flap Text: The defect edges were debeveled with a #15 blade scalpel.  Given the location of the defect and the proximity to free margins (helical rim) a bilateral helical rim advancement flap was deemed most appropriate.  Using a sterile surgical marker, the appropriate advancement flaps were drawn incorporating the defect and placing the expected incisions between the helical rim and antihelix where possible.  The area thus outlined was incised through and through with a #15 scalpel blade.  With a skin hook and iris scissors, the flaps were gently and sharply undermined and freed up.
Saucerization Excision Additional Text (Leave Blank If You Do Not Want): The margin was drawn around the clinically apparent lesion.  Incisions were then made along these lines, in a tangential fashion, to the appropriate tissue plane and the lesion was extirpated.
Excision Method: Elliptical
Hemostasis: Electrocautery
Home Suture Removal Text: Patient was provided a home suture removal kit and will remove their sutures at home.  If they have any questions or difficulties they will call the office.
Rotation Flap Text: The defect edges were debeveled with a #15 scalpel blade.  Given the location of the defect, shape of the defect and the proximity to free margins a rotation flap was deemed most appropriate.  Using a sterile surgical marker, an appropriate rotation flap was drawn incorporating the defect and placing the expected incisions within the relaxed skin tension lines where possible.    The area thus outlined was incised deep to adipose tissue with a #15 scalpel blade.  The skin margins were undermined to an appropriate distance in all directions utilizing iris scissors.
Alar Island Pedicle Flap Text: The defect edges were debeveled with a #15 scalpel blade.  Given the location of the defect, shape of the defect and the proximity to the alar rim an island pedicle advancement flap was deemed most appropriate.  Using a sterile surgical marker, an appropriate advancement flap was drawn incorporating the defect, outlining the appropriate donor tissue and placing the expected incisions within the nasal ala running parallel to the alar rim. The area thus outlined was incised with a #15 scalpel blade.  The skin margins were undermined minimally to an appropriate distance in all directions around the primary defect and laterally outward around the island pedicle utilizing iris scissors.  There was minimal undermining beneath the pedicle flap.
S Plasty Text: Given the location and shape of the defect, and the orientation of relaxed skin tension lines, an S-plasty was deemed most appropriate for repair.  Using a sterile surgical marker, the appropriate outline of the S-plasty was drawn, incorporating the defect and placing the expected incisions within the relaxed skin tension lines where possible.  The area thus outlined was incised deep to adipose tissue with a #15 scalpel blade.  The skin margins were undermined to an appropriate distance in all directions utilizing iris scissors. The skin flaps were advanced over the defect.  The opposing margins were then approximated with interrupted buried subcutaneous sutures.
V-Y Plasty Text: The defect edges were debeveled with a #15 scalpel blade.  Given the location of the defect, shape of the defect and the proximity to free margins an V-Y advancement flap was deemed most appropriate.  Using a sterile surgical marker, an appropriate advancement flap was drawn incorporating the defect and placing the expected incisions within the relaxed skin tension lines where possible.    The area thus outlined was incised deep to adipose tissue with a #15 scalpel blade.  The skin margins were undermined to an appropriate distance in all directions utilizing iris scissors.
Advancement Flap (Single) Text: The defect edges were debeveled with a #15 scalpel blade.  Given the location of the defect and the proximity to free margins a single advancement flap was deemed most appropriate.  Using a sterile surgical marker, an appropriate advancement flap was drawn incorporating the defect and placing the expected incisions within the relaxed skin tension lines where possible.    The area thus outlined was incised deep to adipose tissue with a #15 scalpel blade.  The skin margins were undermined to an appropriate distance in all directions utilizing iris scissors.
Crescentic Advancement Flap Text: The defect edges were debeveled with a #15 scalpel blade.  Given the location of the defect and the proximity to free margins a crescentic advancement flap was deemed most appropriate.  Using a sterile surgical marker, the appropriate advancement flap was drawn incorporating the defect and placing the expected incisions within the relaxed skin tension lines where possible.    The area thus outlined was incised deep to adipose tissue with a #15 scalpel blade.  The skin margins were undermined to an appropriate distance in all directions utilizing iris scissors.
Island Pedicle Flap Text: The defect edges were debeveled with a #15 scalpel blade.  Given the location of the defect, shape of the defect and the proximity to free margins an island pedicle advancement flap was deemed most appropriate.  Using a sterile surgical marker, an appropriate advancement flap was drawn incorporating the defect, outlining the appropriate donor tissue and placing the expected incisions within the relaxed skin tension lines where possible.    The area thus outlined was incised deep to adipose tissue with a #15 scalpel blade.  The skin margins were undermined to an appropriate distance in all directions around the primary defect and laterally outward around the island pedicle utilizing iris scissors.  There was minimal undermining beneath the pedicle flap.
Epidermal Autograft Text: The defect edges were debeveled with a #15 scalpel blade.  Given the location of the defect, shape of the defect and the proximity to free margins an epidermal autograft was deemed most appropriate.  Using a sterile surgical marker, the primary defect shape was transferred to the donor site. The epidermal graft was then harvested.  The skin graft was then placed in the primary defect and oriented appropriately.
Complex Repair And O-T Advancement Flap Text: The defect edges were debeveled with a #15 scalpel blade.  The primary defect was closed partially with a complex linear closure.  Given the location of the remaining defect, shape of the defect and the proximity to free margins an O-T advancement flap was deemed most appropriate for complete closure of the defect.  Using a sterile surgical marker, an appropriate advancement flap was drawn incorporating the defect and placing the expected incisions within the relaxed skin tension lines where possible.    The area thus outlined was incised deep to adipose tissue with a #15 scalpel blade.  The skin margins were undermined to an appropriate distance in all directions utilizing iris scissors.
Skin Substitute Text: The defect edges were debeveled with a #15 scalpel blade.  Given the location of the defect, shape of the defect and the proximity to free margins a skin substitute graft was deemed most appropriate.  The graft material was trimmed to fit the size of the defect. The graft was then placed in the primary defect and oriented appropriately.
Complex Repair And Xenograft Text: The defect edges were debeveled with a #15 scalpel blade.  The primary defect was closed partially with a complex linear closure.  Given the location of the defect, shape of the defect and the proximity to free margins a xenograft was deemed most appropriate to repair the remaining defect.  The graft was trimmed to fit the size of the remaining defect.  The graft was then placed in the primary defect, oriented appropriately, and sutured into place.
Complex Repair And Rhombic Flap Text: The defect edges were debeveled with a #15 scalpel blade.  The primary defect was closed partially with a complex linear closure.  Given the location of the remaining defect, shape of the defect and the proximity to free margins a rhombic flap was deemed most appropriate for complete closure of the defect.  Using a sterile surgical marker, an appropriate advancement flap was drawn incorporating the defect and placing the expected incisions within the relaxed skin tension lines where possible.    The area thus outlined was incised deep to adipose tissue with a #15 scalpel blade.  The skin margins were undermined to an appropriate distance in all directions utilizing iris scissors.
Post-Care Instructions: I reviewed with the patient in detail post-care instructions. Patient is not to engage in any heavy lifting, exercise, or swimming for the next 14 days. Should the patient develop any fevers, chills, bleeding, severe pain patient will contact the office immediately.
Lip Wedge Excision Repair Text: Given the location of the defect and the proximity to free margins a full thickness wedge repair was deemed most appropriate.  Using a sterile surgical marker, the appropriate repair was drawn incorporating the defect and placing the expected incisions perpendicular to the vermilion border.  The vermilion border was also meticulously outlined to ensure appropriate reapproximation during the repair.  The area thus outlined was incised through and through with a #15 scalpel blade.  The muscularis and dermis were reaproximated with deep sutures following hemostasis. Care was taken to realign the vermilion border before proceeding with the superficial closure.  Once the vermilion was realigned the superfical and mucosal closure was finished.
Complex Repair And Dorsal Nasal Flap Text: The defect edges were debeveled with a #15 scalpel blade.  The primary defect was closed partially with a complex linear closure.  Given the location of the remaining defect, shape of the defect and the proximity to free margins a dorsal nasal flap was deemed most appropriate for complete closure of the defect.  Using a sterile surgical marker, an appropriate flap was drawn incorporating the defect and placing the expected incisions within the relaxed skin tension lines where possible.    The area thus outlined was incised deep to adipose tissue with a #15 scalpel blade.  The skin margins were undermined to an appropriate distance in all directions utilizing iris scissors.
Pathology Comment (Optional): Basal cell carcinoma, nodulo-infiltrative type
Island Pedicle Flap With Canthal Suspension Text: The defect edges were debeveled with a #15 scalpel blade.  Given the location of the defect, shape of the defect and the proximity to free margins an island pedicle advancement flap was deemed most appropriate.  Using a sterile surgical marker, an appropriate advancement flap was drawn incorporating the defect, outlining the appropriate donor tissue and placing the expected incisions within the relaxed skin tension lines where possible. The area thus outlined was incised deep to adipose tissue with a #15 scalpel blade.  The skin margins were undermined to an appropriate distance in all directions around the primary defect and laterally outward around the island pedicle utilizing iris scissors.  There was minimal undermining beneath the pedicle flap. A suspension suture was placed in the canthal tendon to prevent tension and prevent ectropion.
Lab: [id number]
Complex Repair And Skin Substitute Graft Text: The defect edges were debeveled with a #15 scalpel blade.  The primary defect was closed partially with a complex linear closure.  Given the location of the remaining defect, shape of the defect and the proximity to free margins a skin substitute graft was deemed most appropriate to repair the remaining defect.  The graft was trimmed to fit the size of the remaining defect.  The graft was then placed in the primary defect, oriented appropriately, and sutured into place.
A-T Advancement Flap Text: The defect edges were debeveled with a #15 scalpel blade.  Given the location of the defect, shape of the defect and the proximity to free margins an A-T advancement flap was deemed most appropriate.  Using a sterile surgical marker, an appropriate advancement flap was drawn incorporating the defect and placing the expected incisions within the relaxed skin tension lines where possible.    The area thus outlined was incised deep to adipose tissue with a #15 scalpel blade.  The skin margins were undermined to an appropriate distance in all directions utilizing iris scissors.
Complex Repair And W Plasty Text: The defect edges were debeveled with a #15 scalpel blade.  The primary defect was closed partially with a complex linear closure.  Given the location of the remaining defect, shape of the defect and the proximity to free margins a W plasty was deemed most appropriate for complete closure of the defect.  Using a sterile surgical marker, an appropriate advancement flap was drawn incorporating the defect and placing the expected incisions within the relaxed skin tension lines where possible.    The area thus outlined was incised deep to adipose tissue with a #15 scalpel blade.  The skin margins were undermined to an appropriate distance in all directions utilizing iris scissors.
No Repair - Repaired With Adjacent Surgical Defect Text (Leave Blank If You Do Not Want): After the excision the defect was repaired concurrently with another surgical defect which was in close approximation.
O-T Plasty Text: The defect edges were debeveled with a #15 scalpel blade.  Given the location of the defect, shape of the defect and the proximity to free margins an O-T plasty was deemed most appropriate.  Using a sterile surgical marker, an appropriate O-T plasty was drawn incorporating the defect and placing the expected incisions within the relaxed skin tension lines where possible.    The area thus outlined was incised deep to adipose tissue with a #15 scalpel blade.  The skin margins were undermined to an appropriate distance in all directions utilizing iris scissors.
Paramedian Forehead Flap Text: A decision was made to reconstruct the defect utilizing an interpolation axial flap and a staged reconstruction.  A telfa template was made of the defect.  This telfa template was then used to outline the paramedian forehead pedicle flap.  The donor area for the pedicle flap was then injected with anesthesia.  The flap was excised through the skin and subcutaneous tissue down to the layer of the underlying musculature.  The pedicle flap was carefully excised within this deep plane to maintain its blood supply.  The edges of the donor site were undermined.   The donor site was closed in a primary fashion.  The pedicle was then rotated into position and sutured.  Once the tube was sutured into place, adequate blood supply was confirmed with blanching and refill.  The pedicle was then wrapped with xeroform gauze and dressed appropriately with a telfa and gauze bandage to ensure continued blood supply and protect the attached pedicle.
Composite Graft Text: The defect edges were debeveled with a #15 scalpel blade.  Given the location of the defect, shape of the defect, the proximity to free margins and the fact the defect was full thickness a composite graft was deemed most appropriate.  The defect was outline and then transferred to the donor site.  A full thickness graft was then excised from the donor site. The graft was then placed in the primary defect, oriented appropriately and then sutured into place.  The secondary defect was then repaired using a primary closure.
Scalpel Size: 15 blade
Complex Repair And A-T Advancement Flap Text: The defect edges were debeveled with a #15 scalpel blade.  The primary defect was closed partially with a complex linear closure.  Given the location of the remaining defect, shape of the defect and the proximity to free margins an A-T advancement flap was deemed most appropriate for complete closure of the defect.  Using a sterile surgical marker, an appropriate advancement flap was drawn incorporating the defect and placing the expected incisions within the relaxed skin tension lines where possible.    The area thus outlined was incised deep to adipose tissue with a #15 scalpel blade.  The skin margins were undermined to an appropriate distance in all directions utilizing iris scissors.
Repair Type: Intermediate
Hatchet Flap Text: The defect edges were debeveled with a #15 scalpel blade.  Given the location of the defect, shape of the defect and the proximity to free margins a hatchet flap was deemed most appropriate.  Using a sterile surgical marker, an appropriate hatchet flap was drawn incorporating the defect and placing the expected incisions within the relaxed skin tension lines where possible.    The area thus outlined was incised deep to adipose tissue with a #15 scalpel blade.  The skin margins were undermined to an appropriate distance in all directions utilizing iris scissors.
Positioning (Leave Blank If You Do Not Want): The patient was placed in a comfortable position exposing the surgical site.
Cheek Interpolation Flap Text: A decision was made to reconstruct the defect utilizing an interpolation axial flap and a staged reconstruction.  A telfa template was made of the defect.  This telfa template was then used to outline the Cheek Interpolation flap.  The donor area for the pedicle flap was then injected with anesthesia.  The flap was excised through the skin and subcutaneous tissue down to the layer of the underlying musculature.  The interpolation flap was carefully excised within this deep plane to maintain its blood supply.  The edges of the donor site were undermined.   The donor site was closed in a primary fashion.  The pedicle was then rotated into position and sutured.  Once the tube was sutured into place, adequate blood supply was confirmed with blanching and refill.  The pedicle was then wrapped with xeroform gauze and dressed appropriately with a telfa and gauze bandage to ensure continued blood supply and protect the attached pedicle.
O-T Advancement Flap Text: The defect edges were debeveled with a #15 scalpel blade.  Given the location of the defect, shape of the defect and the proximity to free margins an O-T advancement flap was deemed most appropriate.  Using a sterile surgical marker, an appropriate advancement flap was drawn incorporating the defect and placing the expected incisions within the relaxed skin tension lines where possible.    The area thus outlined was incised deep to adipose tissue with a #15 scalpel blade.  The skin margins were undermined to an appropriate distance in all directions utilizing iris scissors.
Complex Repair And Double M Plasty Text: The defect edges were debeveled with a #15 scalpel blade.  The primary defect was closed partially with a complex linear closure.  Given the location of the remaining defect, shape of the defect and the proximity to free margins a double M plasty was deemed most appropriate for complete closure of the defect.  Using a sterile surgical marker, an appropriate advancement flap was drawn incorporating the defect and placing the expected incisions within the relaxed skin tension lines where possible.    The area thus outlined was incised deep to adipose tissue with a #15 scalpel blade.  The skin margins were undermined to an appropriate distance in all directions utilizing iris scissors.
W Plasty Text: The lesion was extirpated to the level of the fat with a #15 scalpel blade.  Given the location of the defect, shape of the defect and the proximity to free margins a W-plasty was deemed most appropriate for repair.  Using a sterile surgical marker, the appropriate transposition arms of the W-plasty were drawn incorporating the defect and placing the expected incisions within the relaxed skin tension lines where possible.    The area thus outlined was incised deep to adipose tissue with a #15 scalpel blade.  The skin margins were undermined to an appropriate distance in all directions utilizing iris scissors.  The opposing transposition arms were then transposed into place in opposite direction and anchored with interrupted buried subcutaneous sutures.
Complex Repair And Tissue Cultured Epidermal Autograft Text: The defect edges were debeveled with a #15 scalpel blade.  The primary defect was closed partially with a complex linear closure.  Given the location of the defect, shape of the defect and the proximity to free margins an tissue cultured epidermal autograft was deemed most appropriate to repair the remaining defect.  The graft was trimmed to fit the size of the remaining defect.  The graft was then placed in the primary defect, oriented appropriately, and sutured into place.
H Plasty Text: Given the location of the defect, shape of the defect and the proximity to free margins a H-plasty was deemed most appropriate for repair.  Using a sterile surgical marker, the appropriate advancement arms of the H-plasty were drawn incorporating the defect and placing the expected incisions within the relaxed skin tension lines where possible. The area thus outlined was incised deep to adipose tissue with a #15 scalpel blade. The skin margins were undermined to an appropriate distance in all directions utilizing iris scissors.  The opposing advancement arms were then advanced into place in opposite direction and anchored with interrupted buried subcutaneous sutures.
Complex Repair And Rotation Flap Text: The defect edges were debeveled with a #15 scalpel blade.  The primary defect was closed partially with a complex linear closure.  Given the location of the remaining defect, shape of the defect and the proximity to free margins a rotation flap was deemed most appropriate for complete closure of the defect.  Using a sterile surgical marker, an appropriate advancement flap was drawn incorporating the defect and placing the expected incisions within the relaxed skin tension lines where possible.    The area thus outlined was incised deep to adipose tissue with a #15 scalpel blade.  The skin margins were undermined to an appropriate distance in all directions utilizing iris scissors.
Tissue Cultured Epidermal Autograft Text: The defect edges were debeveled with a #15 scalpel blade.  Given the location of the defect, shape of the defect and the proximity to free margins a tissue cultured epidermal autograft was deemed most appropriate.  The graft was then trimmed to fit the size of the defect.  The graft was then placed in the primary defect and oriented appropriately.
Ear Star Wedge Flap Text: The defect edges were debeveled with a #15 blade scalpel.  Given the location of the defect and the proximity to free margins (helical rim) an ear star wedge flap was deemed most appropriate.  Using a sterile surgical marker, the appropriate flap was drawn incorporating the defect and placing the expected incisions between the helical rim and antihelix where possible.  The area thus outlined was incised through and through with a #15 scalpel blade.
Wound Care: Petrolatum
Size Of Margin In Cm: 0.3
Z Plasty Text: The lesion was extirpated to the level of the fat with a #15 scalpel blade.  Given the location of the defect, shape of the defect and the proximity to free margins a Z-plasty was deemed most appropriate for repair.  Using a sterile surgical marker, the appropriate transposition arms of the Z-plasty were drawn incorporating the defect and placing the expected incisions within the relaxed skin tension lines where possible.    The area thus outlined was incised deep to adipose tissue with a #15 scalpel blade.  The skin margins were undermined to an appropriate distance in all directions utilizing iris scissors.  The opposing transposition arms were then transposed into place in opposite direction and anchored with interrupted buried subcutaneous sutures.
Detail Level: Detailed
Epidermal Closure: subcuticular
Transposition Flap Text: The defect edges were debeveled with a #15 scalpel blade.  Given the location of the defect and the proximity to free margins a transposition flap was deemed most appropriate.  Using a sterile surgical marker, an appropriate transposition flap was drawn incorporating the defect.    The area thus outlined was incised deep to adipose tissue with a #15 scalpel blade.  The skin margins were undermined to an appropriate distance in all directions utilizing iris scissors.
Pre-Excision Curettage Text (Leave Blank If You Do Not Want): Prior to drawing the surgical margin the visible lesion was removed with electrodesiccation and curettage to clearly define the lesion size.
Complex Repair And Ftsg Text: The defect edges were debeveled with a #15 scalpel blade.  The primary defect was closed partially with a complex linear closure.  Given the location of the defect, shape of the defect and the proximity to free margins a full thickness skin graft was deemed most appropriate to repair the remaining defect.  The graft was trimmed to fit the size of the remaining defect.  The graft was then placed in the primary defect, oriented appropriately, and sutured into place.
Previous Accession (Optional): [id number]